# Patient Record
Sex: FEMALE | Race: WHITE | Employment: OTHER | URBAN - METROPOLITAN AREA
[De-identification: names, ages, dates, MRNs, and addresses within clinical notes are randomized per-mention and may not be internally consistent; named-entity substitution may affect disease eponyms.]

---

## 2017-01-18 ENCOUNTER — OFFICE VISIT (OUTPATIENT)
Dept: PRIMARY CARE CLINIC | Age: 67
End: 2017-01-18

## 2017-01-18 VITALS
RESPIRATION RATE: 14 BRPM | SYSTOLIC BLOOD PRESSURE: 118 MMHG | HEART RATE: 74 BPM | TEMPERATURE: 97.5 F | WEIGHT: 98.6 LBS | BODY MASS INDEX: 17.47 KG/M2 | DIASTOLIC BLOOD PRESSURE: 78 MMHG | HEIGHT: 63 IN

## 2017-01-18 DIAGNOSIS — Z11.59 NEED FOR HEPATITIS C SCREENING TEST: ICD-10-CM

## 2017-01-18 DIAGNOSIS — E03.8 OTHER SPECIFIED HYPOTHYROIDISM: ICD-10-CM

## 2017-01-18 DIAGNOSIS — R73.9 HYPERGLYCEMIA: ICD-10-CM

## 2017-01-18 DIAGNOSIS — M19.049 ARTHRITIS OF HAND: ICD-10-CM

## 2017-01-18 DIAGNOSIS — Z11.4 ENCOUNTER FOR SCREENING FOR HIV: ICD-10-CM

## 2017-01-18 DIAGNOSIS — I73.00 RAYNAUD'S DISEASE WITHOUT GANGRENE: Primary | ICD-10-CM

## 2017-01-18 DIAGNOSIS — M54.2 ACUTE NECK PAIN: ICD-10-CM

## 2017-01-18 DIAGNOSIS — Z12.31 SCREENING MAMMOGRAM, ENCOUNTER FOR: ICD-10-CM

## 2017-01-18 DIAGNOSIS — Z12.11 SCREEN FOR COLON CANCER: ICD-10-CM

## 2017-01-18 DIAGNOSIS — I73.00 RAYNAUD'S DISEASE WITHOUT GANGRENE: ICD-10-CM

## 2017-01-18 LAB
ALBUMIN SERPL-MCNC: 4.6 G/DL (ref 3.9–4.9)
ALP BLD-CCNC: 56 U/L (ref 40–130)
ALT SERPL-CCNC: 17 U/L (ref 0–33)
ANION GAP SERPL CALCULATED.3IONS-SCNC: 13 MEQ/L (ref 7–13)
AST SERPL-CCNC: 30 U/L (ref 0–35)
BASOPHILS ABSOLUTE: 0 K/UL (ref 0–0.2)
BASOPHILS RELATIVE PERCENT: 0.5 %
BILIRUB SERPL-MCNC: 0.3 MG/DL (ref 0–1.2)
BUN BLDV-MCNC: 15 MG/DL (ref 8–23)
CALCIUM SERPL-MCNC: 9.2 MG/DL (ref 8.6–10.2)
CHLORIDE BLD-SCNC: 99 MEQ/L (ref 98–107)
CO2: 27 MEQ/L (ref 22–29)
CREAT SERPL-MCNC: 0.59 MG/DL (ref 0.5–0.9)
EOSINOPHILS ABSOLUTE: 0.1 K/UL (ref 0–0.7)
EOSINOPHILS RELATIVE PERCENT: 1.3 %
GFR AFRICAN AMERICAN: >60
GFR NON-AFRICAN AMERICAN: >60
GLOBULIN: 1.6 G/DL (ref 2.3–3.5)
GLUCOSE BLD-MCNC: 67 MG/DL (ref 74–109)
HBA1C MFR BLD: 5.2 % (ref 4.8–5.9)
HCT VFR BLD CALC: 45.8 % (ref 37–47)
HEMOGLOBIN: 14.7 G/DL (ref 12–16)
HEPATITIS C ANTIBODY INTERPRETATION: NORMAL
LYMPHOCYTES ABSOLUTE: 1.5 K/UL (ref 1–4.8)
LYMPHOCYTES RELATIVE PERCENT: 30.4 %
MCH RBC QN AUTO: 31.6 PG (ref 27–31.3)
MCHC RBC AUTO-ENTMCNC: 32 % (ref 33–37)
MCV RBC AUTO: 98.8 FL (ref 82–100)
MONOCYTES ABSOLUTE: 0.8 K/UL (ref 0.2–0.8)
MONOCYTES RELATIVE PERCENT: 15.8 %
NEUTROPHILS ABSOLUTE: 2.5 K/UL (ref 1.4–6.5)
NEUTROPHILS RELATIVE PERCENT: 52 %
PDW BLD-RTO: 12.2 % (ref 11.5–14.5)
PLATELET # BLD: 219 K/UL (ref 130–400)
POTASSIUM SERPL-SCNC: 3.5 MEQ/L (ref 3.5–5.1)
RBC # BLD: 4.64 M/UL (ref 4.2–5.4)
RHEUMATOID FACTOR: <10 IU/ML (ref 0–14)
SLIDE REVIEW: ABNORMAL
SODIUM BLD-SCNC: 139 MEQ/L (ref 132–144)
TOTAL PROTEIN: 6.2 G/DL (ref 6.4–8.1)
TSH SERPL DL<=0.05 MIU/L-ACNC: 1.02 UIU/ML (ref 0.27–4.2)
WBC # BLD: 4.8 K/UL (ref 4.8–10.8)

## 2017-01-18 PROCEDURE — 3014F SCREEN MAMMO DOC REV: CPT | Performed by: INTERNAL MEDICINE

## 2017-01-18 PROCEDURE — 3017F COLORECTAL CA SCREEN DOC REV: CPT | Performed by: INTERNAL MEDICINE

## 2017-01-18 PROCEDURE — G8427 DOCREV CUR MEDS BY ELIG CLIN: HCPCS | Performed by: INTERNAL MEDICINE

## 2017-01-18 PROCEDURE — 99214 OFFICE O/P EST MOD 30 MIN: CPT | Performed by: INTERNAL MEDICINE

## 2017-01-18 PROCEDURE — G8482 FLU IMMUNIZE ORDER/ADMIN: HCPCS | Performed by: INTERNAL MEDICINE

## 2017-01-18 PROCEDURE — G8419 CALC BMI OUT NRM PARAM NOF/U: HCPCS | Performed by: INTERNAL MEDICINE

## 2017-01-18 PROCEDURE — 1036F TOBACCO NON-USER: CPT | Performed by: INTERNAL MEDICINE

## 2017-01-18 RX ORDER — HYDROCODONE BITARTRATE AND ACETAMINOPHEN 5; 325 MG/1; MG/1
1 TABLET ORAL DAILY
Qty: 60 TABLET | Refills: 0 | Status: SHIPPED | OUTPATIENT
Start: 2017-01-18 | End: 2017-08-08

## 2017-01-18 RX ORDER — NIFEDIPINE 30 MG/1
30 TABLET, EXTENDED RELEASE ORAL DAILY
Qty: 30 TABLET | Refills: 5 | Status: SHIPPED | OUTPATIENT
Start: 2017-01-18 | End: 2017-08-08

## 2017-01-19 LAB
ANA INTERPRETATION: ABNORMAL
ANA PATTERN: ABNORMAL
ANA TITER: ABNORMAL
ANTI-NUCLEAR ANTIBODY (ANA): POSITIVE

## 2017-01-20 LAB — HIV-1 AND HIV-2 ANTIBODIES: NEGATIVE

## 2017-01-23 DIAGNOSIS — M19.049 ARTHRITIS OF HAND: ICD-10-CM

## 2017-01-23 RX ORDER — PREDNISONE 1 MG/1
TABLET ORAL
Qty: 90 TABLET | Refills: 1 | Status: SHIPPED | OUTPATIENT
Start: 2017-01-23 | End: 2017-08-08 | Stop reason: SDUPTHER

## 2017-01-29 ASSESSMENT — ENCOUNTER SYMPTOMS
FACIAL SWELLING: 0
APNEA: 0
ABDOMINAL DISTENTION: 0
CHOKING: 0
BLOOD IN STOOL: 0
PHOTOPHOBIA: 0

## 2017-01-30 DIAGNOSIS — M25.50 MULTIPLE JOINT PAIN: Primary | ICD-10-CM

## 2017-01-30 DIAGNOSIS — R76.8 ANA POSITIVE: ICD-10-CM

## 2017-02-06 RX ORDER — METOPROLOL SUCCINATE 25 MG/1
TABLET, EXTENDED RELEASE ORAL
Qty: 90 TABLET | Refills: 1 | Status: SHIPPED | OUTPATIENT
Start: 2017-02-06 | End: 2018-01-31 | Stop reason: SDUPTHER

## 2017-02-09 ENCOUNTER — HOSPITAL ENCOUNTER (OUTPATIENT)
Dept: WOMENS IMAGING | Age: 67
Discharge: HOME | End: 2017-02-09
Payer: COMMERCIAL

## 2017-02-09 DIAGNOSIS — Z12.31 SCREENING MAMMOGRAM, ENCOUNTER FOR: ICD-10-CM

## 2017-02-09 PROCEDURE — G0202 SCR MAMMO BI INCL CAD: HCPCS

## 2017-03-03 RX ORDER — LEVOTHYROXINE SODIUM 0.05 MG/1
TABLET ORAL
Qty: 90 TABLET | Refills: 1 | Status: SHIPPED | OUTPATIENT
Start: 2017-03-03 | End: 2017-09-01 | Stop reason: SDUPTHER

## 2017-03-30 RX ORDER — AMOXICILLIN 500 MG/1
2000 CAPSULE ORAL ONCE
Qty: 6 CAPSULE | Refills: 0 | Status: SHIPPED | OUTPATIENT
Start: 2017-03-30 | End: 2017-03-30

## 2017-07-14 DIAGNOSIS — M19.049 ARTHRITIS OF HAND: ICD-10-CM

## 2017-07-14 RX ORDER — HYDROXYCHLOROQUINE SULFATE 200 MG/1
TABLET, FILM COATED ORAL
Qty: 30 TABLET | Refills: 0 | Status: SHIPPED | OUTPATIENT
Start: 2017-07-14 | End: 2017-10-13 | Stop reason: SDUPTHER

## 2017-08-03 DIAGNOSIS — M19.049 ARTHRITIS OF HAND: ICD-10-CM

## 2017-08-03 RX ORDER — PREDNISONE 1 MG/1
TABLET ORAL
Qty: 90 TABLET | Refills: 1 | OUTPATIENT
Start: 2017-08-03

## 2017-08-08 ENCOUNTER — OFFICE VISIT (OUTPATIENT)
Dept: PRIMARY CARE CLINIC | Age: 67
End: 2017-08-08

## 2017-08-08 VITALS
WEIGHT: 99 LBS | SYSTOLIC BLOOD PRESSURE: 104 MMHG | RESPIRATION RATE: 14 BRPM | BODY MASS INDEX: 17.54 KG/M2 | DIASTOLIC BLOOD PRESSURE: 60 MMHG | HEIGHT: 63 IN | TEMPERATURE: 97.4 F | HEART RATE: 62 BPM

## 2017-08-08 DIAGNOSIS — M54.9 CHRONIC BILATERAL BACK PAIN, UNSPECIFIED BACK LOCATION: ICD-10-CM

## 2017-08-08 DIAGNOSIS — M81.8 OTHER OSTEOPOROSIS: ICD-10-CM

## 2017-08-08 DIAGNOSIS — M54.2 ACUTE NECK PAIN: ICD-10-CM

## 2017-08-08 DIAGNOSIS — M19.049 ARTHRITIS OF HAND: Primary | ICD-10-CM

## 2017-08-08 DIAGNOSIS — Z12.11 SCREEN FOR COLON CANCER: ICD-10-CM

## 2017-08-08 DIAGNOSIS — G89.29 CHRONIC BILATERAL BACK PAIN, UNSPECIFIED BACK LOCATION: ICD-10-CM

## 2017-08-08 PROCEDURE — G8419 CALC BMI OUT NRM PARAM NOF/U: HCPCS | Performed by: INTERNAL MEDICINE

## 2017-08-08 PROCEDURE — 3014F SCREEN MAMMO DOC REV: CPT | Performed by: INTERNAL MEDICINE

## 2017-08-08 PROCEDURE — 99214 OFFICE O/P EST MOD 30 MIN: CPT | Performed by: INTERNAL MEDICINE

## 2017-08-08 PROCEDURE — 4005F PHARM THX FOR OP RXD: CPT | Performed by: INTERNAL MEDICINE

## 2017-08-08 PROCEDURE — 3017F COLORECTAL CA SCREEN DOC REV: CPT | Performed by: INTERNAL MEDICINE

## 2017-08-08 PROCEDURE — G8427 DOCREV CUR MEDS BY ELIG CLIN: HCPCS | Performed by: INTERNAL MEDICINE

## 2017-08-08 PROCEDURE — 1036F TOBACCO NON-USER: CPT | Performed by: INTERNAL MEDICINE

## 2017-08-08 RX ORDER — COLCHICINE 0.6 MG/1
TABLET ORAL
Refills: 5 | COMMUNITY
Start: 2017-07-30 | End: 2021-12-08

## 2017-08-08 RX ORDER — HYDROCODONE BITARTRATE AND ACETAMINOPHEN 5; 325 MG/1; MG/1
1 TABLET ORAL 2 TIMES DAILY PRN
Qty: 60 TABLET | Refills: 0 | Status: SHIPPED | OUTPATIENT
Start: 2017-08-08 | End: 2018-04-04

## 2017-08-08 RX ORDER — ORPHENADRINE CITRATE 100 MG/1
100 TABLET, EXTENDED RELEASE ORAL 2 TIMES DAILY PRN
Qty: 60 TABLET | Refills: 1 | Status: SHIPPED | OUTPATIENT
Start: 2017-08-08 | End: 2018-04-04

## 2017-08-08 RX ORDER — PREDNISONE 1 MG/1
TABLET ORAL
Qty: 90 TABLET | Refills: 1 | Status: SHIPPED | OUTPATIENT
Start: 2017-08-08 | End: 2017-11-07 | Stop reason: SDUPTHER

## 2017-08-08 ASSESSMENT — PATIENT HEALTH QUESTIONNAIRE - PHQ9
1. LITTLE INTEREST OR PLEASURE IN DOING THINGS: 0
SUM OF ALL RESPONSES TO PHQ9 QUESTIONS 1 & 2: 0
SUM OF ALL RESPONSES TO PHQ QUESTIONS 1-9: 0
2. FEELING DOWN, DEPRESSED OR HOPELESS: 0

## 2017-08-11 ASSESSMENT — ENCOUNTER SYMPTOMS
ABDOMINAL PAIN: 0
APNEA: 0
FACIAL SWELLING: 0
ABDOMINAL DISTENTION: 0
BLOOD IN STOOL: 0
CHOKING: 0
BACK PAIN: 1
PHOTOPHOBIA: 0

## 2017-09-01 RX ORDER — LEVOTHYROXINE SODIUM 0.05 MG/1
TABLET ORAL
Qty: 90 TABLET | Refills: 1 | Status: SHIPPED | OUTPATIENT
Start: 2017-09-01 | End: 2017-11-07 | Stop reason: SDUPTHER

## 2017-10-13 DIAGNOSIS — M19.049 ARTHRITIS OF HAND: ICD-10-CM

## 2017-10-13 RX ORDER — HYDROXYCHLOROQUINE SULFATE 200 MG/1
TABLET, FILM COATED ORAL
Qty: 30 TABLET | Refills: 5 | Status: SHIPPED | OUTPATIENT
Start: 2017-10-13 | End: 2017-11-07 | Stop reason: SDUPTHER

## 2017-11-07 DIAGNOSIS — M19.049 ARTHRITIS OF HAND: ICD-10-CM

## 2017-11-07 RX ORDER — PREDNISONE 1 MG/1
TABLET ORAL
Qty: 90 TABLET | Refills: 1 | Status: SHIPPED | OUTPATIENT
Start: 2017-11-07 | End: 2018-04-04 | Stop reason: SDUPTHER

## 2017-11-07 RX ORDER — HYDROXYCHLOROQUINE SULFATE 200 MG/1
TABLET, FILM COATED ORAL
Qty: 30 TABLET | Refills: 5 | Status: SHIPPED | OUTPATIENT
Start: 2017-11-07 | End: 2018-04-04

## 2017-11-07 RX ORDER — LEVOTHYROXINE SODIUM 0.05 MG/1
TABLET ORAL
Qty: 90 TABLET | Refills: 1 | Status: SHIPPED | OUTPATIENT
Start: 2017-11-07 | End: 2018-04-04 | Stop reason: SDUPTHER

## 2017-11-08 ENCOUNTER — TELEPHONE (OUTPATIENT)
Dept: PRIMARY CARE CLINIC | Age: 67
End: 2017-11-08

## 2017-11-08 NOTE — TELEPHONE ENCOUNTER
Zoloft 50 mg 1 qd #7 NR phoned into [facility]. Pt is waiting for mail away meds to arrive.

## 2018-01-15 DIAGNOSIS — M19.049 ARTHRITIS OF HAND: ICD-10-CM

## 2018-01-15 RX ORDER — HYDROXYCHLOROQUINE SULFATE 200 MG/1
TABLET, FILM COATED ORAL
Qty: 60 TABLET | Refills: 3 | Status: SHIPPED | OUTPATIENT
Start: 2018-01-15 | End: 2018-05-14 | Stop reason: SDUPTHER

## 2018-01-31 RX ORDER — METOPROLOL SUCCINATE 25 MG/1
TABLET, EXTENDED RELEASE ORAL
Qty: 90 TABLET | Refills: 0 | Status: SHIPPED | OUTPATIENT
Start: 2018-01-31 | End: 2018-04-04 | Stop reason: SDUPTHER

## 2018-02-14 ENCOUNTER — HOSPITAL ENCOUNTER (OUTPATIENT)
Dept: WOMENS IMAGING | Age: 68
Discharge: HOME | End: 2018-02-16
Payer: MEDICARE

## 2018-02-14 DIAGNOSIS — M81.8 OSTEOPOROSIS, IDIOPATHIC: ICD-10-CM

## 2018-02-14 PROCEDURE — 77080 DXA BONE DENSITY AXIAL: CPT

## 2018-02-26 ENCOUNTER — TELEPHONE (OUTPATIENT)
Dept: PRIMARY CARE CLINIC | Age: 68
End: 2018-02-26

## 2018-04-04 ENCOUNTER — OFFICE VISIT (OUTPATIENT)
Dept: PRIMARY CARE CLINIC | Age: 68
End: 2018-04-04
Payer: MEDICARE

## 2018-04-04 VITALS
WEIGHT: 99.5 LBS | HEIGHT: 63 IN | TEMPERATURE: 97.2 F | DIASTOLIC BLOOD PRESSURE: 80 MMHG | BODY MASS INDEX: 17.63 KG/M2 | OXYGEN SATURATION: 98 % | RESPIRATION RATE: 16 BRPM | SYSTOLIC BLOOD PRESSURE: 110 MMHG | HEART RATE: 71 BPM

## 2018-04-04 DIAGNOSIS — E53.8 VITAMIN B 12 DEFICIENCY: ICD-10-CM

## 2018-04-04 DIAGNOSIS — E53.8 FOLATE DEFICIENCY: ICD-10-CM

## 2018-04-04 DIAGNOSIS — E55.9 VITAMIN D DEFICIENCY: ICD-10-CM

## 2018-04-04 DIAGNOSIS — M19.049 ARTHRITIS OF HAND: ICD-10-CM

## 2018-04-04 DIAGNOSIS — R00.2 PALPITATIONS: ICD-10-CM

## 2018-04-04 DIAGNOSIS — E03.9 HYPOTHYROIDISM, UNSPECIFIED TYPE: ICD-10-CM

## 2018-04-04 DIAGNOSIS — R00.2 PALPITATIONS: Primary | ICD-10-CM

## 2018-04-04 LAB
ALBUMIN SERPL-MCNC: 4.6 G/DL (ref 3.9–4.9)
ALP BLD-CCNC: 66 U/L (ref 40–130)
ALT SERPL-CCNC: 15 U/L (ref 0–33)
ANION GAP SERPL CALCULATED.3IONS-SCNC: 13 MEQ/L (ref 7–13)
AST SERPL-CCNC: 26 U/L (ref 0–35)
BASOPHILS ABSOLUTE: 0.1 K/UL (ref 0–0.2)
BASOPHILS RELATIVE PERCENT: 1.4 %
BILIRUB SERPL-MCNC: 0.4 MG/DL (ref 0–1.2)
BUN BLDV-MCNC: 12 MG/DL (ref 8–23)
CALCIUM SERPL-MCNC: 9.1 MG/DL (ref 8.6–10.2)
CHLORIDE BLD-SCNC: 102 MEQ/L (ref 98–107)
CO2: 27 MEQ/L (ref 22–29)
CREAT SERPL-MCNC: 0.57 MG/DL (ref 0.5–0.9)
EOSINOPHILS ABSOLUTE: 0.1 K/UL (ref 0–0.7)
EOSINOPHILS RELATIVE PERCENT: 1.5 %
FOLATE: >20 NG/ML (ref 7.3–26.1)
GFR AFRICAN AMERICAN: >60
GFR NON-AFRICAN AMERICAN: >60
GLOBULIN: 1.4 G/DL (ref 2.3–3.5)
GLUCOSE BLD-MCNC: 81 MG/DL (ref 74–109)
HCT VFR BLD CALC: 44 % (ref 37–47)
HEMOGLOBIN: 14.6 G/DL (ref 12–16)
LYMPHOCYTES ABSOLUTE: 1.1 K/UL (ref 1–4.8)
LYMPHOCYTES RELATIVE PERCENT: 26.2 %
MCH RBC QN AUTO: 32.7 PG (ref 27–31.3)
MCHC RBC AUTO-ENTMCNC: 33.2 % (ref 33–37)
MCV RBC AUTO: 98.5 FL (ref 82–100)
MONOCYTES ABSOLUTE: 0.7 K/UL (ref 0.2–0.8)
MONOCYTES RELATIVE PERCENT: 16.7 %
NEUTROPHILS ABSOLUTE: 2.3 K/UL (ref 1.4–6.5)
NEUTROPHILS RELATIVE PERCENT: 54.2 %
PDW BLD-RTO: 13.4 % (ref 11.5–14.5)
PLATELET # BLD: 210 K/UL (ref 130–400)
POTASSIUM SERPL-SCNC: 3.3 MEQ/L (ref 3.5–5.1)
RBC # BLD: 4.47 M/UL (ref 4.2–5.4)
SODIUM BLD-SCNC: 142 MEQ/L (ref 132–144)
T4 FREE: 1.2 NG/DL (ref 0.93–1.7)
TOTAL PROTEIN: 6 G/DL (ref 6.4–8.1)
TSH SERPL DL<=0.05 MIU/L-ACNC: 0.91 UIU/ML (ref 0.27–4.2)
VITAMIN B-12: 198 PG/ML (ref 232–1245)
VITAMIN D 25-HYDROXY: 13.8 NG/ML (ref 30–100)
WBC # BLD: 4.2 K/UL (ref 4.8–10.8)

## 2018-04-04 PROCEDURE — 93000 ELECTROCARDIOGRAM COMPLETE: CPT | Performed by: INTERNAL MEDICINE

## 2018-04-04 PROCEDURE — 99213 OFFICE O/P EST LOW 20 MIN: CPT | Performed by: INTERNAL MEDICINE

## 2018-04-04 RX ORDER — METOPROLOL SUCCINATE 25 MG/1
25 TABLET, EXTENDED RELEASE ORAL DAILY
Qty: 90 TABLET | Refills: 2 | Status: SHIPPED | OUTPATIENT
Start: 2018-04-04 | End: 2019-02-18 | Stop reason: SDUPTHER

## 2018-04-04 RX ORDER — LEVOTHYROXINE SODIUM 0.05 MG/1
TABLET ORAL
Qty: 90 TABLET | Refills: 2 | Status: SHIPPED | OUTPATIENT
Start: 2018-04-04 | End: 2019-02-15 | Stop reason: SDUPTHER

## 2018-04-04 RX ORDER — FOLIC ACID 1 MG/1
TABLET ORAL
Refills: 2 | COMMUNITY
Start: 2018-02-28 | End: 2021-12-08

## 2018-04-04 RX ORDER — PREDNISONE 1 MG/1
TABLET ORAL
Qty: 90 TABLET | Refills: 1 | Status: SHIPPED | OUTPATIENT
Start: 2018-04-04 | End: 2018-10-12 | Stop reason: SDUPTHER

## 2018-04-04 RX ORDER — COLCHICINE 0.6 MG/1
CAPSULE ORAL
COMMUNITY
Start: 2018-02-28 | End: 2018-10-12

## 2018-04-05 DIAGNOSIS — E55.9 VITAMIN D DEFICIENCY: Primary | ICD-10-CM

## 2018-04-05 RX ORDER — CHOLECALCIFEROL (VITAMIN D3) 1250 MCG
1 CAPSULE ORAL WEEKLY
Qty: 12 CAPSULE | Refills: 1 | Status: SHIPPED | OUTPATIENT
Start: 2018-04-05 | End: 2018-09-19 | Stop reason: SDUPTHER

## 2018-04-08 ASSESSMENT — ENCOUNTER SYMPTOMS
FACIAL SWELLING: 0
PHOTOPHOBIA: 0
ABDOMINAL DISTENTION: 0
CHOKING: 0
BLOOD IN STOOL: 0
COUGH: 0
APNEA: 0

## 2018-04-12 ENCOUNTER — OFFICE VISIT (OUTPATIENT)
Dept: CARDIOLOGY CLINIC | Age: 68
End: 2018-04-12
Payer: MEDICARE

## 2018-04-12 VITALS
HEIGHT: 63 IN | RESPIRATION RATE: 12 BRPM | DIASTOLIC BLOOD PRESSURE: 70 MMHG | SYSTOLIC BLOOD PRESSURE: 110 MMHG | HEART RATE: 68 BPM | WEIGHT: 99 LBS | BODY MASS INDEX: 17.54 KG/M2

## 2018-04-12 DIAGNOSIS — R94.31 ABNORMAL FINDING ON EKG: ICD-10-CM

## 2018-04-12 DIAGNOSIS — R07.89 OTHER CHEST PAIN: ICD-10-CM

## 2018-04-12 DIAGNOSIS — R00.0 TACHYCARDIA: Primary | ICD-10-CM

## 2018-04-12 PROCEDURE — 99204 OFFICE O/P NEW MOD 45 MIN: CPT | Performed by: INTERNAL MEDICINE

## 2018-04-12 ASSESSMENT — ENCOUNTER SYMPTOMS
COUGH: 0
NAUSEA: 0
APNEA: 0
CHEST TIGHTNESS: 0
VOMITING: 0
COLOR CHANGE: 0
SHORTNESS OF BREATH: 0

## 2018-04-23 ENCOUNTER — NURSE ONLY (OUTPATIENT)
Dept: PRIMARY CARE CLINIC | Age: 68
End: 2018-04-23
Payer: MEDICARE

## 2018-04-23 DIAGNOSIS — E53.8 VITAMIN B 12 DEFICIENCY: Primary | ICD-10-CM

## 2018-04-23 PROCEDURE — 96372 THER/PROPH/DIAG INJ SC/IM: CPT | Performed by: INTERNAL MEDICINE

## 2018-04-23 RX ORDER — CYANOCOBALAMIN 1000 UG/ML
1000 INJECTION INTRAMUSCULAR; SUBCUTANEOUS ONCE
Status: COMPLETED | OUTPATIENT
Start: 2018-04-23 | End: 2018-04-23

## 2018-04-23 RX ADMIN — CYANOCOBALAMIN 1000 MCG: 1000 INJECTION INTRAMUSCULAR; SUBCUTANEOUS at 11:53

## 2018-04-27 ENCOUNTER — HOSPITAL ENCOUNTER (OUTPATIENT)
Dept: NUCLEAR MEDICINE | Age: 68
Discharge: HOME | End: 2018-04-29
Payer: MEDICARE

## 2018-04-27 ENCOUNTER — HOSPITAL ENCOUNTER (OUTPATIENT)
Dept: NON INVASIVE DIAGNOSTICS | Age: 68
Discharge: HOME | End: 2018-04-27
Payer: MEDICARE

## 2018-04-27 VITALS — SYSTOLIC BLOOD PRESSURE: 120 MMHG | DIASTOLIC BLOOD PRESSURE: 82 MMHG

## 2018-04-27 DIAGNOSIS — R00.0 TACHYCARDIA: ICD-10-CM

## 2018-04-27 DIAGNOSIS — R07.89 OTHER CHEST PAIN: ICD-10-CM

## 2018-04-27 DIAGNOSIS — R94.31 ABNORMAL FINDING ON EKG: ICD-10-CM

## 2018-04-27 LAB
LV EF: 65 %
LVEF MODALITY: NORMAL

## 2018-04-27 PROCEDURE — 93306 TTE W/DOPPLER COMPLETE: CPT

## 2018-04-27 PROCEDURE — 78452 HT MUSCLE IMAGE SPECT MULT: CPT

## 2018-04-27 PROCEDURE — 2580000003 HC RX 258: Performed by: INTERNAL MEDICINE

## 2018-04-27 PROCEDURE — A9502 TC99M TETROFOSMIN: HCPCS | Performed by: INTERNAL MEDICINE

## 2018-04-27 PROCEDURE — 3430000000 HC RX DIAGNOSTIC RADIOPHARMACEUTICAL: Performed by: INTERNAL MEDICINE

## 2018-04-27 PROCEDURE — 6360000004 HC RX CONTRAST MEDICATION: Performed by: INTERNAL MEDICINE

## 2018-04-27 PROCEDURE — 93017 CV STRESS TEST TRACING ONLY: CPT

## 2018-04-27 RX ORDER — SODIUM CHLORIDE 0.9 % (FLUSH) 0.9 %
10 SYRINGE (ML) INJECTION 2 TIMES DAILY
Status: DISCONTINUED | OUTPATIENT
Start: 2018-04-27 | End: 2018-04-30 | Stop reason: HOSPADM

## 2018-04-27 RX ORDER — SODIUM CHLORIDE 0.9 % (FLUSH) 0.9 %
10 SYRINGE (ML) INJECTION ONCE
Status: COMPLETED | OUTPATIENT
Start: 2018-04-27 | End: 2018-04-27

## 2018-04-27 RX ADMIN — TETROFOSMIN 11.8 MILLICURIE: 0.23 INJECTION, POWDER, LYOPHILIZED, FOR SOLUTION INTRAVENOUS at 07:13

## 2018-04-27 RX ADMIN — Medication 10 ML: at 07:14

## 2018-04-27 RX ADMIN — Medication 10 ML: at 08:53

## 2018-04-27 RX ADMIN — REGADENOSON 0.4 MG: 0.08 INJECTION, SOLUTION INTRAVENOUS at 08:50

## 2018-04-27 RX ADMIN — TETROFOSMIN 35.3 MILLICURIE: 0.23 INJECTION, POWDER, LYOPHILIZED, FOR SOLUTION INTRAVENOUS at 08:52

## 2018-04-27 RX ADMIN — Medication 10 ML: at 08:51

## 2018-05-04 ENCOUNTER — APPOINTMENT (OUTPATIENT)
Dept: NON INVASIVE DIAGNOSTICS | Age: 68
End: 2018-05-04
Payer: MEDICARE

## 2018-05-04 ENCOUNTER — APPOINTMENT (OUTPATIENT)
Dept: NUCLEAR MEDICINE | Age: 68
End: 2018-05-04
Payer: MEDICARE

## 2018-05-04 ENCOUNTER — HOSPITAL ENCOUNTER (OUTPATIENT)
Dept: NON INVASIVE DIAGNOSTICS | Age: 68
Discharge: HOME | End: 2018-05-04
Payer: MEDICARE

## 2018-05-04 DIAGNOSIS — R07.89 OTHER CHEST PAIN: ICD-10-CM

## 2018-05-04 DIAGNOSIS — R94.31 ABNORMAL FINDING ON EKG: ICD-10-CM

## 2018-05-04 DIAGNOSIS — R00.0 TACHYCARDIA: ICD-10-CM

## 2018-05-04 PROCEDURE — 93270 REMOTE 30 DAY ECG REV/REPORT: CPT

## 2018-05-04 PROCEDURE — 93227 XTRNL ECG REC<48 HR R&I: CPT | Performed by: INTERNAL MEDICINE

## 2018-05-04 PROCEDURE — 93271 ECG/MONITORING AND ANALYSIS: CPT

## 2018-05-14 DIAGNOSIS — M19.049 ARTHRITIS OF HAND: ICD-10-CM

## 2018-05-14 RX ORDER — HYDROXYCHLOROQUINE SULFATE 200 MG/1
TABLET, FILM COATED ORAL
Qty: 60 TABLET | Refills: 2 | Status: SHIPPED | OUTPATIENT
Start: 2018-05-14 | End: 2018-08-12 | Stop reason: SDUPTHER

## 2018-05-21 ENCOUNTER — NURSE ONLY (OUTPATIENT)
Dept: PRIMARY CARE CLINIC | Age: 68
End: 2018-05-21
Payer: MEDICARE

## 2018-05-21 DIAGNOSIS — E53.8 VITAMIN B 12 DEFICIENCY: Primary | ICD-10-CM

## 2018-05-21 PROCEDURE — 96372 THER/PROPH/DIAG INJ SC/IM: CPT | Performed by: INTERNAL MEDICINE

## 2018-05-21 RX ORDER — ABALOPARATIDE 2000 UG/ML
INJECTION, SOLUTION SUBCUTANEOUS
COMMUNITY
Start: 2018-04-16 | End: 2018-10-12

## 2018-05-21 RX ORDER — CYANOCOBALAMIN 1000 UG/ML
1000 INJECTION INTRAMUSCULAR; SUBCUTANEOUS ONCE
Status: COMPLETED | OUTPATIENT
Start: 2018-05-21 | End: 2018-05-21

## 2018-05-21 RX ADMIN — CYANOCOBALAMIN 1000 MCG: 1000 INJECTION INTRAMUSCULAR; SUBCUTANEOUS at 11:44

## 2018-05-24 ENCOUNTER — OFFICE VISIT (OUTPATIENT)
Dept: CARDIOLOGY CLINIC | Age: 68
End: 2018-05-24
Payer: MEDICARE

## 2018-05-24 VITALS
HEIGHT: 63 IN | BODY MASS INDEX: 17.72 KG/M2 | DIASTOLIC BLOOD PRESSURE: 64 MMHG | HEART RATE: 60 BPM | SYSTOLIC BLOOD PRESSURE: 110 MMHG | WEIGHT: 100 LBS | RESPIRATION RATE: 12 BRPM

## 2018-05-24 DIAGNOSIS — R07.89 OTHER CHEST PAIN: ICD-10-CM

## 2018-05-24 DIAGNOSIS — R00.0 TACHYCARDIA: Primary | ICD-10-CM

## 2018-05-24 PROCEDURE — 99213 OFFICE O/P EST LOW 20 MIN: CPT | Performed by: INTERNAL MEDICINE

## 2018-05-24 ASSESSMENT — ENCOUNTER SYMPTOMS
COUGH: 0
VOMITING: 0
DIARRHEA: 0
CHEST TIGHTNESS: 0
ABDOMINAL DISTENTION: 0
ABDOMINAL PAIN: 0
COLOR CHANGE: 0
APNEA: 0
NAUSEA: 0
SHORTNESS OF BREATH: 0
ANAL BLEEDING: 0
BLOOD IN STOOL: 0

## 2018-06-29 ENCOUNTER — NURSE ONLY (OUTPATIENT)
Dept: PRIMARY CARE CLINIC | Age: 68
End: 2018-06-29
Payer: MEDICARE

## 2018-06-29 DIAGNOSIS — E53.8 VITAMIN B12 DEFICIENCY: Primary | ICD-10-CM

## 2018-06-29 PROCEDURE — 96372 THER/PROPH/DIAG INJ SC/IM: CPT | Performed by: INTERNAL MEDICINE

## 2018-06-29 RX ORDER — CYANOCOBALAMIN 1000 UG/ML
1000 INJECTION INTRAMUSCULAR; SUBCUTANEOUS ONCE
Status: COMPLETED | OUTPATIENT
Start: 2018-06-29 | End: 2018-06-29

## 2018-06-29 RX ADMIN — CYANOCOBALAMIN 1000 MCG: 1000 INJECTION INTRAMUSCULAR; SUBCUTANEOUS at 11:52

## 2018-08-12 DIAGNOSIS — M19.049 ARTHRITIS OF HAND: ICD-10-CM

## 2018-08-13 RX ORDER — HYDROXYCHLOROQUINE SULFATE 200 MG/1
TABLET, FILM COATED ORAL
Qty: 60 TABLET | Refills: 2 | Status: SHIPPED | OUTPATIENT
Start: 2018-08-13 | End: 2018-11-12 | Stop reason: SDUPTHER

## 2018-08-14 ENCOUNTER — NURSE ONLY (OUTPATIENT)
Dept: PRIMARY CARE CLINIC | Age: 68
End: 2018-08-14
Payer: MEDICARE

## 2018-08-14 DIAGNOSIS — E53.8 VITAMIN B 12 DEFICIENCY: Primary | ICD-10-CM

## 2018-08-14 PROCEDURE — 96372 THER/PROPH/DIAG INJ SC/IM: CPT | Performed by: INTERNAL MEDICINE

## 2018-08-14 RX ORDER — CYANOCOBALAMIN 1000 UG/ML
1000 INJECTION INTRAMUSCULAR; SUBCUTANEOUS ONCE
Status: COMPLETED | OUTPATIENT
Start: 2018-08-14 | End: 2018-08-14

## 2018-08-14 RX ADMIN — CYANOCOBALAMIN 1000 MCG: 1000 INJECTION INTRAMUSCULAR; SUBCUTANEOUS at 15:03

## 2018-09-18 ENCOUNTER — NURSE ONLY (OUTPATIENT)
Dept: PRIMARY CARE CLINIC | Age: 68
End: 2018-09-18
Payer: MEDICARE

## 2018-09-18 DIAGNOSIS — E53.8 VITAMIN B 12 DEFICIENCY: Primary | ICD-10-CM

## 2018-09-18 PROCEDURE — 96372 THER/PROPH/DIAG INJ SC/IM: CPT | Performed by: INTERNAL MEDICINE

## 2018-09-18 RX ORDER — CYANOCOBALAMIN 1000 UG/ML
1000 INJECTION INTRAMUSCULAR; SUBCUTANEOUS ONCE
Status: COMPLETED | OUTPATIENT
Start: 2018-09-18 | End: 2018-09-18

## 2018-09-18 RX ADMIN — CYANOCOBALAMIN 1000 MCG: 1000 INJECTION INTRAMUSCULAR; SUBCUTANEOUS at 15:07

## 2018-09-19 DIAGNOSIS — E55.9 VITAMIN D DEFICIENCY: ICD-10-CM

## 2018-09-19 RX ORDER — CHOLECALCIFEROL (VITAMIN D3) 1250 MCG
1 CAPSULE ORAL WEEKLY
Qty: 12 CAPSULE | Refills: 0 | Status: SHIPPED | OUTPATIENT
Start: 2018-09-19 | End: 2018-12-07 | Stop reason: SDUPTHER

## 2018-09-19 NOTE — TELEPHONE ENCOUNTER
Vitamin D3    Patient was last seen on 04/04/2018  Last Prescribed 04/05/2018    Medication is pending  Please approve or deny this request

## 2018-10-10 DIAGNOSIS — M19.049 ARTHRITIS OF HAND: ICD-10-CM

## 2018-10-10 RX ORDER — PREDNISONE 1 MG/1
TABLET ORAL
Qty: 90 TABLET | Refills: 1 | OUTPATIENT
Start: 2018-10-10

## 2018-10-12 ENCOUNTER — OFFICE VISIT (OUTPATIENT)
Dept: PRIMARY CARE CLINIC | Age: 68
End: 2018-10-12
Payer: MEDICARE

## 2018-10-12 VITALS
HEIGHT: 63 IN | TEMPERATURE: 97 F | SYSTOLIC BLOOD PRESSURE: 102 MMHG | BODY MASS INDEX: 17.54 KG/M2 | OXYGEN SATURATION: 100 % | DIASTOLIC BLOOD PRESSURE: 60 MMHG | WEIGHT: 99 LBS | HEART RATE: 63 BPM | RESPIRATION RATE: 14 BRPM

## 2018-10-12 DIAGNOSIS — M19.049 ARTHRITIS OF HAND: ICD-10-CM

## 2018-10-12 DIAGNOSIS — M54.9 CHRONIC BILATERAL BACK PAIN, UNSPECIFIED BACK LOCATION: ICD-10-CM

## 2018-10-12 DIAGNOSIS — G89.29 CHRONIC BILATERAL BACK PAIN, UNSPECIFIED BACK LOCATION: ICD-10-CM

## 2018-10-12 PROCEDURE — 99213 OFFICE O/P EST LOW 20 MIN: CPT | Performed by: INTERNAL MEDICINE

## 2018-10-12 RX ORDER — HYDROCODONE BITARTRATE AND ACETAMINOPHEN 5; 325 MG/1; MG/1
1 TABLET ORAL EVERY 6 HOURS PRN
Qty: 28 TABLET | Refills: 0 | Status: SHIPPED | OUTPATIENT
Start: 2018-10-12 | End: 2018-10-19

## 2018-10-12 RX ORDER — PREDNISONE 1 MG/1
5 TABLET ORAL DAILY
Qty: 14 TABLET | Refills: 0 | Status: SHIPPED | OUTPATIENT
Start: 2018-10-12 | End: 2018-10-26

## 2018-10-12 RX ORDER — PREDNISONE 1 MG/1
TABLET ORAL
Qty: 90 TABLET | Refills: 2 | Status: SHIPPED | OUTPATIENT
Start: 2018-10-12 | End: 2019-04-29 | Stop reason: SDUPTHER

## 2018-10-12 RX ORDER — CYCLOBENZAPRINE HCL 10 MG
10 TABLET ORAL 2 TIMES DAILY PRN
Qty: 60 TABLET | Refills: 2 | Status: SHIPPED | OUTPATIENT
Start: 2018-10-12 | End: 2021-10-25 | Stop reason: SDUPTHER

## 2018-10-12 ASSESSMENT — PATIENT HEALTH QUESTIONNAIRE - PHQ9
SUM OF ALL RESPONSES TO PHQ QUESTIONS 1-9: 1
2. FEELING DOWN, DEPRESSED OR HOPELESS: 1
1. LITTLE INTEREST OR PLEASURE IN DOING THINGS: 0
SUM OF ALL RESPONSES TO PHQ9 QUESTIONS 1 & 2: 1
SUM OF ALL RESPONSES TO PHQ QUESTIONS 1-9: 1

## 2018-10-12 NOTE — PROGRESS NOTES
Ely Marin 79 y.o. female presents today with   Chief Complaint   Patient presents with    Arthritis     Follow up. Medication refill for Prednisone due to arthritis of the hands. Patient would like 90 day to go to mail away and 2 week supply to local pharmacy-  in [place].  Health Maintenance     CRC denied       Hand Pain    Incident onset: years. The incident occurred at home. There was no injury mechanism. The pain is present in the left hand, left fingers, right fingers and right hand. The quality of the pain is described as aching. The pain is at a severity of 4/10. The pain is moderate. The pain has been worsening since the incident. Pertinent negatives include no chest pain. The symptoms are aggravated by movement. got multiple shots.     Past Medical History:   Diagnosis Date    Arthritis     seen dr Queta Enrique Chronic back pain     seen dr Ana M Ortiz in past    Depression     Hypothyroidism     Osteoarthritis     Osteoporosis     Restless legs syndrome     Tachycardia      Patient Active Problem List    Diagnosis Date Noted    Spondylosis of lumbar region without myelopathy or radiculopathy 07/29/2016    Hypothyroid 07/29/2013    Depressive disorder, not elsewhere classified 07/29/2013    Palpitations 07/29/2013    Tachycardia 07/29/2013     Past Surgical History:   Procedure Laterality Date    CHOLECYSTECTOMY      HYSTERECTOMY      KNEE ARTHROSCOPY Right     TONSILLECTOMY       Family History   Problem Relation Age of Onset    Heart Disease Mother     High Blood Pressure Mother     Kidney Disease Mother     Cancer Father         bone, prostate, skin    Heart Disease Brother     Cancer Sister         kidney, lung     Social History     Social History    Marital status:      Spouse name: N/A    Number of children: N/A    Years of education: N/A     Social History Main Topics    Smoking status: Never Smoker    Smokeless tobacco: Never Used    Alcohol use 0.0 and all orders for this visit:    Arthritis of hand  -     predniSONE (DELTASONE) 5 MG tablet; TAKE 1 TABLET DAILY    Chronic bilateral back pain, unspecified back location  -     HYDROcodone-acetaminophen (NORCO) 5-325 MG per tablet; Take 1 tablet by mouth every 6 hours as needed for Pain for up to 7 days. .  -     cyclobenzaprine (FLEXERIL) 10 MG tablet; Take 1 tablet by mouth 2 times daily as needed for Muscle spasms  -     Referral To Pain Management ([facility]) - [facility] [address].    Other orders  -     predniSONE (DELTASONE) 5 MG tablet; Take 1 tablet by mouth daily for 14 days        No Follow-up on file.     Tenzin Garcia MD

## 2018-10-17 ASSESSMENT — ENCOUNTER SYMPTOMS
BLOOD IN STOOL: 0
BACK PAIN: 1
FACIAL SWELLING: 0
APNEA: 0
PHOTOPHOBIA: 0
ABDOMINAL DISTENTION: 0
CHOKING: 0

## 2018-11-02 ENCOUNTER — NURSE ONLY (OUTPATIENT)
Dept: PRIMARY CARE CLINIC | Age: 68
End: 2018-11-02
Payer: MEDICARE

## 2018-11-02 DIAGNOSIS — D51.0 PA (PERNICIOUS ANEMIA): Primary | ICD-10-CM

## 2018-11-02 PROCEDURE — 96372 THER/PROPH/DIAG INJ SC/IM: CPT | Performed by: INTERNAL MEDICINE

## 2018-11-02 RX ORDER — CYANOCOBALAMIN 1000 UG/ML
1000 INJECTION INTRAMUSCULAR; SUBCUTANEOUS ONCE
Status: COMPLETED | OUTPATIENT
Start: 2018-11-02 | End: 2018-11-02

## 2018-11-02 RX ADMIN — CYANOCOBALAMIN 1000 MCG: 1000 INJECTION INTRAMUSCULAR; SUBCUTANEOUS at 15:07

## 2018-11-12 DIAGNOSIS — M19.049 ARTHRITIS OF HAND: ICD-10-CM

## 2018-11-12 RX ORDER — HYDROXYCHLOROQUINE SULFATE 200 MG/1
TABLET, FILM COATED ORAL
Qty: 60 TABLET | Refills: 1 | Status: SHIPPED | OUTPATIENT
Start: 2018-11-12 | End: 2019-01-11 | Stop reason: SDUPTHER

## 2018-11-29 ENCOUNTER — OFFICE VISIT (OUTPATIENT)
Dept: CARDIOLOGY CLINIC | Age: 68
End: 2018-11-29
Payer: MEDICARE

## 2018-11-29 VITALS
BODY MASS INDEX: 17.54 KG/M2 | OXYGEN SATURATION: 97 % | SYSTOLIC BLOOD PRESSURE: 116 MMHG | HEIGHT: 63 IN | DIASTOLIC BLOOD PRESSURE: 72 MMHG | HEART RATE: 61 BPM | RESPIRATION RATE: 18 BRPM

## 2018-11-29 DIAGNOSIS — R00.0 TACHYCARDIA: Primary | ICD-10-CM

## 2018-11-29 PROCEDURE — 99213 OFFICE O/P EST LOW 20 MIN: CPT | Performed by: INTERNAL MEDICINE

## 2018-11-29 ASSESSMENT — ENCOUNTER SYMPTOMS
APNEA: 0
COLOR CHANGE: 0
SHORTNESS OF BREATH: 0
DIARRHEA: 0
CHEST TIGHTNESS: 0
NAUSEA: 0
BLOOD IN STOOL: 0
VOMITING: 0

## 2018-11-29 NOTE — PROGRESS NOTES
[facility] CARDIOLOGY OFFICE FOLLOW-UP      Patient: Nathalia Amanda  YOB: 1950  MRN: [id number]    Chief Complaint:  Chief Complaint   Patient presents with    6 Month Follow-Up    Palpitations    Tachycardia         Subjective/HPI:  11/28/18: Patient presents today for A reddish of palpitation. Much better after Toprol was started. Sees rheumatologist for connective tissue disorder. No angina. No congestive heart failure. See me in 6 months.     5/24/18: Patient presents today for Evaluation of palpitation. Much better after we started her on Toprol 25 mg a day. Stress test is unremarkable. Echocardiogram is okay too. Has a history of connective tissue disorder being followed by Juan. No syncope no dizziness. See me in 6 months     4/12/18: Patient presents today for Evaluation of palpitation. Consulted by Dr. Teri Bingham. Has a history of lupus and being treated by Juan. Has a history of palpitation the remote past. Was seen by Torsten Severino. Had a stress test followed by cardiac catheterization. She was put on Toprol 25 mg (1/2). And has continued since then. Accompanied by her . No syncope. Palpitations are unpredictable. It would last hours at times. No relation to any activity she does. Could happen when she is lying in bed. Thyroid profile is normal. No convulsions or seizures. We will increase her Toprol to 25 mg a day. We will repeat Lexiscan, echo and a 14 day event monitor.            Past Medical History:   Diagnosis Date    Arthritis     seen dr Eleazar Leonardo Chronic back pain     seen dr Veronica Leon in past    Depression     Hypothyroidism     Osteoarthritis     Osteoporosis     Restless legs syndrome     Tachycardia        Past Surgical History:   Procedure Laterality Date    CHOLECYSTECTOMY      HYSTERECTOMY      KNEE ARTHROSCOPY Right     TONSILLECTOMY         Family History   Problem Relation Age of Onset    Heart Disease Mother     High Blood Pressure Mother     Kidney

## 2018-12-05 ENCOUNTER — NURSE ONLY (OUTPATIENT)
Dept: PRIMARY CARE CLINIC | Age: 68
End: 2018-12-05
Payer: MEDICARE

## 2018-12-05 DIAGNOSIS — E53.8 B12 DEFICIENCY: Primary | ICD-10-CM

## 2018-12-05 PROCEDURE — 96372 THER/PROPH/DIAG INJ SC/IM: CPT | Performed by: INTERNAL MEDICINE

## 2018-12-05 RX ORDER — CYANOCOBALAMIN 1000 UG/ML
1000 INJECTION INTRAMUSCULAR; SUBCUTANEOUS ONCE
Status: COMPLETED | OUTPATIENT
Start: 2018-12-05 | End: 2018-12-05

## 2018-12-05 RX ADMIN — CYANOCOBALAMIN 1000 MCG: 1000 INJECTION INTRAMUSCULAR; SUBCUTANEOUS at 11:34

## 2018-12-07 DIAGNOSIS — E55.9 VITAMIN D DEFICIENCY: ICD-10-CM

## 2018-12-07 RX ORDER — CHOLECALCIFEROL (VITAMIN D3) 1250 MCG
1 CAPSULE ORAL WEEKLY
Qty: 12 CAPSULE | Refills: 3 | Status: SHIPPED | OUTPATIENT
Start: 2018-12-07 | End: 2019-11-15 | Stop reason: SDUPTHER

## 2019-01-11 DIAGNOSIS — M19.049 ARTHRITIS OF HAND: ICD-10-CM

## 2019-01-11 RX ORDER — HYDROXYCHLOROQUINE SULFATE 200 MG/1
TABLET, FILM COATED ORAL
Qty: 60 TABLET | Refills: 2 | Status: SHIPPED | OUTPATIENT
Start: 2019-01-11 | End: 2019-04-11 | Stop reason: SDUPTHER

## 2019-01-18 ENCOUNTER — NURSE ONLY (OUTPATIENT)
Dept: PRIMARY CARE CLINIC | Age: 69
End: 2019-01-18
Payer: MEDICARE

## 2019-01-18 DIAGNOSIS — E53.8 VITAMIN B 12 DEFICIENCY: Primary | ICD-10-CM

## 2019-01-18 PROCEDURE — 96372 THER/PROPH/DIAG INJ SC/IM: CPT | Performed by: INTERNAL MEDICINE

## 2019-01-18 RX ORDER — CYANOCOBALAMIN 1000 UG/ML
1000 INJECTION INTRAMUSCULAR; SUBCUTANEOUS ONCE
Status: COMPLETED | OUTPATIENT
Start: 2019-01-18 | End: 2019-01-18

## 2019-01-18 RX ADMIN — CYANOCOBALAMIN 1000 MCG: 1000 INJECTION INTRAMUSCULAR; SUBCUTANEOUS at 15:04

## 2019-02-15 RX ORDER — LEVOTHYROXINE SODIUM 0.05 MG/1
TABLET ORAL
Qty: 90 TABLET | Refills: 2 | Status: SHIPPED | OUTPATIENT
Start: 2019-02-15 | End: 2019-08-19 | Stop reason: SDUPTHER

## 2019-02-18 RX ORDER — METOPROLOL SUCCINATE 25 MG/1
25 TABLET, EXTENDED RELEASE ORAL DAILY
Qty: 90 TABLET | Refills: 2 | Status: SHIPPED | OUTPATIENT
Start: 2019-02-18 | End: 2019-08-19 | Stop reason: SDUPTHER

## 2019-02-22 ENCOUNTER — NURSE ONLY (OUTPATIENT)
Dept: PRIMARY CARE CLINIC | Age: 69
End: 2019-02-22
Payer: MEDICARE

## 2019-02-22 DIAGNOSIS — E53.8 VITAMIN B 12 DEFICIENCY: Primary | ICD-10-CM

## 2019-02-22 PROCEDURE — 96372 THER/PROPH/DIAG INJ SC/IM: CPT | Performed by: INTERNAL MEDICINE

## 2019-02-22 RX ORDER — CYANOCOBALAMIN 1000 UG/ML
1000 INJECTION INTRAMUSCULAR; SUBCUTANEOUS ONCE
Status: COMPLETED | OUTPATIENT
Start: 2019-02-22 | End: 2019-02-22

## 2019-02-22 RX ADMIN — CYANOCOBALAMIN 1000 MCG: 1000 INJECTION INTRAMUSCULAR; SUBCUTANEOUS at 15:09

## 2019-03-26 ENCOUNTER — NURSE ONLY (OUTPATIENT)
Dept: PRIMARY CARE CLINIC | Age: 69
End: 2019-03-26
Payer: MEDICARE

## 2019-03-26 DIAGNOSIS — E53.8 B12 DEFICIENCY: Primary | ICD-10-CM

## 2019-03-26 PROCEDURE — 96372 THER/PROPH/DIAG INJ SC/IM: CPT | Performed by: INTERNAL MEDICINE

## 2019-03-26 RX ORDER — CYANOCOBALAMIN 1000 UG/ML
1000 INJECTION INTRAMUSCULAR; SUBCUTANEOUS ONCE
Status: COMPLETED | OUTPATIENT
Start: 2019-03-26 | End: 2019-03-26

## 2019-03-26 RX ADMIN — CYANOCOBALAMIN 1000 MCG: 1000 INJECTION INTRAMUSCULAR; SUBCUTANEOUS at 15:05

## 2019-04-11 DIAGNOSIS — M19.049 ARTHRITIS OF HAND: ICD-10-CM

## 2019-04-11 RX ORDER — HYDROXYCHLOROQUINE SULFATE 200 MG/1
TABLET, FILM COATED ORAL
Qty: 60 TABLET | Refills: 2 | Status: SHIPPED | OUTPATIENT
Start: 2019-04-11 | End: 2019-07-09 | Stop reason: SDUPTHER

## 2019-04-11 NOTE — TELEPHONE ENCOUNTER
Patient was last seen on 10-12-18  Last Prescribed 1-11-19    Medication is pending  Please approve or deny this request

## 2019-04-26 ENCOUNTER — NURSE ONLY (OUTPATIENT)
Dept: FAMILY MEDICINE CLINIC | Age: 69
End: 2019-04-26
Payer: MEDICARE

## 2019-04-26 DIAGNOSIS — F32.A FATIGUE DUE TO DEPRESSION: Primary | ICD-10-CM

## 2019-04-26 DIAGNOSIS — R53.83 FATIGUE DUE TO DEPRESSION: Primary | ICD-10-CM

## 2019-04-26 PROCEDURE — 96372 THER/PROPH/DIAG INJ SC/IM: CPT | Performed by: INTERNAL MEDICINE

## 2019-04-26 RX ORDER — CYANOCOBALAMIN 1000 UG/ML
1000 INJECTION INTRAMUSCULAR; SUBCUTANEOUS ONCE
Status: COMPLETED | OUTPATIENT
Start: 2019-04-26 | End: 2019-04-26

## 2019-04-26 RX ADMIN — CYANOCOBALAMIN 1000 MCG: 1000 INJECTION INTRAMUSCULAR; SUBCUTANEOUS at 15:04

## 2019-04-29 DIAGNOSIS — M19.049 ARTHRITIS OF HAND: ICD-10-CM

## 2019-04-29 RX ORDER — PREDNISONE 1 MG/1
TABLET ORAL
Qty: 7 TABLET | Refills: 0 | Status: SHIPPED | OUTPATIENT
Start: 2019-04-29 | End: 2019-07-31 | Stop reason: SDUPTHER

## 2019-04-29 NOTE — TELEPHONE ENCOUNTER
Pt requesting short supply to hold over until she receives her order from [address]. Please advise.

## 2019-05-13 ENCOUNTER — TELEPHONE (OUTPATIENT)
Dept: FAMILY MEDICINE CLINIC | Age: 69
End: 2019-05-13

## 2019-05-13 DIAGNOSIS — M54.50 LOW BACK PAIN RADIATING TO BOTH LEGS: ICD-10-CM

## 2019-05-13 DIAGNOSIS — M79.605 LOW BACK PAIN RADIATING TO BOTH LEGS: ICD-10-CM

## 2019-05-13 DIAGNOSIS — M47.816 SPONDYLOSIS OF LUMBAR REGION WITHOUT MYELOPATHY OR RADICULOPATHY: Primary | ICD-10-CM

## 2019-05-13 DIAGNOSIS — M79.604 LOW BACK PAIN RADIATING TO BOTH LEGS: ICD-10-CM

## 2019-05-23 ENCOUNTER — HOSPITAL ENCOUNTER (OUTPATIENT)
Dept: GENERAL RADIOLOGY | Age: 69
Discharge: HOME | End: 2019-05-25
Payer: MEDICARE

## 2019-05-23 ENCOUNTER — HOSPITAL ENCOUNTER (OUTPATIENT)
Dept: GENERAL RADIOLOGY | Age: 69
End: 2019-05-23
Payer: MEDICARE

## 2019-05-23 DIAGNOSIS — M41.9 SCOLIOSIS, UNSPECIFIED SCOLIOSIS TYPE, UNSPECIFIED SPINAL REGION: ICD-10-CM

## 2019-05-23 DIAGNOSIS — M51.36 DEGENERATION OF INTERVERTEBRAL DISC OF LUMBAR REGION: ICD-10-CM

## 2019-05-23 DIAGNOSIS — M47.816 ARTHRITIS OF LUMBAR SPINE: ICD-10-CM

## 2019-05-23 PROCEDURE — 72110 X-RAY EXAM L-2 SPINE 4/>VWS: CPT

## 2019-05-23 PROCEDURE — 72072 X-RAY EXAM THORAC SPINE 3VWS: CPT

## 2019-06-04 ENCOUNTER — NURSE ONLY (OUTPATIENT)
Dept: FAMILY MEDICINE CLINIC | Age: 69
End: 2019-06-04
Payer: MEDICARE

## 2019-06-04 DIAGNOSIS — E53.8 VITAMIN B12 DEFICIENCY: Primary | ICD-10-CM

## 2019-06-04 PROCEDURE — 96372 THER/PROPH/DIAG INJ SC/IM: CPT | Performed by: INTERNAL MEDICINE

## 2019-06-04 RX ORDER — CYANOCOBALAMIN 1000 UG/ML
1000 INJECTION INTRAMUSCULAR; SUBCUTANEOUS ONCE
Status: COMPLETED | OUTPATIENT
Start: 2019-06-04 | End: 2019-06-04

## 2019-06-04 RX ADMIN — CYANOCOBALAMIN 1000 MCG: 1000 INJECTION INTRAMUSCULAR; SUBCUTANEOUS at 15:15

## 2019-07-09 DIAGNOSIS — M19.049 ARTHRITIS OF HAND: ICD-10-CM

## 2019-07-09 RX ORDER — HYDROXYCHLOROQUINE SULFATE 200 MG/1
TABLET, FILM COATED ORAL
Qty: 60 TABLET | Refills: 2 | Status: SHIPPED | OUTPATIENT
Start: 2019-07-09 | End: 2019-11-07 | Stop reason: SDUPTHER

## 2019-07-09 NOTE — TELEPHONE ENCOUNTER
Patient was last seen on 11/29/2018  Last Prescribed 4/11/2019    Medication is pending  Please approve or deny this request

## 2019-07-15 ENCOUNTER — NURSE ONLY (OUTPATIENT)
Dept: FAMILY MEDICINE CLINIC | Age: 69
End: 2019-07-15
Payer: MEDICARE

## 2019-07-15 DIAGNOSIS — E53.8 VITAMIN B12 DEFICIENCY: Primary | ICD-10-CM

## 2019-07-15 PROCEDURE — 96372 THER/PROPH/DIAG INJ SC/IM: CPT | Performed by: INTERNAL MEDICINE

## 2019-07-15 RX ORDER — CYANOCOBALAMIN 1000 UG/ML
1000 INJECTION INTRAMUSCULAR; SUBCUTANEOUS ONCE
Status: COMPLETED | OUTPATIENT
Start: 2019-07-15 | End: 2019-07-15

## 2019-07-15 RX ADMIN — CYANOCOBALAMIN 1000 MCG: 1000 INJECTION INTRAMUSCULAR; SUBCUTANEOUS at 15:48

## 2019-07-22 ENCOUNTER — OFFICE VISIT (OUTPATIENT)
Dept: FAMILY MEDICINE CLINIC | Age: 69
End: 2019-07-22
Payer: MEDICARE

## 2019-07-22 VITALS
SYSTOLIC BLOOD PRESSURE: 118 MMHG | BODY MASS INDEX: 17.75 KG/M2 | HEIGHT: 61 IN | WEIGHT: 94 LBS | DIASTOLIC BLOOD PRESSURE: 62 MMHG | OXYGEN SATURATION: 97 % | RESPIRATION RATE: 14 BRPM | HEART RATE: 66 BPM | TEMPERATURE: 98.1 F

## 2019-07-22 DIAGNOSIS — M70.52 INFRAPATELLAR BURSITIS OF LEFT KNEE: ICD-10-CM

## 2019-07-22 DIAGNOSIS — M70.52 INFRAPATELLAR BURSITIS OF LEFT KNEE: Primary | ICD-10-CM

## 2019-07-22 PROCEDURE — 99214 OFFICE O/P EST MOD 30 MIN: CPT | Performed by: INTERNAL MEDICINE

## 2019-07-22 PROCEDURE — 20610 DRAIN/INJ JOINT/BURSA W/O US: CPT | Performed by: INTERNAL MEDICINE

## 2019-07-22 NOTE — PROGRESS NOTES
swelling noted. Area was cleaned and bandaid applied. Patient tolerated procedure well. Neurological: She is alert and oriented to person, place, and time. Assessment:       Diagnosis Orders   1. Infrapatellar bursitis of left knee  65720 - CA DRAIN/INJECT LARGE JOINT/BURSA    Body Fluid Culture    Body Fluid Cell Count With Differential     Plan:   OTC ibuprofen 2-3 tabs q6prn. Pt will complete antibiotic course. Return in about 1 week (around 7/29/2019) for assessment of response to treatment, review of test results.

## 2019-07-23 ASSESSMENT — ENCOUNTER SYMPTOMS
SINUS PAIN: 0
VOMITING: 0
DIARRHEA: 0
COUGH: 0
WHEEZING: 0
COLOR CHANGE: 1
NAUSEA: 0
ABDOMINAL PAIN: 0
SHORTNESS OF BREATH: 0
SINUS PRESSURE: 0
SORE THROAT: 0
RHINORRHEA: 0

## 2019-07-24 ENCOUNTER — TELEPHONE (OUTPATIENT)
Dept: FAMILY MEDICINE CLINIC | Age: 69
End: 2019-07-24

## 2019-07-24 LAB
APPEARANCE FLUID: NORMAL
BASO FLUID: 2 %
CELL COUNT FLUID TYPE: NORMAL
CLOT EVALUATION: NORMAL
COLOR FLUID: NORMAL
EOSINOPHIL FLUID: 1 %
LYMPHOCYTES, BODY FLUID: 18 %
MONOCYTE, FLUID: 7 %
NEUTROPHIL, FLUID: 72 %
NUCLEATED CELLS FLUID: 3786 /CUMM
NUMBER OF CELLS COUNTED FLUID: 100
RBC FLUID: NORMAL /CUMM

## 2019-07-25 DIAGNOSIS — A49.8 BACTERIAL INFECTION DUE TO PSEUDOMONAS: ICD-10-CM

## 2019-07-25 DIAGNOSIS — M70.52 INFRAPATELLAR BURSITIS OF LEFT KNEE: Primary | ICD-10-CM

## 2019-07-25 LAB
BODY FLUID CULTURE, STERILE: ABNORMAL
BODY FLUID CULTURE, STERILE: ABNORMAL
GRAM STAIN RESULT: ABNORMAL
ORGANISM: ABNORMAL

## 2019-07-25 RX ORDER — CIPROFLOXACIN 500 MG/1
500 TABLET, FILM COATED ORAL 2 TIMES DAILY
Qty: 20 TABLET | Refills: 0 | Status: SHIPPED | OUTPATIENT
Start: 2019-07-25 | End: 2019-07-29

## 2019-07-29 ENCOUNTER — OFFICE VISIT (OUTPATIENT)
Dept: FAMILY MEDICINE CLINIC | Age: 69
End: 2019-07-29
Payer: MEDICARE

## 2019-07-29 VITALS
SYSTOLIC BLOOD PRESSURE: 128 MMHG | HEIGHT: 61 IN | HEART RATE: 69 BPM | RESPIRATION RATE: 14 BRPM | WEIGHT: 93 LBS | OXYGEN SATURATION: 93 % | TEMPERATURE: 97.9 F | DIASTOLIC BLOOD PRESSURE: 71 MMHG | BODY MASS INDEX: 17.56 KG/M2

## 2019-07-29 DIAGNOSIS — A49.8 BACTERIAL INFECTION DUE TO PSEUDOMONAS: Primary | ICD-10-CM

## 2019-07-29 DIAGNOSIS — L03.116 CELLULITIS OF KNEE, LEFT: ICD-10-CM

## 2019-07-29 DIAGNOSIS — Z00.00 HEALTH CARE MAINTENANCE: ICD-10-CM

## 2019-07-29 PROCEDURE — 99213 OFFICE O/P EST LOW 20 MIN: CPT | Performed by: INTERNAL MEDICINE

## 2019-07-29 RX ORDER — LEVOFLOXACIN 500 MG/1
500 TABLET, FILM COATED ORAL DAILY
Qty: 10 TABLET | Refills: 0 | Status: SHIPPED | OUTPATIENT
Start: 2019-07-29 | End: 2019-10-28 | Stop reason: SDUPTHER

## 2019-07-29 NOTE — PROGRESS NOTES
Jacki Marin 79 y.o. female presents today with   Chief Complaint   Patient presents with    Bursitis     Pt here for 1 wk f/u from [address] with Dr. Shiva Cassidy for left knee infection, Dr. Shiva Cassidy drained knee.  Health Maintenance     Pt would like cologuard, Pt would like referral for mammo. Rash   This is a new problem. The current episode started 1 to 4 weeks ago. The problem has been gradually improving since onset. The affected locations include the left lower leg (left knee). The rash is characterized by redness and swelling. Pertinent negatives include no fever.        Past Medical History:   Diagnosis Date    Arthritis     seen dr Bina Roque Chronic back pain     seen dr Farzad Lawrence in past    Depression     Hypothyroidism     Osteoarthritis     Osteoporosis     Restless legs syndrome     Tachycardia      Patient Active Problem List    Diagnosis Date Noted    Spondylosis of lumbar region without myelopathy or radiculopathy 07/29/2016    Hypothyroid 07/29/2013    Depressive disorder, not elsewhere classified 07/29/2013    Palpitations 07/29/2013    Tachycardia 07/29/2013     Past Surgical History:   Procedure Laterality Date    CHOLECYSTECTOMY      HYSTERECTOMY      KNEE ARTHROSCOPY Right     TONSILLECTOMY       Family History   Problem Relation Age of Onset    Heart Disease Mother     High Blood Pressure Mother     Kidney Disease Mother     Cancer Father         bone, prostate, skin    Heart Disease Brother     Cancer Sister         kidney, lung     Social History     Socioeconomic History    Marital status:      Spouse name: None    Number of children: None    Years of education: None    Highest education level: None   Occupational History    None   Social Needs    Financial resource strain: None    Food insecurity:     Worry: None     Inability: None    Transportation needs:     Medical: None     Non-medical: None   Tobacco Use    Smoking status: Never Smoker    Smokeless tobacco: Never Used   Substance and Sexual Activity    Alcohol use: Yes     Alcohol/week: 0.0 standard drinks     Comment: social    Drug use: No    Sexual activity: None   Lifestyle    Physical activity:     Days per week: None     Minutes per session: None    Stress: None   Relationships    Social connections:     Talks on phone: None     Gets together: None     Attends Restorationist service: None     Active member of club or organization: None     Attends meetings of clubs or organizations: None     Relationship status: None    Intimate partner violence:     Fear of current or ex partner: None     Emotionally abused: None     Physically abused: None     Forced sexual activity: None   Other Topics Concern    None   Social History Narrative    None     No Known Allergies    Review of Systems   Constitutional: Negative for chills and fever. HENT: Negative for facial swelling and nosebleeds. Eyes: Negative for photophobia and visual disturbance. Respiratory: Negative for apnea and choking. Cardiovascular: Negative for chest pain and palpitations. Gastrointestinal: Negative for abdominal distention and blood in stool. Genitourinary: Negative for enuresis, hematuria and vaginal bleeding. Musculoskeletal: Negative for gait problem and joint swelling. Skin: Positive for rash. Neurological: Negative for syncope and speech difficulty. Hematological: Does not bruise/bleed easily. Psychiatric/Behavioral: Negative for hallucinations and suicidal ideas. Vitals:    07/29/19 1131   BP: 128/71   Pulse: 69   Resp: 14   Temp: 97.9 °F (36.6 °C)   SpO2: 93%   Weight: 93 lb (42.2 kg)   Height: 5' 1\" (1.549 m)       Physical Exam   Constitutional: She is oriented to person, place, and time. She appears well-developed and well-nourished. HENT:   Head: Normocephalic and atraumatic. Eyes: Pupils are equal, round, and reactive to light. EOM are normal.   Neck: Normal range of motion. Neck supple.

## 2019-07-31 DIAGNOSIS — M19.049 ARTHRITIS OF HAND: ICD-10-CM

## 2019-07-31 RX ORDER — PREDNISONE 1 MG/1
TABLET ORAL
Qty: 90 TABLET | Refills: 1 | Status: SHIPPED | OUTPATIENT
Start: 2019-07-31 | End: 2022-08-29 | Stop reason: SDUPTHER

## 2019-07-31 RX ORDER — PREDNISONE 1 MG/1
5 TABLET ORAL DAILY
Qty: 10 TABLET | Refills: 0 | Status: SHIPPED | OUTPATIENT
Start: 2019-07-31 | End: 2019-08-10

## 2019-07-31 ASSESSMENT — ENCOUNTER SYMPTOMS
ABDOMINAL DISTENTION: 0
APNEA: 0
FACIAL SWELLING: 0
PHOTOPHOBIA: 0
BLOOD IN STOOL: 0
CHOKING: 0

## 2019-08-05 ENCOUNTER — OFFICE VISIT (OUTPATIENT)
Dept: FAMILY MEDICINE CLINIC | Age: 69
End: 2019-08-05
Payer: MEDICARE

## 2019-08-05 VITALS
DIASTOLIC BLOOD PRESSURE: 80 MMHG | HEIGHT: 61 IN | HEART RATE: 80 BPM | RESPIRATION RATE: 14 BRPM | OXYGEN SATURATION: 97 % | TEMPERATURE: 97.7 F | SYSTOLIC BLOOD PRESSURE: 121 MMHG | WEIGHT: 92 LBS | BODY MASS INDEX: 17.37 KG/M2

## 2019-08-05 DIAGNOSIS — E03.9 HYPOTHYROIDISM, UNSPECIFIED TYPE: ICD-10-CM

## 2019-08-05 DIAGNOSIS — L03.116 CELLULITIS OF KNEE, LEFT: Primary | ICD-10-CM

## 2019-08-05 LAB
T4 TOTAL: 4.5 UG/DL (ref 4.5–11.7)
TSH SERPL DL<=0.05 MIU/L-ACNC: 1.35 UIU/ML (ref 0.44–3.86)

## 2019-08-05 PROCEDURE — 99212 OFFICE O/P EST SF 10 MIN: CPT | Performed by: INTERNAL MEDICINE

## 2019-08-10 ASSESSMENT — ENCOUNTER SYMPTOMS
EYE DISCHARGE: 0
ABDOMINAL DISTENTION: 0

## 2019-08-19 RX ORDER — LEVOTHYROXINE SODIUM 0.05 MG/1
TABLET ORAL
Qty: 10 TABLET | Refills: 0 | Status: SHIPPED | OUTPATIENT
Start: 2019-08-19 | End: 2019-11-29 | Stop reason: SDUPTHER

## 2019-08-19 RX ORDER — METOPROLOL SUCCINATE 25 MG/1
25 TABLET, EXTENDED RELEASE ORAL DAILY
Qty: 10 TABLET | Refills: 0 | Status: SHIPPED | OUTPATIENT
Start: 2019-08-19 | End: 2019-11-29 | Stop reason: SDUPTHER

## 2019-08-26 ENCOUNTER — TELEPHONE (OUTPATIENT)
Dept: FAMILY MEDICINE CLINIC | Age: 69
End: 2019-08-26

## 2019-08-29 DIAGNOSIS — E53.8 VITAMIN B 12 DEFICIENCY: ICD-10-CM

## 2019-08-29 LAB — VITAMIN B-12: 322 PG/ML (ref 232–1245)

## 2019-09-05 ENCOUNTER — TELEPHONE (OUTPATIENT)
Dept: FAMILY MEDICINE CLINIC | Age: 69
End: 2019-09-05

## 2019-09-17 ENCOUNTER — NURSE ONLY (OUTPATIENT)
Dept: FAMILY MEDICINE CLINIC | Age: 69
End: 2019-09-17
Payer: MEDICARE

## 2019-09-17 DIAGNOSIS — E53.8 B12 DEFICIENCY: Primary | ICD-10-CM

## 2019-09-17 PROCEDURE — 96372 THER/PROPH/DIAG INJ SC/IM: CPT | Performed by: INTERNAL MEDICINE

## 2019-09-17 RX ORDER — CYANOCOBALAMIN 1000 UG/ML
1000 INJECTION INTRAMUSCULAR; SUBCUTANEOUS ONCE
Status: COMPLETED | OUTPATIENT
Start: 2019-09-17 | End: 2019-09-17

## 2019-09-17 RX ADMIN — CYANOCOBALAMIN 1000 MCG: 1000 INJECTION INTRAMUSCULAR; SUBCUTANEOUS at 15:11

## 2019-10-23 DIAGNOSIS — Z12.31 ENCOUNTER FOR SCREENING MAMMOGRAM FOR MALIGNANT NEOPLASM OF BREAST: Primary | ICD-10-CM

## 2019-10-28 ENCOUNTER — OFFICE VISIT (OUTPATIENT)
Dept: FAMILY MEDICINE CLINIC | Age: 69
End: 2019-10-28
Payer: MEDICARE

## 2019-10-28 VITALS
OXYGEN SATURATION: 96 % | DIASTOLIC BLOOD PRESSURE: 82 MMHG | WEIGHT: 95 LBS | SYSTOLIC BLOOD PRESSURE: 134 MMHG | BODY MASS INDEX: 17.94 KG/M2 | TEMPERATURE: 99.1 F | HEIGHT: 61 IN | RESPIRATION RATE: 14 BRPM | HEART RATE: 79 BPM

## 2019-10-28 DIAGNOSIS — G89.29 CHRONIC PAIN OF LEFT KNEE: ICD-10-CM

## 2019-10-28 DIAGNOSIS — R82.998 HYPERURICURIA: ICD-10-CM

## 2019-10-28 DIAGNOSIS — E53.8 B12 DEFICIENCY: ICD-10-CM

## 2019-10-28 DIAGNOSIS — M25.562 CHRONIC PAIN OF LEFT KNEE: ICD-10-CM

## 2019-10-28 DIAGNOSIS — L03.116 CELLULITIS OF KNEE, LEFT: Primary | ICD-10-CM

## 2019-10-28 PROCEDURE — 99213 OFFICE O/P EST LOW 20 MIN: CPT | Performed by: INTERNAL MEDICINE

## 2019-10-28 PROCEDURE — 96372 THER/PROPH/DIAG INJ SC/IM: CPT | Performed by: INTERNAL MEDICINE

## 2019-10-28 RX ORDER — CYANOCOBALAMIN 1000 UG/ML
1000 INJECTION INTRAMUSCULAR; SUBCUTANEOUS ONCE
Status: COMPLETED | OUTPATIENT
Start: 2019-10-28 | End: 2019-10-28

## 2019-10-28 RX ORDER — LEVOFLOXACIN 500 MG/1
500 TABLET, FILM COATED ORAL DAILY
Qty: 10 TABLET | Refills: 0 | Status: SHIPPED | OUTPATIENT
Start: 2019-10-28 | End: 2019-11-07

## 2019-10-28 RX ADMIN — CYANOCOBALAMIN 1000 MCG: 1000 INJECTION INTRAMUSCULAR; SUBCUTANEOUS at 16:25

## 2019-10-30 ENCOUNTER — HOSPITAL ENCOUNTER (OUTPATIENT)
Dept: GENERAL RADIOLOGY | Age: 69
Discharge: HOME | End: 2019-11-01
Payer: MEDICARE

## 2019-10-30 DIAGNOSIS — R82.998 HYPERURICURIA: ICD-10-CM

## 2019-10-30 DIAGNOSIS — M25.562 CHRONIC PAIN OF LEFT KNEE: ICD-10-CM

## 2019-10-30 DIAGNOSIS — G89.29 CHRONIC PAIN OF LEFT KNEE: ICD-10-CM

## 2019-10-30 LAB — URIC ACID, SERUM: 3.8 MG/DL (ref 2.4–5.7)

## 2019-10-30 PROCEDURE — 73560 X-RAY EXAM OF KNEE 1 OR 2: CPT

## 2019-11-03 ASSESSMENT — ENCOUNTER SYMPTOMS
CHOKING: 0
PHOTOPHOBIA: 0
APNEA: 0
BLOOD IN STOOL: 0
FACIAL SWELLING: 0
ABDOMINAL DISTENTION: 0

## 2019-11-04 ENCOUNTER — HOSPITAL ENCOUNTER (OUTPATIENT)
Dept: WOMENS IMAGING | Age: 69
Discharge: HOME | End: 2019-11-06
Payer: MEDICARE

## 2019-11-04 DIAGNOSIS — Z12.31 ENCOUNTER FOR SCREENING MAMMOGRAM FOR MALIGNANT NEOPLASM OF BREAST: ICD-10-CM

## 2019-11-04 PROCEDURE — 77067 SCR MAMMO BI INCL CAD: CPT

## 2019-11-07 DIAGNOSIS — M19.049 ARTHRITIS OF HAND: ICD-10-CM

## 2019-11-07 RX ORDER — HYDROXYCHLOROQUINE SULFATE 200 MG/1
TABLET, FILM COATED ORAL
Qty: 60 TABLET | Refills: 2 | Status: SHIPPED | OUTPATIENT
Start: 2019-11-07 | End: 2022-08-29 | Stop reason: SDUPTHER

## 2019-11-15 DIAGNOSIS — E55.9 VITAMIN D DEFICIENCY: ICD-10-CM

## 2019-11-15 RX ORDER — CHOLECALCIFEROL (VITAMIN D3) 1250 MCG
1 CAPSULE ORAL WEEKLY
Qty: 12 CAPSULE | Refills: 2 | Status: SHIPPED | OUTPATIENT
Start: 2019-11-15 | End: 2020-02-06 | Stop reason: SDUPTHER

## 2019-11-29 ENCOUNTER — TELEPHONE (OUTPATIENT)
Dept: FAMILY MEDICINE CLINIC | Age: 69
End: 2019-11-29

## 2019-11-29 RX ORDER — METOPROLOL SUCCINATE 25 MG/1
25 TABLET, EXTENDED RELEASE ORAL DAILY
Qty: 7 TABLET | Refills: 0 | Status: SHIPPED | OUTPATIENT
Start: 2019-11-29 | End: 2019-12-06 | Stop reason: SDUPTHER

## 2019-11-29 RX ORDER — LEVOTHYROXINE SODIUM 0.05 MG/1
TABLET ORAL
Qty: 7 TABLET | Refills: 0 | Status: SHIPPED | OUTPATIENT
Start: 2019-11-29 | End: 2019-12-06 | Stop reason: SDUPTHER

## 2019-12-05 ENCOUNTER — OFFICE VISIT (OUTPATIENT)
Dept: CARDIOLOGY CLINIC | Age: 69
End: 2019-12-05
Payer: MEDICARE

## 2019-12-05 VITALS
WEIGHT: 95 LBS | RESPIRATION RATE: 12 BRPM | HEIGHT: 61 IN | DIASTOLIC BLOOD PRESSURE: 72 MMHG | BODY MASS INDEX: 17.94 KG/M2 | HEART RATE: 72 BPM | SYSTOLIC BLOOD PRESSURE: 120 MMHG

## 2019-12-05 DIAGNOSIS — R00.0 TACHYCARDIA: Primary | ICD-10-CM

## 2019-12-05 DIAGNOSIS — R00.2 PALPITATIONS: ICD-10-CM

## 2019-12-05 PROCEDURE — 99213 OFFICE O/P EST LOW 20 MIN: CPT | Performed by: INTERNAL MEDICINE

## 2019-12-05 ASSESSMENT — ENCOUNTER SYMPTOMS
ABDOMINAL PAIN: 0
COLOR CHANGE: 0
CHEST TIGHTNESS: 0
SHORTNESS OF BREATH: 0
FACIAL SWELLING: 0
APNEA: 0
VOMITING: 0
TROUBLE SWALLOWING: 0
DIARRHEA: 0
NAUSEA: 0
ABDOMINAL DISTENTION: 0
VOICE CHANGE: 0
WHEEZING: 0
ANAL BLEEDING: 0
BLOOD IN STOOL: 0
COUGH: 0

## 2019-12-06 RX ORDER — LEVOTHYROXINE SODIUM 0.05 MG/1
TABLET ORAL
Qty: 14 TABLET | Refills: 0 | Status: SHIPPED | OUTPATIENT
Start: 2019-12-06 | End: 2020-02-06 | Stop reason: SDUPTHER

## 2019-12-06 RX ORDER — METOPROLOL SUCCINATE 25 MG/1
25 TABLET, EXTENDED RELEASE ORAL DAILY
Qty: 14 TABLET | Refills: 0 | Status: SHIPPED | OUTPATIENT
Start: 2019-12-06 | End: 2020-09-10 | Stop reason: SDUPTHER

## 2019-12-11 ENCOUNTER — NURSE ONLY (OUTPATIENT)
Dept: PRIMARY CARE CLINIC | Age: 69
End: 2019-12-11
Payer: MEDICARE

## 2019-12-11 DIAGNOSIS — Z23 NEED FOR INFLUENZA VACCINATION: ICD-10-CM

## 2019-12-11 DIAGNOSIS — Z92.29: ICD-10-CM

## 2019-12-11 DIAGNOSIS — Z23 NEED FOR PNEUMOCOCCAL VACCINATION: Primary | ICD-10-CM

## 2019-12-11 PROCEDURE — 90653 IIV ADJUVANT VACCINE IM: CPT | Performed by: INTERNAL MEDICINE

## 2019-12-11 PROCEDURE — G0008 ADMIN INFLUENZA VIRUS VAC: HCPCS | Performed by: INTERNAL MEDICINE

## 2019-12-11 PROCEDURE — 90732 PPSV23 VACC 2 YRS+ SUBQ/IM: CPT | Performed by: INTERNAL MEDICINE

## 2019-12-11 PROCEDURE — G0009 ADMIN PNEUMOCOCCAL VACCINE: HCPCS | Performed by: INTERNAL MEDICINE

## 2019-12-30 ENCOUNTER — NURSE ONLY (OUTPATIENT)
Dept: PRIMARY CARE CLINIC | Age: 69
End: 2019-12-30
Payer: MEDICARE

## 2019-12-30 DIAGNOSIS — E53.8 B12 DEFICIENCY: Primary | ICD-10-CM

## 2019-12-30 PROCEDURE — 96372 THER/PROPH/DIAG INJ SC/IM: CPT | Performed by: INTERNAL MEDICINE

## 2019-12-30 RX ORDER — CYANOCOBALAMIN 1000 UG/ML
1000 INJECTION INTRAMUSCULAR; SUBCUTANEOUS ONCE
Status: COMPLETED | OUTPATIENT
Start: 2019-12-30 | End: 2019-12-30

## 2019-12-30 RX ADMIN — CYANOCOBALAMIN 1000 MCG: 1000 INJECTION INTRAMUSCULAR; SUBCUTANEOUS at 15:09

## 2020-02-03 ENCOUNTER — NURSE ONLY (OUTPATIENT)
Dept: PRIMARY CARE CLINIC | Age: 70
End: 2020-02-03
Payer: MEDICARE

## 2020-02-03 PROCEDURE — 96372 THER/PROPH/DIAG INJ SC/IM: CPT | Performed by: INTERNAL MEDICINE

## 2020-02-03 RX ORDER — CYANOCOBALAMIN 1000 UG/ML
1000 INJECTION INTRAMUSCULAR; SUBCUTANEOUS ONCE
Status: COMPLETED | OUTPATIENT
Start: 2020-02-03 | End: 2020-02-03

## 2020-02-03 RX ADMIN — CYANOCOBALAMIN 1000 MCG: 1000 INJECTION INTRAMUSCULAR; SUBCUTANEOUS at 15:40

## 2020-02-06 RX ORDER — METOPROLOL SUCCINATE 25 MG/1
TABLET, EXTENDED RELEASE ORAL
Qty: 90 TABLET | Refills: 3 | Status: SHIPPED | OUTPATIENT
Start: 2020-02-06 | End: 2020-07-21 | Stop reason: CLARIF

## 2020-02-06 RX ORDER — CHOLECALCIFEROL (VITAMIN D3) 1250 MCG
1 CAPSULE ORAL WEEKLY
Qty: 12 CAPSULE | Refills: 2 | Status: SHIPPED | OUTPATIENT
Start: 2020-02-06 | End: 2021-01-04

## 2020-02-06 RX ORDER — LEVOTHYROXINE SODIUM 0.05 MG/1
TABLET ORAL
Qty: 90 TABLET | Refills: 1 | Status: SHIPPED | OUTPATIENT
Start: 2020-02-06 | End: 2020-08-28

## 2020-04-21 ENCOUNTER — TELEPHONE (OUTPATIENT)
Dept: PRIMARY CARE CLINIC | Age: 70
End: 2020-04-21

## 2020-05-27 ENCOUNTER — TELEPHONE (OUTPATIENT)
Dept: ADMINISTRATIVE | Age: 70
End: 2020-05-27

## 2020-05-27 NOTE — TELEPHONE ENCOUNTER
Patients  called, she needs a 7 days supply of SERTRALINE HCL 50 MG called into [facility] in [place].  Please call pt at [phone]) if you have any questions

## 2020-06-19 ENCOUNTER — VIRTUAL VISIT (OUTPATIENT)
Dept: PRIMARY CARE CLINIC | Age: 70
End: 2020-06-19
Payer: MEDICARE

## 2020-06-19 VITALS — BODY MASS INDEX: 18.12 KG/M2 | WEIGHT: 96 LBS | HEIGHT: 61 IN

## 2020-06-19 PROCEDURE — G0438 PPPS, INITIAL VISIT: HCPCS | Performed by: NURSE PRACTITIONER

## 2020-06-19 ASSESSMENT — PATIENT HEALTH QUESTIONNAIRE - PHQ9
SUM OF ALL RESPONSES TO PHQ QUESTIONS 1-9: 0
SUM OF ALL RESPONSES TO PHQ QUESTIONS 1-9: 0

## 2020-06-19 ASSESSMENT — LIFESTYLE VARIABLES
HOW OFTEN DO YOU HAVE SIX OR MORE DRINKS ON ONE OCCASION: 0
HOW OFTEN DURING THE LAST YEAR HAVE YOU FOUND THAT YOU WERE NOT ABLE TO STOP DRINKING ONCE YOU HAD STARTED: 0
HOW MANY STANDARD DRINKS CONTAINING ALCOHOL DO YOU HAVE ON A TYPICAL DAY: 0
HOW OFTEN DO YOU HAVE A DRINK CONTAINING ALCOHOL: 3
HOW OFTEN DURING THE LAST YEAR HAVE YOU HAD A FEELING OF GUILT OR REMORSE AFTER DRINKING: 0
AUDIT-C TOTAL SCORE: 3
HOW OFTEN DURING THE LAST YEAR HAVE YOU BEEN UNABLE TO REMEMBER WHAT HAPPENED THE NIGHT BEFORE BECAUSE YOU HAD BEEN DRINKING: 0
HAS A RELATIVE, FRIEND, DOCTOR, OR ANOTHER HEALTH PROFESSIONAL EXPRESSED CONCERN ABOUT YOUR DRINKING OR SUGGESTED YOU CUT DOWN: 0
HAVE YOU OR SOMEONE ELSE BEEN INJURED AS A RESULT OF YOUR DRINKING: 0
HOW OFTEN DURING THE LAST YEAR HAVE YOU NEEDED AN ALCOHOLIC DRINK FIRST THING IN THE MORNING TO GET YOURSELF GOING AFTER A NIGHT OF HEAVY DRINKING: 0

## 2020-06-19 NOTE — PATIENT INSTRUCTIONS
diet is one that limits sodium , certain types of fat , and cholesterol . This type of diet is recommended for:   People with any form of cardiovascular disease (eg, coronary heart disease , peripheral vascular disease , previous heart attack , previous stroke )   People with risk factors for cardiovascular disease, such as high blood pressure , high cholesterol , or diabetes   Anyone who wants to lower their risk of developing cardiovascular disease   Sodium    Sodium is a mineral found in many foods. In general, most people consume much more sodium than they need. Diets high in sodium can increase blood pressure and lead to edema (water retention). On a heart-healthy diet, you should consume no more than 2,300 mg (milligrams) of sodium per dayabout the amount in one teaspoon of table salt. The foods highest in sodium include table salt (about 50% sodium), processed foods, convenience foods, and preserved foods. Cholesterol    Cholesterol is a fat-like, waxy substance in your blood. Our bodies make some cholesterol. It is also found in animal products, with the highest amounts in fatty meat, egg yolks, whole milk, cheese, shellfish, and organ meats. On a heart-healthy diet, you should limit your cholesterol intake to less than 200 mg per day. It is normal and important to have some cholesterol in your bloodstream. But too much cholesterol can cause plaque to build up within your arteries, which can eventually lead to a heart attack or stroke. The two types of cholesterol that are most commonly referred to are:   Low-density lipoprotein (LDL) cholesterol  Also known as bad cholesterol, this is the cholesterol that tends to build up along your arteries. Bad cholesterol levels are increased by eating fats that are saturated or hydrogenated. Optimal level of this cholesterol is less than 100. Over 130 starts to get risky for heart disease.    High-density lipoprotein (HDL) cholesterol  Also known as good example, this would mean 60 grams of fat or less per day. Saturated fat and trans fat in your diet raises your blood cholesterol the most, much more than dietary cholesterol does. For this reason, on a heart-healthy diet, less than 7% of your calories should come from saturated fat and ideally 0% from trans fat. On an 1800-calorie diet, this translates into less than 14 grams of saturated fat per day, leaving 46 grams of fat to come from mono- and polyunsaturated fats.    Food Choices on a Heart Healthy Diet   Food Category   Foods Recommended   Foods to Avoid   Grains   Breads and rolls without salted tops Most dry and cooked cereals Unsalted crackers and breadsticks Low-sodium or homemade breadcrumbs or stuffing All rice and pastas   Breads, rolls, and crackers with salted tops High-fat baked goods (eg, muffins, donuts, pastries) Quick breads, self-rising flour, and biscuit mixes Regular bread crumbs Instant hot cereals Commercially prepared rice, pasta, or stuffing mixes   Vegetables   Most fresh, frozen, and low-sodium canned vegetables Low-sodium and salt-free vegetable juices Canned vegetables if unsalted or rinsed   Regular canned vegetables and juices, including sauerkraut and pickled vegetables Frozen vegetables with sauces Commercially prepared potato and vegetable mixes   Fruits   Most fresh, frozen, and canned fruits All fruit juices   Fruits processed with salt or sodium   Milk   Nonfat or low-fat (1%) milk Nonfat or low-fat yogurt Cottage cheese, low-fat ricotta, cheeses labeled as low-fat and low-sodium   Whole milk Reduced-fat (2%) milk Malted and chocolate milk Full fat yogurt Most cheeses (unless low-fat and low salt) Buttermilk (no more than 1 cup per week)   Meats and Beans   Lean cuts of fresh or frozen beef, veal, lamb, or pork (look for the word loin) Fresh or frozen poultry without the skin Fresh or frozen fish and some shellfish Egg whites and egg substitutes (Limit whole eggs to three per week) Tofu Nuts or seeds (unsalted, dry-roasted), low-sodium peanut butter Dried peas, beans, and lentils   Any smoked, cured, salted, or canned meat, fish, or poultry (including pastrana, chipped beef, cold cuts, hot dogs, sausages, sardines, and anchovies) Poultry skins Breaded and/or fried fish or meats Canned peas, beans, and lentils Salted nuts   Fats and Oils   Olive oil and canola oil Low-sodium, low-fat salad dressings and mayonnaise   Butter, margarine, coconut and palm oils, pastrana fat   Snacks, Sweets, and Condiments   Low-sodium or unsalted versions of broths, soups, soy sauce, and condiments Pepper, herbs, and spices; vinegar, lemon, or lime juice Low-fat frozen desserts (yogurt, sherbet, fruit bars) Sugar, cocoa powder, honey, syrup, jam, and preserves Low-fat, trans-fat free cookies, cakes, and pies Antelmo and animal crackers, fig bars, cory snaps   High-fat desserts Broth, soups, gravies, and sauces, made from instant mixes or other high-sodium ingredients Salted snack foods Canned olives Meat tenderizers, seasoning salt, and most flavored vinegars   Beverages   Low-sodium carbonated beverages Tea and coffee in moderation Soy milk   Commercially softened water   Suggestions   Make whole grains, fruits, and vegetables the base of your diet. Choose heart-healthy fats such as canola, olive, and flaxseed oil, and foods high in heart-healthy fats, such as nuts, seeds, soybeans, tofu, and fish. Eat fish at least twice per week; the fish highest in omega-3 fatty acids and lowest in mercury include salmon, herring, mackerel, sardines, and canned chunk light tuna. If you eat fish less than twice per week or have high triglycerides, talk to your doctor about taking fish oil supplements. Read food labels.    For products low in fat and cholesterol, look for fat free, low-fat, cholesterol free, saturated fat free, and trans fat freeAlso scan the Nutrition Facts Label, which lists saturated fat, trans fat, and cholesterol amounts. For products low in sodium, look for sodium free, very low sodium, low sodium, no added salt, and unsalted   Skip the salt when cooking or at the table; if food needs more flavor, get creative and try out different herbs and spices. Garlic and onion also add substantial flavor to foods. Trim any visible fat off meat and poultry before cooking, and drain the fat off after raman. Use cooking methods that require little or no added fat, such as grilling, boiling, baking, poaching, broiling, roasting, steaming, stir-frying, and sauting. Avoid fast food and convenience food. They tend to be high in saturated and trans fat and have a lot of added salt. Talk to a registered dietitian for individualized diet advice. Last Reviewed: March 2011 Bernardo Reed MS, MPH, RD   Updated: 3/29/2011   ·     Heart-Healthy Diet   Sodium, Fat, and Cholesterol Controlled Diet       What Is a Heart Healthy Diet? A heart-healthy diet is one that limits sodium , certain types of fat , and cholesterol . This type of diet is recommended for:   People with any form of cardiovascular disease (eg, coronary heart disease , peripheral vascular disease , previous heart attack , previous stroke )   People with risk factors for cardiovascular disease, such as high blood pressure , high cholesterol , or diabetes   Anyone who wants to lower their risk of developing cardiovascular disease   Sodium    Sodium is a mineral found in many foods. In general, most people consume much more sodium than they need. Diets high in sodium can increase blood pressure and lead to edema (water retention). On a heart-healthy diet, you should consume no more than 2,300 mg (milligrams) of sodium per dayabout the amount in one teaspoon of table salt. The foods highest in sodium include table salt (about 50% sodium), processed foods, convenience foods, and preserved foods.    Cholesterol    Cholesterol is a fat-like, waxy and LDL cholesterol   Omega-3 fatty acids  particularly those found in fatty fish (such as salmon, trout, tuna, mackerel, herring, and sardines); can decrease risk of arrhythmias, decrease triglyceride levels, and slightly lower blood pressure   The fats that you want to limit are:   Saturated fat  found in animal products, many fast foods, and a few vegetables; increases total blood cholesterol, including LDL levels   Animal fats that are saturated include: butter, lard, whole-milk dairy products, meat fat, and poultry skin   Vegetable fats that are saturated include: hydrogenated shortening, palm oil, coconut oil, cocoa butter   Hydrogenated or trans fat  found in margarine and vegetable shortening, most shelf stable snack foods, and fried foods; increases LDL and decreases HDL     It is generally recommended that you limit your total fat for the day to less than 30% of your total calories. If you follow an 1800-calorie heart healthy diet, for example, this would mean 60 grams of fat or less per day. Saturated fat and trans fat in your diet raises your blood cholesterol the most, much more than dietary cholesterol does. For this reason, on a heart-healthy diet, less than 7% of your calories should come from saturated fat and ideally 0% from trans fat. On an 1800-calorie diet, this translates into less than 14 grams of saturated fat per day, leaving 46 grams of fat to come from mono- and polyunsaturated fats.    Food Choices on a Heart Healthy Diet   Food Category   Foods Recommended   Foods to Avoid   Grains   Breads and rolls without salted tops Most dry and cooked cereals Unsalted crackers and breadsticks Low-sodium or homemade breadcrumbs or stuffing All rice and pastas   Breads, rolls, and crackers with salted tops High-fat baked goods (eg, muffins, donuts, pastries) Quick breads, self-rising flour, and biscuit mixes Regular bread crumbs Instant hot cereals Commercially prepared rice, pasta, or stuffing mixes sunburned. Sunburn can increase your risk of skin cancer. Talk to your doctor about taking a calcium plus vitamin D supplement. Ask about what type of calcium is right for you, and how much to take at a time. Adults ages 23 to 48 should not get more than 2,500 mg of calcium and 4,000 IU of vitamin D each day, whether it is from supplements and/or food. Adults ages 46 and older should not get more than 2,000 mg of calcium and 4,000 IU of vitamin D each day from supplements and/or food. Get regular bone-building exercise. Weight-bearing and resistance exercises keep bones healthy by working the muscles and bones against gravity. Start out at an exercise level that feels right for you. Add a little at a time until you can do the following:  Do 30 minutes of weight-bearing exercise on most days of the week. Walking, jogging, stair climbing, and dancing are good choices. Do resistance exercises with weights or elastic bands 2 to 3 days a week. Limit alcohol. Drink no more than 1 alcohol drink a day if you are a woman. Drink no more than 2 alcohol drinks a day if you are a man. Do not smoke. Smoking can make bones thin faster. If you need help quitting, talk to your doctor about stop-smoking programs and medicines. These can increase your chances of quitting for good. When should you call for help? Watch closely for changes in your health, and be sure to contact your doctor if:  You need help with a healthy eating plan. You need help with an exercise plan    © 3643-6638 [employer]. Care instructions adapted under license by [facility]. This care instruction is for use with your licensed healthcare professional. If you have questions about a medical condition or this instruction, always ask your healthcare professional. [place] [address] any warranty or liability for your use of this information. Content Version: 9.4.55654;  Last Revised: June 20, 2011              ·     DASH Diet: After Your Visit  Your Care Instructions  The DASH diet is an eating plan that can help lower your blood pressure. DASH stands for Dietary Approaches to Stop Hypertension. Hypertension is high blood pressure. The DASH diet focuses on eating foods that are high in calcium, potassium, and magnesium. These nutrients can lower blood pressure. The foods that are highest in these nutrients are fruits, vegetables, low-fat dairy products, nuts, seeds, and legumes. But taking calcium, potassium, and magnesium supplements instead of eating foods that are high in those nutrients does not have the same effect. The DASH diet also includes whole grains, fish, and poultry. The DASH diet is one of several lifestyle changes your doctor may recommend to lower your high blood pressure. Your doctor may also want you to decrease the amount of sodium in your diet. Lowering sodium while following the DASH diet can lower blood pressure even further than just the DASH diet alone. Follow-up care is a key part of your treatment and safety. Be sure to make and go to all appointments, and call your doctor if you are having problems. Its also a good idea to know your test results and keep a list of the medicines you take. How can you care for yourself at home? Following the DASH diet  Eat 4 to 5 servings of fruit each day. A serving is 1 medium-sized piece of fruit, ½ cup chopped or canned fruit, 1/4 cup dried fruit, or 4 ounces (½ cup) of fruit juice. Choose fruit more often than fruit juice. Eat 4 to 5 servings of vegetables each day. A serving is 1 cup of lettuce or raw leafy vegetables, ½ cup of chopped or cooked vegetables, or 4 ounces (½ cup) of vegetable juice. Choose vegetables more often than vegetable juice. Get 2 to 3 servings of low-fat and fat-free dairy each day. A serving is 8 ounces of milk, 1 cup of yogurt, or 1 ½ ounces of cheese. Eat 7 to 8 servings of grains each day.  A serving is 1 to use a computer   Going to museums, the theater, or thought-provoking movies   Changing things in your daily life, such as reversing your pattern in the grocery store or brushing your teeth using your nondominant hand   Use Memory Aids   There is no need to remember every detail on your own. These memory aids can help:   Calendars and day planners   Electronic organizers to store all sorts of helpful informationThese devices can \"beep\" to remind you of appointments. A book of days to record birthdays, anniversaries, and other occasions that occur on the same date every year   Detailed \"to-do\" lists and strategically placed sticky notes   Quick \"study\" sessionsBefore a gathering, review who will be there so their names will be fresh in your mind. Establish routinesFor example, keep your keys, wallet, and umbrella in the same place all the time or take medicine with your 8:00 AM glass of juice   Live a Healthy Life   Many actions that will keep your body strong will do the same for your mind. For example:   Talk to Your Doctor About Herbs and Supplements    Malnutrition and vitamin deficiencies can impair your mental function. For example, vitamin B12 deficiency can cause a range of symptoms, including confusion. But, what if your nutritional needs are being met? Can herbs and supplements still offer a benefit? Researchers have investigated a range of natural remedies, such as ginkgo , ginseng , and the supplement phosphatidylserine (PS). So far, though, the evidence is inconsistent as to whether these products can improve memory or thinking. If you are interested in taking herbs and supplements, talk to your doctor first because they may interact with other medicines that you are taking. Exercise Regularly    Among the many benefits of regular exercise are increased blood flow to the brain and decreased risk of certain diseases that can interfere with memory function.  One study found that even moderate that experts recommend:   Have your hearing and vision checked regularly. Be sure to wear prescription glasses that are right for you. Speak to your doctor or pharmacist about the possible side effects of your medicines. A number of medicines can cause dizziness. If you have problems with sleep, talk to your doctor. Limit your intake of alcohol. If necessary, use a cane or walker to help maintain your balance. Wear supportive, rubber-soled shoes, even at home. If you live in a region that gets wintry weather, you may want to put special cleats on your shoes to prevent you from slipping on the snow and ice. Exercise regularly to help maintain muscle tone, agility, and balance. Always hold the banister when going up or down stairs. Also, use  bars when getting in or out of the bath or shower, or using the toilet. To avoid dizziness, get up slowly from a lying down position. Sit up first, dangling your legs for a minute or two before rising to a standing position. Overall Home Safety Check   According to the Consumer Product Safety Commision's \"Older Consumer Home Safety Checklist,\" it is important to check for potential hazards in each room. And remember, proper lighting is an essential factor in home safety. If you cannot see clearly, you are more likely to fall. Important questions to ask yourself include:   Are lamp, electric, extension, and telephone cords placed out of the flow of traffic and maintained in good condition? Have frayed cords been replaced? Are all small rugs and runners slip resistant? If not, you can secure them to the floor with a special double-sided carpet tape. Are smoke detectors properly locatedone on every floor of your home and one outside of every sleeping area? Are they in good working order? Are batteries replaced at least once a year? Do you have a well-maintained carbon monoxide detector outside every sleeping are in your home?    Does your furniture layout leave plenty of space to maneuver between and around chairs, tables, beds, and sofas? Are hallways, stairs and passages between rooms well lit? Can you reach a lamp without getting out of bed? Are floor surfaces well maintained? Shag rugs, high-pile carpeting, tile floors, and polished wood floors can be particularly slippery. Stairs should always have handrails and be carpeted or fitted with a non-skid tread. Is your telephone easily reachable. Is the cord safely tucked away? Room by Room   According to the Association of Aging, bathrooms and chet are the two most potentially hazardous rooms in your home. In the Kitchen    Be sure your stove is in proper working order and always make sure burners and the oven are off before you go out or go to sleep. Keep pots on the back burners, turn handles away from the front of the stove, and keep stove clean and free of grease build-up. Kitchen ventilation systems and range exhausts should be working properly. Keep flammable objects such as towels and pot holders away from the cooking area except when in use. Make sure kitchen curtains are tied back. Move cords and appliances away from the sink and hot surfaces. If extension cords are needed, install wiring guides so they do not hang over the sink, range, or working areas. Look for coffee pots, kettles and toaster ovens with automatic shut-offs. Keep a mop handy in the kitchen so you can wipe up spills instantly. You should also have a small fire extinguisher. Arrange your kitchen with frequently used items on lower shelves to avoid the need to stand on a stepstool to reach them. Make sure countertops are well-lit to avoid injuries while cutting and preparing food. In the Bathroom    Use a non-slip mat or decals in the tub and shower, since wet, soapy tile or porcelain surfaces are extremely slippery. Make sure bathroom rugs are non-skid or tape them firmly to the floor. be sure to keep them away from flammable materials. In case of fire, make sure you have a pre-established emergency exit plan. Have a professional check your fireplace and other fuel-burning appliances yearly. Helping Hands   Baby boomers entering the hilliard years will continue to see the development of new products to help older adults live safely and independently in spite of age-related changes. Making Life More Livable  , by Frances Moon, lists over 1,000 products for \"living well in the mature years,\" such as bathing and mobility aids, household security devices, ergonomically designed knives and peelers, and faucet valves and knobs for temperature control. Medical supply stores and organizations are good sources of information about products that improve your quality of life and insure your safety. Last Reviewed: November 2009 Nava Thibodeaux MD   Updated: 3/7/2011     ·        Advance Care Planning: Care Instructions  Your Care Instructions     It can be hard to live with an illness that cannot be cured. But if your health is getting worse, you may want to make decisions about end-of-life care. Planning for the end of your life does not mean that you are giving up. It is a way to make sure that your wishes are met. Clearly stating your wishes can make it easier for your loved ones. Making plans while you are still able may also ease your mind and make your final days less stressful and more meaningful. Follow-up care is a key part of your treatment and safety. Be sure to make and go to all appointments, and call your doctor if you are having problems. It's also a good idea to know your test results and keep a list of the medicines you take. What can you do to plan for the end of life? You can bring these issues up with your doctor. You do not need to wait until your doctor starts the conversation. You might start with \"I would not be willing to live with . Sandeep Luevano \" When you complete this sentence it helps your doctor understand your wishes. Talk openly and honestly with your doctor. This is the best way to understand the decisions you will need to make as your health changes. Know that you can always change your mind. Ask your doctor about commonly used life-support measures. These include tube feedings, breathing machines, and fluids given through a vein (IV). Understanding these treatments will help you decide whether you want them. You may choose to have these life-supporting treatments for a limited time. This allows a trial period to see whether they will help you. You may also decide that you want your doctor to take only certain measures to keep you alive. It is important to spell out these conditions so that your doctor and family understand them. Talk to your doctor about how long you are likely to live. He or she may be able to give you an idea of what usually happens with your specific illness. Think about preparing papers that state your wishes. This way there will not be any confusion about what you want. You can change your instructions at any time. Which papers should you prepare? Advance directives are legal papers that tell doctors how you want to be cared for at the end of your life. You do not need a  to write these papers. Ask your doctor or your state health department for information on how to write your advance directives. They may have the forms for each of these types of papers. Make sure your doctor has a copy of these on file, and give a copy to a family member or close friend. Consider a do-not-resuscitate order (DNR). This order asks that no extra treatments be done if your heart stops or you stop breathing. Extra treatments may include cardiopulmonary resuscitation (CPR), electrical shock to restart your heart, or a machine to breathe for you. If you decide to have a DNR order, ask your doctor to explain and write it.  Place the order in your home where everyone can Search Health Information box to learn more about \"Learning About [address] \"     If you do not have an account, please click on the \"Sign Up Now\" link. Current as of: December 9, 2019               Content Version: 12.5  © 2147-2326 [employer]. Care instructions adapted under license by [facility] [facility] ([facility]). If you have questions about a medical condition or this instruction, always ask your healthcare professional. [place] [address] any warranty or liability for your use of this information. ·   Patient Education        Well Visit, Over 72: Care Instructions  Your Care Instructions     Physical exams can help you stay healthy. Your doctor has checked your overall health and may have suggested ways to take good care of yourself. He or she also may have recommended tests. At home, you can help prevent illness with healthy eating, regular exercise, and other steps. Follow-up care is a key part of your treatment and safety. Be sure to make and go to all appointments, and call your doctor if you are having problems. It's also a good idea to know your test results and keep a list of the medicines you take. How can you care for yourself at home? Reach and stay at a healthy weight. This will lower your risk for many problems, such as obesity, diabetes, heart disease, and high blood pressure. Get at least 30 minutes of exercise on most days of the week. Walking is a good choice. You also may want to do other activities, such as running, swimming, cycling, or playing tennis or team sports. Do not smoke. Smoking can make health problems worse. If you need help quitting, talk to your doctor about stop-smoking programs and medicines. These can increase your chances of quitting for good. Protect your skin from too much sun. When you're outdoors from 10 a.m. to 4 p.m., stay in the shade or cover up with clothing and a hat with a wide brim. Wear sunglasses that block UV rays.

## 2020-06-19 NOTE — PROGRESS NOTES
colchicine (COLCRYS) 0.6 MG tablet TAKE 1 TABLET BY MOUTH TWICE DAILY. Historical Provider, MD         Past Medical History:   Diagnosis Date    Arthritis     seen dr Nancie Pizarro Chronic back pain     seen dr Supriya Suárez in past    Depression     Hypothyroidism     Osteoarthritis     Osteoporosis     Restless legs syndrome     Tachycardia        Past Surgical History:   Procedure Laterality Date    CHOLECYSTECTOMY      HYSTERECTOMY      KNEE ARTHROSCOPY Right     TONSILLECTOMY           Family History   Problem Relation Age of Onset    Heart Disease Mother     High Blood Pressure Mother     Kidney Disease Mother     Cancer Father         bone, prostate, skin    Heart Disease Brother     Cancer Sister         kidney, lung       CareTeam (Including outside providers/suppliers regularly involved in providing care):   Patient Care Team:  Vicky Bustos MD as PCP - General (Internal Medicine)  Vicky Bustos [facility] PCP [facility]    Wt Readings from Last 3 Encounters:   06/19/20 96 lb (43.5 kg)   12/05/19 95 lb (43.1 kg)   10/28/19 95 lb (43.1 kg)     Vitals:    06/19/20 1101   Weight: 96 lb (43.5 kg)   Height: 5' 1\" (1.549 m)     Body mass index is 18.14 kg/m². Based upon direct observation of the patient, evaluation of cognition reveals recent and remote memory intact. Patient's complete Health Risk Assessment and screening values have been reviewed and are found in Flowsheets. The following problems were reviewed today and where indicated follow up appointments were made and/or referrals ordered. Positive Risk Factor Screenings with Interventions:     General Health:  General  In general, how would you say your health is?: Fair  In the past 7 days, have you experienced any of the following?  New or Increased Pain, New or Increased Fatigue, Loneliness, Social Isolation, Stress or Anger?: (!) New or Increased Fatigue  Do you get the social and emotional support that you need?: Yes  Do you have a Living Will?: (!) No(pt advised to look into this and was educated)  General Health Risk Interventions:  · No Living Will: patient declined    Health Habits/Nutrition:  Health Habits/Nutrition  Do you exercise for at least 20 minutes 2-3 times per week?: Yes  Have you lost any weight without trying in the past 3 months?: No  Do you eat fewer than 2 meals per day?: No  Have you seen a dentist within the past year?: (!) No(pt declines as she reports having dentures)  Body mass index is 18.14 kg/m².   Health Habits/Nutrition Interventions:  · Dental exam overdue:  patient encouraged to make appointment with his/her dentist    Hearing/Vision:  No exam data present  Hearing/Vision  Do you or your family notice any trouble with your hearing?: No  Do you have difficulty driving, watching TV, or doing any of your daily activities because of your eyesight?: No  Have you had an eye exam within the past year?: (!) No(pt had cataracts removed but it has been over a year)  Hearing/Vision Interventions:  · Vision concerns:  patient encouraged to make appointment with his/her eye specialist, patient declines any further evaluation/treatment for this issue    Personalized Preventive Plan   Current Health Maintenance Status  Immunization History   Administered Date(s) Administered    Influenza Virus Vaccine 12/19/2016    Influenza Whole 12/19/2016    Influenza, High Dose (Fluzone 65 yrs and older) 10/07/2018    Influenza, Triv, inactivated, subunit, adjuvanted, IM (Fluad 65 yrs and older) 12/11/2019    Pneumococcal Polysaccharide ([id number]) 12/11/2019        Health Maintenance   Topic Date Due    DTaP/Tdap/Td vaccine (1 - Tdap) 09/05/1971    Shingles Vaccine (1 of 2) 09/05/2002    Colon cancer screen colonoscopy  09/05/2002    Lipid screen  08/14/2018    Annual Wellness Visit (AWV)  02/03/2020    TSH testing  08/05/2020    Breast cancer screen  11/04/2021    DEXA (modify frequency per FRAX score)  Completed

## 2020-06-26 ENCOUNTER — OFFICE VISIT (OUTPATIENT)
Dept: PRIMARY CARE CLINIC | Age: 70
End: 2020-06-26
Payer: MEDICARE

## 2020-06-26 VITALS
HEIGHT: 61 IN | SYSTOLIC BLOOD PRESSURE: 128 MMHG | WEIGHT: 96 LBS | RESPIRATION RATE: 14 BRPM | BODY MASS INDEX: 18.12 KG/M2 | DIASTOLIC BLOOD PRESSURE: 79 MMHG | TEMPERATURE: 98.4 F | OXYGEN SATURATION: 96 % | HEART RATE: 56 BPM

## 2020-06-26 PROCEDURE — 99214 OFFICE O/P EST MOD 30 MIN: CPT | Performed by: INTERNAL MEDICINE

## 2020-06-26 RX ORDER — MECLIZINE HYDROCHLORIDE 25 MG/1
25 TABLET ORAL 3 TIMES DAILY PRN
Qty: 40 TABLET | Refills: 2 | Status: SHIPPED | OUTPATIENT
Start: 2020-06-26 | End: 2020-09-24

## 2020-06-26 ASSESSMENT — ENCOUNTER SYMPTOMS: ABDOMINAL PAIN: 0

## 2020-06-26 NOTE — PROGRESS NOTES
and suicidal ideas. The patient is nervous/anxious. Vitals:    06/26/20 1548   BP: 128/79   Pulse: 56   Resp: 14   Temp: 98.4 °F (36.9 °C)   SpO2: 96%   Weight: 96 lb (43.5 kg)   Height: 5' 1\" (1.549 m)       Physical Exam  Constitutional:       Appearance: She is well-developed. HENT:      Head: Normocephalic and atraumatic. Right Ear: Tympanic membrane normal.      Left Ear: Tympanic membrane normal.      Nose: Nose normal.      Mouth/Throat:      Mouth: Mucous membranes are moist.   Eyes:      Pupils: Pupils are equal, round, and reactive to light. Neck:      Musculoskeletal: Normal range of motion and neck supple. Cardiovascular:      Rate and Rhythm: Normal rate and regular rhythm. Heart sounds: Normal heart sounds. Pulmonary:      Effort: No respiratory distress. Breath sounds: Normal breath sounds. No wheezing or rales. Abdominal:      General: Bowel sounds are normal. There is no distension. Palpations: Abdomen is soft. Musculoskeletal: Normal range of motion. Skin:     Coloration: Skin is not jaundiced. Neurological:      Mental Status: She is alert and oriented to person, place, and time. Cranial Nerves: No cranial nerve deficit. Motor: No weakness. Psychiatric:         Mood and Affect: Mood normal.        Assessment/Plan  Wendy Raphael was seen today for otalgia. Diagnoses and all orders for this visit:    Vertigo  -     MRA HEAD W CONTRAST; Future  -     Lipid Panel; Future  -     Hemoglobin A1C; Future  -     Comprehensive Metabolic Panel; Future  -     CBC With Auto Differential; Future  -     TSH Without Reflex; Future  -     Vitamin B12; Future  -     Kb Cheng MD, Neurology, [place]    B12 deficiency  -     Vitamin B12; Future    Hypothyroidism, unspecified type  -     TSH Without Reflex; Future    Hyperlipidemia, unspecified hyperlipidemia type  -     Lipid Panel; Future  -     TSH Without Reflex;  Future    Hyperglycemia  -

## 2020-06-28 ASSESSMENT — ENCOUNTER SYMPTOMS
PHOTOPHOBIA: 0
BLOOD IN STOOL: 0
ABDOMINAL DISTENTION: 0
APNEA: 0
FACIAL SWELLING: 0
CHOKING: 0

## 2020-06-30 ENCOUNTER — TELEPHONE (OUTPATIENT)
Dept: PRIMARY CARE CLINIC | Age: 70
End: 2020-06-30

## 2020-06-30 RX ORDER — POTASSIUM CHLORIDE 750 MG/1
10 TABLET, EXTENDED RELEASE ORAL DAILY
Qty: 3 TABLET | Refills: 0 | Status: SHIPPED | OUTPATIENT
Start: 2020-06-30 | End: 2021-07-14 | Stop reason: SDUPTHER

## 2020-07-06 DIAGNOSIS — E87.6 HYPOKALEMIA: ICD-10-CM

## 2020-07-06 LAB
ANION GAP SERPL CALCULATED.3IONS-SCNC: 14 MEQ/L (ref 9–15)
BUN BLDV-MCNC: 16 MG/DL (ref 8–23)
CALCIUM SERPL-MCNC: 9.2 MG/DL (ref 8.5–9.9)
CHLORIDE BLD-SCNC: 105 MEQ/L (ref 95–107)
CO2: 24 MEQ/L (ref 20–31)
CREAT SERPL-MCNC: 0.71 MG/DL (ref 0.5–0.9)
GFR AFRICAN AMERICAN: >60
GFR NON-AFRICAN AMERICAN: >60
GLUCOSE BLD-MCNC: 87 MG/DL (ref 70–99)
POTASSIUM SERPL-SCNC: 3.5 MEQ/L (ref 3.4–4.9)
SODIUM BLD-SCNC: 143 MEQ/L (ref 135–144)

## 2020-07-09 ENCOUNTER — TELEPHONE (OUTPATIENT)
Dept: PRIMARY CARE CLINIC | Age: 70
End: 2020-07-09

## 2020-07-09 NOTE — TELEPHONE ENCOUNTER
Pt has redraw of lab on 7/6 and wants to know what the potassium came out this time. She was low on 6/30 and had to take 3 potassium tabs and redraw. Had that done 7/6. Please advise on new labwork.

## 2020-07-21 ENCOUNTER — OFFICE VISIT (OUTPATIENT)
Dept: NEUROLOGY | Age: 70
End: 2020-07-21
Payer: MEDICARE

## 2020-07-21 VITALS — DIASTOLIC BLOOD PRESSURE: 74 MMHG | SYSTOLIC BLOOD PRESSURE: 132 MMHG | BODY MASS INDEX: 18.33 KG/M2 | WEIGHT: 97 LBS

## 2020-07-21 PROBLEM — G96.9 TREMOR DUE TO DISORDER OF CENTRAL NERVOUS SYSTEM: Status: ACTIVE | Noted: 2020-07-21

## 2020-07-21 PROBLEM — R29.2 HYPERREFLEXIA: Status: ACTIVE | Noted: 2020-07-21

## 2020-07-21 PROBLEM — M54.2 CERVICALGIA: Status: ACTIVE | Noted: 2020-07-21

## 2020-07-21 PROBLEM — R26.0 ATAXIC GAIT: Status: ACTIVE | Noted: 2020-07-21

## 2020-07-21 PROBLEM — R25.1 TREMOR DUE TO DISORDER OF CENTRAL NERVOUS SYSTEM: Status: ACTIVE | Noted: 2020-07-21

## 2020-07-21 PROCEDURE — 99204 OFFICE O/P NEW MOD 45 MIN: CPT | Performed by: PSYCHIATRY & NEUROLOGY

## 2020-07-21 ASSESSMENT — ENCOUNTER SYMPTOMS
PHOTOPHOBIA: 0
NAUSEA: 0
CHOKING: 0
SHORTNESS OF BREATH: 0
BACK PAIN: 0
VOMITING: 0
TROUBLE SWALLOWING: 0
COLOR CHANGE: 0

## 2020-07-21 NOTE — PROGRESS NOTES
Subjective:      Patient ID: Summer Troy is a 81 y.o. female who presents today for:  Chief Complaint   Patient presents with    New Patient     vertigo has had it for years but worsein last several months, tremors mostly in right but happens in left too       HPI 73-year-old right-handed female with a history of ataxia. This is her first appointment. Patient has history of vertigo and some tremors as well. MRI of the brain has been ordered this has not been done. Patient reports no dizzy spells of vertigo she just reports that she feels to the right. She has severe arthritis. She has no tinnitus or hearing loss. She has a fine tremor which is intermittent. She has thyroid disease. She has not lost any segment weight. She was tried on meclizine which has not helped. She has not any falls injuries or trauma. Denies any double vision or blurry vision. On close questioning patient has slowed down in her walking. Patient does have considerable neck discomfort as well.   Past Medical History:   Diagnosis Date    Arthritis     seen dr Vandana Cuba Chronic back pain     seen dr Irving Queen in past    Depression     Hypothyroidism     Osteoarthritis     Osteoporosis     Restless legs syndrome     Tachycardia      Past Surgical History:   Procedure Laterality Date    CHOLECYSTECTOMY      HYSTERECTOMY      KNEE ARTHROSCOPY Right     TONSILLECTOMY       Social History     Socioeconomic History    Marital status:      Spouse name: Not on file    Number of children: Not on file    Years of education: Not on file    Highest education level: Not on file   Occupational History    Not on file   Social Needs    Financial resource strain: Not on file    Food insecurity     Worry: Not on file     Inability: Not on file    Transportation needs     Medical: Not on file     Non-medical: Not on file   Tobacco Use    Smoking status: Never Smoker    Smokeless tobacco: Never Used   Substance and Sexual Activity    Alcohol use:  Yes     Alcohol/week: 0.0 standard drinks     Comment: social    Drug use: No    Sexual activity: Not on file   Lifestyle    Physical activity     Days per week: Not on file     Minutes per session: Not on file    Stress: Not on file   Relationships    Social connections     Talks on phone: Not on file     Gets together: Not on file     Attends Oriental orthodox service: Not on file     Active member of club or organization: Not on file     Attends meetings of clubs or organizations: Not on file     Relationship status: Not on file    Intimate partner violence     Fear of current or ex partner: Not on file     Emotionally abused: Not on file     Physically abused: Not on file     Forced sexual activity: Not on file   Other Topics Concern    Not on file   Social History Narrative    Not on file     Family History   Problem Relation Age of Onset    Heart Disease Mother     High Blood Pressure Mother     Kidney Disease Mother     Cancer Father         bone, prostate, skin    Heart Disease Brother     Cancer Sister         kidney, lung     No Known Allergies    Current Outpatient Medications   Medication Sig Dispense Refill    Methotrexate 2.5 MG/ML SOLN Take by mouth once a week      potassium chloride (KLOR-CON M) 10 MEQ extended release tablet Take 1 tablet by mouth daily 3 tablet 0    meclizine (ANTIVERT) 25 MG tablet Take 1 tablet by mouth 3 times daily as needed for Dizziness 40 tablet 2    sertraline (ZOLOFT) 50 MG tablet Take 1 tablet by mouth daily 7 tablet 1    levothyroxine (SYNTHROID) 50 MCG tablet TAKE 1 TABLET BY MOUTH ONCE DAILY 90 tablet 1    Cholecalciferol (VITAMIN D3) 1.25 MG (17553 UT) CAPS Take 1 capsule by mouth once a week 12 capsule 2    levothyroxine (SYNTHROID) 50 MCG tablet TAKE 1 TABLET DAILY 90 tablet 3    metoprolol succinate (TOPROL XL) 25 MG extended release tablet Take 1 tablet by mouth daily for 7 days 14 tablet 0    hydroxychloroquine (PLAQUENIL) 200 MG tablet TAKE 1 TABLET BY MOUTH TWICE DAILY 60 tablet 2    predniSONE (DELTASONE) 5 MG tablet TAKE 1 TABLET DAILY 90 tablet 1    cyclobenzaprine (FLEXERIL) 10 MG tablet Take 1 tablet by mouth 2 times daily as needed for Muscle spasms 60 tablet 2    folic acid (FOLVITE) 1 MG tablet TAKE 1 TABLET BY MOUTH ONCE DAILY. 2    colchicine (COLCRYS) 0.6 MG tablet TAKE 1 TABLET BY MOUTH TWICE DAILY. 5    methotrexate (RHEUMATREX) 2.5 MG chemo tablet TAKE 3 TABLETS BY MOUTH once EVERY week. 2     No current facility-administered medications for this visit. Review of Systems   Constitutional: Negative for fever. HENT: Negative for ear pain, tinnitus and trouble swallowing. Eyes: Negative for photophobia and visual disturbance. Respiratory: Negative for choking and shortness of breath. Cardiovascular: Negative for chest pain and palpitations. Gastrointestinal: Negative for nausea and vomiting. Musculoskeletal: Negative for back pain, gait problem, joint swelling, myalgias, neck pain and neck stiffness. Skin: Negative for color change. Allergic/Immunologic: Negative for food allergies. Neurological: Negative for dizziness, tremors, seizures, syncope, facial asymmetry, speech difficulty, weakness, light-headedness, numbness and headaches. Psychiatric/Behavioral: Negative for behavioral problems, confusion, hallucinations and sleep disturbance. Remarkable for loss of balance only. Objective:   /74 (Site: Left Upper Arm, Position: Sitting, Cuff Size: Small Adult)   Wt 97 lb (44 kg)   BMI 18.33 kg/m²     Physical Exam  Vitals signs reviewed. Eyes:      Pupils: Pupils are equal, round, and reactive to light. Neck:      Musculoskeletal: Normal range of motion. Cardiovascular:      Rate and Rhythm: Normal rate and regular rhythm. Heart sounds: No murmur. Pulmonary:      Effort: Pulmonary effort is normal.      Breath sounds: Normal breath sounds. Abdominal:      General: Bowel sounds are normal.   Musculoskeletal: Normal range of motion. Skin:     General: Skin is warm. Neurological:      Mental Status: She is alert and oriented to person, place, and time. Cranial Nerves: No cranial nerve deficit. Sensory: No sensory deficit. Motor: No abnormal muscle tone. Coordination: Coordination normal.      Deep Tendon Reflexes: Reflexes are normal and symmetric. Babinski sign absent on the right side. Babinski sign absent on the left side. Psychiatric:         Mood and Affect: Mood normal.       Considerable arthritic changes seen in small joints with swelling. These are nontender. She is mildly hyperreflexic throughout with some minor loss of balance. She has a mild tremor which had seen this could be a rest tremor though this is very subtle and I was not able to exacerbate it with mental task of walking. There is no cogwheel rigidity but she does have decreased arm swings  Jakob Digital Screen W Cad Bilateral    Result Date: 11/4/2019  EXAMINATION: JAKOB DIGITAL SCREEN W OR WO CAD BILATERAL   CLINICAL HISTORY:Z12.31 Encounter for screening mammogram for malignant neoplasm of breast ICD10 COMPARISON: February 9, 2017 FINDINGS:Bilateral digital mammography was performed. The breast parenchyma is heterogeneously dense bilaterally which may obscure small masses. There are no suspicious masses, areas of architectural distortion or suspicious areas of microcalcifications. CAD analysis was performed and used in the interpretation. BI-RADS 2: BENIGN FINDINGS. Board Certified Radiologists. Accredited by the ACR and FDA. MAMMOGRAPHY IS VERY IMPORTANT TO YOUR HEALTH. THE AMERICAN CANC[employer] SOCIETY GUIDELINES RECOMMEND THAT WOMEN 36YEARS OF AGE AND OLDER SHOULD HAVE A MAMMOGRAM EVERY YEAR.  A REMINDER LETTER WILL BE SENT AT THE APPROPRIATE TIME.  THIS FACILITY UTILIZES A REMINDER SYSTEM TO ENSURE ALL PATIENTS RECEIVE REMINDER NOTIFICATIONS AT THE APPROPRIATE TIME BASED ON THE RECOMMENDATIONS OF THIS EXAM. THIS INCLUDES REMINDERS FOR ROUTINE  SCREENING MAMMOGRAMS, DIAGNOSTIC MAMMOGRAMS IN WHICH THE PATIENT IS ASKED TO RETURN FOR ADDITIONAL VIEWS, OR OTHER BREAST IMAGING INTERVENTIONS WHEN APPROPRIATE. THE PATIENT WILL BE PLACED IN THE APPROPRIATE REMINDER SYSTEM INCLUDING A REMINDER AT THE APPROPRIATE TIME FOR ANY PENDING ADDITIONAL VIEWS. Lab Results   Component Value Date    WBC 4.2 06/30/2020    RBC 4.58 06/30/2020    HGB 15.1 06/30/2020    HCT 46.2 06/30/2020    .9 06/30/2020    MCH 33.0 06/30/2020    MCHC 32.7 06/30/2020    RDW 14.1 06/30/2020     06/30/2020    MPV 8.4 08/14/2013     Lab Results   Component Value Date     07/06/2020    K 3.5 07/06/2020     07/06/2020    CO2 24 07/06/2020    BUN 16 07/06/2020    CREATININE 0.71 07/06/2020    GFRAA >60.0 07/06/2020    LABGLOM >60.0 07/06/2020    GLUCOSE 87 07/06/2020    PROT 6.3 06/30/2020    LABALBU 4.6 06/30/2020    CALCIUM 9.2 07/06/2020    BILITOT 0.5 06/30/2020    ALKPHOS 58 06/30/2020    AST 28 06/30/2020    ALT 15 06/30/2020     No results found for: PROTIME, INR  Lab Results   Component Value Date    TSH 0.632 06/30/2020    IEDASQLO10 298 06/30/2020    FOLATE >20.0 04/04/2018     Lab Results   Component Value Date    TRIG 88 06/30/2020     06/30/2020    LDLCALC 56 06/30/2020     No results found for: LABAMPH, BARBSCNU, LABBENZ, CANNAB, COCAINESCRN, LABMETH, OPIATESCREENURINE, PHENCYCLIDINESCREENURINE, PPXUR, ETOH  No results found for: LITHIUM, DILFRTOT, VALPROATE    Assessment:       Diagnosis Orders   1. Ataxic gait  MRI CERVICAL SPINE WO CONTRAST   2. 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     3. Cervicalgia     4. Hyperreflexia     Gait ataxia which may relate to patient's arthritic changes appear this could be in her cervical spine as well. She is hyperreflexic throughout and this may be contributing to some of her gait issues.     The other

## 2020-08-11 ENCOUNTER — HOSPITAL ENCOUNTER (OUTPATIENT)
Dept: MRI IMAGING | Age: 70
Discharge: HOME | End: 2020-08-13
Payer: MEDICARE

## 2020-08-11 PROCEDURE — 70544 MR ANGIOGRAPHY HEAD W/O DYE: CPT

## 2020-08-11 PROCEDURE — 72141 MRI NECK SPINE W/O DYE: CPT

## 2020-08-24 DIAGNOSIS — E87.6 HYPOKALEMIA: ICD-10-CM

## 2020-08-24 LAB
ANION GAP SERPL CALCULATED.3IONS-SCNC: 11 MEQ/L (ref 9–15)
BUN BLDV-MCNC: 18 MG/DL (ref 8–23)
CALCIUM SERPL-MCNC: 8.7 MG/DL (ref 8.5–9.9)
CHLORIDE BLD-SCNC: 106 MEQ/L (ref 95–107)
CO2: 27 MEQ/L (ref 20–31)
CREAT SERPL-MCNC: 0.74 MG/DL (ref 0.5–0.9)
GFR AFRICAN AMERICAN: >60
GFR NON-AFRICAN AMERICAN: >60
GLUCOSE BLD-MCNC: 106 MG/DL (ref 70–99)
POTASSIUM SERPL-SCNC: 3.4 MEQ/L (ref 3.4–4.9)
SODIUM BLD-SCNC: 144 MEQ/L (ref 135–144)

## 2020-08-28 RX ORDER — LEVOTHYROXINE SODIUM 0.05 MG/1
TABLET ORAL
Qty: 90 TABLET | Refills: 1 | Status: SHIPPED | OUTPATIENT
Start: 2020-08-28 | End: 2021-03-02 | Stop reason: SDUPTHER

## 2020-08-28 NOTE — TELEPHONE ENCOUNTER
requesting medication refill.  Please approve or deny this request.    Rx requested:  Requested Prescriptions     Pending Prescriptions Disp Refills    levothyroxine (SYNTHROID) 50 MCG tablet [Pharmacy Med Name: LEVOTHYROXIN TAB 0.05MG] 90 tablet 1     Sig: TAKE 1 TABLET ONCE DAILY         Last Office Visit:   6/26/2020      Next Visit Date:  Future Appointments   Date Time Provider Roque Garcia   9/22/2020  1:45 PM Serafin Christian MD [facility]   12/7/2020  3:30 PM Jaiden Thomas MD [facility]

## 2020-08-30 ENCOUNTER — TELEPHONE (OUTPATIENT)
Dept: FAMILY MEDICINE CLINIC | Age: 70
End: 2020-08-30

## 2020-09-10 RX ORDER — METOPROLOL SUCCINATE 25 MG/1
25 TABLET, EXTENDED RELEASE ORAL DAILY
Qty: 14 TABLET | Refills: 0 | Status: SHIPPED | OUTPATIENT
Start: 2020-09-10 | End: 2020-09-15 | Stop reason: SDUPTHER

## 2020-09-15 RX ORDER — METOPROLOL SUCCINATE 25 MG/1
25 TABLET, EXTENDED RELEASE ORAL DAILY
Qty: 90 TABLET | Refills: 3 | Status: SHIPPED | OUTPATIENT
Start: 2020-09-15 | End: 2021-09-14

## 2020-09-22 ENCOUNTER — OFFICE VISIT (OUTPATIENT)
Dept: NEUROLOGY | Age: 70
End: 2020-09-22
Payer: MEDICARE

## 2020-09-22 VITALS — DIASTOLIC BLOOD PRESSURE: 78 MMHG | SYSTOLIC BLOOD PRESSURE: 128 MMHG | HEART RATE: 90 BPM

## 2020-09-22 PROCEDURE — 99214 OFFICE O/P EST MOD 30 MIN: CPT | Performed by: PSYCHIATRY & NEUROLOGY

## 2020-09-22 RX ORDER — NEEDLES, SAFETY 22GX1 1/2"
NEEDLE, DISPOSABLE MISCELLANEOUS
COMMUNITY
Start: 2020-08-01 | End: 2021-12-13

## 2020-09-22 ASSESSMENT — ENCOUNTER SYMPTOMS
COLOR CHANGE: 0
PHOTOPHOBIA: 0
NAUSEA: 0
CHOKING: 0
BACK PAIN: 0
VOMITING: 0
SHORTNESS OF BREATH: 0
TROUBLE SWALLOWING: 0

## 2020-09-22 NOTE — PROGRESS NOTES
Subjective:      Patient ID: Aung Allen is a 78 y.o. female who presents today for:  Chief Complaint   Patient presents with    Follow-up     Pt states that she is still a little off when walking.  Results     Pateint had MRI's done through [facility] and would like to go over results. HPI 57-year-old right-handed female with a history of ataxia. Patient had considerable arthritic changes and she is hyperreflexic throughout and therefore we are concerned about cervical spine pathology. The other etiology would be a very early suggestion of parkinsonism. MRI of the cervical spine does not reveal anything significant for some arthritic changes. Patient continues to slow down in her walking. She is not any falls injuries trauma no bleeding bruising choking drooling. Past Medical History:   Diagnosis Date    Arthritis     seen dr Bunny Foster Chronic back pain     seen dr Navjot Reyes in past    Depression     Hypothyroidism     Osteoarthritis     Osteoporosis     Restless legs syndrome     Tachycardia      Past Surgical History:   Procedure Laterality Date    CHOLECYSTECTOMY      HYSTERECTOMY      KNEE ARTHROSCOPY Right     TONSILLECTOMY       Social History     Socioeconomic History    Marital status:      Spouse name: Not on file    Number of children: Not on file    Years of education: Not on file    Highest education level: Not on file   Occupational History    Not on file   Social Needs    Financial resource strain: Not on file    Food insecurity     Worry: Not on file     Inability: Not on file    Transportation needs     Medical: Not on file     Non-medical: Not on file   Tobacco Use    Smoking status: Never Smoker    Smokeless tobacco: Never Used   Substance and Sexual Activity    Alcohol use:  Yes     Alcohol/week: 0.0 standard drinks     Comment: social    Drug use: No    Sexual activity: Not on file   Lifestyle    Physical activity     Days per week: Not on file Minutes per session: Not on file    Stress: Not on file   Relationships    Social connections     Talks on phone: Not on file     Gets together: Not on file     Attends Buddhist service: Not on file     Active member of club or organization: Not on file     Attends meetings of clubs or organizations: Not on file     Relationship status: Not on file    Intimate partner violence     Fear of current or ex partner: Not on file     Emotionally abused: Not on file     Physically abused: Not on file     Forced sexual activity: Not on file   Other Topics Concern    Not on file   Social History Narrative    Not on file     Family History   Problem Relation Age of Onset    Heart Disease Mother     High Blood Pressure Mother     Kidney Disease Mother     Cancer Father         bone, prostate, skin    Heart Disease Brother     Cancer Sister         kidney, lung     No Known Allergies    Current Outpatient Medications   Medication Sig Dispense Refill    B-D INSULIN SYRINGE 29G X 1/2\" 1 ML MISC use one syringe to administer mEtHOTRExATE once each week      metoprolol succinate (TOPROL XL) 25 MG extended release tablet Take 1 tablet by mouth daily 90 tablet 3    levothyroxine (SYNTHROID) 50 MCG tablet TAKE 1 TABLET ONCE DAILY 90 tablet 1    Methotrexate 2.5 MG/ML SOLN Take by mouth once a week      potassium chloride (KLOR-CON M) 10 MEQ extended release tablet Take 1 tablet by mouth daily 3 tablet 0    meclizine (ANTIVERT) 25 MG tablet Take 1 tablet by mouth 3 times daily as needed for Dizziness 40 tablet 2    sertraline (ZOLOFT) 50 MG tablet Take 1 tablet by mouth daily 7 tablet 1    Cholecalciferol (VITAMIN D3) 1.25 MG (00792 UT) CAPS Take 1 capsule by mouth once a week 12 capsule 2    levothyroxine (SYNTHROID) 50 MCG tablet TAKE 1 TABLET DAILY 90 tablet 3    hydroxychloroquine (PLAQUENIL) 200 MG tablet TAKE 1 TABLET BY MOUTH TWICE DAILY 60 tablet 2    predniSONE (DELTASONE) 5 MG tablet TAKE 1 TABLET DAILY 90 tablet 1    cyclobenzaprine (FLEXERIL) 10 MG tablet Take 1 tablet by mouth 2 times daily as needed for Muscle spasms 60 tablet 2    folic acid (FOLVITE) 1 MG tablet TAKE 1 TABLET BY MOUTH ONCE DAILY. 2    methotrexate (RHEUMATREX) 2.5 MG chemo tablet TAKE 3 TABLETS BY MOUTH once EVERY week. 2    colchicine (COLCRYS) 0.6 MG tablet TAKE 1 TABLET BY MOUTH TWICE DAILY. 5     No current facility-administered medications for this visit. Review of Systems   Constitutional: Negative for fever. HENT: Negative for ear pain, tinnitus and trouble swallowing. Eyes: Negative for photophobia and visual disturbance. Respiratory: Negative for choking and shortness of breath. Cardiovascular: Negative for chest pain and palpitations. Gastrointestinal: Negative for nausea and vomiting. Musculoskeletal: Positive for arthralgias. Negative for back pain, gait problem, joint swelling, myalgias, neck pain and neck stiffness. Skin: Negative for color change. Allergic/Immunologic: Negative for food allergies. Neurological: Negative for dizziness, tremors, seizures, syncope, facial asymmetry, speech difficulty, weakness, light-headedness, numbness and headaches. Psychiatric/Behavioral: Negative for behavioral problems, confusion, hallucinations and sleep disturbance. Objective:   /78 (Site: Left Upper Arm, Position: Sitting, Cuff Size: Medium Adult)   Pulse 90     Physical Exam  Vitals signs reviewed. Eyes:      Pupils: Pupils are equal, round, and reactive to light. Neck:      Musculoskeletal: Normal range of motion. Cardiovascular:      Rate and Rhythm: Normal rate and regular rhythm. Heart sounds: No murmur. Pulmonary:      Effort: Pulmonary effort is normal.      Breath sounds: Normal breath sounds. Abdominal:      General: Bowel sounds are normal.   Musculoskeletal: Normal range of motion. Skin:     General: Skin is warm.    Neurological:      Mental Status: She is alert and oriented to person, place, and time. Cranial Nerves: No cranial nerve deficit. Sensory: No sensory deficit. Motor: No abnormal muscle tone. Coordination: Coordination normal.      Deep Tendon Reflexes: Reflexes are normal and symmetric. Babinski sign absent on the right side. Babinski sign absent on the left side. Psychiatric:         Mood and Affect: Mood normal.     Patient is to posture with decreased arm swings. A subtle tremor was noted when she walked but nothing else is seen. Mra Head Wo Contrast    Result Date: 8/11/2020  MRA BRAIN: COMPARISONS:  NONE CLINICAL HISTORY:  Abnormal balance. Vertigo, dizziness. Symptoms for 6 months. TECHNIQUE: 3-D time-of-flight MRA images of the [place] were obtained. Also T2-weighted axial sequences of the brain were acquired. FINDINGS: Mild motion degradation. ?? Flow is visualized in both distal vertebral arteries, basilar artery and posterior cerebral arteries. Flow is visualized in the intracranial internal carotid arteries and its branches including anterior, middle and cerebral arteries. No demonstrable aneurysm or AVM. There is some narrowing at the origin of the A1 segment of the right TISHA . NO DEMONSTRABLE ANEURYSM. SUSPECTED NARROWING AT THE ORIGIN OF THE A1 SEGMENT THE RIGHT TISHA. NO OTHER SIGNIFICANT STENOSIS. Mri Cervical Spine Wo Contrast    Result Date: 8/11/2020  MRI CERVICAL SPINE WITHOUT CONTRAST: COMPARISONS:  NONE CLINICAL HISTORY: Vertigo, dizziness, abnormal balance. Lightheaded feeling for 6 months. Neck pain. TECHNIQUE: Multiplanar, multi-sequence MRI was performed on a  1.2 Do open magnet. FINDINGS: There is a mild anterolisthesis at the C3-4 level for 3-4 mm. There is a mild retrolisthesis at the C5-6 level for 4 mm. There is narrowing of the C2-3 through C6-7 disc levels. No acute fracture nor osseous destruction. There are degenerative changes, see below.   The cervical cord has normal morphology and signal. No evidence of cord compression. The cerebellar tonsils are not ectopic. Within the posterior aspect of the dens there is a focal area of decreased signal on T1 and increased signal on T2 and increased signal on STIR images. 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 C2/C3: Narrowed disc. No central canal or neural foraminal stenosis. C3/C4: Mild anterolisthesis for 3 to 4 mm. There is posterior bony ridging. There is moderate narrowing left neural foramen. No central canal  or right neural foraminal stenosis. C4/C5: Narrowed disc. Bilateral posterior lateral bony spurring. Mild narrowing the right neural foramen. No central canal or left neural foraminal stenosis. C5/C6: Narrowed disc. Mild retrolisthesis of C5 and C6 for approximately 4 mm. Posterior bony spurring with bony ridge. Posterior protrusion of disc. This causes anterior extradural defect and mild narrowing of central canal. Moderate narrowing left neural foramen. Right neural foramen is patent. C6/C7: Narrowed disc. Bilateral posterior lateral bony spurring. Moderate narrowing of left neural foramen. Mild narrowing central canal. Right neural foramen is patent. C7/T1: Narrowed disc. No central canal or neural foraminal stenosis. NO EVIDENCE OF FRACTURE. MILD ANTEROLISTHESIS C3-4 AND RETROLISTHESIS C5-6. DEGENERATIVE CHANGES AT MULTIPLE CERVICAL DISC LEVELS, SEE ABOVE. MILD NARROWING CENTRAL CANAL AT THE C5-6 AND C6-7 LEVELS. NARROWING OF NEUROFORAMINA, SEE INDIVIDUAL LEVELS ABOVE. SUSPECTED BONE CYST WITHIN THE POSTERIOR ASPECT OF THE DENS. NO ABNORMAL SIGNAL WITHIN CERVICAL CORD.       Lab Results   Component Value Date    WBC 4.2 06/30/2020    RBC 4.58 06/30/2020    HGB 15.1 06/30/2020    HCT 46.2 06/30/2020    .9 06/30/2020    MCH 33.0 06/30/2020    MCHC 32.7 06/30/2020    RDW 14.1 06/30/2020     06/30/2020    MPV 8.4 08/14/2013     Lab Results   Component Value Date     08/24/2020    K 3.4 08/24/2020     08/24/2020    CO2 27 08/24/2020    BUN 18 08/24/2020    CREATININE 0.74 08/24/2020    GFRAA >60.0 08/24/2020    LABGLOM >60.0 08/24/2020    GLUCOSE 106 08/24/2020    PROT 6.3 06/30/2020    LABALBU 4.6 06/30/2020    CALCIUM 8.7 08/24/2020    BILITOT 0.5 06/30/2020    ALKPHOS 58 06/30/2020    AST 28 06/30/2020    ALT 15 06/30/2020     No results found for: PROTIME, INR  Lab Results   Component Value Date    TSH 0.632 06/30/2020    [id number] 298 06/30/2020    FOLATE >20.0 04/04/2018     Lab Results   Component Value Date    TRIG 88 06/30/2020     06/30/2020    LDLCALC 56 06/30/2020     No results found for: LABAMPH, BARBSCNU, LABBENZ, CANNAB, COCAINESCRN, LABMETH, OPIATESCREENURINE, PHENCYCLIDINESCREENURINE, PPXUR, ETOH  No results found for: LITHIUM, DILFRTOT, VALPROATE    Assessment:       Diagnosis Orders   1. Ataxic gait     2. Cervicalgia     3. 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     Gait ataxia which may likely represent very early parkinsonism. She has some features of stooped posture decreased arm swings as well as a subtle tremor that I note only when she walks in the left hand. Cervical spine MRI was obtained as patient is hyperreflexic and she does not have cervical myelopathy. I recommend that we try her on a low-dose of carbidopa levodopa as a dopamine trial and see her in 2 months to see if this is helping. Side effects of been discussed she will call me if this occurs then will discontinue the medication. Details of this are reviewed      Plan:      No orders of the defined types were placed in this encounter. No orders of the defined types were placed in this encounter. No follow-ups on file.       Miguelina Starks MD

## 2020-09-24 ENCOUNTER — TELEPHONE (OUTPATIENT)
Dept: NEUROLOGY | Age: 70
End: 2020-09-24

## 2020-09-24 NOTE — TELEPHONE ENCOUNTER
Patient started Sinemet 25-100mg today and after she took it she started having cold sweats, stomach cramping and was vomiting.      Please advise    Thanks Media [place]

## 2020-11-24 ENCOUNTER — OFFICE VISIT (OUTPATIENT)
Dept: NEUROLOGY | Age: 70
End: 2020-11-24
Payer: MEDICARE

## 2020-11-24 VITALS
DIASTOLIC BLOOD PRESSURE: 68 MMHG | SYSTOLIC BLOOD PRESSURE: 124 MMHG | BODY MASS INDEX: 18.48 KG/M2 | WEIGHT: 97.8 LBS | HEART RATE: 74 BPM

## 2020-11-24 PROBLEM — M41.9 KYPHOSCOLIOSIS: Status: ACTIVE | Noted: 2020-11-24

## 2020-11-24 PROCEDURE — 99214 OFFICE O/P EST MOD 30 MIN: CPT | Performed by: PSYCHIATRY & NEUROLOGY

## 2020-11-24 ASSESSMENT — ENCOUNTER SYMPTOMS
SHORTNESS OF BREATH: 0
COLOR CHANGE: 0
TROUBLE SWALLOWING: 0
NAUSEA: 0
PHOTOPHOBIA: 0
BACK PAIN: 0
CHOKING: 0
VOMITING: 0

## 2020-11-24 NOTE — PROGRESS NOTES
Subjective:      Patient ID: Da Harding is a 78 y.o. female who presents today for:  Chief Complaint   Patient presents with    Follow-up     Pt states that she is doing a little better. Pt states that she is taking 1/2 in the morning and 1/2 in the evening of the carb-levo and she has been doing okay on it with no problems. HPI 80-year-old right-handed female with history of ataxia. Patient has history of vertigo and tremors./Seen MRI of the cervical spine was reviewed does not rating significant MR is normal.  Minor narrowing of the right TISHA is notable of no clinical significance. Patient has some symptoms of parkinsonism and therefore await further started on carbidopa levodopa. She had some stomach issues and she was not able to tolerate that she is now on half a tablet twice a day and tolerating this and doing somewhat better. She is not any falls denies any dyskinesias or hallucinations or hypotensive episodes  Patient been somewhat better her balance issues are also related to severe's kyphoscoliosis that she has. Patient also has insomnia and does not sleep very well.   She is not tried any medication except for occasional use of Tylenol PM.  She is not having any vivid dreaming    Past Medical History:   Diagnosis Date    Arthritis     seen dr Jose Luis Corcoran Chronic back pain     seen dr Aye Nation in past    Depression     Hypothyroidism     Osteoarthritis     Osteoporosis     Restless legs syndrome     Tachycardia      Past Surgical History:   Procedure Laterality Date    CHOLECYSTECTOMY      HYSTERECTOMY      KNEE ARTHROSCOPY Right     TONSILLECTOMY       Social History     Socioeconomic History    Marital status:      Spouse name: Not on file    Number of children: Not on file    Years of education: Not on file    Highest education level: Not on file   Occupational History    Not on file   Social Needs    Financial resource strain: Not on file    Food insecurity Worry: Not on file     Inability: Not on file    Transportation needs     Medical: Not on file     Non-medical: Not on file   Tobacco Use    Smoking status: Never Smoker    Smokeless tobacco: Never Used   Substance and Sexual Activity    Alcohol use:  Yes     Alcohol/week: 0.0 standard drinks     Comment: social    Drug use: No    Sexual activity: Not on file   Lifestyle    Physical activity     Days per week: Not on file     Minutes per session: Not on file    Stress: Not on file   Relationships    Social connections     Talks on phone: Not on file     Gets together: Not on file     Attends Congregation service: Not on file     Active member of club or organization: Not on file     Attends meetings of clubs or organizations: Not on file     Relationship status: Not on file    Intimate partner violence     Fear of current or ex partner: Not on file     Emotionally abused: Not on file     Physically abused: Not on file     Forced sexual activity: Not on file   Other Topics Concern    Not on file   Social History Narrative    Not on file     Family History   Problem Relation Age of Onset    Heart Disease Mother     High Blood Pressure Mother     Kidney Disease Mother     Cancer Father         bone, prostate, skin    Heart Disease Brother     Cancer Sister         kidney, lung     No Known Allergies    Current Outpatient Medications   Medication Sig Dispense Refill    carbidopa-levodopa (SINEMET)  MG per tablet One qam for 1 week, then one bid (am and 2 pm) 90 tablet 3    metoprolol succinate (TOPROL XL) 25 MG extended release tablet Take 1 tablet by mouth daily 90 tablet 3    levothyroxine (SYNTHROID) 50 MCG tablet TAKE 1 TABLET ONCE DAILY 90 tablet 1    potassium chloride (KLOR-CON M) 10 MEQ extended release tablet Take 1 tablet by mouth daily 3 tablet 0    sertraline (ZOLOFT) 50 MG tablet Take 1 tablet by mouth daily 7 tablet 1    Cholecalciferol (VITAMIN D3) 1.25 MG (26607 UT) CAPS Take 1 capsule by mouth once a week 12 capsule 2    hydroxychloroquine (PLAQUENIL) 200 MG tablet TAKE 1 TABLET BY MOUTH TWICE DAILY 60 tablet 2    predniSONE (DELTASONE) 5 MG tablet TAKE 1 TABLET DAILY 90 tablet 1    colchicine (COLCRYS) 0.6 MG tablet TAKE 1 TABLET BY MOUTH TWICE DAILY. 5    B-D INSULIN SYRINGE 29G X 1/2\" 1 ML MISC use one syringe to administer mEtHOTRExATE once each week      Methotrexate 2.5 MG/ML SOLN Take by mouth once a week      levothyroxine (SYNTHROID) 50 MCG tablet TAKE 1 TABLET DAILY (Patient not taking: Reported on 11/24/2020) 90 tablet 3    cyclobenzaprine (FLEXERIL) 10 MG tablet Take 1 tablet by mouth 2 times daily as needed for Muscle spasms (Patient not taking: Reported on 11/24/2020) 60 tablet 2    folic acid (FOLVITE) 1 MG tablet TAKE 1 TABLET BY MOUTH ONCE DAILY. 2    methotrexate (RHEUMATREX) 2.5 MG chemo tablet TAKE 3 TABLETS BY MOUTH once EVERY week. 2     No current facility-administered medications for this visit. Review of Systems   Constitutional: Negative for fever. HENT: Negative for ear pain, tinnitus and trouble swallowing. Eyes: Negative for photophobia and visual disturbance. Respiratory: Negative for choking and shortness of breath. Cardiovascular: Negative for chest pain and palpitations. Gastrointestinal: Negative for nausea and vomiting. Musculoskeletal: Positive for gait problem. Negative for back pain, joint swelling, myalgias, neck pain and neck stiffness. Skin: Negative for color change. Allergic/Immunologic: Negative for food allergies. Neurological: Negative for dizziness, tremors, seizures, syncope, facial asymmetry, speech difficulty, weakness, light-headedness, numbness and headaches. Psychiatric/Behavioral: Negative for behavioral problems, confusion, hallucinations and sleep disturbance.        Objective:   /68 (Site: Left Upper Arm, Position: Sitting, Cuff Size: Medium Adult)   Pulse 74   Wt 97 lb 12.8 oz (44.4 kg)   BMI 18.48 kg/m²     Physical Exam  Vitals signs reviewed. Eyes:      Pupils: Pupils are equal, round, and reactive to light. Neck:      Musculoskeletal: Normal range of motion. Cardiovascular:      Rate and Rhythm: Normal rate and regular rhythm. Heart sounds: No murmur. Pulmonary:      Effort: Pulmonary effort is normal.      Breath sounds: Normal breath sounds. Abdominal:      General: Bowel sounds are normal.   Musculoskeletal: Normal range of motion. Skin:     General: Skin is warm. Neurological:      Mental Status: She is alert and oriented to person, place, and time. Cranial Nerves: No cranial nerve deficit. Sensory: No sensory deficit. Motor: No abnormal muscle tone. Coordination: Coordination normal.      Deep Tendon Reflexes: Reflexes are normal and symmetric. Babinski sign absent on the right side. Babinski sign absent on the left side. Comments: In addition to this patient has a significant tilted gait with kyphoscoliosis. She today has some arm swings somewhat better. Psychiatric:         Mood and Affect: Mood normal.     She is somewhat hyperreflexic throughout    Mra Head Wo Contrast    Result Date: 8/11/2020  MRA BRAIN: COMPARISONS:  NONE CLINICAL HISTORY:  Abnormal balance. Vertigo, dizziness. Symptoms for 6 months. TECHNIQUE: 3-D time-of-flight MRA images of the [place] were obtained. Also T2-weighted axial sequences of the brain were acquired. FINDINGS: Mild motion degradation. ?? Flow is visualized in both distal vertebral arteries, basilar artery and posterior cerebral arteries. Flow is visualized in the intracranial internal carotid arteries and its branches including anterior, middle and cerebral arteries. No demonstrable aneurysm or AVM. There is some narrowing at the origin of the A1 segment of the right TISHA . NO DEMONSTRABLE ANEURYSM. SUSPECTED NARROWING AT THE ORIGIN OF THE A1 SEGMENT THE RIGHT TISHA.  NO OTHER SIGNIFICANT MULTIPLE CERVICAL DISC LEVELS, SEE ABOVE. MILD NARROWING CENTRAL CANAL AT THE C5-6 AND C6-7 LEVELS. NARROWING OF NEUROFORAMINA, SEE INDIVIDUAL LEVELS ABOVE. SUSPECTED BONE CYST WITHIN THE POSTERIOR ASPECT OF THE DENS. NO ABNORMAL SIGNAL WITHIN CERVICAL CORD. Lab Results   Component Value Date    WBC 4.2 06/30/2020    RBC 4.58 06/30/2020    HGB 15.1 06/30/2020    HCT 46.2 06/30/2020    .9 06/30/2020    MCH 33.0 06/30/2020    MCHC 32.7 06/30/2020    RDW 14.1 06/30/2020     06/30/2020    MPV 8.4 08/14/2013     Lab Results   Component Value Date     08/24/2020    K 3.4 08/24/2020     08/24/2020    CO2 27 08/24/2020    BUN 18 08/24/2020    CREATININE 0.74 08/24/2020    GFRAA >60.0 08/24/2020    LABGLOM >60.0 08/24/2020    GLUCOSE 106 08/24/2020    PROT 6.3 06/30/2020    LABALBU 4.6 06/30/2020    CALCIUM 8.7 08/24/2020    BILITOT 0.5 06/30/2020    ALKPHOS 58 06/30/2020    AST 28 06/30/2020    ALT 15 06/30/2020     No results found for: PROTIME, INR  Lab Results   Component Value Date    TSH 0.632 06/30/2020    [id number] 298 06/30/2020    FOLATE >20.0 04/04/2018     Lab Results   Component Value Date    TRIG 88 06/30/2020     06/30/2020    LDLCALC 56 06/30/2020     No results found for: LABAMPH, BARBSCNU, LABBENZ, CANNAB, COCAINESCRN, LABMETH, OPIATESCREENURINE, PHENCYCLIDINESCREENURINE, PPXUR, ETOH  No results found for: LITHIUM, DILFRTOT, VALPROATE    Assessment:       Diagnosis Orders   1. Ataxic gait     2. Cervicalgia     3. Hyperreflexia     Gait  ataxia which may represent very early parkinsonism without any true features of Parkinson's disease. Patient has a stooped posture with decreased arm swings with a subtle tremor that I noted while she was walking. Today she is doing much better. We tried on carbidopa levodopa twice a day with side effects. We cut this back to half a tablet twice a day and she is doing better.   I did not appreciate a tremor today and she has some arm swings. We will keep an eye on this appearance of her cervical spine MRI did not show rating significant and she is hyperreflexic and this may be secondary to severe kyphoscoliosis that she has. We will see how she does in the next few months she is not developed any other side effects of carbidopa levodopa      Plan:      No orders of the defined types were placed in this encounter. No orders of the defined types were placed in this encounter. No follow-ups on file.       Domonique Singh MD

## 2020-12-04 NOTE — TELEPHONE ENCOUNTER
Pharmacy requesting medication refill.  Please approve or deny this request.    Rx requested:  Requested Prescriptions     Pending Prescriptions Disp Refills    sertraline (ZOLOFT) 50 MG tablet [Pharmacy Med Name: SERTRALINE TAB 50MG] 90 tablet 2     Sig: TAKE 1 TABLET DAILY         Last Office Visit:   6/26/2020      Next Visit Date:  Future Appointments   Date Time Provider Roque Radha   12/9/2020 11:30 AM Jd Davis MD [facility]   3/30/2021  2:00 PM Kb Johnson MD [address] [place] [address]

## 2020-12-09 ENCOUNTER — OFFICE VISIT (OUTPATIENT)
Dept: CARDIOLOGY CLINIC | Age: 70
End: 2020-12-09
Payer: MEDICARE

## 2020-12-09 VITALS
DIASTOLIC BLOOD PRESSURE: 82 MMHG | OXYGEN SATURATION: 98 % | WEIGHT: 97.4 LBS | BODY MASS INDEX: 18.39 KG/M2 | RESPIRATION RATE: 20 BRPM | HEART RATE: 70 BPM | SYSTOLIC BLOOD PRESSURE: 122 MMHG | HEIGHT: 61 IN

## 2020-12-09 PROCEDURE — 99214 OFFICE O/P EST MOD 30 MIN: CPT | Performed by: INTERNAL MEDICINE

## 2020-12-09 ASSESSMENT — ENCOUNTER SYMPTOMS
VOMITING: 0
BLOOD IN STOOL: 0
DIARRHEA: 0
SHORTNESS OF BREATH: 0
NAUSEA: 0
APNEA: 0
CHEST TIGHTNESS: 0

## 2020-12-09 NOTE — PROGRESS NOTES
[facility] CARDIOLOGY OFFICE FOLLOW-UP      Patient: Janice Martinez  YOB: 1950  MRN: [id number]    Chief Complaint:  Chief Complaint   Patient presents with    1 Year Follow Up    Palpitations    Tachycardia         Subjective/HPI:  12/9/2020: Patient presents today for follow-up of palpitation. Stop taking the methotrexate which was given to her. by Erik Deleon. Hypothyroidism. Also on Sinemet. This was started by Dr. Murtaza An. Sinemet was causing issues with Plaquenil and therefore she quit taking Plaquenil. Occasional chest pain. This is noncardiac. She weighs only 100 pounds appetite is okay. No ankle edema. No syncope dizziness. I will see her in 1 year. She denies smoking. Emphasized to her the need to take all kinds of precaution during the Covid season because of her immunocompromised status. 12/5/19: Patient presents today for follow-up of palpitations. Much better controlled. After beta-blocker was started. Also reportedly has connective tissue disorders for which she sees . I have advised her to get the flu shot. She does not smoke. No syncope dizziness     11/28/18: Patient presents today for followup of palpitation. Much better after Toprol was started. Sees rheumatologist for connective tissue disorder. No angina. No congestive heart failure. See me in 6 months.     5/24/18: Patient presents today for Evaluation of palpitation. Much better after we started her on Toprol 25 mg a day. Stress test is unremarkable. Echocardiogram is okay too. Has a history of connective tissue disorder being followed by Juan. No syncope no dizziness. See me in 6 months     4/12/18: Patient presents today for Evaluation of palpitation. Consulted by Dr. Demetri Cat. Has a history of lupus and being treated by Juan. Has a history of palpitation the remote past. Was seen by Gita Chicas. Had a stress test followed by cardiac catheterization. She was put on Toprol 25 mg (1/2).  And has continued since then. Accompanied by her . No syncope. Palpitations are unpredictable. It would last hours at times. No relation to any activity she does. Could happen when she is lying in bed. Thyroid profile is normal. No convulsions or seizures. We will increase her Toprol to 25 mg a day. We will repeat Lexiscan, echo and a 14 day event monitor.                Past Medical History:   Diagnosis Date    Arthritis     seen dr Naman Javier Chronic back pain     seen dr Zeynep Nava in past    Depression     Hypothyroidism     Osteoarthritis     Osteoporosis     Restless legs syndrome     Tachycardia        Past Surgical History:   Procedure Laterality Date    CHOLECYSTECTOMY      HYSTERECTOMY      KNEE ARTHROSCOPY Right     TONSILLECTOMY         Family History   Problem Relation Age of Onset    Heart Disease Mother     High Blood Pressure Mother     Kidney Disease Mother     Cancer Father         bone, prostate, skin    Heart Disease Brother     Cancer Sister         kidney, lung       Social History     Socioeconomic History    Marital status:      Spouse name: None    Number of children: None    Years of education: None    Highest education level: None   Occupational History    None   Social Needs    Financial resource strain: None    Food insecurity     Worry: None     Inability: None    Transportation needs     Medical: None     Non-medical: None   Tobacco Use    Smoking status: Never Smoker    Smokeless tobacco: Never Used   Substance and Sexual Activity    Alcohol use:  Yes     Alcohol/week: 0.0 standard drinks     Comment: social    Drug use: No    Sexual activity: None   Lifestyle    Physical activity     Days per week: None     Minutes per session: None    Stress: None   Relationships    Social connections     Talks on phone: None     Gets together: None     Attends Jainism service: None     Active member of club or organization: None     Attends meetings of clubs or organizations: None     Relationship status: None    Intimate partner violence     Fear of current or ex partner: None     Emotionally abused: None     Physically abused: None     Forced sexual activity: None   Other Topics Concern    None   Social History Narrative    None       No Known Allergies    Current Outpatient Medications   Medication Sig Dispense Refill    sertraline (ZOLOFT) 50 MG tablet TAKE 1 TABLET DAILY 90 tablet 2    B-D INSULIN SYRINGE 29G X 1/2\" 1 ML MISC use one syringe to administer mEtHOTRExATE once each week      carbidopa-levodopa (SINEMET)  MG per tablet One qam for 1 week, then one bid (am and 2 pm) 90 tablet 3    metoprolol succinate (TOPROL XL) 25 MG extended release tablet Take 1 tablet by mouth daily 90 tablet 3    levothyroxine (SYNTHROID) 50 MCG tablet TAKE 1 TABLET ONCE DAILY 90 tablet 1    potassium chloride (KLOR-CON M) 10 MEQ extended release tablet Take 1 tablet by mouth daily 3 tablet 0    Cholecalciferol (VITAMIN D3) 1.25 MG (93104 UT) CAPS Take 1 capsule by mouth once a week 12 capsule 2    hydroxychloroquine (PLAQUENIL) 200 MG tablet TAKE 1 TABLET BY MOUTH TWICE DAILY 60 tablet 2    predniSONE (DELTASONE) 5 MG tablet TAKE 1 TABLET DAILY 90 tablet 1    cyclobenzaprine (FLEXERIL) 10 MG tablet Take 1 tablet by mouth 2 times daily as needed for Muscle spasms 60 tablet 2    folic acid (FOLVITE) 1 MG tablet TAKE 1 TABLET BY MOUTH ONCE DAILY. 2    colchicine (COLCRYS) 0.6 MG tablet TAKE 1 TABLET BY MOUTH TWICE DAILY. 5     No current facility-administered medications for this visit. Review of Systems:   Review of Systems   Constitutional: Negative for diaphoresis and fatigue. HENT: Negative for nosebleeds. Respiratory: Negative for apnea, chest tightness and shortness of breath. Cardiovascular: Negative for chest pain, palpitations and leg swelling. Gastrointestinal: Negative for blood in stool, diarrhea, nausea and vomiting. Musculoskeletal: Negative for myalgias, neck pain and neck stiffness. Neurological: Negative for dizziness, seizures, syncope, weakness, light-headedness, numbness and headaches. Hematological: Does not bruise/bleed easily. Psychiatric/Behavioral: Negative for confusion. The patient is not nervous/anxious. All other systems reviewed and are negative. Review of System is negative except for as mentioned above. Physical Examination:    /82 (Site: Left Upper Arm, Position: Sitting, Cuff Size: Medium Adult)   Pulse 70   Resp 20   Ht 5' 1\" (1.549 m)   Wt 97 lb 6.4 oz (44.2 kg)   SpO2 98%   BMI 18.40 kg/m²    Physical Exam        Patient Active Problem List   Diagnosis    Hypothyroid    Depressive disorder, not elsewhere classified    Palpitations    Tachycardia    Spondylosis of lumbar region without myelopathy or radiculopathy    Ataxic gait    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    Cervicalgia    Hyperreflexia    Kyphoscoliosis           No orders of the defined types were placed in this encounter. No orders of the defined types were placed in this encounter. Assessment/Orders:       ICD-10-CM    1. Tachycardia  R00.0    2. Palpitations  R00.2        No orders of the defined types were placed in this encounter. Medications Discontinued During This Encounter   Medication Reason    methotrexate (RHEUMATREX) 2.5 MG chemo tablet Therapy completed    levothyroxine (SYNTHROID) 50 MCG tablet DUPLICATE    Methotrexate 2.5 MG/ML SOLN Therapy completed    sertraline (ZOLOFT) 50 MG tablet DUPLICATE       No orders of the defined types were placed in this encounter. Plan:    Stay on same medications. See me in 1 year.         Electronically signed by: Rogelio Ramsey MD  12/13/2020 7:02 PM

## 2021-01-03 DIAGNOSIS — E55.9 VITAMIN D DEFICIENCY: ICD-10-CM

## 2021-01-04 RX ORDER — CHOLECALCIFEROL (VITAMIN D3) 1250 MCG
1 CAPSULE ORAL WEEKLY
Qty: 12 CAPSULE | Refills: 1 | Status: SHIPPED | OUTPATIENT
Start: 2021-01-04 | End: 2021-06-15

## 2021-01-04 NOTE — TELEPHONE ENCOUNTER
Pharmacy requesting medication refill.  Please approve or deny this request.    Rx requested:  Requested Prescriptions     Pending Prescriptions Disp Refills    Cholecalciferol (VITAMIN D3) 1.25 MG (84931 UT) CAPS [Pharmacy Med Name: cholecalciferol (vitamin D3) 1,250 mcg (50,000 unit) capsule] 12 capsule 1     Sig: TAKE 1 CAPSULE BY MOUTH ONCE A WEEK         Last Office Visit:   6/26/2020      Next Visit Date:  Future Appointments   Date Time Provider Roque Quachisti   3/30/2021  2:00 PM Jv Barton MD [facility]   12/8/2021 11:30 AM Claudette Holcomb MD [facility] [address]

## 2021-03-02 RX ORDER — LEVOTHYROXINE SODIUM 0.05 MG/1
TABLET ORAL
Qty: 90 TABLET | Refills: 1 | Status: SHIPPED | OUTPATIENT
Start: 2021-03-02 | End: 2021-09-10 | Stop reason: SDUPTHER

## 2021-03-30 ENCOUNTER — OFFICE VISIT (OUTPATIENT)
Dept: NEUROLOGY | Age: 71
End: 2021-03-30
Payer: MEDICARE

## 2021-03-30 VITALS
HEART RATE: 88 BPM | SYSTOLIC BLOOD PRESSURE: 138 MMHG | DIASTOLIC BLOOD PRESSURE: 78 MMHG | WEIGHT: 95 LBS | BODY MASS INDEX: 17.95 KG/M2

## 2021-03-30 DIAGNOSIS — M41.9 KYPHOSCOLIOSIS: ICD-10-CM

## 2021-03-30 DIAGNOSIS — R29.2 HYPERREFLEXIA: ICD-10-CM

## 2021-03-30 DIAGNOSIS — G20 PD (PARKINSON'S DISEASE) (HCC): ICD-10-CM

## 2021-03-30 DIAGNOSIS — R26.0 ATAXIC GAIT: Primary | ICD-10-CM

## 2021-03-30 PROCEDURE — 99214 OFFICE O/P EST MOD 30 MIN: CPT | Performed by: PSYCHIATRY & NEUROLOGY

## 2021-03-30 ASSESSMENT — ENCOUNTER SYMPTOMS
CHOKING: 0
BACK PAIN: 0
VOMITING: 0
COLOR CHANGE: 0
SHORTNESS OF BREATH: 0
NAUSEA: 0
PHOTOPHOBIA: 0
TROUBLE SWALLOWING: 0

## 2021-03-30 NOTE — PROGRESS NOTES
Subjective:      Patient ID: Landon Hogan is a 78 y.o. female who presents today for:  Chief Complaint   Patient presents with    Follow-up     Pt states that she is doing okay. She has notices a little bit more of a tremor occuring in the hands. States that she has the tremor more in the right hand. No recent falls and her balance is better then it was but still a little shaky. HPI 43-year-old right-handed female with Parkinson's disease. Patient actually had balance issues more than the tremors. Patient was initially started on a full tablet of carbidopa levodopa which she did not tolerate due to significant nausea. We then cut it down to half a tablet she is taking half a tablet 3 times a day. She is slowing down somewhat and more stooped with a minor tremor. She is able to tolerate the medication she takes it with food. She does have some vivid dreaming as a side effect but no other side effects of dizziness hallucinations dyskinesias are reported. She has not any memory issues not any difficulty swallowing or bulbar symptoms.     Past Medical History:   Diagnosis Date    Arthritis     seen dr Ayah Hamlin Chronic back pain     seen dr Weston Riggs in past    Depression     Hypothyroidism     Osteoarthritis     Osteoporosis     Restless legs syndrome     Tachycardia      Past Surgical History:   Procedure Laterality Date    CHOLECYSTECTOMY      HYSTERECTOMY      KNEE ARTHROSCOPY Right     TONSILLECTOMY       Social History     Socioeconomic History    Marital status:      Spouse name: Not on file    Number of children: Not on file    Years of education: Not on file    Highest education level: Not on file   Occupational History    Not on file   Social Needs    Financial resource strain: Not on file    Food insecurity     Worry: Not on file     Inability: Not on file    Transportation needs     Medical: Not on file     Non-medical: Not on file   Tobacco Use    Smoking status: tablet TAKE 1 TABLET DAILY 90 tablet 1    cyclobenzaprine (FLEXERIL) 10 MG tablet Take 1 tablet by mouth 2 times daily as needed for Muscle spasms 60 tablet 2    colchicine (COLCRYS) 0.6 MG tablet TAKE 1 TABLET BY MOUTH TWICE DAILY. 5    potassium chloride (KLOR-CON M) 10 MEQ extended release tablet Take 1 tablet by mouth daily (Patient not taking: Reported on 3/30/2021) 3 tablet 0    folic acid (FOLVITE) 1 MG tablet TAKE 1 TABLET BY MOUTH ONCE DAILY. 2     No current facility-administered medications for this visit. Review of Systems   Constitutional: Negative for fever. HENT: Negative for ear pain, tinnitus and trouble swallowing. Eyes: Negative for photophobia and visual disturbance. Respiratory: Negative for choking and shortness of breath. Cardiovascular: Negative for chest pain and palpitations. Gastrointestinal: Negative for nausea and vomiting. Musculoskeletal: Negative for back pain, gait problem, joint swelling, myalgias, neck pain and neck stiffness. Skin: Negative for color change. Allergic/Immunologic: Negative for food allergies. Neurological: Negative for dizziness, tremors, seizures, syncope, facial asymmetry, speech difficulty, weakness, light-headedness, numbness and headaches. Psychiatric/Behavioral: Negative for behavioral problems, confusion, hallucinations and sleep disturbance. Objective:   /78 (Site: Left Upper Arm, Position: Sitting, Cuff Size: Medium Adult)   Pulse 88   Wt 95 lb (43.1 kg)   BMI 17.95 kg/m²     Physical Exam  Vitals signs reviewed. Eyes:      Pupils: Pupils are equal, round, and reactive to light. Neck:      Musculoskeletal: Normal range of motion. Cardiovascular:      Rate and Rhythm: Normal rate and regular rhythm. Heart sounds: No murmur. Pulmonary:      Effort: Pulmonary effort is normal.      Breath sounds: Normal breath sounds.    Abdominal:      General: Bowel sounds are normal.   Musculoskeletal: Normal range of motion. Skin:     General: Skin is warm. Neurological:      Mental Status: She is alert and oriented to person, place, and time. Cranial Nerves: No cranial nerve deficit. Sensory: No sensory deficit. Motor: No abnormal muscle tone. Coordination: Coordination normal.      Deep Tendon Reflexes: Reflexes are normal and symmetric. Babinski sign absent on the right side. Babinski sign absent on the left side. Comments: In addition to this she has a very subtle tremor. She has decreased arm swings a stooped posture though this is more notable with kyphoscoliosis more than his parkinsonism of bradykinesia. Psychiatric:         Mood and Affect: Mood normal.         Mra Head Wo Contrast    Result Date: 8/11/2020  MRA BRAIN: COMPARISONS:  NONE CLINICAL HISTORY:  Abnormal balance. Vertigo, dizziness. Symptoms for 6 months. TECHNIQUE: 3-D time-of-flight MRA images of the [place] were obtained. Also T2-weighted axial sequences of the brain were acquired. FINDINGS: Mild motion degradation. ?? Flow is visualized in both distal vertebral arteries, basilar artery and posterior cerebral arteries. Flow is visualized in the intracranial internal carotid arteries and its branches including anterior, middle and cerebral arteries. No demonstrable aneurysm or AVM. There is some narrowing at the origin of the A1 segment of the right TISHA . NO DEMONSTRABLE ANEURYSM. SUSPECTED NARROWING AT THE ORIGIN OF THE A1 SEGMENT THE RIGHT TISHA. NO OTHER SIGNIFICANT STENOSIS. Mri Cervical Spine Wo Contrast    Result Date: 8/11/2020  MRI CERVICAL SPINE WITHOUT CONTRAST: COMPARISONS:  NONE CLINICAL HISTORY: Vertigo, dizziness, abnormal balance. Lightheaded feeling for 6 months. Neck pain. TECHNIQUE: Multiplanar, multi-sequence MRI was performed on a  1.2 Do open magnet. FINDINGS: There is a mild anterolisthesis at the C3-4 level for 3-4 mm.  There is a mild retrolisthesis at the C5-6 level for 4 mm.   There is narrowing of the C2-3 through C6-7 disc levels. No acute fracture nor osseous destruction. There are degenerative changes, see below. The cervical cord has normal morphology and signal. No evidence of cord compression. The cerebellar tonsils are not ectopic. Within the posterior aspect of the dens there is a focal area of decreased signal on T1 and increased signal on T2 and increased signal on STIR images. 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 C2/C3: Narrowed disc. No central canal or neural foraminal stenosis. C3/C4: Mild anterolisthesis for 3 to 4 mm. There is posterior bony ridging. There is moderate narrowing left neural foramen. No central canal  or right neural foraminal stenosis. C4/C5: Narrowed disc. Bilateral posterior lateral bony spurring. Mild narrowing the right neural foramen. No central canal or left neural foraminal stenosis. C5/C6: Narrowed disc. Mild retrolisthesis of C5 and C6 for approximately 4 mm. Posterior bony spurring with bony ridge. Posterior protrusion of disc. This causes anterior extradural defect and mild narrowing of central canal. Moderate narrowing left neural foramen. Right neural foramen is patent. C6/C7: Narrowed disc. Bilateral posterior lateral bony spurring. Moderate narrowing of left neural foramen. Mild narrowing central canal. Right neural foramen is patent. C7/T1: Narrowed disc. No central canal or neural foraminal stenosis. NO EVIDENCE OF FRACTURE. MILD ANTEROLISTHESIS C3-4 AND RETROLISTHESIS C5-6. DEGENERATIVE CHANGES AT MULTIPLE CERVICAL DISC LEVELS, SEE ABOVE. MILD NARROWING CENTRAL CANAL AT THE C5-6 AND C6-7 LEVELS. NARROWING OF NEUROFORAMINA, SEE INDIVIDUAL LEVELS ABOVE. SUSPECTED BONE CYST WITHIN THE POSTERIOR ASPECT OF THE DENS. NO ABNORMAL SIGNAL WITHIN CERVICAL CORD.       Lab Results   Component Value Date    WBC 4.2 06/30/2020    RBC 4.58 06/30/2020    HGB 15.1 06/30/2020    HCT 46.2 06/30/2020    .9 06/30/2020    MCH 33.0 06/30/2020 MCHC 32.7 06/30/2020    RDW 14.1 06/30/2020     06/30/2020    MPV 8.4 08/14/2013     Lab Results   Component Value Date     08/24/2020    K 3.4 08/24/2020     08/24/2020    CO2 27 08/24/2020    BUN 18 08/24/2020    CREATININE 0.74 08/24/2020    GFRAA >60.0 08/24/2020    LABGLOM >60.0 08/24/2020    GLUCOSE 106 08/24/2020    PROT 6.3 06/30/2020    LABALBU 4.6 06/30/2020    CALCIUM 8.7 08/24/2020    BILITOT 0.5 06/30/2020    ALKPHOS 58 06/30/2020    AST 28 06/30/2020    ALT 15 06/30/2020     No results found for: PROTIME, INR  Lab Results   Component Value Date    TSH 0.632 06/30/2020    NTPAAEYU70 298 06/30/2020    FOLATE >20.0 04/04/2018     Lab Results   Component Value Date    TRIG 88 06/30/2020     06/30/2020    LDLCALC 56 06/30/2020     No results found for: LABAMPH, BARBSCNU, LABBENZ, CANNAB, COCAINESCRN, LABMETH, OPIATESCREENURINE, PHENCYCLIDINESCREENURINE, PPXUR, ETOH  No results found for: LITHIUM, DILFRTOT, VALPROATE    Assessment:       Diagnosis Orders   1. Ataxic gait     2. Kyphoscoliosis     3. PD (Parkinson's disease) ([facility] [address]     4. Hyperreflexia     With ataxia consistent with very early parkinsonism. Patient actually responded very well to carbidopa levodopa dose on arrival to tolerate higher doses. She is now showing some minor worsening therefore recommended that we change her carbidopa levodopa to half a tablet 4 times a day she can space it out and not take 1 late at night. She does have some vivid dreaming she has no other side effects. Patient has severe kyphoscoliosis with a stooped posture and has hyperreflexia but no other myelopathic signs are notable. Of note she has a right TISHA narrowing which is of no clinical significance  She let me know how things go and I recommended that she take the medication with just a few crackers instead of with meals as she is likely to lose 20% of her carbidopa levodopa with protein binding.   She is doing a stationary bike which is helping. We will keep an eye on this and continue to follow. Plan:      No orders of the defined types were placed in this encounter. No orders of the defined types were placed in this encounter. No follow-ups on file.       Ben Medel MD

## 2021-06-13 DIAGNOSIS — E55.9 VITAMIN D DEFICIENCY: ICD-10-CM

## 2021-06-15 DIAGNOSIS — E53.8 VITAMIN B 12 DEFICIENCY: ICD-10-CM

## 2021-06-15 DIAGNOSIS — Z00.00 PREVENTATIVE HEALTH CARE: Primary | ICD-10-CM

## 2021-06-15 DIAGNOSIS — E78.5 HYPERLIPIDEMIA, UNSPECIFIED HYPERLIPIDEMIA TYPE: ICD-10-CM

## 2021-06-15 DIAGNOSIS — E03.9 HYPOTHYROIDISM, UNSPECIFIED TYPE: ICD-10-CM

## 2021-06-15 DIAGNOSIS — R73.9 HYPERGLYCEMIA: ICD-10-CM

## 2021-06-15 RX ORDER — CHOLECALCIFEROL (VITAMIN D3) 1250 MCG
1 CAPSULE ORAL WEEKLY
Qty: 12 CAPSULE | Refills: 1 | Status: SHIPPED | OUTPATIENT
Start: 2021-06-15 | End: 2021-12-05

## 2021-06-15 NOTE — TELEPHONE ENCOUNTER
Pharmacy requesting medication refill.  Please approve or deny this request.    Rx requested:  Requested Prescriptions     Pending Prescriptions Disp Refills    Cholecalciferol (VITAMIN D3) 1.25 MG (86228 UT) CAPS [Pharmacy Med Name: cholecalciferol (vitamin D3) 1,250 mcg (50,000 unit) capsule] 12 capsule 1     Sig: TAKE 1 CAPSULE BY MOUTH ONCE A WEEK         Last Office Visit:   6/26/2020      Next Visit Date:  Future Appointments   Date Time Provider Roque Umañai   8/3/2021  2:15 PM Haider Borja MD [facility]   12/8/2021 11:30 AM Irven Claude, MD [facility] [address]

## 2021-06-22 ENCOUNTER — HOSPITAL ENCOUNTER (OUTPATIENT)
Dept: LAB | Age: 71
Discharge: HOME | End: 2021-06-22
Payer: MEDICARE

## 2021-06-22 LAB
ALBUMIN SERPL-MCNC: 4.4 G/DL (ref 3.5–4.6)
ALP BLD-CCNC: 74 U/L (ref 40–130)
ALT SERPL-CCNC: <5 U/L (ref 0–33)
ANION GAP SERPL CALCULATED.3IONS-SCNC: 14 MEQ/L (ref 9–15)
AST SERPL-CCNC: 25 U/L (ref 0–35)
BASOPHILS ABSOLUTE: 0.1 K/UL (ref 0–0.2)
BASOPHILS RELATIVE PERCENT: 2.1 %
BILIRUB SERPL-MCNC: 0.4 MG/DL (ref 0.2–0.7)
BUN BLDV-MCNC: 13 MG/DL (ref 8–23)
CALCIUM SERPL-MCNC: 9.7 MG/DL (ref 8.5–9.9)
CHLORIDE BLD-SCNC: 104 MEQ/L (ref 95–107)
CHOLESTEROL, TOTAL: 200 MG/DL (ref 0–199)
CO2: 27 MEQ/L (ref 20–31)
CREAT SERPL-MCNC: 0.74 MG/DL (ref 0.5–0.9)
EOSINOPHILS ABSOLUTE: 0.1 K/UL (ref 0–0.7)
EOSINOPHILS RELATIVE PERCENT: 2.5 %
GFR AFRICAN AMERICAN: >60
GFR NON-AFRICAN AMERICAN: >60
GLOBULIN: 2 G/DL (ref 2.3–3.5)
GLUCOSE BLD-MCNC: 52 MG/DL (ref 70–99)
HBA1C MFR BLD: 5.3 % (ref 4.8–5.9)
HCT VFR BLD CALC: 45.8 % (ref 37–47)
HDLC SERPL-MCNC: 129 MG/DL (ref 40–59)
HEMOGLOBIN: 15.3 G/DL (ref 12–16)
LDL CHOLESTEROL CALCULATED: 55 MG/DL (ref 0–129)
LYMPHOCYTES ABSOLUTE: 1.1 K/UL (ref 1–4.8)
LYMPHOCYTES RELATIVE PERCENT: 26.2 %
MCH RBC QN AUTO: 32.5 PG (ref 27–31.3)
MCHC RBC AUTO-ENTMCNC: 33.4 % (ref 33–37)
MCV RBC AUTO: 97.5 FL (ref 82–100)
MONOCYTES ABSOLUTE: 0.6 K/UL (ref 0.2–0.8)
MONOCYTES RELATIVE PERCENT: 14.8 %
NEUTROPHILS ABSOLUTE: 2.2 K/UL (ref 1.4–6.5)
NEUTROPHILS RELATIVE PERCENT: 54.4 %
PDW BLD-RTO: 13.3 % (ref 11.5–14.5)
PLATELET # BLD: 225 K/UL (ref 130–400)
PLATELET SLIDE REVIEW: NORMAL
POTASSIUM SERPL-SCNC: 3.2 MEQ/L (ref 3.4–4.9)
RBC # BLD: 4.7 M/UL (ref 4.2–5.4)
RBC # BLD: NORMAL 10*6/UL
SODIUM BLD-SCNC: 145 MEQ/L (ref 135–144)
TOTAL PROTEIN: 6.4 G/DL (ref 6.3–8)
TRIGL SERPL-MCNC: 79 MG/DL (ref 0–150)
TSH REFLEX: 0.75 UIU/ML (ref 0.44–3.86)
VITAMIN B-12: 294 PG/ML (ref 232–1245)
WBC # BLD: 4 K/UL (ref 4.8–10.8)

## 2021-06-22 PROCEDURE — 82607 VITAMIN B-12: CPT

## 2021-06-22 PROCEDURE — 80053 COMPREHEN METABOLIC PANEL: CPT

## 2021-06-22 PROCEDURE — 84443 ASSAY THYROID STIM HORMONE: CPT

## 2021-06-22 PROCEDURE — 80061 LIPID PANEL: CPT

## 2021-06-22 PROCEDURE — 85025 COMPLETE CBC W/AUTO DIFF WBC: CPT

## 2021-06-22 PROCEDURE — 83036 HEMOGLOBIN GLYCOSYLATED A1C: CPT

## 2021-07-14 ENCOUNTER — TELEPHONE (OUTPATIENT)
Dept: PRIMARY CARE CLINIC | Age: 71
End: 2021-07-14

## 2021-07-14 DIAGNOSIS — E87.6 HYPOKALEMIA: ICD-10-CM

## 2021-07-14 RX ORDER — POTASSIUM CHLORIDE 750 MG/1
10 TABLET, EXTENDED RELEASE ORAL DAILY
Qty: 8 TABLET | Refills: 2 | Status: SHIPPED | OUTPATIENT
Start: 2021-07-14 | End: 2021-12-08

## 2021-07-26 ENCOUNTER — OFFICE VISIT (OUTPATIENT)
Dept: PRIMARY CARE CLINIC | Age: 71
End: 2021-07-26
Payer: MEDICARE

## 2021-07-26 VITALS
SYSTOLIC BLOOD PRESSURE: 120 MMHG | OXYGEN SATURATION: 97 % | BODY MASS INDEX: 17.56 KG/M2 | WEIGHT: 93 LBS | HEIGHT: 61 IN | TEMPERATURE: 97.9 F | DIASTOLIC BLOOD PRESSURE: 88 MMHG | HEART RATE: 80 BPM

## 2021-07-26 DIAGNOSIS — M54.50 LOW BACK PAIN RADIATING TO BOTH LEGS: Primary | ICD-10-CM

## 2021-07-26 DIAGNOSIS — M79.604 LOW BACK PAIN RADIATING TO BOTH LEGS: Primary | ICD-10-CM

## 2021-07-26 DIAGNOSIS — M79.605 LOW BACK PAIN RADIATING TO BOTH LEGS: Primary | ICD-10-CM

## 2021-07-26 PROCEDURE — 99213 OFFICE O/P EST LOW 20 MIN: CPT | Performed by: INTERNAL MEDICINE

## 2021-07-26 RX ORDER — TIZANIDINE 2 MG/1
2 TABLET ORAL 2 TIMES DAILY PRN
Qty: 60 TABLET | Refills: 2 | Status: SHIPPED | OUTPATIENT
Start: 2021-07-26 | End: 2021-10-24

## 2021-07-26 RX ORDER — TRAMADOL HYDROCHLORIDE 50 MG/1
50 TABLET ORAL 2 TIMES DAILY PRN
Qty: 60 TABLET | Refills: 2 | Status: SHIPPED | OUTPATIENT
Start: 2021-07-26 | End: 2021-10-25 | Stop reason: SDUPTHER

## 2021-07-26 SDOH — ECONOMIC STABILITY: FOOD INSECURITY: WITHIN THE PAST 12 MONTHS, THE FOOD YOU BOUGHT JUST DIDN'T LAST AND YOU DIDN'T HAVE MONEY TO GET MORE.: NEVER TRUE

## 2021-07-26 SDOH — ECONOMIC STABILITY: FOOD INSECURITY: WITHIN THE PAST 12 MONTHS, YOU WORRIED THAT YOUR FOOD WOULD RUN OUT BEFORE YOU GOT MONEY TO BUY MORE.: NEVER TRUE

## 2021-07-26 SDOH — ECONOMIC STABILITY: TRANSPORTATION INSECURITY
IN THE PAST 12 MONTHS, HAS LACK OF TRANSPORTATION KEPT YOU FROM MEETINGS, WORK, OR FROM GETTING THINGS NEEDED FOR DAILY LIVING?: NO

## 2021-07-26 SDOH — ECONOMIC STABILITY: TRANSPORTATION INSECURITY
IN THE PAST 12 MONTHS, HAS THE LACK OF TRANSPORTATION KEPT YOU FROM MEDICAL APPOINTMENTS OR FROM GETTING MEDICATIONS?: NO

## 2021-07-26 ASSESSMENT — ENCOUNTER SYMPTOMS
PHOTOPHOBIA: 0
BLOOD IN STOOL: 0
COUGH: 0
APNEA: 0
ABDOMINAL DISTENTION: 0

## 2021-07-26 ASSESSMENT — SOCIAL DETERMINANTS OF HEALTH (SDOH): HOW HARD IS IT FOR YOU TO PAY FOR THE VERY BASICS LIKE FOOD, HOUSING, MEDICAL CARE, AND HEATING?: NOT HARD AT ALL

## 2021-07-26 NOTE — PROGRESS NOTES
Krystle Marin 78 y.o. female presents today with   Chief Complaint   Patient presents with    Lower Back Pain     \" its bad\"        Back Pain  This is a recurrent problem. The current episode started in the past 7 days. The problem occurs daily. The problem has been waxing and waning since onset. The pain is present in the lumbar spine. The quality of the pain is described as aching. The pain radiates to the left thigh and right foot. The pain is at a severity of 7/10. The pain is severe. The symptoms are aggravated by twisting, standing, bending and sitting. Pertinent negatives include no chest pain or fever.        Past Medical History:   Diagnosis Date    Arthritis     seen dr Mallika Mancera    Chronic back pain     seen dr Aye Nation in past    Depression     Hypothyroidism     Osteoarthritis     Osteoporosis     Restless legs syndrome     Tachycardia      Patient Active Problem List    Diagnosis Date Noted    Kyphoscoliosis 11/24/2020    Ataxic gait 07/21/2020    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 07/21/2020    Cervicalgia 07/21/2020    Hyperreflexia 07/21/2020    Spondylosis of lumbar region without myelopathy or radiculopathy 07/29/2016    Hypothyroid 07/29/2013    Depressive disorder, not elsewhere classified 07/29/2013    Palpitations 07/29/2013    Tachycardia 07/29/2013     Past Surgical History:   Procedure Laterality Date    CHOLECYSTECTOMY      HYSTERECTOMY      KNEE ARTHROSCOPY Right     TONSILLECTOMY       Family History   Problem Relation Age of Onset    Heart Disease Mother     High Blood Pressure Mother     Kidney Disease Mother     Cancer Father         bone, prostate, skin    Heart Disease Brother     Cancer Sister         kidney, lung     Social History     Socioeconomic History    Marital status:      Spouse name: None    Number of children: None    Years of education: None    Highest education level: None   Occupational History    None   Tobacco Use  Smoking status: Never Smoker    Smokeless tobacco: Never Used   Substance and Sexual Activity    Alcohol use: Yes     Alcohol/week: 0.0 standard drinks     Comment: social    Drug use: No    Sexual activity: None   Other Topics Concern    None   Social History Narrative    None     Social Determinants of Health     Financial Resource Strain: Low Risk     Difficulty of Paying Living Expenses: Not hard at all   Food Insecurity: No Food Insecurity    Worried About Running Out of Food in the Last Year: Never true    Cris of Food in the Last Year: Never true   Transportation Needs: No Transportation Needs    Lack of Transportation (Medical): No    Lack of Transportation (Non-Medical): No   Physical Activity:     Days of Exercise per Week:     Minutes of Exercise per Session:    Stress:     Feeling of Stress :    Social Connections:     Frequency of Communication with Friends and Family:     Frequency of Social Gatherings with Friends and Family:     Attends Yazdanism Services:     Active Member of Clubs or Organizations:     Attends Club or Organization Meetings:     Marital Status:    Intimate Partner Violence:     Fear of Current or Ex-Partner:     Emotionally Abused:     Physically Abused:     Sexually Abused:      No Known Allergies    Review of Systems   Constitutional: Negative for fever. Eyes: Negative for photophobia and visual disturbance. Respiratory: Negative for apnea and cough. Cardiovascular: Negative for chest pain and palpitations. Gastrointestinal: Negative for abdominal distention and blood in stool. Genitourinary: Negative for enuresis and hematuria. Musculoskeletal: Positive for back pain. Negative for gait problem and joint swelling. Skin: Negative for rash. Neurological: Negative for syncope and speech difficulty. Hematological: Does not bruise/bleed easily. Psychiatric/Behavioral: Negative for hallucinations and suicidal ideas.            Vitals: 07/26/21 1717   BP: 120/88   Site: Right Upper Arm   Cuff Size: Medium Adult   Pulse: 80   Temp: 97.9 °F (36.6 °C)   SpO2: 97%   Weight: 93 lb (42.2 kg)   Height: 5' 1\" (1.549 m)       Physical Exam  Constitutional:       Appearance: She is well-developed. HENT:      Head: Normocephalic and atraumatic. Eyes:      Pupils: Pupils are equal, round, and reactive to light. Cardiovascular:      Rate and Rhythm: Normal rate and regular rhythm. Heart sounds: Normal heart sounds. Pulmonary:      Effort: No respiratory distress. Breath sounds: Normal breath sounds. Abdominal:      General: There is no distension. Musculoskeletal:      Cervical back: Normal range of motion and neck supple. Thoracic back: Deformity and spasms present. Decreased range of motion. Lumbar back: Deformity and spasms present. Decreased range of motion. Skin:     Coloration: Skin is not jaundiced. Neurological:      Mental Status: She is alert and oriented to person, place, and time. Cranial Nerves: No cranial nerve deficit. Psychiatric:         Mood and Affect: Mood normal.       Assessment/Plan  Ofelia Newton was seen today for lower back pain. Diagnoses and all orders for this visit:    Low back pain radiating to both legs  -     traMADol (ULTRAM) 50 MG tablet; Take 1 tablet by mouth 2 times daily as needed for Pain for up to 90 days. Intended supply: 7 days. Take lowest dose possible to manage pain  -     tiZANidine (ZANAFLEX) 2 MG tablet; Take 1 tablet by mouth 2 times daily as needed (prn)      Controlled Substances Monitoring: Periodic Controlled Substance Monitoring: Possible medication side effects, risk of tolerance/dependence & alternative treatments discussed., No signs of potential drug abuse or diversion identified. , Assessed functional status. Danya Garcia MD)      No follow-ups on file.     Danya Garcia MD

## 2021-07-31 ASSESSMENT — ENCOUNTER SYMPTOMS: BACK PAIN: 1

## 2021-08-02 PROBLEM — G20.A1 PARKINSON'S DISEASE: Status: ACTIVE | Noted: 2021-08-02

## 2021-08-02 PROBLEM — G20 PARKINSON'S DISEASE (HCC): Status: ACTIVE | Noted: 2021-08-02

## 2021-08-03 ENCOUNTER — OFFICE VISIT (OUTPATIENT)
Dept: NEUROLOGY | Age: 71
End: 2021-08-03
Payer: MEDICARE

## 2021-08-03 VITALS
DIASTOLIC BLOOD PRESSURE: 78 MMHG | OXYGEN SATURATION: 97 % | SYSTOLIC BLOOD PRESSURE: 130 MMHG | WEIGHT: 95 LBS | HEART RATE: 81 BPM | BODY MASS INDEX: 17.95 KG/M2

## 2021-08-03 DIAGNOSIS — M41.9 KYPHOSCOLIOSIS: ICD-10-CM

## 2021-08-03 DIAGNOSIS — R26.0 ATAXIC GAIT: ICD-10-CM

## 2021-08-03 DIAGNOSIS — G20 PARKINSON'S DISEASE (HCC): Primary | ICD-10-CM

## 2021-08-03 DIAGNOSIS — R25.1 TREMOR DUE TO DISORDER OF CENTRAL NERVOUS SYSTEM: ICD-10-CM

## 2021-08-03 DIAGNOSIS — G96.9 TREMOR DUE TO DISORDER OF CENTRAL NERVOUS SYSTEM: ICD-10-CM

## 2021-08-03 PROCEDURE — 99214 OFFICE O/P EST MOD 30 MIN: CPT | Performed by: PSYCHIATRY & NEUROLOGY

## 2021-08-03 RX ORDER — CYPROHEPTADINE HYDROCHLORIDE 4 MG/1
4 TABLET ORAL 2 TIMES DAILY
Qty: 60 TABLET | Refills: 3 | Status: SHIPPED | OUTPATIENT
Start: 2021-08-03 | End: 2021-12-08

## 2021-08-03 RX ORDER — CLONAZEPAM 0.5 MG/1
0.25 TABLET ORAL 2 TIMES DAILY
Qty: 30 TABLET | Refills: 3 | Status: SHIPPED | OUTPATIENT
Start: 2021-08-03 | End: 2021-12-08

## 2021-08-03 ASSESSMENT — ENCOUNTER SYMPTOMS
TROUBLE SWALLOWING: 0
PHOTOPHOBIA: 0
NAUSEA: 0
SHORTNESS OF BREATH: 0
CHOKING: 0
BACK PAIN: 0
VOMITING: 0
COLOR CHANGE: 0

## 2021-08-03 NOTE — PROGRESS NOTES
tobacco: Never Used   Substance and Sexual Activity    Alcohol use: Yes     Alcohol/week: 0.0 standard drinks     Comment: social    Drug use: No    Sexual activity: Not on file   Other Topics Concern    Not on file   Social History Narrative    Not on file     Social Determinants of Health     Financial Resource Strain: Low Risk     Difficulty of Paying Living Expenses: Not hard at all   Food Insecurity: No Food Insecurity    Worried About Running Out of Food in the Last Year: Never true    Cris of Food in the Last Year: Never true   Transportation Needs: No Transportation Needs    Lack of Transportation (Medical): No    Lack of Transportation (Non-Medical):  No   Physical Activity:     Days of Exercise per Week:     Minutes of Exercise per Session:    Stress:     Feeling of Stress :    Social Connections:     Frequency of Communication with Friends and Family:     Frequency of Social Gatherings with Friends and Family:     Attends Synagogue Services:     Active Member of Clubs or Organizations:     Attends Club or Organization Meetings:     Marital Status:    Intimate Partner Violence:     Fear of Current or Ex-Partner:     Emotionally Abused:     Physically Abused:     Sexually Abused:      Family History   Problem Relation Age of Onset    Heart Disease Mother     High Blood Pressure Mother     Kidney Disease Mother     Cancer Father         bone, prostate, skin    Heart Disease Brother     Cancer Sister         kidney, lung     No Known Allergies    Current Outpatient Medications   Medication Sig Dispense Refill    carbidopa-levodopa (SINEMET)  MG per tablet TAKE 1 TABLET BY MOUTH IN THE MORNING FOR 1 WEEK, then TAKE 1 TABLET TWICE DAILY (IN THE MORNING and at 2pm) 90 tablet 3    cyproheptadine (PERIACTIN) 4 MG tablet Take 1 tablet by mouth 2 times daily Half hour before meals 60 tablet 3    clonazePAM (KLONOPIN) 0.5 MG tablet Take 0.5 tablets by mouth 2 times daily for 30 days. 30 tablet 3    traMADol (ULTRAM) 50 MG tablet Take 1 tablet by mouth 2 times daily as needed for Pain for up to 90 days. Intended supply: 7 days. Take lowest dose possible to manage pain 60 tablet 2    tiZANidine (ZANAFLEX) 2 MG tablet Take 1 tablet by mouth 2 times daily as needed (prn) 60 tablet 2    potassium chloride (KLOR-CON M) 10 MEQ extended release tablet Take 1 tablet by mouth daily One pill twice a week po 8 tablet 2    Cholecalciferol (VITAMIN D3) 1.25 MG (18640 UT) CAPS TAKE 1 CAPSULE BY MOUTH ONCE A WEEK 12 capsule 1    levothyroxine (SYNTHROID) 50 MCG tablet TAKE 1 TABLET ONCE DAILY 90 tablet 1    sertraline (ZOLOFT) 50 MG tablet TAKE 1 TABLET DAILY 90 tablet 2    B-D INSULIN SYRINGE 29G X 1/2\" 1 ML MISC use one syringe to administer mEtHOTRExATE once each week      metoprolol succinate (TOPROL XL) 25 MG extended release tablet Take 1 tablet by mouth daily 90 tablet 3    hydroxychloroquine (PLAQUENIL) 200 MG tablet TAKE 1 TABLET BY MOUTH TWICE DAILY 60 tablet 2    predniSONE (DELTASONE) 5 MG tablet TAKE 1 TABLET DAILY 90 tablet 1    colchicine (COLCRYS) 0.6 MG tablet TAKE 1 TABLET BY MOUTH TWICE DAILY. 5    cyclobenzaprine (FLEXERIL) 10 MG tablet Take 1 tablet by mouth 2 times daily as needed for Muscle spasms (Patient not taking: Reported on 7/26/2021) 60 tablet 2    folic acid (FOLVITE) 1 MG tablet TAKE 1 TABLET BY MOUTH ONCE DAILY. (Patient not taking: Reported on 7/26/2021)  2     No current facility-administered medications for this visit. Review of Systems   Constitutional: Negative for fever. HENT: Negative for ear pain, tinnitus and trouble swallowing. Eyes: Negative for photophobia and visual disturbance. Respiratory: Negative for choking and shortness of breath. Cardiovascular: Negative for chest pain and palpitations. Gastrointestinal: Negative for nausea and vomiting.    Musculoskeletal: Negative for back pain, gait problem, joint swelling, myalgias, neck pain and neck stiffness. Skin: Negative for color change. Allergic/Immunologic: Negative for food allergies. Neurological: Negative for dizziness, tremors, seizures, syncope, facial asymmetry, speech difficulty, weakness, light-headedness, numbness and headaches. Psychiatric/Behavioral: Negative for behavioral problems, confusion, hallucinations and sleep disturbance. Objective:   /78 (Site: Left Upper Arm, Position: Sitting, Cuff Size: Medium Adult)   Pulse 81   Wt 95 lb (43.1 kg)   SpO2 97%   BMI 17.95 kg/m²     Physical Exam  Vitals reviewed. Eyes:      Pupils: Pupils are equal, round, and reactive to light. Cardiovascular:      Rate and Rhythm: Normal rate and regular rhythm. Heart sounds: No murmur heard. Pulmonary:      Effort: Pulmonary effort is normal.      Breath sounds: Normal breath sounds. Abdominal:      General: Bowel sounds are normal.   Musculoskeletal:         General: Normal range of motion. Cervical back: Normal range of motion. Skin:     General: Skin is warm. Neurological:      Mental Status: She is alert and oriented to person, place, and time. Cranial Nerves: No cranial nerve deficit. Sensory: No sensory deficit. Motor: No abnormal muscle tone. Coordination: Coordination normal.      Deep Tendon Reflexes: Reflexes are normal and symmetric. Babinski sign absent on the right side. Babinski sign absent on the left side. Psychiatric:         Mood and Affect: Mood normal.     ~Parkinsonian features are notable she is able to sleep well    No results found.     Lab Results   Component Value Date    WBC 4.0 06/22/2021    RBC 4.70 06/22/2021    HGB 15.3 06/22/2021    HCT 45.8 06/22/2021    MCV 97.5 06/22/2021    MCH 32.5 06/22/2021    MCHC 33.4 06/22/2021    RDW 13.3 06/22/2021     06/22/2021    MPV 8.4 08/14/2013     Lab Results   Component Value Date     06/22/2021    K 3.2 06/22/2021     06/22/2021    CO2 27 06/22/2021    BUN 13 06/22/2021    CREATININE 0.74 06/22/2021    GFRAA >60.0 06/22/2021    LABGLOM >60.0 06/22/2021    GLUCOSE 52 06/22/2021    PROT 6.4 06/22/2021    LABALBU 4.4 06/22/2021    CALCIUM 9.7 06/22/2021    BILITOT 0.4 06/22/2021    ALKPHOS 74 06/22/2021    AST 25 06/22/2021    ALT <5 06/22/2021     No results found for: PROTIME, INR  Lab Results   Component Value Date    TSH 0.632 06/30/2020    ABUEGLNX95 294 06/22/2021    FOLATE >20.0 04/04/2018     Lab Results   Component Value Date    TRIG 79 06/22/2021     06/22/2021    LDLCALC 55 06/22/2021     No results found for: Raghu Ku, LABBENZ, CANNAB, COCAINESCRN, LABMETH, OPIATESCREENURINE, PHENCYCLIDINESCREENURINE, PPXUR, ETOH  No results found for: LITHIUM, DILFRTOT, VALPROATE    Assessment:       Diagnosis Orders   1. Parkinson's disease ([address]     2. 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  clonazePAM (KLONOPIN) 0.5 MG tablet   3. Ataxic gait     4. Kyphoscoliosis     Parkinson's disease which is better controlled with carbidopa levodopa half tablet 3 times a day she is not able tolerate higher doses. She had considerable weight loss. She is having significant REM issues and we recommended Klonopin 0.25 mg at night as she has vivid dreaming which is part of Parkinson's disease and medications itself. Patient still having some nausea recommended for cyproheptadine to be taken half an hour before meals as she may actually gain weight with this and may have response to nausea. We will see how she does on the Klonopin. She will continue on carbidopa levodopa half a tablet 3 times a day for now her parkinsonian symptoms are much well controlled. Plan:      No orders of the defined types were placed in this encounter.     Orders Placed This Encounter   Medications    carbidopa-levodopa (SINEMET)  MG per tablet     Sig: TAKE 1 TABLET BY MOUTH IN THE MORNING FOR 1 WEEK, then TAKE 1 TABLET TWICE DAILY (IN THE MORNING and at 2pm)     Dispense:  90 tablet     Refill:  3    cyproheptadine (PERIACTIN) 4 MG tablet     Sig: Take 1 tablet by mouth 2 times daily Half hour before meals     Dispense:  60 tablet     Refill:  3    clonazePAM (KLONOPIN) 0.5 MG tablet     Sig: Take 0.5 tablets by mouth 2 times daily for 30 days. Dispense:  30 tablet     Refill:  3       Return in about 4 months (around 12/3/2021).       Ravi James MD

## 2021-08-05 ENCOUNTER — TELEPHONE (OUTPATIENT)
Dept: NEUROLOGY | Age: 71
End: 2021-08-05

## 2021-08-05 RX ORDER — HYDROXYZINE HYDROCHLORIDE 25 MG/1
25 TABLET, FILM COATED ORAL
Qty: 60 TABLET | Refills: 0 | Status: SHIPPED | OUTPATIENT
Start: 2021-08-05 | End: 2021-09-04

## 2021-08-05 NOTE — TELEPHONE ENCOUNTER
Pts insurance is denying cyproheptadine. They did not give any other medications that they do cover. Would you like to try something different for the patient.

## 2021-09-10 RX ORDER — LEVOTHYROXINE SODIUM 0.05 MG/1
TABLET ORAL
Qty: 90 TABLET | Refills: 1 | OUTPATIENT
Start: 2021-09-10

## 2021-09-10 RX ORDER — LEVOTHYROXINE SODIUM 0.05 MG/1
TABLET ORAL
Qty: 90 TABLET | Refills: 1 | Status: SHIPPED | OUTPATIENT
Start: 2021-09-10 | End: 2021-09-13 | Stop reason: SDUPTHER

## 2021-09-10 NOTE — TELEPHONE ENCOUNTER
Patient also asking if you can send a 14 day supply of the levothyroxine to the local [facility] [address] [place] [address] until they receive the mailaway 90 day supply since she is completely out.   Thank you

## 2021-09-13 RX ORDER — LEVOTHYROXINE SODIUM 0.05 MG/1
TABLET ORAL
Qty: 14 TABLET | Refills: 0 | Status: SHIPPED | OUTPATIENT
Start: 2021-09-13 | End: 2022-02-22 | Stop reason: SDUPTHER

## 2021-09-13 NOTE — TELEPHONE ENCOUNTER
Please send this 14 day script to local pharmacy, you sent the 90 day, but will be almost 2 weeks for the refill to come to them.

## 2021-10-08 ENCOUNTER — IMMUNIZATION (OUTPATIENT)
Dept: PRIMARY CARE CLINIC | Age: 71
End: 2021-10-08
Payer: MEDICARE

## 2021-10-08 DIAGNOSIS — Z23 NEED FOR INFLUENZA VACCINATION: Primary | ICD-10-CM

## 2021-10-08 PROCEDURE — 90694 VACC AIIV4 NO PRSRV 0.5ML IM: CPT | Performed by: INTERNAL MEDICINE

## 2021-10-08 PROCEDURE — G0008 ADMIN INFLUENZA VIRUS VAC: HCPCS | Performed by: INTERNAL MEDICINE

## 2021-10-25 ENCOUNTER — OFFICE VISIT (OUTPATIENT)
Dept: PRIMARY CARE CLINIC | Age: 71
End: 2021-10-25
Payer: MEDICARE

## 2021-10-25 VITALS
HEIGHT: 61 IN | DIASTOLIC BLOOD PRESSURE: 64 MMHG | SYSTOLIC BLOOD PRESSURE: 130 MMHG | WEIGHT: 94 LBS | TEMPERATURE: 97.6 F | OXYGEN SATURATION: 96 % | HEART RATE: 96 BPM | BODY MASS INDEX: 17.75 KG/M2

## 2021-10-25 DIAGNOSIS — G89.29 CHRONIC BILATERAL BACK PAIN, UNSPECIFIED BACK LOCATION: Primary | ICD-10-CM

## 2021-10-25 DIAGNOSIS — Z51.81 THERAPEUTIC DRUG MONITORING: ICD-10-CM

## 2021-10-25 DIAGNOSIS — M54.50 LOW BACK PAIN RADIATING TO RIGHT LEG: ICD-10-CM

## 2021-10-25 DIAGNOSIS — M54.9 CHRONIC BILATERAL BACK PAIN, UNSPECIFIED BACK LOCATION: Primary | ICD-10-CM

## 2021-10-25 DIAGNOSIS — K59.00 CONSTIPATION, UNSPECIFIED CONSTIPATION TYPE: ICD-10-CM

## 2021-10-25 DIAGNOSIS — M79.604 LEG PAIN, RIGHT: ICD-10-CM

## 2021-10-25 DIAGNOSIS — M79.604 LOW BACK PAIN RADIATING TO BOTH LEGS: ICD-10-CM

## 2021-10-25 DIAGNOSIS — Z00.00 ROUTINE GENERAL MEDICAL EXAMINATION AT A HEALTH CARE FACILITY: Primary | ICD-10-CM

## 2021-10-25 DIAGNOSIS — M54.50 LOW BACK PAIN RADIATING TO BOTH LEGS: ICD-10-CM

## 2021-10-25 DIAGNOSIS — M79.604 LOW BACK PAIN RADIATING TO RIGHT LEG: ICD-10-CM

## 2021-10-25 DIAGNOSIS — M79.605 LOW BACK PAIN RADIATING TO BOTH LEGS: ICD-10-CM

## 2021-10-25 PROCEDURE — G0439 PPPS, SUBSEQ VISIT: HCPCS | Performed by: INTERNAL MEDICINE

## 2021-10-25 PROCEDURE — 99214 OFFICE O/P EST MOD 30 MIN: CPT | Performed by: INTERNAL MEDICINE

## 2021-10-25 RX ORDER — CYCLOBENZAPRINE HCL 10 MG
10 TABLET ORAL 2 TIMES DAILY PRN
Qty: 60 TABLET | Refills: 2 | Status: SHIPPED | OUTPATIENT
Start: 2021-10-25 | End: 2022-01-31 | Stop reason: SDUPTHER

## 2021-10-25 RX ORDER — TRAMADOL HYDROCHLORIDE 50 MG/1
50 TABLET ORAL EVERY 8 HOURS PRN
Qty: 90 TABLET | Refills: 2 | Status: SHIPPED | OUTPATIENT
Start: 2021-10-25 | End: 2022-01-28

## 2021-10-25 RX ORDER — COLCHICINE 0.6 MG/1
0.6 CAPSULE ORAL DAILY
COMMUNITY
Start: 2021-10-05

## 2021-10-25 RX ORDER — POLYETHYLENE GLYCOL 3350 17 G/17G
17 POWDER, FOR SOLUTION ORAL DAILY
Qty: 507 G | Refills: 5 | Status: SHIPPED | OUTPATIENT
Start: 2021-10-25 | End: 2021-12-08

## 2021-10-25 RX ORDER — TRAMADOL HYDROCHLORIDE 50 MG/1
50 TABLET ORAL EVERY 6 HOURS PRN
COMMUNITY
End: 2021-12-08

## 2021-10-25 ASSESSMENT — PATIENT HEALTH QUESTIONNAIRE - PHQ9
SUM OF ALL RESPONSES TO PHQ QUESTIONS 1-9: 0
SUM OF ALL RESPONSES TO PHQ QUESTIONS 1-9: 0
1. LITTLE INTEREST OR PLEASURE IN DOING THINGS: 0
SUM OF ALL RESPONSES TO PHQ QUESTIONS 1-9: 0
2. FEELING DOWN, DEPRESSED OR HOPELESS: 0
SUM OF ALL RESPONSES TO PHQ9 QUESTIONS 1 & 2: 0

## 2021-10-25 ASSESSMENT — ENCOUNTER SYMPTOMS
CONSTIPATION: 1
CHOKING: 0
BACK PAIN: 1
ABDOMINAL PAIN: 0
APNEA: 0
PHOTOPHOBIA: 0
ABDOMINAL DISTENTION: 0
BLOOD IN STOOL: 0
FACIAL SWELLING: 0

## 2021-10-25 ASSESSMENT — LIFESTYLE VARIABLES
HOW OFTEN DO YOU HAVE A DRINK CONTAINING ALCOHOL: NEVER
AUDIT TOTAL SCORE: INCOMPLETE
AUDIT-C TOTAL SCORE: INCOMPLETE
HOW OFTEN DO YOU HAVE A DRINK CONTAINING ALCOHOL: 0

## 2021-10-25 NOTE — PROGRESS NOTES
Ethel Marin 73 y.o. female presents today with   Chief Complaint   Patient presents with    Leg Pain    Medication Refill       Back Pain  This is a chronic problem. The current episode started more than 1 year ago. The problem occurs daily. The problem has been waxing and waning since onset. The pain is present in the lumbar spine and gluteal. The quality of the pain is described as aching. The pain radiates to the right foot. The pain is at a severity of 7/10. The pain is severe. The symptoms are aggravated by twisting, standing and bending. Associated symptoms include leg pain. Pertinent negatives include no abdominal pain, chest pain or fever. Leg Pain   The incident occurred more than 1 week ago. The incident occurred at home. The pain is present in the right leg. The pain is at a severity of 5/10. The pain is moderate. The pain has been worsening since onset.        Past Medical History:   Diagnosis Date    Arthritis     seen dr Rose Marie Saxena    Chronic back pain     seen dr Maddie Hernandez in past    Depression     Hypothyroidism     Osteoarthritis     Osteoporosis     Restless legs syndrome     Tachycardia      Patient Active Problem List    Diagnosis Date Noted    Parkinson's disease ([facility] [address] 08/02/2021    Kyphoscoliosis 11/24/2020    Ataxic gait 07/21/2020    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 07/21/2020    Cervicalgia 07/21/2020    Hyperreflexia 07/21/2020    Spondylosis of lumbar region without myelopathy or radiculopathy 07/29/2016    Hypothyroid 07/29/2013    Depressive disorder, not elsewhere classified 07/29/2013    Palpitations 07/29/2013    Tachycardia 07/29/2013     Past Surgical History:   Procedure Laterality Date    CHOLECYSTECTOMY      HYSTERECTOMY      KNEE ARTHROSCOPY Right     TONSILLECTOMY       Family History   Problem Relation Age of Onset    Heart Disease Mother     High Blood Pressure Mother     Kidney Disease Mother     Cancer Father         bone, prostate, skin    Heart Disease Brother     Cancer Sister         kidney, lung     Social History     Socioeconomic History    Marital status:      Spouse name: None    Number of children: None    Years of education: None    Highest education level: None   Occupational History    None   Tobacco Use    Smoking status: Never Smoker    Smokeless tobacco: Never Used   Substance and Sexual Activity    Alcohol use: Yes     Alcohol/week: 0.0 standard drinks     Comment: social    Drug use: No    Sexual activity: None   Other Topics Concern    None   Social History Narrative    None     Social Determinants of Health     Financial Resource Strain: Low Risk     Difficulty of Paying Living Expenses: Not hard at all   Food Insecurity: No Food Insecurity    Worried About Running Out of Food in the Last Year: Never true    Cris of Food in the Last Year: Never true   Transportation Needs: No Transportation Needs    Lack of Transportation (Medical): No    Lack of Transportation (Non-Medical): No   Physical Activity:     Days of Exercise per Week:     Minutes of Exercise per Session:    Stress:     Feeling of Stress :    Social Connections:     Frequency of Communication with Friends and Family:     Frequency of Social Gatherings with Friends and Family:     Attends Sabianism Services:     Active Member of Clubs or Organizations:     Attends Club or Organization Meetings:     Marital Status:    Intimate Partner Violence:     Fear of Current or Ex-Partner:     Emotionally Abused:     Physically Abused:     Sexually Abused:      No Known Allergies    Review of Systems   Constitutional: Negative for chills and fever. HENT: Negative for facial swelling and nosebleeds. Eyes: Negative for photophobia and visual disturbance. Respiratory: Negative for apnea and choking. Cardiovascular: Negative for chest pain and palpitations. Gastrointestinal: Positive for constipation.  Negative for abdominal (ULTRAM) 50 MG tablet; Take 1 tablet by mouth every 8 hours as needed for Pain for up to 90 days. Intended supply: 7 days. Take lowest dose possible to manage pain    Low back pain radiating to both legs    Constipation, unspecified constipation type  -     polyethylene glycol (MIRALAX) 17 GM/SCOOP powder; Take 17 g by mouth daily        No follow-ups on file.     Clotilde Joya MD

## 2021-10-28 DIAGNOSIS — Z51.81 THERAPEUTIC DRUG MONITORING: ICD-10-CM

## 2021-10-28 LAB
AMPHETAMINE SCREEN, URINE: NORMAL
BARBITURATE SCREEN URINE: NORMAL
BENZODIAZEPINE SCREEN, URINE: NORMAL
CANNABINOID SCREEN URINE: NORMAL
COCAINE METABOLITE SCREEN URINE: NORMAL
Lab: NORMAL
METHADONE SCREEN, URINE: NORMAL
OPIATE SCREEN URINE: NORMAL
OXYCODONE URINE: NORMAL
PHENCYCLIDINE SCREEN URINE: NORMAL
PROPOXYPHENE SCREEN: NORMAL

## 2021-10-29 NOTE — PROGRESS NOTES
Scotty Enriquez 73 y.o. female presents today with   Chief Complaint   Patient presents with    Medicare AWV       HPI  Annual wellness visit  Past Medical History:   Diagnosis Date    Arthritis     seen dr Halie Girard    Chronic back pain     seen dr Aman Marin in past    Depression     Hypothyroidism     Osteoarthritis     Osteoporosis     Restless legs syndrome     Tachycardia      Patient Active Problem List    Diagnosis Date Noted    Parkinson's disease ([facility] [address] 08/02/2021    Kyphoscoliosis 11/24/2020    Ataxic gait 07/21/2020    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 07/21/2020    Cervicalgia 07/21/2020    Hyperreflexia 07/21/2020    Spondylosis of lumbar region without myelopathy or radiculopathy 07/29/2016    Hypothyroid 07/29/2013    Depressive disorder, not elsewhere classified 07/29/2013    Palpitations 07/29/2013    Tachycardia 07/29/2013     Past Surgical History:   Procedure Laterality Date    CHOLECYSTECTOMY      HYSTERECTOMY      KNEE ARTHROSCOPY Right     TONSILLECTOMY       Family History   Problem Relation Age of Onset    Heart Disease Mother     High Blood Pressure Mother     Kidney Disease Mother     Cancer Father         bone, prostate, skin    Heart Disease Brother     Cancer Sister         kidney, lung     Social History     Socioeconomic History    Marital status:      Spouse name: Not on file    Number of children: Not on file    Years of education: Not on file    Highest education level: Not on file   Occupational History    Not on file   Tobacco Use    Smoking status: Never Smoker    Smokeless tobacco: Never Used   Substance and Sexual Activity    Alcohol use:  Yes     Alcohol/week: 0.0 standard drinks     Comment: social    Drug use: No    Sexual activity: Not on file   Other Topics Concern    Not on file   Social History Narrative    Not on file     Social Determinants of Health     Financial Resource Strain: Low Risk     Difficulty of Paying Living Expenses: Not hard at all   Food Insecurity: No Food Insecurity    Worried About [address] in the Last Year: Never true    Ran Out of Food in the Last Year: Never true   Transportation Needs: No Transportation Needs    Lack of Transportation (Medical): No    Lack of Transportation (Non-Medical): No   Physical Activity:     Days of Exercise per Week:     Minutes of Exercise per Session:    Stress:     Feeling of Stress :    Social Connections:     Frequency of Communication with Friends and Family:     Frequency of Social Gatherings with Friends and Family:     Attends Worship Services:     Active Member of Clubs or Organizations:     Attends Club or Organization Meetings:     Marital Status:    Intimate Partner Violence:     Fear of Current or Ex-Partner:     Emotionally Abused:     Physically Abused:     Sexually Abused:      No Known Allergies    Review of Systems        There were no vitals filed for this visit. Physical Exam\Assessment/Plan  Tomas Paulino was seen today for medicare awv. Diagnoses and all orders for this visit:    Routine general medical examination at a health care facility        Return in 1 year (on 10/25/2022) for Medicare Annual Wellness Visit in 1 year. Janes Ritchie MD    Medicare Annual Wellness Visit  Name: Devaughn Hennessy Date: 10/29/2021   MRN: [id number] Sex: Female   Age: 73 y.o. Ethnicity: Non- / Non    : 1950 Race: White (non-)      Tanvi Quinonez is here for Medicare AWV    Screenings for behavioral, psychosocial and functional/safety risks, and cognitive dysfunction are all negative except as indicated below. These results, as well as other patient data from the [address] [facility], are documented in Flowsheets linked to this Encounter. No Known Allergies      Prior to Visit Medications    Medication Sig Taking?  Authorizing Provider   colchicine (MITIGARE) 0.6 MG capsule  Yes Historical Provider, MD   traMADol (ULTRAM) 50 MG tablet Take 50 mg by mouth every 6 hours as needed for Pain. Yes Historical Provider, MD   sertraline (ZOLOFT) 50 MG tablet TAKE 1 TABLET DAILY Yes Vahe Neal MD   metoprolol succinate (TOPROL XL) 25 MG extended release tablet TAKE 1 TABLET DAILY Yes Vahe Neal MD   levothyroxine (SYNTHROID) 50 MCG tablet TAKE 1 TABLET ONCE DAILY Yes Vahe Neal MD   carbidopa-levodopa (SINEMET)  MG per tablet TAKE 1 TABLET BY MOUTH IN THE MORNING FOR 1 WEEK, then TAKE 1 TABLET TWICE DAILY (IN THE MORNING and at 2pm) Yes Liz Leyva MD   cyproheptadine (PERIACTIN) 4 MG tablet Take 1 tablet by mouth 2 times daily Half hour before meals Yes Liz Leyva MD   potassium chloride (KLOR-CON M) 10 MEQ extended release tablet Take 1 tablet by mouth daily One pill twice a week po Yes Vahe Neal MD   Cholecalciferol (VITAMIN D3) 1.25 MG (56618 UT) CAPS TAKE 1 CAPSULE BY MOUTH ONCE A WEEK Yes Vahe Neal MD   B-D INSULIN SYRINGE 29G X 1/2\" 1 ML MISC use one syringe to administer mEtHOTRExATE once each week Yes Historical Provider, MD   hydroxychloroquine (PLAQUENIL) 200 MG tablet TAKE 1 TABLET BY MOUTH TWICE DAILY Yes Vahe Neal MD   predniSONE (DELTASONE) 5 MG tablet TAKE 1 TABLET DAILY Yes Vahe Neal MD   folic acid (FOLVITE) 1 MG tablet TAKE 1 TABLET BY MOUTH ONCE DAILY. Yes Historical Provider, MD   colchicine (COLCRYS) 0.6 MG tablet TAKE 1 TABLET BY MOUTH TWICE DAILY. Yes Historical Provider, MD   cyclobenzaprine (FLEXERIL) 10 MG tablet Take 1 tablet by mouth 2 times daily as needed for Muscle spasms  Vahe Neal MD   traMADol (ULTRAM) 50 MG tablet Take 1 tablet by mouth every 8 hours as needed for Pain for up to 90 days. Intended supply: 7 days.  Take lowest dose possible to manage pain  Vahe Neal MD   polyethylene glycol (MIRALAX) 17 GM/SCOOP powder Take 17 g by mouth daily  Vahe Neal MD   clonazePAM (KLONOPIN) 0.5 MG tablet Take 0.5 tablets by mouth 2 times daily for 30 days. Patient not taking: Reported on 10/25/2021  Scot Nieto MD         Past Medical History:   Diagnosis Date    Arthritis     seen dr Carin Coats Chronic back pain     seen dr Valentin Dhaliwal in past    Depression     Hypothyroidism     Osteoarthritis     Osteoporosis     Restless legs syndrome     Tachycardia        Past Surgical History:   Procedure Laterality Date    CHOLECYSTECTOMY      HYSTERECTOMY      KNEE ARTHROSCOPY Right     TONSILLECTOMY           Family History   Problem Relation Age of Onset    Heart Disease Mother     High Blood Pressure Mother     Kidney Disease Mother     Cancer Father         bone, prostate, skin    Heart Disease Brother     Cancer Sister         kidney, lung       CareTeam (Including outside providers/suppliers regularly involved in providing care):   Patient Care Team:  Lorrin Kayser, MD as PCP - General (Internal Medicine)  Lorrin Kayser, MD as PCP - [address] [place] [address]  [facility] Provider  Deion Coronado MD as Physician (Cardiology)  Scot Nieto MD as Consulting Physician (Neurology)    Wt Readings from Last 3 Encounters:   10/25/21 94 lb (42.6 kg)   08/03/21 95 lb (43.1 kg)   07/26/21 93 lb (42.2 kg)     There were no vitals filed for this visit. There is no height or weight on file to calculate BMI. Based upon direct observation of the patient, evaluation of cognition reveals recent and remote memory intact. Patient's complete Health Risk Assessment and screening values have been reviewed and are found in Flowsheets. The following problems were reviewed today and where indicated follow up appointments were made and/or referrals ordered. Positive Risk Factor Screenings with Interventions:            General Health and ACP:  General  In general, how would you say your health is?: Fair  In the past 7 days, have you experienced any of the following?  New or Increased Pain, New or Increased Fatigue, Loneliness, Social Isolation, Stress or Anger?: (!) New or Hib vaccine  Aged Out    Meningococcal (ACWY) vaccine  Aged Out     Recommendations for [employer] Due: see orders and patient instructions/AVS.  . Recommended screening schedule for the next 5-10 years is provided to the patient in written form: see Patient Instructions/AVS.    Narinderwilly Ferguson was seen today for medicare aw.     Diagnoses and all orders for this visit:    Routine general medical examination at a health care facility

## 2021-11-12 DIAGNOSIS — M79.604 LOW BACK PAIN RADIATING TO RIGHT LEG: ICD-10-CM

## 2021-11-12 DIAGNOSIS — M54.50 LOW BACK PAIN RADIATING TO RIGHT LEG: ICD-10-CM

## 2021-11-22 ENCOUNTER — TELEPHONE (OUTPATIENT)
Dept: PRIMARY CARE CLINIC | Age: 71
End: 2021-11-22

## 2021-11-22 DIAGNOSIS — F41.9 ANXIETY: Primary | ICD-10-CM

## 2021-11-22 RX ORDER — DIAZEPAM 5 MG/1
TABLET ORAL
Qty: 2 TABLET | Refills: 0 | Status: SHIPPED | OUTPATIENT
Start: 2021-11-22 | End: 2021-12-08

## 2021-11-22 NOTE — TELEPHONE ENCOUNTER
----- Message from Preston Needle sent at 11/22/2021  9:30 AM EST -----  Subject: Message to Provider    QUESTIONS  Information for Provider? Patient has an MRI scheduled on the 26th. He is   asking if she can get something to help calm her nerves such as a valium,   etc. Can someone please follow up asap.   ---------------------------------------------------------------------------  --------------  CALL BACK INFO  What is the best way for the office to contact you? OK to leave message on   voicemail  Preferred Call Back Phone Number? [phone]  ---------------------------------------------------------------------------  --------------  SCRIPT ANSWERS  Relationship to Patient? Third Party  Representative Name? Mary Marin-.  not on updated HIPAA but   Patient gave permission

## 2021-11-22 NOTE — TELEPHONE ENCOUNTER
----- Message from Preston Needle sent at 11/22/2021  9:30 AM EST -----  Subject: Message to Provider    QUESTIONS  Information for Provider? Patient has an MRI scheduled on the 26th. He is   asking if she can get something to help calm her nerves such as a valium,   etc. Can someone please follow up asap.   ---------------------------------------------------------------------------  --------------  CALL BACK INFO  What is the best way for the office to contact you? OK to leave message on   voicemail  Preferred Call Back Phone Number? [phone]  ---------------------------------------------------------------------------  --------------  SCRIPT ANSWERS  Relationship to Patient? Third Party  Representative Name? Mary Marin-.  not on updated HIPAA but   Patient gave permission

## 2021-11-26 ENCOUNTER — HOSPITAL ENCOUNTER (OUTPATIENT)
Dept: MRI IMAGING | Age: 71
Discharge: HOME | End: 2021-11-28
Payer: MEDICARE

## 2021-11-26 PROCEDURE — 72148 MRI LUMBAR SPINE W/O DYE: CPT

## 2021-12-05 DIAGNOSIS — E55.9 VITAMIN D DEFICIENCY: ICD-10-CM

## 2021-12-05 RX ORDER — CHOLECALCIFEROL (VITAMIN D3) 1250 MCG
1 CAPSULE ORAL WEEKLY
Qty: 12 CAPSULE | Refills: 1 | Status: SHIPPED | OUTPATIENT
Start: 2021-12-05 | End: 2022-06-27

## 2021-12-07 ENCOUNTER — OFFICE VISIT (OUTPATIENT)
Dept: NEUROLOGY | Age: 71
End: 2021-12-07
Payer: MEDICARE

## 2021-12-07 ENCOUNTER — TELEPHONE (OUTPATIENT)
Dept: PRIMARY CARE CLINIC | Age: 71
End: 2021-12-07

## 2021-12-07 VITALS — SYSTOLIC BLOOD PRESSURE: 138 MMHG | HEART RATE: 88 BPM | DIASTOLIC BLOOD PRESSURE: 88 MMHG

## 2021-12-07 DIAGNOSIS — G20 PARKINSON'S DISEASE (HCC): Primary | ICD-10-CM

## 2021-12-07 DIAGNOSIS — M79.604 LOW BACK PAIN RADIATING TO RIGHT LEG: Primary | ICD-10-CM

## 2021-12-07 DIAGNOSIS — M41.9 KYPHOSCOLIOSIS: ICD-10-CM

## 2021-12-07 DIAGNOSIS — G96.9 TREMOR DUE TO DISORDER OF CENTRAL NERVOUS SYSTEM: ICD-10-CM

## 2021-12-07 DIAGNOSIS — R29.2 HYPERREFLEXIA: ICD-10-CM

## 2021-12-07 DIAGNOSIS — M54.50 LOW BACK PAIN RADIATING TO RIGHT LEG: Primary | ICD-10-CM

## 2021-12-07 DIAGNOSIS — R25.1 TREMOR DUE TO DISORDER OF CENTRAL NERVOUS SYSTEM: ICD-10-CM

## 2021-12-07 DIAGNOSIS — M54.2 CERVICALGIA: ICD-10-CM

## 2021-12-07 DIAGNOSIS — R26.0 ATAXIC GAIT: ICD-10-CM

## 2021-12-07 PROCEDURE — 99214 OFFICE O/P EST MOD 30 MIN: CPT | Performed by: PSYCHIATRY & NEUROLOGY

## 2021-12-07 ASSESSMENT — ENCOUNTER SYMPTOMS
CHOKING: 0
SHORTNESS OF BREATH: 0
TROUBLE SWALLOWING: 0
VOMITING: 0
NAUSEA: 0
BACK PAIN: 1
PHOTOPHOBIA: 0
COLOR CHANGE: 0

## 2021-12-07 NOTE — TELEPHONE ENCOUNTER
Central scheduling called requesting to get an extended order on EMG since they are booking out further. Central scheduling stated that order will  21. Central scheduling will just keep checking pt's chart for updated order of EMG. Please advise.

## 2021-12-07 NOTE — PROGRESS NOTES
Subjective:      Patient ID: Scotty Enriquez is a 73 y.o. female who presents today for:  Chief Complaint   Patient presents with    Follow-up     Pt states that she is having a lot of back and leg pain. She states that she recently had MRI of the spine. HPI 73-year-old right-handed female with a known history of Parkinson's disease. Patient has considerable kyphoscoliosis as well and he is on a very low-dose of carbidopa levodopa she is not able to increase the medication due to intolerance. She is slowing down his stooped posture. She cuts her tablet into 3:30 times a day is the best she can do. Patient also has a right TISHA narrowing of no clinical significance there is no aneurysm. Last seen we had further obtained a MRI of the cervical spine and then the lumbar spine. The lumbar spine shows significant kyphoscoliosis. This appears to be at multiple levels in fact we were not able to see the sagittal view and had to reconstruct the whole MRI though I am not seeing a significant root compression. I did review the findings with her in much detail and that I recommended that we not pursue surgery in this situation as it is not likely to help her. An EMG would be beneficial though is unlikely to help anyway to add into her diagnosis and since she had not recommended any symptomatic treatments or surgical treatments this would be best avoided as well. She does not have bladder dysfunction yet.     Past Medical History:   Diagnosis Date    Arthritis     seen dr Eduard Mann Chronic back pain     seen dr Aman Marin in past    Depression     Hypothyroidism     Osteoarthritis     Osteoporosis     Restless legs syndrome     Tachycardia      Past Surgical History:   Procedure Laterality Date    CHOLECYSTECTOMY      HYSTERECTOMY      KNEE ARTHROSCOPY Right     TONSILLECTOMY       Social History     Socioeconomic History    Marital status:      Spouse name: Not on file    Number of children: Not on Brother     Cancer Sister         kidney, lung     No Known Allergies    Current Outpatient Medications   Medication Sig Dispense Refill    Cholecalciferol (VITAMIN D3) 1.25 MG (86698 UT) CAPS TAKE 1 CAPSULE BY MOUTH ONCE A WEEK 12 capsule 1    traMADol (ULTRAM) 50 MG tablet Take 50 mg by mouth every 6 hours as needed for Pain.  cyclobenzaprine (FLEXERIL) 10 MG tablet Take 1 tablet by mouth 2 times daily as needed for Muscle spasms 60 tablet 2    traMADol (ULTRAM) 50 MG tablet Take 1 tablet by mouth every 8 hours as needed for Pain for up to 90 days. Intended supply: 7 days.  Take lowest dose possible to manage pain 90 tablet 2    sertraline (ZOLOFT) 50 MG tablet TAKE 1 TABLET DAILY 90 tablet 1    metoprolol succinate (TOPROL XL) 25 MG extended release tablet TAKE 1 TABLET DAILY 90 tablet 2    levothyroxine (SYNTHROID) 50 MCG tablet TAKE 1 TABLET ONCE DAILY 14 tablet 0    carbidopa-levodopa (SINEMET)  MG per tablet TAKE 1 TABLET BY MOUTH IN THE MORNING FOR 1 WEEK, then TAKE 1 TABLET TWICE DAILY (IN THE MORNING and at 2pm) 90 tablet 3    B-D INSULIN SYRINGE 29G X 1/2\" 1 ML MISC use one syringe to administer mEtHOTRExATE once each week      hydroxychloroquine (PLAQUENIL) 200 MG tablet TAKE 1 TABLET BY MOUTH TWICE DAILY (Patient taking differently: Take 200 mg by mouth daily ) 60 tablet 2    predniSONE (DELTASONE) 5 MG tablet TAKE 1 TABLET DAILY 90 tablet 1    diazePAM (VALIUM) 5 MG tablet 1-2 pills po 30 minutes before the test (Patient not taking: Reported on 12/7/2021) 2 tablet 0    colchicine (MITIGARE) 0.6 MG capsule  (Patient not taking: Reported on 12/7/2021)      polyethylene glycol (MIRALAX) 17 GM/SCOOP powder Take 17 g by mouth daily (Patient not taking: Reported on 12/7/2021) 507 g 5    cyproheptadine (PERIACTIN) 4 MG tablet Take 1 tablet by mouth 2 times daily Half hour before meals (Patient not taking: Reported on 12/7/2021) 60 tablet 3    clonazePAM (KLONOPIN) 0.5 MG tablet Take 0.5 tablets by mouth 2 times daily for 30 days. (Patient not taking: Reported on 10/25/2021) 30 tablet 3    potassium chloride (KLOR-CON M) 10 MEQ extended release tablet Take 1 tablet by mouth daily One pill twice a week po (Patient not taking: Reported on 12/7/2021) 8 tablet 2    folic acid (FOLVITE) 1 MG tablet TAKE 1 TABLET BY MOUTH ONCE DAILY. (Patient not taking: Reported on 12/7/2021)  2    colchicine (COLCRYS) 0.6 MG tablet TAKE 1 TABLET BY MOUTH TWICE DAILY. (Patient not taking: Reported on 12/7/2021)  5     No current facility-administered medications for this visit. Review of Systems   Constitutional: Negative for fever. HENT: Negative for ear pain, tinnitus and trouble swallowing. Eyes: Negative for photophobia and visual disturbance. Respiratory: Negative for choking and shortness of breath. Cardiovascular: Negative for chest pain and palpitations. Gastrointestinal: Negative for nausea and vomiting. Musculoskeletal: Positive for back pain, gait problem and neck pain. Negative for joint swelling, myalgias and neck stiffness. Skin: Negative for color change. Allergic/Immunologic: Negative for food allergies. Neurological: Positive for tremors and weakness. Negative for dizziness, seizures, syncope, facial asymmetry, speech difficulty, light-headedness, numbness and headaches. Psychiatric/Behavioral: Negative for behavioral problems, confusion, hallucinations and sleep disturbance. Objective:   /88 (Site: Left Upper Arm, Position: Sitting, Cuff Size: Small Adult)   Pulse 88     Physical Exam  Vitals reviewed. Eyes:      Pupils: Pupils are equal, round, and reactive to light. Cardiovascular:      Rate and Rhythm: Normal rate and regular rhythm. Heart sounds: No murmur heard. Pulmonary:      Effort: Pulmonary effort is normal.      Breath sounds: Normal breath sounds.    Abdominal:      General: Bowel sounds are normal.   Musculoskeletal: General: Normal range of motion. Cervical back: Normal range of motion. Skin:     General: Skin is warm. Neurological:      Mental Status: She is alert and oriented to person, place, and time. Cranial Nerves: No cranial nerve deficit. Sensory: No sensory deficit. Motor: No abnormal muscle tone. Coordination: Coordination normal.      Deep Tendon Reflexes: Reflexes are normal and symmetric. Babinski sign absent on the right side. Babinski sign absent on the left side. Comments: Patient has severe kyphoscoliosis of her back and gait issues but she is not myelopathic. Psychiatric:         Mood and Affect: Mood normal.         MRI LUMBAR SPINE WO CONTRAST    Result Date: 11/26/2021  EXAMINATION: MRI LUMBAR SPINE WO CONTRAST CLINICAL HISTORY: M54.50 Low back pain radiating to right leg ICD10 COMPARISONS: Lumbar spine x-rays from 8/23/2019 TECHNIQUE: Multiplanar multisequence images of the lumbar spine were obtained without contrast. FINDINGS:  There is severe levoscoliosis of the lumbar spine with a Singletary angle measurement of 48 degrees between L1 and L4. There is mild left-to-right subluxation of L2 on L3 and grade 2 left subluxation of L3 and L4 best seen on the coronal views of the proximal  a 6 mm of displacement at each level. There is a millimeters of right-to-left subluxation of L4 on L5. There is preservation of the lordotic curvature. There is severe intervertebral disc space narrowing about the right side at L2-3 and L3-4 with disc desiccation moderate to space narrowing at the remaining levels. There is no acute fracture. There is preservation of the vertebral body heights. The bone marrow signal is inhomogeneous which may be secondary to osteoporosis, osteopenia versus other etiologies. The distal cord and conus medullaris are within normal limits. The cauda equina is unremarkable. There is no prevertebral soft tissue swelling.   The visualized retroperitoneal structures are unremarkable. T12-L1: There is a 6 mm broad-based disc bulge asymmetric towards the left indenting the anterior portion of the thecal sac. There is no narrowing of the central canal. There is severe bilateral neural foraminal narrowing which may be impinging on the exiting T12 nerve roots. L1-2: There is no disc herniation. There is mild bilateral facet arthrosis and mild ligamentum flavum hypertrophy. There is no narrowing of central canal. There is moderate to severe narrowing of the neural foramina which may be impinging on the exiting L1 nerve roots. L2-3: There is a 6 mm symmetric disc bulge as well as mild bilateral facet arthrosis and mild ligamentum flavum hypertrophy producing mild narrowing of central canal. There is severe right and moderate left neural foraminal narrowing which may be impinging on the exiting right L2 nerve root. L3-4: There is a 2 mm symmetric disc bulge as well as moderate bilateral facet arthrosis and mild ligamentum flavum hypertrophy producing mild narrowing of the central canal. There is severe right and moderate left neural foraminal narrowing which may be  impinging on the exiting right L3 nerve root. L4-5: There is a 2 mm symmetric disc bulge as well as moderate bilateral facet arthrosis and mild ligamentum flavum hypertrophy producing mild narrowing of central canal and mild left lateral recess narrowing. There is moderate right and left neural foraminal narrowing. L5-S1: There is a 2 mm symmetric disc bulge as well as mild bilateral facet arthrosis and mild ligamentum flavum hypertrophy producing mild narrowing of the central canal. There is mild bilateral neural foraminal narrowing. There is severe multilevel spondylosis including severe levoscoliosis centered at L3 with a Singletary angle measurement of 48 degrees between L1 and L4. There is no loss vertebral body height. There is no acute fracture or subluxation.  There is a to moderate narrowing of central canal with multiple areas of severe neural foraminal narrowing which may be impinging on the first on the exiting nerve roots as described in greater detail at each level above. Lab Results   Component Value Date    WBC 4.0 06/22/2021    RBC 4.70 06/22/2021    HGB 15.3 06/22/2021    HCT 45.8 06/22/2021    MCV 97.5 06/22/2021    MCH 32.5 06/22/2021    MCHC 33.4 06/22/2021    RDW 13.3 06/22/2021     06/22/2021    MPV 8.4 08/14/2013     Lab Results   Component Value Date     06/22/2021    K 3.2 06/22/2021     06/22/2021    CO2 27 06/22/2021    BUN 13 06/22/2021    CREATININE 0.74 06/22/2021    GFRAA >60.0 06/22/2021    LABGLOM >60.0 06/22/2021    GLUCOSE 52 06/22/2021    PROT 6.4 06/22/2021    LABALBU 4.4 06/22/2021    CALCIUM 9.7 06/22/2021    BILITOT 0.4 06/22/2021    ALKPHOS 74 06/22/2021    AST 25 06/22/2021    ALT <5 06/22/2021     No results found for: PROTIME, INR  Lab Results   Component Value Date    TSH 0.632 06/30/2020    WQOYOMNL97 294 06/22/2021    FOLATE >20.0 04/04/2018     Lab Results   Component Value Date    TRIG 79 06/22/2021     06/22/2021    LDLCALC 55 06/22/2021     Lab Results   Component Value Date    LABAMPH Neg 10/28/2021    BARBSCNU Neg 10/28/2021    LABBENZ Neg 10/28/2021    LABMETH Neg 10/28/2021    OPIATESCREENURINE Neg 10/28/2021    PHENCYCLIDINESCREENURINE Neg 10/28/2021     No results found for: LITHIUM, DILFRTOT, VALPROATE    Assessment:       Diagnosis Orders   1. Parkinson's disease ([address]     2. 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     3. Kyphoscoliosis     4. Hyperreflexia     5. Cervicalgia     6. Ataxic gait     Parkinson's disease which is stable. Patient is only able to tolerate half a tablet of carbidopa levodopa 3 times a day and she has been stable on the same. Her main issues appears to be her gait and since last session we further obtain an MRI of the cervical spine and lumbar spine.   The lumbar spine MRI is reviewed and this shows significant kyphoscoliosis in fact we are not able to see a sagittal view given that patient has considerable kyphoscoliosis and we had to actually reconstruct her spine on the MRI. I am not seeing significant stenosis though. I have not recommended surgery given these findings as there is no surgical interventions likely to help this. I recommended a back brace and a walker for now and we will watch that she does not have bowel or bladder dysfunction or cauda equina syndrome. We will continue keep observation of her findings. Cervical spine MRI does not show any significant compression. Plan:      No orders of the defined types were placed in this encounter. No orders of the defined types were placed in this encounter. No follow-ups on file.       Jorge Polo MD

## 2021-12-08 ENCOUNTER — OFFICE VISIT (OUTPATIENT)
Dept: CARDIOLOGY CLINIC | Age: 71
End: 2021-12-08
Payer: MEDICARE

## 2021-12-08 VITALS
HEART RATE: 86 BPM | WEIGHT: 90 LBS | HEIGHT: 61 IN | BODY MASS INDEX: 16.99 KG/M2 | SYSTOLIC BLOOD PRESSURE: 138 MMHG | OXYGEN SATURATION: 100 % | DIASTOLIC BLOOD PRESSURE: 80 MMHG

## 2021-12-08 DIAGNOSIS — R00.2 PALPITATIONS: Primary | ICD-10-CM

## 2021-12-08 DIAGNOSIS — R00.0 TACHYCARDIA: ICD-10-CM

## 2021-12-08 PROCEDURE — 93000 ELECTROCARDIOGRAM COMPLETE: CPT | Performed by: INTERNAL MEDICINE

## 2021-12-08 PROCEDURE — 99212 OFFICE O/P EST SF 10 MIN: CPT | Performed by: INTERNAL MEDICINE

## 2021-12-08 ASSESSMENT — ENCOUNTER SYMPTOMS
SHORTNESS OF BREATH: 0
APNEA: 0
CHEST TIGHTNESS: 0
COLOR CHANGE: 0

## 2021-12-08 NOTE — PROGRESS NOTES
[facility] CARDIOLOGY OFFICE FOLLOW-UP      Patient: Mónica Henning  YOB: 1950  MRN: [id number]    Chief Complaint:  Chief Complaint   Patient presents with    1 Year Follow Up    Tachycardia         Subjective/HPI:  12/8/21: Patient presents today for follow-up of palpitation. She is extremely frail. Has a history of depressive disorder, Hypothyroidism, and Parkinson's disease. Does not smoke. Also has hyperuricemia for which she is on Colcrys 0.6 mg twice a day. Also on Plaquenil which is given to her by Rosa M May. No congestive heart failure symptoms no syncope no dizziness. She is vaccinated. She will see me in 1 year       12/9/2020: Patient presents today for follow-up of palpitation. Stop taking the methotrexate which was given to her. by Darby Luna. Hypothyroidism. Also on Sinemet. This was started by Dr. Sid Cody. Sinemet was causing issues with Plaquenil and therefore she quit taking Plaquenil. Occasional chest pain. This is noncardiac. She weighs only 100 pounds appetite is okay. No ankle edema. No syncope dizziness. I will see her in 1 year. She denies smoking. Emphasized to her the need to take all kinds of precaution during the Covid season because of her immunocompromised status.        12/5/19: Patient presents today for follow-up of palpitations.  Much better controlled.  After beta-blocker was started. Michelle Figueroa reportedly has connective tissue disorders for which she sees .  I have advised her to get the flu shot.  She does not smoke.  No syncope dizziness       11/28/18: Patient presents today for followup of palpitation. Much better after Toprol was started. Sees rheumatologist for connective tissue disorder. No angina. No congestive heart failure. See me in 6 months.       5/24/18: Patient presents today for Evaluation of palpitation. Much better after we started her on Toprol 25 mg a day. Stress test is unremarkable.  Echocardiogram is okay too. Has a history of connective tissue disorder being followed by [facility]. No syncope no dizziness. See me in 6 months       4/12/18: Patient presents today for Evaluation of palpitation. Consulted by Dr. Baldwin. Has a history of lupus and being treated by Juan. Has a history of palpitation the remote past. Was seen by [facility]. Had a stress test followed by cardiac catheterization. She was put on Toprol 25 mg (1/2). And has continued since then. Accompanied by her . No syncope. Palpitations are unpredictable. It would last hours at times. No relation to any activity she does. Could happen when she is lying in bed. Thyroid profile is normal. No convulsions or seizures. We will increase her Toprol to 25 mg a day. We will repeat Lexiscan, echo and a 14 day event monitor.                 Past Medical History:   Diagnosis Date    Arthritis     seen dr Daphne Mitchell Chronic back pain     seen dr Yeimy Crump in past    Depression     Hypothyroidism     Osteoarthritis     Osteoporosis     Restless legs syndrome     Tachycardia        Past Surgical History:   Procedure Laterality Date    CHOLECYSTECTOMY      HYSTERECTOMY      KNEE ARTHROSCOPY Right     TONSILLECTOMY         Family History   Problem Relation Age of Onset    Heart Disease Mother     High Blood Pressure Mother     Kidney Disease Mother     Cancer Father         bone, prostate, skin    Heart Disease Brother     Cancer Sister         kidney, lung       Social History     Socioeconomic History    Marital status:      Spouse name: None    Number of children: None    Years of education: None    Highest education level: None   Occupational History    None   Tobacco Use    Smoking status: Never Smoker    Smokeless tobacco: Never Used   Substance and Sexual Activity    Alcohol use:  Yes     Alcohol/week: 0.0 standard drinks     Comment: social    Drug use: No    Sexual activity: None   Other Topics Concern    None   Social History Narrative  None     Social Determinants of Health     Financial Resource Strain: Low Risk     Difficulty of Paying Living Expenses: Not hard at all   Food Insecurity: No Food Insecurity    Worried About Running Out of Food in the Last Year: Never true    Cris of Food in the Last Year: Never true   Transportation Needs: No Transportation Needs    Lack of Transportation (Medical): No    Lack of Transportation (Non-Medical): No   Physical Activity:     Days of Exercise per Week: Not on file    Minutes of Exercise per Session: Not on file   Stress:     Feeling of Stress : Not on file   Social Connections:     Frequency of Communication with Friends and Family: Not on file    Frequency of Social Gatherings with Friends and Family: Not on file    Attends Mosque Services: Not on file    Active Member of [address] [place] [address] or Organizations: Not on file    Attends Club or Organization Meetings: Not on file    Marital Status: Not on file   Intimate Partner Violence:     Fear of Current or Ex-Partner: Not on file    Emotionally Abused: Not on file    Physically Abused: Not on file    Sexually Abused: Not on file   Housing Stability:     Unable to Pay for Housing in the Last Year: Not on file    Number of [place] in the Last Year: Not on file    Unstable Housing in the Last Year: Not on file       No Known Allergies    Current Outpatient Medications   Medication Sig Dispense Refill    Cholecalciferol (VITAMIN D3) 1.25 MG (80638 UT) CAPS TAKE 1 CAPSULE BY MOUTH ONCE A WEEK 12 capsule 1    colchicine (MITIGARE) 0.6 MG capsule       traMADol (ULTRAM) 50 MG tablet Take 1 tablet by mouth every 8 hours as needed for Pain for up to 90 days. Intended supply: 7 days.  Take lowest dose possible to manage pain 90 tablet 2    sertraline (ZOLOFT) 50 MG tablet TAKE 1 TABLET DAILY 90 tablet 1    metoprolol succinate (TOPROL XL) 25 MG extended release tablet TAKE 1 TABLET DAILY 90 tablet 2    levothyroxine (SYNTHROID) 50 MCG tablet TAKE 1 TABLET ONCE DAILY 14 tablet 0    carbidopa-levodopa (SINEMET)  MG per tablet TAKE 1 TABLET BY MOUTH IN THE MORNING FOR 1 WEEK, then TAKE 1 TABLET TWICE DAILY (IN THE MORNING and at 2pm) 90 tablet 3    hydroxychloroquine (PLAQUENIL) 200 MG tablet TAKE 1 TABLET BY MOUTH TWICE DAILY (Patient taking differently: Take 200 mg by mouth daily ) 60 tablet 2    predniSONE (DELTASONE) 5 MG tablet TAKE 1 TABLET DAILY 90 tablet 1    cyclobenzaprine (FLEXERIL) 10 MG tablet Take 1 tablet by mouth 2 times daily as needed for Muscle spasms 60 tablet 2    B-D INSULIN SYRINGE 29G X 1/2\" 1 ML MISC use one syringe to administer mEtHOTRExATE once each week       No current facility-administered medications for this visit. Review of Systems:   Review of Systems   Constitutional: Negative for appetite change, diaphoresis and fatigue. Respiratory: Negative for apnea, chest tightness and shortness of breath. Cardiovascular: Positive for palpitations. Negative for chest pain and leg swelling. Musculoskeletal: Negative for myalgias. Skin: Negative for color change, pallor, rash and wound. Neurological: Positive for light-headedness. Negative for dizziness, syncope, weakness, numbness and headaches. Hematological: Does not bruise/bleed easily. Psychiatric/Behavioral: Negative for agitation, behavioral problems and confusion. The patient is not nervous/anxious and is not hyperactive. Review of System is negative except for as mentioned above. Physical Examination:    /80 (Site: Left Upper Arm, Position: Sitting, Cuff Size: Medium Adult)   Pulse 86   Ht 5' 1\" (1.549 m)   Wt 90 lb (40.8 kg)   SpO2 100%   BMI 17.01 kg/m²    Physical Exam   Constitutional: She appears healthy. HENT:   Nose: Nose normal.   Mouth/Throat: Dentition is normal. Oropharynx is clear. Eyes: Pupils are equal, round, and reactive to light.    Cardiovascular: Normal rate, regular rhythm, S1 normal, S2 normal, normal heart sounds, intact distal pulses and normal pulses. No extrasystoles are present. Exam reveals no gallop. No murmur heard. Pulmonary/Chest: Effort normal and breath sounds normal. She has no wheezes. She has no rales. She exhibits no tenderness. Abdominal: Soft. Bowel sounds are normal. She exhibits no distension and no mass. There is no splenomegaly or hepatomegaly. There is no abdominal tenderness. Musculoskeletal:         General: No tenderness, deformity or edema. Normal range of motion. Cervical back: Normal range of motion. Neurological: She is alert and oriented to person, place, and time. She has normal motor skills and normal reflexes. Gait normal.   Skin: Skin is warm and dry. Patient Active Problem List   Diagnosis    Hypothyroid    Depressive disorder, not elsewhere classified    Palpitations    Tachycardia    Spondylosis of lumbar region without myelopathy or radiculopathy    Ataxic gait    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    Cervicalgia    Hyperreflexia    Kyphoscoliosis    Parkinson's disease ([facility] [address]           Orders Placed This Encounter   Procedures    EKG 12 lead     Order Specific Question:   Reason for Exam?     Answer:   Irregular heart rate                   Assessment/Orders:       ICD-10-CM    1. Palpitations  R00.2 EKG 12 lead   2. Tachycardia  R00.0 EKG 12 lead       No orders of the defined types were placed in this encounter.       Medications Discontinued During This Encounter   Medication Reason    clonazePAM (KLONOPIN) 0.5 MG tablet LIST CLEANUP    colchicine (COLCRYS) 0.6 MG tablet LIST CLEANUP    diazePAM (VALIUM) 5 MG tablet LIST CLEANUP    folic acid (FOLVITE) 1 MG tablet LIST CLEANUP    cyproheptadine (PERIACTIN) 4 MG tablet LIST CLEANUP    polyethylene glycol (MIRALAX) 17 GM/SCOOP powder LIST CLEANUP    potassium chloride (KLOR-CON M) 10 MEQ extended release tablet LIST CLEANUP    traMADol (ULTRAM) 50 MG tablet LIST CLEANUP       Orders Placed This Encounter   Procedures    EKG 12 lead     Order Specific Question:   Reason for Exam?     Answer:   Irregular heart rate               Plan:  EKG was reviewed. Sinus rhythm at 84. Blood pressure is controlled.   Continue same medications and see me in 1 year            Electronically signed by: Kiko Duran MD  12/8/2021 11:58 AM

## 2021-12-10 DIAGNOSIS — M79.604 LOW BACK PAIN RADIATING TO RIGHT LEG: Primary | ICD-10-CM

## 2021-12-10 DIAGNOSIS — M54.50 LOW BACK PAIN RADIATING TO RIGHT LEG: Primary | ICD-10-CM

## 2021-12-13 ENCOUNTER — OFFICE VISIT (OUTPATIENT)
Dept: PRIMARY CARE CLINIC | Age: 71
End: 2021-12-13
Payer: MEDICARE

## 2021-12-13 ENCOUNTER — HOSPITAL ENCOUNTER (OUTPATIENT)
Dept: GENERAL RADIOLOGY | Age: 71
Discharge: HOME | End: 2021-12-15
Payer: MEDICARE

## 2021-12-13 VITALS
SYSTOLIC BLOOD PRESSURE: 134 MMHG | DIASTOLIC BLOOD PRESSURE: 70 MMHG | OXYGEN SATURATION: 97 % | RESPIRATION RATE: 16 BRPM | HEART RATE: 85 BPM | HEIGHT: 61 IN | WEIGHT: 93.6 LBS | BODY MASS INDEX: 17.67 KG/M2

## 2021-12-13 DIAGNOSIS — Z12.31 ENCOUNTER FOR SCREENING MAMMOGRAM FOR BREAST CANCER: ICD-10-CM

## 2021-12-13 DIAGNOSIS — S99.922A FOOT INJURY, LEFT, INITIAL ENCOUNTER: ICD-10-CM

## 2021-12-13 DIAGNOSIS — Z12.11 SPECIAL SCREENING FOR MALIGNANT NEOPLASMS, COLON: ICD-10-CM

## 2021-12-13 DIAGNOSIS — S99.922A FOOT INJURY, LEFT, INITIAL ENCOUNTER: Primary | ICD-10-CM

## 2021-12-13 PROCEDURE — 73630 X-RAY EXAM OF FOOT: CPT

## 2021-12-13 PROCEDURE — 73610 X-RAY EXAM OF ANKLE: CPT

## 2021-12-13 PROCEDURE — 99214 OFFICE O/P EST MOD 30 MIN: CPT | Performed by: INTERNAL MEDICINE

## 2021-12-13 PROCEDURE — L4361 PNEUMA/VAC WALK BOOT PRE OTS: HCPCS | Performed by: INTERNAL MEDICINE

## 2021-12-13 ASSESSMENT — ENCOUNTER SYMPTOMS
SHORTNESS OF BREATH: 0
COUGH: 0
WHEEZING: 0

## 2021-12-13 NOTE — PROGRESS NOTES
Subjective:      Patient ID: Denisha Bearden is a 73 y.o. female    Left foot and ankle pain x 3 days   HPI  Pt presents with 3 days of stabbing left foot and ankle pain. This started after she dropped a 2lb weight on the foot and rammed it into a wall. Assoc swelling of the foot. Past Medical History:   Diagnosis Date    Arthritis     seen dr Aura Ruiz Chronic back pain     seen dr Nestor Bryant in past    Depression     Hypothyroidism     Osteoarthritis     Osteoporosis     Restless legs syndrome     Tachycardia      Past Surgical History:   Procedure Laterality Date    CHOLECYSTECTOMY      HYSTERECTOMY      KNEE ARTHROSCOPY Right     TONSILLECTOMY       Social History     Socioeconomic History    Marital status:      Spouse name: Not on file    Number of children: Not on file    Years of education: Not on file    Highest education level: Not on file   Occupational History    Not on file   Tobacco Use    Smoking status: Never Smoker    Smokeless tobacco: Never Used   Substance and Sexual Activity    Alcohol use: Yes     Alcohol/week: 0.0 standard drinks     Comment: social    Drug use: No    Sexual activity: Not on file   Other Topics Concern    Not on file   Social History Narrative    Not on file     Social Determinants of Health     Financial Resource Strain: Low Risk     Difficulty of Paying Living Expenses: Not hard at all   Food Insecurity: No Food Insecurity    Worried About Running Out of Food in the Last Year: Never true    Cris of Food in the Last Year: Never true   Transportation Needs: No Transportation Needs    Lack of Transportation (Medical): No    Lack of Transportation (Non-Medical):  No   Physical Activity:     Days of Exercise per Week: Not on file    Minutes of Exercise per Session: Not on file   Stress:     Feeling of Stress : Not on file   Social Connections:     Frequency of Communication with Friends and Family: Not on file    Frequency of Social Gatherings with Friends and Family: Not on file    Attends Judaism Services: Not on file    Active Member of Clubs or Organizations: Not on file    Attends Club or Organization Meetings: Not on file    Marital Status: Not on file   Intimate Partner Violence:     Fear of Current or Ex-Partner: Not on file    Emotionally Abused: Not on file    Physically Abused: Not on file    Sexually Abused: Not on file   Housing Stability:     Unable to Pay for Housing in the Last Year: Not on file    Number of [place] in the Last Year: Not on file    Unstable Housing in the Last Year: Not on file     Family History   Problem Relation Age of Onset    Heart Disease Mother     High Blood Pressure Mother     Kidney Disease Mother     Cancer Father         bone, prostate, skin    Heart Disease Brother     Cancer Sister         kidney, lung     Allergies:  Patient has no known allergies. Patient Active Problem List   Diagnosis    Hypothyroid    Depressive disorder, not elsewhere classified    Palpitations    Tachycardia    Spondylosis of lumbar region without myelopathy or radiculopathy    Ataxic gait    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    Cervicalgia    Hyperreflexia    Kyphoscoliosis    Parkinson's disease ([facility] [address]     Current Outpatient Medications on File Prior to Visit   Medication Sig Dispense Refill    Cholecalciferol (VITAMIN D3) 1.25 MG (15343 UT) CAPS TAKE 1 CAPSULE BY MOUTH ONCE A WEEK 12 capsule 1    colchicine (MITIGARE) 0.6 MG capsule       cyclobenzaprine (FLEXERIL) 10 MG tablet Take 1 tablet by mouth 2 times daily as needed for Muscle spasms 60 tablet 2    traMADol (ULTRAM) 50 MG tablet Take 1 tablet by mouth every 8 hours as needed for Pain for up to 90 days. Intended supply: 7 days.  Take lowest dose possible to manage pain 90 tablet 2    sertraline (ZOLOFT) 50 MG tablet TAKE 1 TABLET DAILY 90 tablet 1    metoprolol succinate (TOPROL XL) 25 MG extended release tablet TAKE 1 TABLET DAILY 90 tablet 2    levothyroxine (SYNTHROID) 50 MCG tablet TAKE 1 TABLET ONCE DAILY 14 tablet 0    carbidopa-levodopa (SINEMET)  MG per tablet TAKE 1 TABLET BY MOUTH IN THE MORNING FOR 1 WEEK, then TAKE 1 TABLET TWICE DAILY (IN THE MORNING and at 2pm) 90 tablet 3    hydroxychloroquine (PLAQUENIL) 200 MG tablet TAKE 1 TABLET BY MOUTH TWICE DAILY (Patient taking differently: Take 200 mg by mouth daily ) 60 tablet 2    predniSONE (DELTASONE) 5 MG tablet TAKE 1 TABLET DAILY 90 tablet 1     No current facility-administered medications on file prior to visit. Review of Systems   Respiratory: Negative for cough, shortness of breath and wheezing. Cardiovascular: Negative for chest pain. Endocrine: Negative for cold intolerance and heat intolerance. Genitourinary: Negative for dysuria and frequency. Musculoskeletal: Positive for arthralgias and joint swelling. Neurological: Negative for dizziness and light-headedness. Objective:   /70   Pulse 85   Resp 16   Ht 5' 1\" (1.549 m)   Wt 93 lb 9.6 oz (42.5 kg)   SpO2 97%   BMI 17.69 kg/m²     Physical Exam  Constitutional:       General: She is not in acute distress. Appearance: She is not diaphoretic. Cardiovascular:      Rate and Rhythm: Normal rate and regular rhythm. Pulses: Normal pulses. Heart sounds: Normal heart sounds, S1 normal and S2 normal.   Pulmonary:      Effort: Pulmonary effort is normal. No respiratory distress. Breath sounds: Normal breath sounds. No wheezing or rales. Chest:      Chest wall: No tenderness. Abdominal:      General: Bowel sounds are normal.      Tenderness: There is no abdominal tenderness.    Musculoskeletal:      Comments:   Left foot and ankle erythema, swelling and marked TTP in the lateral malleolus, metatarsasus and fourth toeor TTP b/l  DP and PT pulses palpable b/l   Left ankle ROM limited in flexion, extension, inversion and eversion    Gait is antalgic   Neurological:      Mental Status: She is alert. Assessment:       Diagnosis Orders   1. Foot injury, left, initial encounter  Procare Nexstep Shortie Air Walker (Boot)    XR FOOT LEFT (MIN 3 VIEWS)    XR ANKLE LEFT (MIN 3 VIEWS)    Gabby Lowe DPM, Podiatry, [place]   2. Encounter for screening mammogram for breast cancer  [facility] DIGITAL SCREEN W OR WO CAD BILATERAL   3. Special screening for malignant neoplasms, colon  Cologuard     Plan:      Orders Placed This Encounter   Procedures    Cologuard     This test is performed by an external laboratory and is used for result attachment only. Please fill out the appropriate paperwork required by the processing laboratory. See [email or web address] for further information. Standing Status:   Future     Standing Expiration Date:   12/13/2022    MAYANK DIGITAL SCREEN W OR WO CAD BILATERAL     Standing Status:   Future     Standing Expiration Date:   2/13/2023    XR FOOT LEFT (MIN 3 VIEWS)     Standing Status:   Future     Number of Occurrences:   1     Standing Expiration Date:   12/13/2022    XR ANKLE LEFT (MIN 3 VIEWS)     Standing Status:   Future     Number of Occurrences:   1     Standing Expiration Date:   12/13/2022   Joanne Lowe DPM, Podiatry, [place]     Referral Priority:   Routine     Referral Type:   Eval and Treat     Referral Reason:   Specialty Services Required     Referred to Provider:   Chase Elizabeth DPM     Requested Specialty:   Podiatry     Number of Visits Requested:   1    [facility]     Patient was prescribed a Procare Nexstep Shortie Air Walker (Boot). The left foot will require stabilization / immobilization from this semi-rigid / rigid orthosis to improve their function. The orthosis will assist in protecting the affected area, provide functional support and facilitate healing.     The patient was educated and fit by a healthcare professional with expert knowledge and specialization in brace application while under the direct supervision of the physician. Verbal and written instructions for the use of and application of this item were provided. They were instructed to contact the office immediately should the brace result in increased pain, decreased sensation, increased swelling or worsening of the condition. No orders of the defined types were placed in this encounter. No follow-ups on file.

## 2022-01-04 ENCOUNTER — INITIAL CONSULT (OUTPATIENT)
Dept: PODIATRY | Age: 72
End: 2022-01-04
Payer: MEDICARE

## 2022-01-04 VITALS — WEIGHT: 93 LBS | HEIGHT: 61 IN | TEMPERATURE: 97.7 F | BODY MASS INDEX: 17.56 KG/M2

## 2022-01-04 DIAGNOSIS — S92.532A CLOSED DISPLACED FRACTURE OF DISTAL PHALANX OF LESSER TOE OF LEFT FOOT, INITIAL ENCOUNTER: ICD-10-CM

## 2022-01-04 DIAGNOSIS — M79.675 PAIN IN TOE OF LEFT FOOT: Primary | ICD-10-CM

## 2022-01-04 PROCEDURE — 99203 OFFICE O/P NEW LOW 30 MIN: CPT | Performed by: PODIATRIST

## 2022-01-04 ASSESSMENT — ENCOUNTER SYMPTOMS
BACK PAIN: 1
SHORTNESS OF BREATH: 0
NAUSEA: 0
VOMITING: 0

## 2022-01-04 NOTE — PROGRESS NOTES
[address] [facility]  [address] [place] [address]  Dept: [phone]  Loc: [phone]       Clark Prado  (1950)    1/4/22    Subjective     Clark Prado is 73 y.o. female who complains today of:    Chief Complaint   Patient presents with    Foot Pain     left       Clark Prado is seen in consultation at the request of Lili Busby MD for evaluation of a toe injury. HPI: Patient presents with a complaint of a painful left fourth toe. Patient states the pain began 2 weeks ago when she dropped a 2 pound exercise weight onto the toe. Patient describes having sharp and achy pain. She rates the pain a 5/10. Patient states the pain has improved since the initial incident. Patient states that walking exacerbates the pain. Patient states that sitting with the foot elevated decreases her pain. Patient has been performing magui splinting with silk tape. Patient says she purchased a postoperative shoe at the [facility], she finds is more comfortable than a normal shoe. Review of Systems   Constitutional: Negative for chills and fever. HENT: Negative for hearing loss. Respiratory: Negative for shortness of breath. Cardiovascular: Negative for chest pain. Gastrointestinal: Negative for nausea and vomiting. Genitourinary: Negative for difficulty urinating. Musculoskeletal: Positive for back pain and gait problem. Skin: Negative for wound. Neurological: Negative for numbness. Hematological: Does not bruise/bleed easily. Psychiatric/Behavioral: Negative for sleep disturbance. The patient is not a diabetic. Allergies:  Patient has no known allergies.     Current Outpatient Medications on File Prior to Visit   Medication Sig Dispense Refill    Cholecalciferol (VITAMIN D3) 1.25 MG (04237 UT) CAPS TAKE 1 CAPSULE BY MOUTH ONCE A WEEK 12 capsule 1    colchicine (MITIGARE) 0.6 MG capsule       cyclobenzaprine (FLEXERIL) 10 MG tablet Take 1 tablet by mouth 2 times daily as needed for Muscle spasms 60 tablet 2    traMADol (ULTRAM) 50 MG tablet Take 1 tablet by mouth every 8 hours as needed for Pain for up to 90 days. Intended supply: 7 days. Take lowest dose possible to manage pain 90 tablet 2    sertraline (ZOLOFT) 50 MG tablet TAKE 1 TABLET DAILY 90 tablet 1    metoprolol succinate (TOPROL XL) 25 MG extended release tablet TAKE 1 TABLET DAILY 90 tablet 2    levothyroxine (SYNTHROID) 50 MCG tablet TAKE 1 TABLET ONCE DAILY 14 tablet 0    carbidopa-levodopa (SINEMET)  MG per tablet TAKE 1 TABLET BY MOUTH IN THE MORNING FOR 1 WEEK, then TAKE 1 TABLET TWICE DAILY (IN THE MORNING and at 2pm) 90 tablet 3    hydroxychloroquine (PLAQUENIL) 200 MG tablet TAKE 1 TABLET BY MOUTH TWICE DAILY (Patient taking differently: Take 200 mg by mouth daily ) 60 tablet 2    predniSONE (DELTASONE) 5 MG tablet TAKE 1 TABLET DAILY 90 tablet 1     No current facility-administered medications on file prior to visit. Past Medical History:   Diagnosis Date    Arthritis     seen dr Zuñiga Allan Chronic back pain     seen dr Jed Douglass in past    Depression     Hypothyroidism     Osteoarthritis     Osteoporosis     Restless legs syndrome     Tachycardia      Past Surgical History:   Procedure Laterality Date    CHOLECYSTECTOMY      HYSTERECTOMY      KNEE ARTHROSCOPY Right     TONSILLECTOMY       Social History     Socioeconomic History    Marital status:      Spouse name: Not on file    Number of children: Not on file    Years of education: Not on file    Highest education level: Not on file   Occupational History    Not on file   Tobacco Use    Smoking status: Never Smoker    Smokeless tobacco: Never Used   Substance and Sexual Activity    Alcohol use:  Yes     Alcohol/week: 0.0 standard drinks     Comment: social    Drug use: No    Sexual activity: Not on file Other Topics Concern    Not on file   Social History Narrative    Not on file     Social Determinants of Health     Financial Resource Strain: Low Risk     Difficulty of Paying Living Expenses: Not hard at all   Food Insecurity: No Food Insecurity    Worried About Running Out of Food in the Last Year: Never true    [address] in the Last Year: Never true   Transportation Needs: No Transportation Needs    Lack of Transportation (Medical): No    Lack of Transportation (Non-Medical): No   Physical Activity:     Days of Exercise per Week: Not on file    Minutes of Exercise per Session: Not on file   Stress:     Feeling of Stress : Not on file   Social Connections:     Frequency of Communication with Friends and Family: Not on file    Frequency of Social Gatherings with Friends and Family: Not on file    Attends Roman Catholic Services: Not on file    Active Member of [address] [place] [address] [employer] or Organizations: Not on file    Attends Club or Organization Meetings: Not on file    Marital Status: Not on file   Intimate Partner Violence:     Fear of Current or Ex-Partner: Not on file    Emotionally Abused: Not on file    Physically Abused: Not on file    Sexually Abused: Not on file   Housing Stability:     Unable to Pay for Housing in the Last Year: Not on file    Number of [place] in the Last Year: Not on file    Unstable Housing in the Last Year: Not on file     Family History   Problem Relation Age of Onset    Heart Disease Mother     High Blood Pressure Mother     Kidney Disease Mother     Cancer Father         bone, prostate, skin    Heart Disease Brother     Cancer Sister         kidney, lung           Objective:   Vitals:  Temp 97.7 °F (36.5 °C)   Ht 5' 1\" (1.549 m)   Wt 93 lb (42.2 kg)   BMI 17.57 kg/m² Pain Score:   5      Physical Exam  Vitals reviewed. Constitutional:       Appearance: Normal appearance. She is normal weight. HENT:      Head: Normocephalic and atraumatic.    Cardiovascular: Pulses:           Dorsalis pedis pulses are 2+ on the right side and 2+ on the left side. Posterior tibial pulses are 2+ on the right side and 2+ on the left side. Comments: Focal edema noted to the distal left forefoot and fourth toe. Varicosities and telangiectasia noted bilaterally. No hair growth noted to the digits bilaterally. Pulmonary:      Effort: Pulmonary effort is normal.   Musculoskeletal:      Right lower leg: No edema. Left lower leg: Edema present. Right foot: Deformity and bunion present. Left foot: Deformity and bunion present. Feet:      Right foot:      Skin integrity: Callus present. Toenail Condition: Right toenails are normal.      Left foot:      Skin integrity: Erythema and callus present. Toenail Condition: Left toenails are normal.      Comments: Rectus foot type noted bilaterally. Manual muscle testing is graded 5/5 for all extrinsic foot muscle groups bilaterally. Laterally deviated hallux with prominent medial eminence of the head of the first metatarsal noted bilaterally. Adductovarus rotation noted to toes 3-5 bilaterally, prominent lateral eminence noted at the head of the fifth metatarsal.  Semirigid flexion deformity noted to the right second PIPJ. There is pain on palpation of the distal one third of the left fourth toe, there is pain with range of motion of the DIPJ. Lymphadenopathy:      Comments: Popliteal lymph nodes are soft nontender left lower extremity. Skin:     Capillary Refill: Capillary refill takes less than 2 seconds. Comments: Mild erythema noted to the dorsum of the left forefoot. There is resolving ecchymosis noted to the fourth toe, there is a subungual hematoma noted to the proximal portion of the left fourth nail plate, no loosening of the nail plate noted. Neurological:      Mental Status: She is alert and oriented to person, place, and time. Motor: Motor function is intact.       Deep Tendon Reflexes: Babinski sign absent on the right side. Babinski sign absent on the left side. Reflex Scores:       Patellar reflexes are 2+ on the right side and 2+ on the left side. Achilles reflexes are 2+ on the right side and 2+ on the left side. Comments: Altered light touch sensation noted to the distal portion of the left fourth toe. Psychiatric:         Mood and Affect: Mood normal.         Behavior: Behavior normal.         Assessment:      Diagnosis Orders   1. Pain in toe of left foot     2. Closed displaced fracture of distal phalanx of lesser toe of left foot, initial encounter         Plan:     Closed fracture left fourth toe: Patient instructed to continue use of the surgical shoe to the left foot, weightbearing as tolerated. Patient may attempt transition to a normal shoe in 2 weeks. I have instructed patient to use a foam toe spacer and a reasonable toe strap instead of tape to magui splint the toes. I have also dispensed instructions for home exercise plan, patient may begin performing exercises once they are not painful. All questions were answered to the patient's satisfaction. Thank you for allowing me to participate in the care of your patient. Follow up:  Return in about 4 weeks (around 2/1/2022). Obed Cesar DPM      Level of medical decision making: low. Please note that this report has been partially produced using speech recognition software which may cause errors including grammar, punctuation, and spelling or words and phrases that may seem inappropriate. If there are questions or concerns please feel free to contact me for clarification.

## 2022-01-04 NOTE — PATIENT INSTRUCTIONS
Patient Education        Broken Toe: Care Instructions  Your Care Instructions  You have broken (fractured) a bone in your toe. This kind of fracture does not need a special cast or brace. \"Magui-taping\" your broken toe to a healthy toe next to it is almost always enough to treat the problem and ease symptoms. The toe may take 4 weeks or more to heal.  You heal best when you take good care of yourself. Eat a variety of healthy foods, and don't smoke. Follow-up care is a key part of your treatment and safety. Be sure to make and go to all appointments, and call your doctor if you are having problems. It's also a good idea to know your test results and keep a list of the medicines you take. How can you care for yourself at home? · Be safe with medicines. Take pain medicines exactly as directed. ? If the doctor gave you a prescription medicine for pain, take it as prescribed. ? If you are not taking a prescription pain medicine, ask your doctor if you can take an over-the-counter medicine. · If your toe is taped to the toe next to it, your doctor has shown you how to change the tape. Protect the skin by putting something soft, such as felt or foam, between your toes before you tape them together. Never tape the toes together skin-to-skin. Your broken toe may need to be magui-taped for 2 to 4 weeks to heal.  · Rest and protect your toe. Do not walk on it until you can do so without too much pain. If the doctor has told you to use crutches, use them as instructed. · Put ice or a cold pack on your toe for 10 to 20 minutes at a time. Try to do this every 1 to 2 hours for the next 3 days (when you are awake) or until the swelling goes down. Put a thin cloth between the ice and your skin. · Prop up your foot on a pillow when you ice it or anytime you sit or lie down. Try to keep it above the level of your heart. This will help reduce swelling. · Make sure you go to your follow-up appointments.  Your doctor will need to check that your toe is healing right. When should you call for help? Call your doctor now or seek immediate medical care if:    · You have severe pain.     · Your toe is cool or pale or changes color.     · You have tingling, weakness, or numbness in your toe. Watch closely for changes in your health, and be sure to contact your doctor if:    · Pain and swelling get worse.     · You are not getting better as expected. Where can you learn more? Go to https://Drobo.CoreTrace. org and sign in to your mPura account. Enter N875 in the Truckily box to learn more about \"Broken Toe: Care Instructions. \"     If you do not have an account, please click on the \"Sign Up Now\" link. Current as of: July 1, 2021               Content Version: 13.1  © 3742-2585 [employer]. Care instructions adapted under license by [facility] ([facility]). If you have questions about a medical condition or this instruction, always ask your healthcare professional. [place] [address] any warranty or liability for your use of this information. Patient Education        Toe Fracture: Rehab Exercises  Introduction  Here are some examples of exercises for you to try. The exercises may be suggested for a condition or for rehabilitation. Start each exercise slowly. Ease off the exercises if you start to have pain. You will be told when to start these exercises and which ones will work best for you. How to do the exercises  Passive toe exercise    1. Sit on the floor, with the heel of your affected foot on the floor. Use one hand to hold your foot steady. 2. Using the thumb and index (pointing) finger of your other hand, slowly bend your toe forward and then backward. Hold each position for about 15 seconds. 3. Repeat 2 to 4 times. Toe curl    1. Sit on the floor, with the heel of your affected foot on the floor. 2. Gently curl your toes forward and then backward.  Hold each position for about 6 seconds. 3. Repeat 8 to 12 times. Towel scrunches    1. Sit in a chair, and place your affected foot on a towel on the floor. 2. Scrunch the towel toward you with your toes. Then use your toes to push the towel back into place. 3. Repeat 8 to 12 times. [place] pick-ups    1. Put some marbles on the floor next to a cup.  2. Sit in a chair, and use the toes of your affected foot to lift up one marble from the floor at a time. Then try to put the marble in the cup.  3. Repeat 8 to 12 times. Towel stretch    1. Sit with your legs extended and knees straight. 2. Place a towel or belt around your foot just under your toes. 3. Hold both ends of the towel or belt, with your hands above your knees. 4. Pull back with the towel or belt so that your foot stretches toward you. 5. Hold the position for at least 15 to 30 seconds. 6. Repeat 2 to 4 times. Follow-up care is a key part of your treatment and safety. Be sure to make and go to all appointments, and call your doctor if you are having problems. It's also a good idea to know your test results and keep a list of the medicines you take. Where can you learn more? Go to https://Solace TherapeuticspeEMUZE.DECA. org and sign in to your STEARCLEAR account. Enter T670 in the KylesHealthagen box to learn more about \"Toe Fracture: Rehab Exercises. \"     If you do not have an account, please click on the \"Sign Up Now\" link. Current as of: July 1, 2021               Content Version: 13.1  © 3206-7064 Healthwise[employer]. Care instructions adapted under license by [facility] ([facility]). If you have questions about a medical condition or this instruction, always ask your healthcare professional. [address] any warranty or liability for your use of this information.

## 2022-01-31 ENCOUNTER — OFFICE VISIT (OUTPATIENT)
Dept: PRIMARY CARE CLINIC | Age: 72
End: 2022-01-31
Payer: MEDICARE

## 2022-01-31 VITALS
OXYGEN SATURATION: 100 % | HEART RATE: 88 BPM | TEMPERATURE: 97.1 F | DIASTOLIC BLOOD PRESSURE: 70 MMHG | BODY MASS INDEX: 17.56 KG/M2 | HEIGHT: 61 IN | WEIGHT: 93 LBS | SYSTOLIC BLOOD PRESSURE: 112 MMHG

## 2022-01-31 DIAGNOSIS — G20 PARKINSON'S DISEASE (HCC): ICD-10-CM

## 2022-01-31 DIAGNOSIS — M79.604 LOW BACK PAIN RADIATING TO RIGHT LEG: Primary | ICD-10-CM

## 2022-01-31 DIAGNOSIS — Z12.31 SCREENING MAMMOGRAM FOR BREAST CANCER: ICD-10-CM

## 2022-01-31 DIAGNOSIS — M54.50 LOW BACK PAIN RADIATING TO RIGHT LEG: Primary | ICD-10-CM

## 2022-01-31 DIAGNOSIS — M72.0 DUPUYTREN'S CONTRACTURE OF LEFT HAND: ICD-10-CM

## 2022-01-31 DIAGNOSIS — R11.0 NAUSEA: ICD-10-CM

## 2022-01-31 PROCEDURE — 99214 OFFICE O/P EST MOD 30 MIN: CPT | Performed by: INTERNAL MEDICINE

## 2022-01-31 RX ORDER — TRAMADOL HYDROCHLORIDE 50 MG/1
50 TABLET ORAL EVERY 8 HOURS PRN
Qty: 90 TABLET | Refills: 2 | Status: SHIPPED | OUTPATIENT
Start: 2022-01-31 | End: 2022-05-01

## 2022-01-31 RX ORDER — ONDANSETRON 4 MG/1
4 TABLET, FILM COATED ORAL 3 TIMES DAILY PRN
Qty: 90 TABLET | Refills: 2 | Status: SHIPPED | OUTPATIENT
Start: 2022-01-31 | End: 2022-10-25

## 2022-01-31 RX ORDER — CYCLOBENZAPRINE HCL 10 MG
10 TABLET ORAL 2 TIMES DAILY PRN
Qty: 60 TABLET | Refills: 2 | Status: SHIPPED | OUTPATIENT
Start: 2022-01-31 | End: 2022-05-02 | Stop reason: SDUPTHER

## 2022-01-31 ASSESSMENT — ENCOUNTER SYMPTOMS
ABDOMINAL PAIN: 0
CHOKING: 0
ABDOMINAL DISTENTION: 0
PHOTOPHOBIA: 0
BLOOD IN STOOL: 0
APNEA: 0
BACK PAIN: 1
FACIAL SWELLING: 0

## 2022-02-01 ENCOUNTER — HOSPITAL ENCOUNTER (OUTPATIENT)
Dept: WOMENS IMAGING | Age: 72
Discharge: HOME | End: 2022-02-03
Payer: MEDICARE

## 2022-02-01 VITALS — BODY MASS INDEX: 17.57 KG/M2 | HEIGHT: 61 IN

## 2022-02-01 DIAGNOSIS — Z12.31 SCREENING MAMMOGRAM FOR BREAST CANCER: ICD-10-CM

## 2022-02-01 PROCEDURE — 77063 BREAST TOMOSYNTHESIS BI: CPT

## 2022-02-21 ENCOUNTER — TELEPHONE (OUTPATIENT)
Dept: PRIMARY CARE CLINIC | Age: 72
End: 2022-02-21

## 2022-02-22 DIAGNOSIS — E03.9 HYPOTHYROIDISM, UNSPECIFIED TYPE: ICD-10-CM

## 2022-02-22 DIAGNOSIS — E03.9 HYPOTHYROIDISM, UNSPECIFIED TYPE: Primary | ICD-10-CM

## 2022-02-22 RX ORDER — LEVOTHYROXINE SODIUM 0.05 MG/1
TABLET ORAL
Qty: 90 TABLET | Refills: 2 | Status: SHIPPED | OUTPATIENT
Start: 2022-02-22 | End: 2022-02-22 | Stop reason: SDUPTHER

## 2022-02-22 RX ORDER — LEVOTHYROXINE SODIUM 0.05 MG/1
TABLET ORAL
Qty: 90 TABLET | Refills: 2 | Status: SHIPPED | OUTPATIENT
Start: 2022-02-22

## 2022-03-02 ENCOUNTER — OFFICE VISIT (OUTPATIENT)
Dept: ORTHOPEDIC SURGERY | Age: 72
End: 2022-03-02
Payer: MEDICARE

## 2022-03-02 VITALS
TEMPERATURE: 97 F | WEIGHT: 93 LBS | HEART RATE: 103 BPM | HEIGHT: 61 IN | BODY MASS INDEX: 17.56 KG/M2 | OXYGEN SATURATION: 98 %

## 2022-03-02 DIAGNOSIS — M72.0 DUPUYTREN'S CONTRACTURE: Primary | ICD-10-CM

## 2022-03-02 DIAGNOSIS — M19.049 LOCALIZED, PRIMARY OSTEOARTHRITIS OF HAND, UNSPECIFIED LATERALITY: ICD-10-CM

## 2022-03-02 PROCEDURE — 99203 OFFICE O/P NEW LOW 30 MIN: CPT | Performed by: ORTHOPAEDIC SURGERY

## 2022-03-02 NOTE — PROGRESS NOTES
Subjective:      Patient ID: Sarah Moss is a 73 y.o. female who presents today for:  Chief Complaint   Patient presents with    Hand Pain     The patient c/o left hand pain and a noticable bump. She states that the bump has gotten larger. Her pain is rated as a 4/10 today. HPI    The patient presents for 2 things. She comes in with little stiffness of her left hand she also noticed a bump in her left hand in the palm and wanted to have that checked out. Her father had significant cords causing surgery per her report. She is here with her  today. No x-rays are taken today. Past Medical History:   Diagnosis Date    Arthritis     seen dr Janay Cisse Chronic back pain     seen dr Dung De La Rosa in past    Depression     Hypothyroidism     Osteoarthritis     Osteoporosis     Restless legs syndrome     Tachycardia      Past Surgical History:   Procedure Laterality Date    CHOLECYSTECTOMY      HYSTERECTOMY      KNEE ARTHROSCOPY Right     OVARY REMOVAL      TONSILLECTOMY       Social History     Socioeconomic History    Marital status:      Spouse name: Not on file    Number of children: Not on file    Years of education: Not on file    Highest education level: Not on file   Occupational History    Not on file   Tobacco Use    Smoking status: Never Smoker    Smokeless tobacco: Never Used   Substance and Sexual Activity    Alcohol use:  Yes     Alcohol/week: 0.0 standard drinks     Comment: social    Drug use: No    Sexual activity: Not on file   Other Topics Concern    Not on file   Social History Narrative    Not on file     Social Determinants of Health     Financial Resource Strain: Low Risk     Difficulty of Paying Living Expenses: Not hard at all   Food Insecurity: No Food Insecurity    Worried About Running Out of Food in the Last Year: Never true    Cris of Food in the Last Year: Never true   Transportation Needs: No Transportation Needs    Lack of Transportation (Medical): No    Lack of Transportation (Non-Medical): No   Physical Activity:     Days of Exercise per Week: Not on file    Minutes of Exercise per Session: Not on file   Stress:     Feeling of Stress : Not on file   Social Connections:     Frequency of Communication with Friends and Family: Not on file    Frequency of Social Gatherings with Friends and Family: Not on file    Attends [employer] Services: Not on file    Active Member of [address] [place] [address] [employer] or Organizations: Not on file    Attends Club or Organization Meetings: Not on file    Marital Status: Not on file   Intimate Partner Violence:     Fear of Current or Ex-Partner: Not on file    Emotionally Abused: Not on file    Physically Abused: Not on file    Sexually Abused: Not on file   Housing Stability:     Unable to Pay for Housing in the Last Year: Not on file    Number of [place] in the Last Year: Not on file    Unstable Housing in the Last Year: Not on file     Family History   Problem Relation Age of Onset    Heart Disease Mother     High Blood Pressure Mother     Kidney Disease Mother     Cancer Father         bone, prostate, skin    Heart Disease Brother     Cancer Sister         kidney, lung     No Known Allergies  Current Outpatient Medications on File Prior to Visit   Medication Sig Dispense Refill    levothyroxine (SYNTHROID) 50 MCG tablet TAKE 1 TABLET ONCE DAILY 90 tablet 2    traMADol (ULTRAM) 50 MG tablet Take 1 tablet by mouth every 8 hours as needed for Pain for up to 90 days.  90 tablet 2    cyclobenzaprine (FLEXERIL) 10 MG tablet Take 1 tablet by mouth 2 times daily as needed for Muscle spasms 60 tablet 2    ondansetron (ZOFRAN) 4 MG tablet Take 1 tablet by mouth 3 times daily as needed for Nausea or Vomiting 90 tablet 2    Cholecalciferol (VITAMIN D3) 1.25 MG (76265 UT) CAPS TAKE 1 CAPSULE BY MOUTH ONCE A WEEK 12 capsule 1    colchicine (MITIGARE) 0.6 MG capsule       sertraline (ZOLOFT) 50 MG tablet TAKE 1 TABLET DAILY 90 tablet 1    metoprolol succinate (TOPROL XL) 25 MG extended release tablet TAKE 1 TABLET DAILY 90 tablet 2    carbidopa-levodopa (SINEMET)  MG per tablet TAKE 1 TABLET BY MOUTH IN THE MORNING FOR 1 WEEK, then TAKE 1 TABLET TWICE DAILY (IN THE MORNING and at 2pm) 90 tablet 3    hydroxychloroquine (PLAQUENIL) 200 MG tablet TAKE 1 TABLET BY MOUTH TWICE DAILY (Patient taking differently: Take 200 mg by mouth daily ) 60 tablet 2    predniSONE (DELTASONE) 5 MG tablet TAKE 1 TABLET DAILY 90 tablet 1     No current facility-administered medications on file prior to visit. Review of Systems  Patient has no fever chills night sweats    Objective:   Pulse 103   Temp 97 °F (36.1 °C) (Temporal)   Ht 5' 1\" (1.549 m)   Wt 93 lb (42.2 kg)   SpO2 98%   BMI 17.57 kg/m²     ORTHOEXAM    Patient has a small Dupuytren's nodule in the palm. It does cause a contracture of the middle finger. Or the ring finger. Unfortunately the patient has significant osteoarthritis in the hand and involves all the joints. She has no triggering that I can appreciate but clearly her hand arthritis is due to such and is caused her restriction of motion. Her right hand not to that extent. The patient has a very small nodule on the right hand as well. Assessment:       Diagnosis Orders   1. Dupuytren's contracture     2. Localized, primary osteoarthritis of hand, unspecified laterality           Plan:   I does had a discussion with her and her  on what Dupuytren's is. The good news is I think her small nodules will never come to a point where she will have to address them surgically or in any other manner. And therefore understanding what it is and what to look for were comfortable with that plan. In regards to arthritis outside of anti-inflammatories I would only recommend warming up the hand in the morning with a damp cloth or a warm water before starting her day.   No intervention at this time for any isolated joint is required. She is comfortable with the plan. Follow-up will be as needed  No orders of the defined types were placed in this encounter. No orders of the defined types were placed in this encounter. No follow-ups on file.       Rachana Armenta MD

## 2022-03-30 DIAGNOSIS — F32.A DEPRESSION, UNSPECIFIED DEPRESSION TYPE: ICD-10-CM

## 2022-04-01 ENCOUNTER — TELEPHONE (OUTPATIENT)
Dept: PRIMARY CARE CLINIC | Age: 72
End: 2022-04-01

## 2022-04-01 DIAGNOSIS — F32.A DEPRESSION, UNSPECIFIED DEPRESSION TYPE: ICD-10-CM

## 2022-04-01 NOTE — TELEPHONE ENCOUNTER
----- Message from Jenn Russ [address] sent at 4/1/2022  9:54 AM EDT -----  Subject: Refill Request    QUESTIONS  Name of Medication? sertraline (ZOLOFT) 50 MG tablet  Patient-reported dosage and instructions? 1 tab daily  How many days do you have left? 1  Preferred Pharmacy? [id number]  Pharmacy phone number (if available)? [phone]  Additional Information for Provider? ####7 Day supply Only. Pt called in   refill for mail order however she will be out of the med before it   arrives. they are requesting a 7 day supply be called into the local   pharm. Call with question  ---------------------------------------------------------------------------  --------------  CALL BACK INFO  What is the best way for the office to contact you? OK to leave message on   voicemail  Preferred Call Back Phone Number? [phone]  ---------------------------------------------------------------------------  --------------  SCRIPT ANSWERS  Relationship to Patient? Other  Representative Name?   Is the Representative on the appropriate HIPAA document in Epic?  Yes

## 2022-04-05 ENCOUNTER — OFFICE VISIT (OUTPATIENT)
Dept: NEUROLOGY | Age: 72
End: 2022-04-05
Payer: MEDICARE

## 2022-04-05 VITALS
WEIGHT: 92 LBS | HEIGHT: 61 IN | SYSTOLIC BLOOD PRESSURE: 120 MMHG | DIASTOLIC BLOOD PRESSURE: 80 MMHG | BODY MASS INDEX: 17.37 KG/M2 | HEART RATE: 83 BPM

## 2022-04-05 DIAGNOSIS — R26.0 ATAXIC GAIT: ICD-10-CM

## 2022-04-05 DIAGNOSIS — G20 PARKINSON'S DISEASE (HCC): Primary | ICD-10-CM

## 2022-04-05 DIAGNOSIS — M41.9 KYPHOSCOLIOSIS: ICD-10-CM

## 2022-04-05 DIAGNOSIS — M54.2 CERVICALGIA: ICD-10-CM

## 2022-04-05 DIAGNOSIS — R29.2 HYPERREFLEXIA: ICD-10-CM

## 2022-04-05 PROCEDURE — 99214 OFFICE O/P EST MOD 30 MIN: CPT | Performed by: PSYCHIATRY & NEUROLOGY

## 2022-04-05 ASSESSMENT — ENCOUNTER SYMPTOMS
COLOR CHANGE: 0
CHOKING: 0
PHOTOPHOBIA: 0
BACK PAIN: 1
TROUBLE SWALLOWING: 0
NAUSEA: 0
SHORTNESS OF BREATH: 0
VOMITING: 0

## 2022-04-05 NOTE — PROGRESS NOTES
Subjective:      Patient ID: Bhavya Diaz is a 73 y.o. female who presents today for:  Chief Complaint   Patient presents with    Follow-up     4 month f/u pt states shes more shaky now, having trouble with constipation also        HPI 81-year-old right-handed female with a history of Parkinson's disease. Patient has gait ataxia and tremor with some degree of kyphoscoliosis. Patient last seen in December. We had initiated her on a very low-dose of carbidopa levodopa and she was not able to increase this. She cuts her tablets half a tablet 3 times a day. She has not had any further worsening. We will further obtain a cervical spine lumbar spine MRI showing significant kyphoscoliosis. This did not appear to suggest cervical stenosis or cauda equina  Patient actually is now taking up to 2 tablets of carbidopa levodopa half tablet 4 times a day. She has not developed any side effects. Her walking continues to show the same findings and she is very tilted once in a while she uses a cane. She has lost some weight    Past Medical History:   Diagnosis Date    Arthritis     seen dr Luis Alberto Schneider Chronic back pain     seen dr Akila Fitzgerald in past    Depression     Hypothyroidism     Osteoarthritis     Osteoporosis     Restless legs syndrome     Tachycardia      Past Surgical History:   Procedure Laterality Date    CHOLECYSTECTOMY      HYSTERECTOMY      KNEE ARTHROSCOPY Right     OVARY REMOVAL      TONSILLECTOMY       Social History     Socioeconomic History    Marital status:      Spouse name: Not on file    Number of children: Not on file    Years of education: Not on file    Highest education level: Not on file   Occupational History    Not on file   Tobacco Use    Smoking status: Never Smoker    Smokeless tobacco: Never Used   Substance and Sexual Activity    Alcohol use:  Yes     Alcohol/week: 0.0 standard drinks     Comment: social    Drug use: No    Sexual activity: Not on file   Other Topics Concern    Not on file   Social History Narrative    Not on file     Social Determinants of Health     Financial Resource Strain: Low Risk     Difficulty of Paying Living Expenses: Not hard at all   Food Insecurity: No Food Insecurity    Worried About Running Out of Food in the Last Year: Never true    [address] in the Last Year: Never true   Transportation Needs: No Transportation Needs    Lack of Transportation (Medical): No    Lack of Transportation (Non-Medical):  No   Physical Activity:     Days of Exercise per Week: Not on file    Minutes of Exercise per Session: Not on file   Stress:     Feeling of Stress : Not on file   Social Connections:     Frequency of Communication with Friends and Family: Not on file    Frequency of Social Gatherings with Friends and Family: Not on file    Attends Mandaen Services: Not on file    Active Member of [address] [place] [address] or Organizations: Not on file    Attends Club or Organization Meetings: Not on file    Marital Status: Not on file   Intimate Partner Violence:     Fear of Current or Ex-Partner: Not on file    Emotionally Abused: Not on file    Physically Abused: Not on file    Sexually Abused: Not on file   Housing Stability:     Unable to Pay for Housing in the Last Year: Not on file    Number of Places Lived in the Last Year: Not on file    Unstable Housing in the Last Year: Not on file     Family History   Problem Relation Age of Onset    Heart Disease Mother     High Blood Pressure Mother     Kidney Disease Mother     Cancer Father         bone, prostate, skin    Heart Disease Brother     Cancer Sister         kidney, lung     No Known Allergies    Current Outpatient Medications   Medication Sig Dispense Refill    sertraline (ZOLOFT) 50 MG tablet TAKE 1 TABLET DAILY po 90 tablet 1    levothyroxine (SYNTHROID) 50 MCG tablet TAKE 1 TABLET ONCE DAILY 90 tablet 2    traMADol (ULTRAM) 50 MG tablet Take 1 tablet by mouth every 8 hours as needed for Pain for up to 90 days. 90 tablet 2    cyclobenzaprine (FLEXERIL) 10 MG tablet Take 1 tablet by mouth 2 times daily as needed for Muscle spasms 60 tablet 2    ondansetron (ZOFRAN) 4 MG tablet Take 1 tablet by mouth 3 times daily as needed for Nausea or Vomiting 90 tablet 2    Cholecalciferol (VITAMIN D3) 1.25 MG (92783 UT) CAPS TAKE 1 CAPSULE BY MOUTH ONCE A WEEK 12 capsule 1    colchicine (MITIGARE) 0.6 MG capsule       metoprolol succinate (TOPROL XL) 25 MG extended release tablet TAKE 1 TABLET DAILY 90 tablet 2    carbidopa-levodopa (SINEMET)  MG per tablet TAKE 1 TABLET BY MOUTH IN THE MORNING FOR 1 WEEK, then TAKE 1 TABLET TWICE DAILY (IN THE MORNING and at 2pm) 90 tablet 3    hydroxychloroquine (PLAQUENIL) 200 MG tablet TAKE 1 TABLET BY MOUTH TWICE DAILY (Patient taking differently: Take 200 mg by mouth daily ) 60 tablet 2    predniSONE (DELTASONE) 5 MG tablet TAKE 1 TABLET DAILY 90 tablet 1     No current facility-administered medications for this visit. Review of Systems   Constitutional: Negative for fever. HENT: Negative for ear pain, tinnitus and trouble swallowing. Eyes: Negative for photophobia and visual disturbance. Respiratory: Negative for choking and shortness of breath. Cardiovascular: Negative for chest pain and palpitations. Gastrointestinal: Negative for nausea and vomiting. Musculoskeletal: Positive for back pain and gait problem. Negative for joint swelling, myalgias, neck pain and neck stiffness. Skin: Negative for color change. Allergic/Immunologic: Negative for food allergies. Neurological: Positive for tremors and weakness. Negative for dizziness, seizures, syncope, facial asymmetry, speech difficulty, light-headedness, numbness and headaches. Psychiatric/Behavioral: Negative for behavioral problems, confusion, hallucinations and sleep disturbance.        Objective:   /80 (Site: Left Upper Arm, Position: Sitting, Cuff Size: Small Adult)   Pulse 83   Ht 5' 1\" (1.549 m)   Wt 92 lb (41.7 kg)   BMI 17.38 kg/m²     Physical Exam  Vitals reviewed. Eyes:      Pupils: Pupils are equal, round, and reactive to light. Cardiovascular:      Rate and Rhythm: Normal rate and regular rhythm. Heart sounds: No murmur heard. Pulmonary:      Effort: Pulmonary effort is normal.      Breath sounds: Normal breath sounds. Abdominal:      General: Bowel sounds are normal.   Musculoskeletal:         General: Normal range of motion. Cervical back: Normal range of motion. Skin:     General: Skin is warm. Neurological:      Mental Status: She is alert and oriented to person, place, and time. Cranial Nerves: No cranial nerve deficit. Sensory: No sensory deficit. Motor: No abnormal muscle tone. Coordination: Coordination normal.      Deep Tendon Reflexes: Reflexes are normal and symmetric. Babinski sign absent on the right side. Babinski sign absent on the left side. Comments: No tremors notable today though she is quite tilted with kyphoscoliosis. She has mild degree of bradykinesia. Psychiatric:         Mood and Affect: Mood normal.         [employer] FABRICIO DIGITAL SCREEN BILATERAL    Result Date: 2/1/2022  EXAMINATION: [employer] FABRIC[employer] DIGITAL SCREEN BILATERAL CLINICAL HISTORY:Z12.31 Screening mammogram for breast cancer ICD10 COMPARISON: Priors dating back to 2011 RESULT: 3-D tomosynthesis imaging of the bilateral breasts was performed. The breast parenchyma is heterogeneously dense which may obscure small masses. There are no suspicious masses or asymmetries, areas of architectural distortion or suspicious areas of microcalcifications. CAD analysis was performed and used in the interpretation. BI-RADS 1: NEGATIVE MAMMOGRAM. ROUTINE FOLLOW-UP MAMMOGRAPHY IS SUGGESTED IN ONE YEAR. DENSITY: Heterogeneous Board Certified Radiologists. Accredited by the ACR and FDA. MAMMOGRAPHY IS VERY IMPORTANT TO YOUR HEALTH.   THE AMERICAN CANCER SOCIETY GUIDELINES RECOMMEND THAT WOMEN 36YEARS OF AGE AND OLDER SHOULD HAVE A MAMMOGRAM EVERY YEAR. A REMINDER LETTER WILL BE SENT AT THE APPROPRIATE TIME.  THIS FACILITY UTILIZES A REMINDER SYSTEM TO ENSURE ALL PATIENTS RECEIVE REMINDER NOTIFICATIONS AT THE APPROPRIATE TIME BASED ON THE RECOMMENDATIONS OF THIS EXAM. THIS INCLUDES REMINDERS FOR ROUTINE  SCREENING MAMMOGRAMS, DIAGNOSTIC MAMMOGRAMS IN WHICH THE PATIENT IS ASKED TO RETURN FOR ADDITIONAL VIEWS, OR OTHER BREAST IMAGING INTERVENTIONS WHEN APPROPRIATE. THE PATIENT WILL BE PLACED IN THE APPROPRIATE REMINDER SYSTEM INCLUDING A REMINDER AT THE APPROPRIATE TIME FOR ANY PENDING ADDITIONAL VIEWS. Lab Results   Component Value Date    WBC 4.0 06/22/2021    RBC 4.70 06/22/2021    HGB 15.3 06/22/2021    HCT 45.8 06/22/2021    MCV 97.5 06/22/2021    MCH 32.5 06/22/2021    MCHC 33.4 06/22/2021    RDW 13.3 06/22/2021     06/22/2021    MPV 8.4 08/14/2013     Lab Results   Component Value Date     06/22/2021    K 3.2 06/22/2021     06/22/2021    CO2 27 06/22/2021    BUN 13 06/22/2021    CREATININE 0.74 06/22/2021    GFRAA >60.0 06/22/2021    LABGLOM >60.0 06/22/2021    GLUCOSE 52 06/22/2021    PROT 6.4 06/22/2021    LABALBU 4.4 06/22/2021    CALCIUM 9.7 06/22/2021    BILITOT 0.4 06/22/2021    ALKPHOS 74 06/22/2021    AST 25 06/22/2021    ALT <5 06/22/2021     No results found for: PROTIME, INR  Lab Results   Component Value Date    TSH 0.632 06/30/2020    OWDYDNAL80 294 06/22/2021    FOLATE >20.0 04/04/2018     Lab Results   Component Value Date    TRIG 79 06/22/2021     06/22/2021    LDLCALC 55 06/22/2021     Lab Results   Component Value Date    LABAMPH Neg 10/28/2021    BARBSCNU Neg 10/28/2021    LABBENZ Neg 10/28/2021    LABMETH Neg 10/28/2021    OPIATESCREENURINE Neg 10/28/2021    PHENCYCLIDINESCREENURINE Neg 10/28/2021     No results found for: LITHIUM, DILFRTOT, VALPROATE    Assessment:       Diagnosis Orders   1. Parkinson's disease ([facility] [address]     2. Ataxic gait     3. Cervicalgia     4. Hyperreflexia     5. Kyphoscoliosis       Parkinson's disease responsive to carbidopa levodopa. She is not able to tolerate higher dosing. She is on half a tablet 4 times a day making it to total tablets. She has not any major side effects of hypertension hallucinations or vivid dreaming or REM sleep disorders. She has lost some weight I recommend that she take protein shakes but keep it about 1 hour away from the medications given that this is protein binding. She not developed any cognitive issues. Her main issues appears to be kyphoscoliosis causing most of her gait issues. For now we will not recommended any further titration and if he can maintain her with this amount of medication we should be good. Occasionally she uses a cane   Plan:      No orders of the defined types were placed in this encounter. No orders of the defined types were placed in this encounter. No follow-ups on file.       Hanane Chase MD

## 2022-04-11 DIAGNOSIS — F32.A DEPRESSION, UNSPECIFIED DEPRESSION TYPE: ICD-10-CM

## 2022-04-11 NOTE — TELEPHONE ENCOUNTER
pharmacy requesting medication refill. Please approve or deny this request. It had went to the wrong pharmacy.     Rx requested:  Requested Prescriptions     Pending Prescriptions Disp Refills    sertraline (ZOLOFT) 50 MG tablet 90 tablet 1     Sig: TAKE 1 TABLET DAILY po         Last Office Visit:   1/31/2022      Next Visit Date:  Future Appointments   Date Time Provider Roque Garcia   5/2/2022  2:30 PM Feliberto Runner, MD [place]   8/2/2022  3:00 PM Hanane Chase MD [address] [place] [address]

## 2022-05-02 ENCOUNTER — OFFICE VISIT (OUTPATIENT)
Dept: PRIMARY CARE CLINIC | Age: 72
End: 2022-05-02
Payer: MEDICARE

## 2022-05-02 VITALS
HEART RATE: 52 BPM | TEMPERATURE: 97.6 F | BODY MASS INDEX: 17.56 KG/M2 | DIASTOLIC BLOOD PRESSURE: 68 MMHG | OXYGEN SATURATION: 98 % | WEIGHT: 93 LBS | SYSTOLIC BLOOD PRESSURE: 124 MMHG | HEIGHT: 61 IN

## 2022-05-02 DIAGNOSIS — Z00.00 PREVENTATIVE HEALTH CARE: ICD-10-CM

## 2022-05-02 DIAGNOSIS — M54.50 LOW BACK PAIN RADIATING TO RIGHT LEG: ICD-10-CM

## 2022-05-02 DIAGNOSIS — M79.604 LOW BACK PAIN RADIATING TO RIGHT LEG: ICD-10-CM

## 2022-05-02 DIAGNOSIS — I83.812 VARICOSE VEINS OF LEFT LOWER EXTREMITY WITH PAIN: Primary | ICD-10-CM

## 2022-05-02 DIAGNOSIS — Z23 NEED FOR PNEUMOCOCCAL VACCINATION: ICD-10-CM

## 2022-05-02 DIAGNOSIS — M79.605 LEG PAIN, LEFT: ICD-10-CM

## 2022-05-02 DIAGNOSIS — R60.0 EDEMA OF LEFT LOWER LEG: ICD-10-CM

## 2022-05-02 DIAGNOSIS — I77.1 ILIAC ARTERY STENOSIS, LEFT (HCC): ICD-10-CM

## 2022-05-02 PROCEDURE — 90732 PPSV23 VACC 2 YRS+ SUBQ/IM: CPT | Performed by: INTERNAL MEDICINE

## 2022-05-02 PROCEDURE — G0009 ADMIN PNEUMOCOCCAL VACCINE: HCPCS | Performed by: INTERNAL MEDICINE

## 2022-05-02 PROCEDURE — 99213 OFFICE O/P EST LOW 20 MIN: CPT | Performed by: INTERNAL MEDICINE

## 2022-05-02 RX ORDER — TRAMADOL HYDROCHLORIDE 50 MG/1
50 TABLET ORAL EVERY 8 HOURS PRN
Qty: 90 TABLET | Refills: 2 | Status: SHIPPED | OUTPATIENT
Start: 2022-05-02 | End: 2022-08-09 | Stop reason: SDUPTHER

## 2022-05-02 RX ORDER — CYCLOBENZAPRINE HCL 10 MG
10 TABLET ORAL 2 TIMES DAILY PRN
Qty: 60 TABLET | Refills: 2 | Status: SHIPPED | OUTPATIENT
Start: 2022-05-02 | End: 2022-08-09 | Stop reason: SDUPTHER

## 2022-05-02 RX ORDER — TRIAMTERENE AND HYDROCHLOROTHIAZIDE 37.5; 25 MG/1; MG/1
1 TABLET ORAL DAILY
Qty: 30 TABLET | Refills: 5 | Status: SHIPPED | OUTPATIENT
Start: 2022-05-02 | End: 2022-11-01 | Stop reason: SDUPTHER

## 2022-05-02 RX ORDER — TRAMADOL HYDROCHLORIDE 50 MG/1
50 TABLET ORAL EVERY 6 HOURS PRN
COMMUNITY
End: 2022-05-02 | Stop reason: SDUPTHER

## 2022-05-02 ASSESSMENT — PATIENT HEALTH QUESTIONNAIRE - PHQ9
SUM OF ALL RESPONSES TO PHQ9 QUESTIONS 1 & 2: 0
3. TROUBLE FALLING OR STAYING ASLEEP: 0
6. FEELING BAD ABOUT YOURSELF - OR THAT YOU ARE A FAILURE OR HAVE LET YOURSELF OR YOUR FAMILY DOWN: 0
SUM OF ALL RESPONSES TO PHQ QUESTIONS 1-9: 0
SUM OF ALL RESPONSES TO PHQ QUESTIONS 1-9: 0
9. THOUGHTS THAT YOU WOULD BE BETTER OFF DEAD, OR OF HURTING YOURSELF: 0
SUM OF ALL RESPONSES TO PHQ QUESTIONS 1-9: 0
10. IF YOU CHECKED OFF ANY PROBLEMS, HOW DIFFICULT HAVE THESE PROBLEMS MADE IT FOR YOU TO DO YOUR WORK, TAKE CARE OF THINGS AT HOME, OR GET ALONG WITH OTHER PEOPLE: 0
SUM OF ALL RESPONSES TO PHQ QUESTIONS 1-9: 0
5. POOR APPETITE OR OVEREATING: 0
4. FEELING TIRED OR HAVING LITTLE ENERGY: 0
8. MOVING OR SPEAKING SO SLOWLY THAT OTHER PEOPLE COULD HAVE NOTICED. OR THE OPPOSITE, BEING SO FIGETY OR RESTLESS THAT YOU HAVE BEEN MOVING AROUND A LOT MORE THAN USUAL: 0
2. FEELING DOWN, DEPRESSED OR HOPELESS: 0
1. LITTLE INTEREST OR PLEASURE IN DOING THINGS: 0
7. TROUBLE CONCENTRATING ON THINGS, SUCH AS READING THE NEWSPAPER OR WATCHING TELEVISION: 0

## 2022-05-02 ASSESSMENT — ENCOUNTER SYMPTOMS
CHOKING: 0
BLOOD IN STOOL: 0
ABDOMINAL DISTENTION: 0
PHOTOPHOBIA: 0
APNEA: 0
FACIAL SWELLING: 0

## 2022-05-02 NOTE — PROGRESS NOTES
Fay Marin 73 y.o. female presents today with   Chief Complaint   Patient presents with    3 Month Follow-Up    Lower Back Pain    Medication Refill    Foot Problem     discoloration on left foot red/purple       Back Pain  This is a chronic problem. The current episode started more than 1 year ago. The problem occurs daily. The problem has been waxing and waning since onset. The pain is present in the lumbar spine and gluteal. The quality of the pain is described as aching. The pain is at a severity of 6/10. The pain is severe. The symptoms are aggravated by bending. Pertinent negatives include no chest pain or fever. puff slighly color change. Had a fx 4th.   Back still present    Past Medical History:   Diagnosis Date    Arthritis     seen dr Albino Naqvi    Chronic back pain     seen dr Madeleine Becerra in past    Depression     Hypothyroidism     Osteoarthritis     Osteoporosis     Restless legs syndrome     Tachycardia      Patient Active Problem List    Diagnosis Date Noted    Dupuytren's contracture 03/02/2022    Localized, primary osteoarthritis of hand 03/02/2022    Parkinson's disease ([facility] [address] 08/02/2021    Kyphoscoliosis 11/24/2020    Ataxic gait 07/21/2020    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 07/21/2020    Cervicalgia 07/21/2020    Hyperreflexia 07/21/2020    Spondylosis of lumbar region without myelopathy or radiculopathy 07/29/2016    Hypothyroid 07/29/2013    Depressive disorder, not elsewhere classified 07/29/2013    Palpitations 07/29/2013    Tachycardia 07/29/2013     Past Surgical History:   Procedure Laterality Date    CHOLECYSTECTOMY      HYSTERECTOMY      KNEE ARTHROSCOPY Right     OVARY REMOVAL      TONSILLECTOMY       Family History   Problem Relation Age of Onset    Heart Disease Mother     High Blood Pressure Mother     Kidney Disease Mother     Cancer Father         bone, prostate, skin    Heart Disease Brother     Cancer Sister         kidney, lung Social History     Socioeconomic History    Marital status:      Spouse name: None    Number of children: None    Years of education: None    Highest education level: None   Occupational History    None   Tobacco Use    Smoking status: Never Smoker    Smokeless tobacco: Never Used   Substance and Sexual Activity    Alcohol use: Yes     Alcohol/week: 0.0 standard drinks     Comment: social    Drug use: No    Sexual activity: None   Other Topics Concern    None   Social History Narrative    None     Social Determinants of Health     Financial Resource Strain: Low Risk     Difficulty of Paying Living Expenses: Not hard at all   Food Insecurity: No Food Insecurity    Worried About Running Out of Food in the Last Year: Never true    Cris of Food in the Last Year: Never true   Transportation Needs: No Transportation Needs    Lack of Transportation (Medical): No    Lack of Transportation (Non-Medical): No   Physical Activity:     Days of Exercise per Week: Not on file    Minutes of Exercise per Session: Not on file   Stress:     Feeling of Stress : Not on file   Social Connections:     Frequency of Communication with Friends and Family: Not on file    Frequency of Social Gatherings with Friends and Family: Not on file    Attends Latter-day Services: Not on file    Active Member of [address] [place] [address] or Organizations: Not on file    Attends Club or Organization Meetings: Not on file    Marital Status: Not on file   Intimate Partner Violence:     Fear of Current or Ex-Partner: Not on file    Emotionally Abused: Not on file    Physically Abused: Not on file    Sexually Abused: Not on file   Housing Stability:     Unable to Pay for Housing in the Last Year: Not on file    Number of [place] in the Last Year: Not on file    Unstable Housing in the Last Year: Not on file     No Known Allergies    Review of Systems   Constitutional: Negative for chills and fever.    HENT: Negative for facial swelling and nosebleeds. Eyes: Negative for photophobia and visual disturbance. Respiratory: Negative for apnea and choking. Cardiovascular: Positive for leg swelling. Negative for chest pain and palpitations. Gastrointestinal: Negative for abdominal distention and blood in stool. Genitourinary: Negative for enuresis, hematuria and vaginal bleeding. Musculoskeletal: Positive for back pain. Negative for gait problem and joint swelling. Skin: Negative for rash. Neurological: Negative for syncope and speech difficulty. Hematological: Does not bruise/bleed easily. Psychiatric/Behavioral: Negative for hallucinations and suicidal ideas. Vitals:    05/02/22 1446   BP: 124/68   Site: Left Upper Arm   Cuff Size: Medium Adult   Pulse: 52   Temp: 97.6 °F (36.4 °C)   SpO2: 98%   Weight: 93 lb (42.2 kg)   Height: 5' 1\" (1.549 m)       Physical Exam  Constitutional:       Appearance: She is well-developed. HENT:      Head: Normocephalic and atraumatic. Eyes:      Pupils: Pupils are equal, round, and reactive to light. Cardiovascular:      Rate and Rhythm: Normal rate and regular rhythm. Heart sounds: Normal heart sounds. Pulmonary:      Effort: No respiratory distress. Breath sounds: Normal breath sounds. No wheezing. Abdominal:      General: There is no distension. Musculoskeletal:         General: Normal range of motion. Cervical back: Normal range of motion. Skin:         Neurological:      Mental Status: She is alert and oriented to person, place, and time. Psychiatric:         Mood and Affect: Mood normal.      support stocking knee high  Assessment/Plan  Celine Blackmon was seen today for 3 month follow-up, lower back pain, medication refill and foot problem.     Diagnoses and all orders for this visit:    Varicose veins of left lower extremity with pain  -     Aide Lamb MD, Interventional Radiology, [place]    Leg pain, left  -     US DUP LOWER EXTREMITY LEFT ARTERIES; Future  -     US DUP LOWER EXTREMITY LEFT THOMAS; Future    Edema of left lower leg  -     triamterene-hydroCHLOROthiazide (MAXZIDE-25) 37.5-25 MG per tablet; Take 1 tablet by mouth daily    Iliac artery stenosis, left (HCC)  -     US DUP LOWER EXTREMITY LEFT ARTERIES; Future    Low back pain radiating to right leg  -     traMADol (ULTRAM) 50 MG tablet; Take 1 tablet by mouth every 8 hours as needed for Pain for up to 90 days. -     cyclobenzaprine (FLEXERIL) 10 MG tablet; Take 1 tablet by mouth 2 times daily as needed for Muscle spasms    Need for pneumococcal vaccination  -     PNEUMOVAX 23 subcutaneous/IM (Pneumococcal polysaccharide vaccine 23-valent >= 3yo)    Preventative health care        No follow-ups on file.     Carol Rasmussen MD

## 2022-05-15 ASSESSMENT — ENCOUNTER SYMPTOMS: BACK PAIN: 1

## 2022-05-16 ENCOUNTER — HOSPITAL ENCOUNTER (OUTPATIENT)
Dept: ULTRASOUND IMAGING | Age: 72
Discharge: HOME | End: 2022-05-18
Payer: MEDICARE

## 2022-05-16 ENCOUNTER — CLINICAL DOCUMENTATION (OUTPATIENT)
Dept: SPIRITUAL SERVICES | Age: 72
End: 2022-05-16

## 2022-05-16 DIAGNOSIS — M79.605 LEG PAIN, LEFT: ICD-10-CM

## 2022-05-16 DIAGNOSIS — I77.1 ILIAC ARTERY STENOSIS, LEFT (HCC): ICD-10-CM

## 2022-05-16 PROCEDURE — 93971 EXTREMITY STUDY: CPT

## 2022-05-16 PROCEDURE — 93926 LOWER EXTREMITY STUDY: CPT

## 2022-05-16 NOTE — ACP (ADVANCE CARE PLANNING)
Advance Care Planning    Ambulatory ACP Specialist Patient Outreach    Date:  5/16/2022  ACP Specialist:  Nida John    Outreach call to patient in follow-up to ACP Specialist referral from: Nacho Ramires MD    [x] PCP  [] Provider   [] Ambulatory Care Management [] Other for Reason:        [] Early ACP Decision-Making   [x] Advance Directive Assistance   [] Discuss Goals of Care   [] Code Status Discussion   [] Completion of Portable DNR order   [] Complete POST/MOST   [] Other (Specify)    Date Referral Received: 5/15/2022    Today's Outreach:  [x] First   [] Second  [] Third                               First outreach made by [x]  phone  [] email [x]   [place]     Intervention:  [] Spoke with Patient  [x] Left VM requesting return call      Outcome:  Left detailed VM offering appointment with ACP Specialist for assistance with advance care planning. No Email on file. [employer]. Will reach out again in one week if return call not received. Next Step:   [] ACP scheduled conversation  [x] Outreach again in one week               [] Email / Mail ACP Info Sheets  [] Email / Mail Advance Directive            [] Close Referral. Routing closure to referring provider/staff and to ACP Specialist .      Thank you for this referral.

## 2022-05-23 ENCOUNTER — CLINICAL DOCUMENTATION (OUTPATIENT)
Dept: SPIRITUAL SERVICES | Age: 72
End: 2022-05-23

## 2022-05-23 ENCOUNTER — INITIAL CONSULT (OUTPATIENT)
Dept: INTERVENTIONAL RADIOLOGY/VASCULAR | Age: 72
End: 2022-05-23
Payer: MEDICARE

## 2022-05-23 VITALS
DIASTOLIC BLOOD PRESSURE: 90 MMHG | SYSTOLIC BLOOD PRESSURE: 139 MMHG | BODY MASS INDEX: 17.57 KG/M2 | HEART RATE: 73 BPM | WEIGHT: 93 LBS

## 2022-05-23 DIAGNOSIS — R29.898 LEG FATIGUE: ICD-10-CM

## 2022-05-23 DIAGNOSIS — I83.813 PAIN DUE TO VARICOSE VEINS OF BOTH LOWER EXTREMITIES: ICD-10-CM

## 2022-05-23 DIAGNOSIS — I73.9 PERIPHERAL VASCULAR DISEASE OF LOWER EXTREMITY (HCC): Primary | ICD-10-CM

## 2022-05-23 DIAGNOSIS — R29.898 HEAVY SENSATION OF LOWER EXTREMITY: ICD-10-CM

## 2022-05-23 DIAGNOSIS — M79.89 PAIN AND SWELLING OF LOWER LEG, UNSPECIFIED LATERALITY: ICD-10-CM

## 2022-05-23 DIAGNOSIS — M79.669 PAIN AND SWELLING OF LOWER LEG, UNSPECIFIED LATERALITY: ICD-10-CM

## 2022-05-23 DIAGNOSIS — I83.892 BLEEDING FROM VARICOSE VEINS OF LEFT LOWER EXTREMITY: ICD-10-CM

## 2022-05-23 PROCEDURE — 99204 OFFICE O/P NEW MOD 45 MIN: CPT | Performed by: NURSE PRACTITIONER

## 2022-05-23 ASSESSMENT — ENCOUNTER SYMPTOMS
EYES NEGATIVE: 1
SORE THROAT: 0
SHORTNESS OF BREATH: 0
WHEEZING: 0
NAUSEA: 0
COLOR CHANGE: 0
ABDOMINAL PAIN: 0
COUGH: 0
RESPIRATORY NEGATIVE: 1
TROUBLE SWALLOWING: 0
DIARRHEA: 0
BACK PAIN: 1
VOMITING: 0
GASTROINTESTINAL NEGATIVE: 1

## 2022-05-23 NOTE — ACP (ADVANCE CARE PLANNING)
Advance Care Planning    Ambulatory ACP Specialist Patient Outreach    Date:  5/23/2022  ACP Specialist:  Mini Peacock    Outreach call to patient in follow-up to ACP Specialist referral from: Carol Rasmussen MD    [x] PCP  [] Provider   [] Ambulatory Care Management [] Other for Reason:        [] Early ACP Decision-Making   [x] Advance Directive Assistance   [] Discuss Goals of Care   [] Code Status Discussion   [] Completion of Portable DNR order   [] Complete POST/MOST   [] Other (Specify)    Date Referral Received:  5/20/2022    Today's Outreach:  [] First   [x] Second  [] Third                               Third outreach made by []  phone  [] email []   [employer]     Intervention:  [] Spoke with Patient  [] Left VM requesting return call      Outcome:  Second outreach attempt to offer conversation with ACP Specialist for Advance Care Planning assistance unsuccessful. Left VM requesting return call. No email on file. Next Step:   [] ACP scheduled conversation  [x] Outreach again in one week               [] Email / Mail ACP Info Sheets  [] Email / Mail Advance Directive            [] Close Referral. Routing closure to referring provider/staff and to ACP Specialist .      Thank you for this referral.

## 2022-05-23 NOTE — PROGRESS NOTES
Vascular Medicine and Interventional Radiology:    Chela Cruz, a female of 73 y.o. came to the office 5/23/2022. Chief Complaint   Patient presents with    Consultation     varicose veins        5/23/2022 HPI: Chela Cruz referred by PCP for evaluation of Painful LE varicose veins. Patient presents with symptoms of: painful varicose veins left lower leg. Both legs with chronic aching, with fatigue and heaviness sensation daily and worse by end of each day and symptoms worse with prolonged standing. VVs to left lower leg will break open and bleed. Occasional left ankle edema, not daily. LLE arterial US duplex 5/2022 ordered by PCP negative for any significant flow limiting stenosis. LLE venous US duplex 5/20/2022 ordered by PCP negative for DVT  Reports have lower back pain with bilateral radiculopathy. NSAIDS:  Tolerates pain, no meds. Leg elevation:  Daily with moderate relief. Stocking use and dates: Has not done conservative therapy with daily consistent wearing of class two compression stockings for at least 6 weeks. Denies claudication. Denies chest pain. Denies dyspnea.      Family History   Problem Relation Age of Onset    Heart Disease Mother     High Blood Pressure Mother     Kidney Disease Mother     Cancer Father         bone, prostate, skin    Heart Disease Brother     Cancer Sister         kidney, lung       Past Surgical History:   Procedure Laterality Date    CHOLECYSTECTOMY      HYSTERECTOMY      KNEE ARTHROSCOPY Right     OVARY REMOVAL      TONSILLECTOMY          Past Medical History:   Diagnosis Date    Arthritis     seen dr Quyen Wiseman    Chronic back pain     seen dr Cody Russell in past    Depression     Hypothyroidism     Osteoarthritis     Osteoporosis     Restless legs syndrome     Tachycardia        Social History     Socioeconomic History    Marital status:      Spouse name: Not on file    Number of children: Not on file    Years of education: Not on file    Highest education level: Not on file   Occupational History    Not on file   Tobacco Use    Smoking status: Never Smoker    Smokeless tobacco: Never Used   Substance and Sexual Activity    Alcohol use: Yes     Alcohol/week: 0.0 standard drinks     Comment: social    Drug use: No    Sexual activity: Not on file   Other Topics Concern    Not on file   Social History Narrative    Not on file     Social Determinants of Health     Financial Resource Strain: Low Risk     Difficulty of Paying Living Expenses: Not hard at all   Food Insecurity: No Food Insecurity    Worried About Running Out of Food in the Last Year: Never true    Cris of Food in the Last Year: Never true   Transportation Needs: No Transportation Needs    Lack of Transportation (Medical): No    Lack of Transportation (Non-Medical):  No   Physical Activity:     Days of Exercise per Week: Not on file    Minutes of Exercise per Session: Not on file   Stress:     Feeling of Stress : Not on file   Social Connections:     Frequency of Communication with Friends and Family: Not on file    Frequency of Social Gatherings with Friends and Family: Not on file    Attends Church Services: Not on file    Active Member of [address] [place] [address] or Organizations: Not on file    Attends Club or Organization Meetings: Not on file    Marital Status: Not on file   Intimate Partner Violence:     Fear of Current or Ex-Partner: Not on file    Emotionally Abused: Not on file    Physically Abused: Not on file    Sexually Abused: Not on file   Housing Stability:     Unable to Pay for Housing in the Last Year: Not on file    Number of [place] in the Last Year: Not on file    Unstable Housing in the Last Year: Not on file       No Known Allergies    Current Outpatient Medications on File Prior to Visit   Medication Sig Dispense Refill    triamterene-hydroCHLOROthiazide (MAXZIDE-25) 37.5-25 MG per tablet Take 1 tablet by mouth daily 30 tablet 5    traMADol (ULTRAM) 50 MG tablet Take 1 tablet by mouth every 8 hours as needed for Pain for up to 90 days. 90 tablet 2    cyclobenzaprine (FLEXERIL) 10 MG tablet Take 1 tablet by mouth 2 times daily as needed for Muscle spasms 60 tablet 2    sertraline (ZOLOFT) 50 MG tablet TAKE 1 TABLET DAILY po 90 tablet 1    carbidopa-levodopa (SINEMET)  MG per tablet Half qid 180 tablet 3    levothyroxine (SYNTHROID) 50 MCG tablet TAKE 1 TABLET ONCE DAILY 90 tablet 2    ondansetron (ZOFRAN) 4 MG tablet Take 1 tablet by mouth 3 times daily as needed for Nausea or Vomiting 90 tablet 2    Cholecalciferol (VITAMIN D3) 1.25 MG (28114 UT) CAPS TAKE 1 CAPSULE BY MOUTH ONCE A WEEK 12 capsule 1    colchicine (MITIGARE) 0.6 MG capsule       metoprolol succinate (TOPROL XL) 25 MG extended release tablet TAKE 1 TABLET DAILY 90 tablet 2    hydroxychloroquine (PLAQUENIL) 200 MG tablet TAKE 1 TABLET BY MOUTH TWICE DAILY (Patient taking differently: Take 200 mg by mouth daily ) 60 tablet 2    predniSONE (DELTASONE) 5 MG tablet TAKE 1 TABLET DAILY 90 tablet 1     No current facility-administered medications on file prior to visit. Review of Systems   Constitutional: Negative. Negative for chills, fatigue and fever. HENT: Negative. Negative for congestion, ear pain, sore throat and trouble swallowing. Eyes: Negative. Negative for visual disturbance. Respiratory: Negative. Negative for cough, shortness of breath and wheezing. Cardiovascular: Positive for leg swelling (mild chronic left ankle). Negative for chest pain and palpitations. Painful varicose veins both legs   Gastrointestinal: Negative. Negative for abdominal pain, diarrhea, nausea and vomiting. Endocrine: Negative. Genitourinary: Negative. Negative for difficulty urinating, dysuria and hematuria.    Musculoskeletal: Positive for arthralgias (hips), back pain (chronic lower back radiates down both legs) and gait problem (uses cane). Negative for neck pain and neck stiffness. Chronic BLE aching, fatigue and heaviness sensation   Skin: Negative. Negative for color change, rash and wound. Neurological: Negative for dizziness, weakness, light-headedness, numbness and headaches. Hematological: Negative. Does not bruise/bleed easily. Psychiatric/Behavioral: Negative. The patient is not nervous/anxious. All other systems reviewed and are negative. OBJECTIVE:  BP (!) 139/90   Pulse 73   Wt 93 lb (42.2 kg)   BMI 17.57 kg/m²     Physical Exam  Constitutional:       General: She is not in acute distress. Appearance: Normal appearance. She is well-developed. She is not ill-appearing. HENT:      Head: Normocephalic and atraumatic. Nose: Nose normal. No congestion. Neck:      Thyroid: No thyromegaly. Vascular: No JVD. Trachea: No tracheal deviation. Cardiovascular:      Rate and Rhythm: Normal rate and regular rhythm. Pulses: Normal pulses. Dorsalis pedis pulses are 2+ on the right side and 2+ on the left side. Posterior tibial pulses are 2+ on the right side and 2+ on the left side. Heart sounds: Normal heart sounds. No murmur heard. Pulmonary:      Effort: Pulmonary effort is normal.   Abdominal:      General: Bowel sounds are normal. There is no distension. Palpations: Abdomen is soft. Musculoskeletal:         General: Tenderness (with palpation to LE VV's) present. No signs of injury. Normal range of motion. Cervical back: Normal range of motion. Right lower leg: No edema. Left lower leg: No edema. Skin:     General: Skin is warm and dry. Capillary Refill: Capillary refill takes 2 to 3 seconds. Findings: No bruising, erythema or lesion. Neurological:      Mental Status: She is alert and oriented to person, place, and time.    Psychiatric:         Mood and Affect: Mood normal.         Behavior: Behavior normal. ASSESSMENT AND PLAN:  Chart review as noted  Medications evaluated for any [address]  Above labs reviewed. Diagnosis Orders   1. Peripheral vascular disease of lower extremity (HCC)  US NON-INV EXTREMITY VEINS BILAT COMP    US DUP LOWER EXTREMITY RIGHT THOMAS   2. Pain due to varicose veins of both lower extremities  US NON-INV EXTREMITY VEINS BILAT COMP    US DUP LOWER EXTREMITY RIGHT THOMAS   3. Bleeding from varicose veins of left lower extremity  US NON-INV EXTREMITY VEINS BILAT COMP    US DUP LOWER EXTREMITY RIGHT THOMAS   4. Pain and swelling of lower leg, unspecified laterality  US NON-INV EXTREMITY VEINS BILAT COMP    US DUP LOWER EXTREMITY RIGHT THOMAS   5. Leg fatigue  US NON-INV EXTREMITY VEINS BILAT COMP    US DUP LOWER EXTREMITY RIGHT THOMAS   6. Heavy sensation of lower extremity  US NON-INV EXTREMITY VEINS BILAT COMP    US DUP LOWER EXTREMITY RIGHT THOMAS          Plan:     Orders Placed This Encounter   Procedures    US NON-INV EXTREMITY VEINS BILAT COMP     This procedure can be scheduled via [facility]. Access your [email or web address] account by visiting [email or web address]. Standing Status:   Future     Standing Expiration Date:   5/23/2023    US DUP LOWER EXTREMITY RIGHT THOMAS     Standing Status:   Future     Standing Expiration Date:   5/23/2023      No orders of the defined types were placed in this encounter. --  RLE Venous US duplex and BLE venous insufficiency with office return for results. Educated in detail disease process of venous insufficiency, purpose of ultrasound, and purpose of compression stockings. Will evaluate need for class II compression to legs once US scan and results done. --  Elevate PRN LE's when sitting and/or lying for management of LE edema. --  Follow up with general practitioner for other medical concerns and management. Thank You Dr. Triny Asif for referral of your patient to our practice for care. Total time spent for this encounter: 32 minutes.     Haven Carr Katie, ZACK - CNP

## 2022-05-31 ENCOUNTER — CLINICAL DOCUMENTATION (OUTPATIENT)
Dept: SPIRITUAL SERVICES | Age: 72
End: 2022-05-31

## 2022-05-31 NOTE — ACP (ADVANCE CARE PLANNING)
Advance Care Planning    Ambulatory ACP Specialist Patient Outreach    Date:  5/31/2022  ACP Specialist:  Dimple Hernandez    Outreach call to patient in follow-up to ACP Specialist referral from: aSra Kruger MD    [x] PCP  [] Provider   [] Ambulatory Care Management [] Other for Reason:        [] Early ACP Decision-Making   [x] Advance Directive Assistance   [] Discuss Goals of Care   [] Code Status Discussion   [] Completion of Portable DNR order   [] Complete POST/MOST   [] Other (Specify)    Date Referral Received: 5/15/2022    Today's Outreach:  [] First   [] Second  [x] Third                               Third outreach made by []  phone  [] email []  [address] [x]  USPS Mail     Intervention:  [] Spoke with Patient  [] Left VM requesting return call      Outcome:  Outreach attempts to offer patient conversation with an ACP Specialist for [address] [place] [address] assistance unsuccessful. No response received from patient. Referral closure notification sent to patients home address by USPS mail per ACP Program process. Next Step:   [] ACP scheduled conversation  [] Outreach again in one week               [x] Email / Mail ACP Info Sheets  [] Email / Mail Advance Directive            [x] Close Referral. Routing closure to referring provider/staff and to ACP Specialist .      Thank you for this referral.

## 2022-06-03 RX ORDER — METOPROLOL SUCCINATE 25 MG/1
TABLET, EXTENDED RELEASE ORAL
Qty: 90 TABLET | Refills: 2 | OUTPATIENT
Start: 2022-06-03

## 2022-06-03 RX ORDER — METOPROLOL SUCCINATE 25 MG/1
TABLET, EXTENDED RELEASE ORAL
Qty: 90 TABLET | Refills: 2 | Status: SHIPPED | OUTPATIENT
Start: 2022-06-03

## 2022-06-13 ENCOUNTER — COMMUNITY OUTREACH (OUTPATIENT)
Dept: PRIMARY CARE CLINIC | Age: 72
End: 2022-06-13

## 2022-06-13 NOTE — PROGRESS NOTES
Patient's HM shows they are overdue for Colonoscopy . Personal and  files searched without success. Note added to upcoming appointment to discuss Care Gap.

## 2022-06-20 ENCOUNTER — OFFICE VISIT (OUTPATIENT)
Dept: INTERVENTIONAL RADIOLOGY/VASCULAR | Age: 72
End: 2022-06-20
Payer: MEDICARE

## 2022-06-20 VITALS
WEIGHT: 93 LBS | BODY MASS INDEX: 17.57 KG/M2 | SYSTOLIC BLOOD PRESSURE: 135 MMHG | DIASTOLIC BLOOD PRESSURE: 83 MMHG | HEART RATE: 74 BPM

## 2022-06-20 DIAGNOSIS — R29.898 HEAVY SENSATION OF LOWER EXTREMITY: ICD-10-CM

## 2022-06-20 DIAGNOSIS — I73.9 PERIPHERAL VASCULAR DISEASE OF LOWER EXTREMITY (HCC): Primary | ICD-10-CM

## 2022-06-20 DIAGNOSIS — M79.669 PAIN AND SWELLING OF LOWER LEG, UNSPECIFIED LATERALITY: ICD-10-CM

## 2022-06-20 DIAGNOSIS — I83.813 PAIN DUE TO VARICOSE VEINS OF BOTH LOWER EXTREMITIES: ICD-10-CM

## 2022-06-20 DIAGNOSIS — R29.898 LEG FATIGUE: ICD-10-CM

## 2022-06-20 DIAGNOSIS — I87.2 EDEMA OF LEFT LOWER EXTREMITY DUE TO PERIPHERAL VENOUS INSUFFICIENCY: ICD-10-CM

## 2022-06-20 DIAGNOSIS — I83.892 BLEEDING FROM VARICOSE VEINS OF LEFT LOWER EXTREMITY: ICD-10-CM

## 2022-06-20 DIAGNOSIS — M79.89 PAIN AND SWELLING OF LOWER LEG, UNSPECIFIED LATERALITY: ICD-10-CM

## 2022-06-20 PROCEDURE — 99213 OFFICE O/P EST LOW 20 MIN: CPT | Performed by: NURSE PRACTITIONER

## 2022-06-20 PROCEDURE — 1123F ACP DISCUSS/DSCN MKR DOCD: CPT | Performed by: NURSE PRACTITIONER

## 2022-06-20 ASSESSMENT — ENCOUNTER SYMPTOMS
VOMITING: 0
EYES NEGATIVE: 1
ABDOMINAL PAIN: 0
SHORTNESS OF BREATH: 0
BACK PAIN: 1
DIARRHEA: 0
COLOR CHANGE: 0
SORE THROAT: 0
RESPIRATORY NEGATIVE: 1
GASTROINTESTINAL NEGATIVE: 1
NAUSEA: 0
COUGH: 0
TROUBLE SWALLOWING: 0
WHEEZING: 0

## 2022-06-20 NOTE — PROGRESS NOTES
Vascular Medicine and Interventional Radiology:    Shanice Talavera, a female of 73 y.o. came to the office 6/20/2022. Chief Complaint   Patient presents with    Results     us results      PROGRESS NOTE:     SUBJECTIVE:     6/20/2022: Shanice Talavera is here to go over results of BLE US venous insufficiency and US duplex RLE results. RLE negative US duplex and CVI in Left GSV at level of calf. She reports BLE symptoms persist unchanged of painful BLE varicose veins, LLE bleeding VV's, and Both legs with chronic aching, with fatigue and heaviness sensation, and intermittent Left ankle edema, daily and worse by end of each day and symptoms worse with prolonged standing. Has not done conservative therapy with daily consistent wearing of class two compression stockings for at least 6 weeks. Denies claudication. Denies chest pain. Denies dyspnea. 5/23/2022 HPI: Shanice Talavera referred by PCP for evaluation of Painful LE varicose veins. Patient presents with symptoms of: painful varicose veins left lower leg. Both legs with chronic aching, with fatigue and heaviness sensation daily and worse by end of each day and symptoms worse with prolonged standing. VVs to left lower leg will break open and bleed. Occasional left ankle edema, not daily. LLE arterial US duplex 5/2022 ordered by PCP negative for any significant flow limiting stenosis. LLE venous US duplex 5/20/2022 ordered by PCP negative for DVT  Reports have lower back pain with bilateral radiculopathy. NSAIDS:  Tolerates pain, no meds. Leg elevation:  Daily with moderate relief. Stocking use and dates: Has not done conservative therapy with daily consistent wearing of class two compression stockings for at least 6 weeks. Denies claudication. Denies chest pain. Denies dyspnea.      Family History   Problem Relation Age of Onset    Heart Disease Mother     High Blood Pressure Mother     Kidney Disease Mother     Cancer Club or Organization Meetings: Not on file    Marital Status: Not on file   Intimate Partner Violence:     Fear of Current or Ex-Partner: Not on file    Emotionally Abused: Not on file    Physically Abused: Not on file    Sexually Abused: Not on file   Housing Stability:     Unable to Pay for Housing in the Last Year: Not on file    Number of [place] in the Last Year: Not on file    Unstable Housing in the Last Year: Not on file       No Known Allergies    Current Outpatient Medications on File Prior to Visit   Medication Sig Dispense Refill    metoprolol succinate (TOPROL XL) 25 MG extended release tablet TAKE 1 TABLET DAILY 90 tablet 2    triamterene-hydroCHLOROthiazide (MAXZIDE-25) 37.5-25 MG per tablet Take 1 tablet by mouth daily 30 tablet 5    traMADol (ULTRAM) 50 MG tablet Take 1 tablet by mouth every 8 hours as needed for Pain for up to 90 days. 90 tablet 2    cyclobenzaprine (FLEXERIL) 10 MG tablet Take 1 tablet by mouth 2 times daily as needed for Muscle spasms 60 tablet 2    sertraline (ZOLOFT) 50 MG tablet TAKE 1 TABLET DAILY po 90 tablet 1    carbidopa-levodopa (SINEMET)  MG per tablet Half qid 180 tablet 3    levothyroxine (SYNTHROID) 50 MCG tablet TAKE 1 TABLET ONCE DAILY 90 tablet 2    ondansetron (ZOFRAN) 4 MG tablet Take 1 tablet by mouth 3 times daily as needed for Nausea or Vomiting 90 tablet 2    Cholecalciferol (VITAMIN D3) 1.25 MG (36440 UT) CAPS TAKE 1 CAPSULE BY MOUTH ONCE A WEEK 12 capsule 1    colchicine (MITIGARE) 0.6 MG capsule       hydroxychloroquine (PLAQUENIL) 200 MG tablet TAKE 1 TABLET BY MOUTH TWICE DAILY (Patient taking differently: Take 200 mg by mouth daily ) 60 tablet 2    predniSONE (DELTASONE) 5 MG tablet TAKE 1 TABLET DAILY 90 tablet 1     No current facility-administered medications on file prior to visit. Review of Systems   Constitutional: Negative. Negative for chills, fatigue and fever. HENT: Negative.   Negative for congestion, ear pain, sore throat and trouble swallowing. Eyes: Negative. Negative for visual disturbance. Respiratory: Negative. Negative for cough, shortness of breath and wheezing. Cardiovascular: Positive for leg swelling (mild chronic left ankle). Negative for chest pain and palpitations. Painful varicose veins both legs   Gastrointestinal: Negative. Negative for abdominal pain, diarrhea, nausea and vomiting. Endocrine: Negative. Genitourinary: Negative. Negative for difficulty urinating, dysuria and hematuria. Musculoskeletal: Positive for arthralgias (hips), back pain (chronic lower back radiates down both legs) and gait problem (uses cane). Negative for neck pain and neck stiffness. Chronic BLE aching, fatigue and heaviness sensation   Skin: Negative. Negative for color change, rash and wound. Neurological: Negative for dizziness, weakness, light-headedness, numbness and headaches. Hematological: Negative. Does not bruise/bleed easily. Psychiatric/Behavioral: Negative. The patient is not nervous/anxious. All other systems reviewed and are negative. OBJECTIVE:  /83   Pulse 74   Wt 93 lb (42.2 kg)   BMI 17.57 kg/m²     Physical Exam  Constitutional:       General: She is not in acute distress. Appearance: Normal appearance. She is well-developed. She is not ill-appearing. HENT:      Head: Normocephalic and atraumatic. Nose: Nose normal. No congestion. Neck:      Thyroid: No thyromegaly. Vascular: No JVD. Trachea: No tracheal deviation. Cardiovascular:      Rate and Rhythm: Normal rate and regular rhythm. Pulses: Normal pulses. Dorsalis pedis pulses are 2+ on the right side and 2+ on the left side. Posterior tibial pulses are 2+ on the right side and 2+ on the left side. Heart sounds: Normal heart sounds. No murmur heard.       Pulmonary:      Effort: Pulmonary effort is normal.   Abdominal:      General: Bowel sounds are normal. There is no distension. Palpations: Abdomen is soft. Musculoskeletal:         General: Tenderness (with palpation to LE VV's) present. No signs of injury. Normal range of motion. Cervical back: Normal range of motion. Right lower leg: No edema. Left lower leg: No edema. Skin:     General: Skin is warm and dry. Capillary Refill: Capillary refill takes 2 to 3 seconds. Findings: No bruising, erythema or lesion. Neurological:      Mental Status: She is alert and oriented to person, place, and time. Psychiatric:         Mood and Affect: Mood normal.         Behavior: Behavior normal.                 Impression   1. No signs of deep venous thrombosis in the bilateral lower extremities. 2.There is insufficiency of the left great saphenous vein at the level of the proximal calf and distal calf. Duration is 6 seconds, diameter is 4 mm. 3.Note is made of multiple varicose veins in the bilateral legs.           COMPARISON:No prior studies are available for comparison.  .       DIAGNOSIS: I83.813 Pain due to varicose veins of both lower extremities ICD10       COMMENTS: Pain with varicose veins of both lower extremities. Bleeding from varicose veins with pain, swelling, and leg fatigue and heaviness sensation. ASSESSMENT AND PLAN:  Above BLE US insufficiency and RLE duplex US scans reviewed personally by myself and discussed with patient. Negative RLE US duplex and there is CVI to left GSV at level of proximal and distal calf. Diagnosis Orders   1. Peripheral vascular disease of lower extremity (HCC)  Elastic Bandages & Supports (MEDICAL COMPRESSION STOCKINGS) MISC   2. Pain due to varicose veins of both lower extremities  Elastic Bandages & Supports (MEDICAL COMPRESSION STOCKINGS) MISC   3. Bleeding from varicose veins of left lower extremity  Elastic Bandages & Supports (MEDICAL COMPRESSION STOCKINGS) MISC   4.  Pain and swelling of lower leg, unspecified laterality  Elastic Bandages & Supports (MEDICAL COMPRESSION STOCKINGS) MISC   5. Leg fatigue  Elastic Bandages & Supports (MEDICAL COMPRESSION STOCKINGS) MISC   6. Heavy sensation of lower extremity  Elastic Bandages & Supports (MEDICAL COMPRESSION STOCKINGS) MISC   7. Edema of left lower extremity due to peripheral venous insufficiency  Elastic Bandages & Supports (MEDICAL COMPRESSION STOCKINGS) MISC        --  LLE arterial US duplex 2022 ordered by PCP negative for any significant flow limiting stenosis. --  LLE venous US duplex 2022 ordered by PCP negative for DVT    Plan:     No orders of the defined types were placed in this encounter. Orders Placed This Encounter   Medications    Elastic Bandages & Supports (MEDICAL COMPRESSION STOCKINGS) MISC     Si each by Does not apply route daily Knee high 20-30 mmhg graduated compression stockings both legs wear daily during day and off Qhs. Wear as tolerated. Do not wear if they cause increased pain. Dispense:  1 each     Refill:  5       --  Rx knee high compression 20-30 mmhg take to [facility]. Wear daily during day and off Qhs. Wear for initial treatment of LLE venous insufficiency and BLE painful varicose veins. Wear as tolerated. Do not wear if they cause increased discomfort or lesions and notify vascular clinic. Educated in detail disease process of venous insufficiency and purpose of compression stockings. Wear for 12 week trial and return to evaluate effectiveness and management of LE symptoms. If no relief, will discuss possible need for venous procedures. Patient is responsible for cost of compression socks if not covered by insurance. Discussed in depth with patient treatment for CVI and/or varicose veins is not a 100% guarantee of BLE symptom relief as there can also be other contributing health factors. Patient verbalized understanding.     --  Elevate LE's when sitting and/or lying for management of LE edema.      --  Follow up with general practitioner for other medical concerns and management. Total time spent for this encounter: 25 minutes.       ZACK Ordaz - CNP

## 2022-06-27 DIAGNOSIS — E55.9 VITAMIN D DEFICIENCY: ICD-10-CM

## 2022-06-27 RX ORDER — CHOLECALCIFEROL (VITAMIN D3) 1250 MCG
1 CAPSULE ORAL WEEKLY
Qty: 12 CAPSULE | Refills: 1 | Status: SHIPPED | OUTPATIENT
Start: 2022-06-27

## 2022-07-11 ENCOUNTER — TELEPHONE (OUTPATIENT)
Dept: GASTROENTEROLOGY | Age: 72
End: 2022-07-11

## 2022-08-02 ENCOUNTER — OFFICE VISIT (OUTPATIENT)
Dept: NEUROLOGY | Age: 72
End: 2022-08-02
Payer: MEDICARE

## 2022-08-02 VITALS
BODY MASS INDEX: 17.21 KG/M2 | DIASTOLIC BLOOD PRESSURE: 78 MMHG | WEIGHT: 91.1 LBS | HEART RATE: 94 BPM | SYSTOLIC BLOOD PRESSURE: 130 MMHG

## 2022-08-02 DIAGNOSIS — G96.9 TREMOR DUE TO DISORDER OF CENTRAL NERVOUS SYSTEM: ICD-10-CM

## 2022-08-02 DIAGNOSIS — R26.0 ATAXIC GAIT: ICD-10-CM

## 2022-08-02 DIAGNOSIS — K59.03 DRUG-INDUCED CONSTIPATION: ICD-10-CM

## 2022-08-02 DIAGNOSIS — R25.1 TREMOR DUE TO DISORDER OF CENTRAL NERVOUS SYSTEM: ICD-10-CM

## 2022-08-02 DIAGNOSIS — G20 PARKINSON'S DISEASE (HCC): Primary | ICD-10-CM

## 2022-08-02 DIAGNOSIS — M47.816 SPONDYLOSIS OF LUMBAR REGION WITHOUT MYELOPATHY OR RADICULOPATHY: ICD-10-CM

## 2022-08-02 DIAGNOSIS — R29.2 HYPERREFLEXIA: ICD-10-CM

## 2022-08-02 DIAGNOSIS — M41.9 KYPHOSCOLIOSIS: ICD-10-CM

## 2022-08-02 PROCEDURE — 1123F ACP DISCUSS/DSCN MKR DOCD: CPT | Performed by: PSYCHIATRY & NEUROLOGY

## 2022-08-02 PROCEDURE — 99214 OFFICE O/P EST MOD 30 MIN: CPT | Performed by: PSYCHIATRY & NEUROLOGY

## 2022-08-02 RX ORDER — LACTULOSE 10 G/15ML
10 SOLUTION ORAL EVERY EVENING
Qty: 500 ML | Refills: 3 | Status: SHIPPED | OUTPATIENT
Start: 2022-08-02

## 2022-08-02 ASSESSMENT — ENCOUNTER SYMPTOMS
PHOTOPHOBIA: 0
VOMITING: 0
CHOKING: 0
SHORTNESS OF BREATH: 0
NAUSEA: 0
TROUBLE SWALLOWING: 0
COLOR CHANGE: 0
BACK PAIN: 1

## 2022-08-02 NOTE — PROGRESS NOTES
Subjective:      Patient ID: Jada White is a 73 y.o. female who presents today for:  Chief Complaint   Patient presents with    Follow-up     Pt states that she is doing okay. She states that she is getting a little more shaky and her balance is a little more off. No recent falls. She states that if she is a hurry she really notices the tremor increases and if she is tired. PT states that she is also having some constipation problems. HPI 73 right-handed female with history of Parkinson's disease. Patient is on carbidopa levodopa. Patient also has kyphoscoliosis with cervicalgia and hyperreflexia. Patient is now on 3 tablets of carbidopa-levodopa and feeling somewhat better. Her major issues appears to be her kyphoscoliosis which does limit her walking. When last stimulated she was taking carbidopa-levodopa, 1 tablet 4 times a day. She has not any hallucinations or any side effects. Getting somewhat shaky and loss of balance and she notices a tremor when she is tired  Patient's main issues appears to be constipation she has tried several things none of them are worked up. She does get shaky at times but has not any falls. She has occasional vivid dreaming.     Past Medical History:   Diagnosis Date    Arthritis     seen dr Beth Dunaway    Chronic back pain     seen dr Paras Mckeon in past    Depression     Hypothyroidism     Osteoarthritis     Osteoporosis     Restless legs syndrome     Tachycardia      Past Surgical History:   Procedure Laterality Date    CHOLECYSTECTOMY      HYSTERECTOMY      KNEE ARTHROSCOPY Right     OOPHORECTOMY      TONSILLECTOMY       Social History     Socioeconomic History    Marital status:      Spouse name: Not on file    Number of children: Not on file    Years of education: Not on file    Highest education level: Not on file   Occupational History    Not on file   Tobacco Use    Smoking status: Never    Smokeless tobacco: Never   Substance and Sexual Activity    Alcohol use: Yes     Alcohol/week: 0.0 standard drinks     Comment: social    Drug use: No    Sexual activity: Not on file   Other Topics Concern    Not on file   Social History Narrative    Not on file     Social Determinants of Health     Financial Resource Strain: Not on file   Food Insecurity: Not on file   Transportation Needs: Not on file   Physical Activity: Not on file   Stress: Not on file   Social Connections: Not on file   Intimate Partner Violence: Not on file   Housing Stability: Not on file     Family History   Problem Relation Age of Onset    Heart Disease Mother     High Blood Pressure Mother     Kidney Disease Mother     Cancer Father         bone, prostate, skin    Heart Disease Brother     Cancer Sister         kidney, lung     No Known Allergies    Current Outpatient Medications   Medication Sig Dispense Refill    Cholecalciferol (VITAMIN D3) 1.25 MG (02621 UT) CAPS TAKE 1 CAPSULE BY MOUTH ONCE A WEEK 12 capsule 1    Elastic Bandages & Supports (MEDICAL COMPRESSION STOCKINGS) MISC 1 each by Does not apply route daily Knee high 20-30 mmhg graduated compression stockings both legs wear daily during day and off Qhs. Wear as tolerated. Do not wear if they cause increased pain.  1 each 5    metoprolol succinate (TOPROL XL) 25 MG extended release tablet TAKE 1 TABLET DAILY 90 tablet 2    triamterene-hydroCHLOROthiazide (MAXZIDE-25) 37.5-25 MG per tablet Take 1 tablet by mouth daily 30 tablet 5    cyclobenzaprine (FLEXERIL) 10 MG tablet Take 1 tablet by mouth 2 times daily as needed for Muscle spasms 60 tablet 2    sertraline (ZOLOFT) 50 MG tablet TAKE 1 TABLET DAILY po 90 tablet 1    carbidopa-levodopa (SINEMET)  MG per tablet Half qid 180 tablet 3    levothyroxine (SYNTHROID) 50 MCG tablet TAKE 1 TABLET ONCE DAILY 90 tablet 2    ondansetron (ZOFRAN) 4 MG tablet Take 1 tablet by mouth 3 times daily as needed for Nausea or Vomiting 90 tablet 2    colchicine (MITIGARE) 0.6 MG capsule Take 0.6 mg by mouth in the morning. hydroxychloroquine (PLAQUENIL) 200 MG tablet TAKE 1 TABLET BY MOUTH TWICE DAILY (Patient taking differently: Take 200 mg by mouth in the morning.) 60 tablet 2    predniSONE (DELTASONE) 5 MG tablet TAKE 1 TABLET DAILY 90 tablet 1     No current facility-administered medications for this visit. Review of Systems   Constitutional:  Negative for fever. HENT:  Negative for ear pain, tinnitus and trouble swallowing. Eyes:  Negative for photophobia and visual disturbance. Respiratory:  Negative for choking and shortness of breath. Cardiovascular:  Negative for chest pain and palpitations. Gastrointestinal:  Negative for nausea and vomiting. Musculoskeletal:  Positive for back pain, gait problem and myalgias. Negative for joint swelling, neck pain and neck stiffness. Skin:  Negative for color change. Allergic/Immunologic: Negative for food allergies. Neurological:  Positive for tremors and weakness. Negative for dizziness, seizures, syncope, facial asymmetry, speech difficulty, light-headedness, numbness and headaches. Psychiatric/Behavioral:  Negative for behavioral problems, confusion, hallucinations and sleep disturbance. Objective:   /78 (Site: Right Upper Arm, Position: Sitting, Cuff Size: Medium Adult)   Pulse 94   Wt 91 lb 1.6 oz (41.3 kg)   BMI 17.21 kg/m²     Physical Exam  Vitals reviewed. Eyes:      Pupils: Pupils are equal, round, and reactive to light. Cardiovascular:      Rate and Rhythm: Normal rate and regular rhythm. Heart sounds: No murmur heard. Pulmonary:      Effort: Pulmonary effort is normal.      Breath sounds: Normal breath sounds. Abdominal:      General: Bowel sounds are normal.   Musculoskeletal:         General: Normal range of motion. Cervical back: Normal range of motion. Skin:     General: Skin is warm. Neurological:      Mental Status: She is alert and oriented to person, place, and time.       Cranial Nerves: No cranial nerve deficit. Sensory: No sensory deficit. Motor: No abnormal muscle tone. Coordination: Coordination normal.      Deep Tendon Reflexes: Reflexes are normal and symmetric. Babinski sign absent on the right side. Babinski sign absent on the left side. Comments: Patient is very thinly built with a significant kyphoscoliosis with tilting to the right. She is not myelopathic though. She walks slowly with a cane with some minor shuffle. Psychiatric:         Mood and Affect: Mood normal.       US DUP LOWER EXTREMITY LEFT ARTERIES    Result Date: 5/16/2022  US DUP LOWER EXTREMITY LEFT ARTERIES: 5/16/2022 2:03 PM CLINICAL HISTORY: M79.605 Leg pain, left ICD10. ? Iliac artery stenosis. COMPARISON: None available. Grayscale, color and waveform fourth Doppler analysis of both lower extremity arterial systems was performed. FINDINGS: No significant atherosclerotic plaque is present, with pulsatile waveforms. Doppler tracings are biphasic in the femoral artery and the runoff vessels. Doppler tracings are triphasic in the common femoral and popliteal arteries. There are no significantly elevated velocities to suggest a flow-limiting stenosis, or arterial occlusion identified. No dampening of arterial waveforms within the common femoral artery distally. The arterial system is normal in caliber, without evidence of aneurysm or dissection. Maximum systolic velocities are: LEFT LOWER EXTREMITY: Left common femoral artery 96 cm/s. Left proximal superficial femoral artery 143 cm/s. Left mid superficial femoral artery 105 cm/s. Left distal superficial femoral artery 96 cm/s. Left popliteal artery 72 cm/s. Left proximal anterior tibial artery 92 cm/s. Left mid anterior tibial artery 82  cm/s. Left distal anterior tibial artery 73 cm/s. Left proximal posterior tibial artery 62 cm/s. Left mid posterior tibial artery 72  cm/s. Left distal posterior tibial artery 75  cm/s.  Left proximal peroneal artery 61 cm/s. Left mid peroneal artery 60 cm/s. Left distal peroneal artery 47 cm/s. NO EVIDENCE OF A FLOW-LIMITING STENOSIS, ARTERIAL OCCLUSION, OR ANEURYSM IDENTIFIED. NO SIGNIFICANT ATHEROSCLEROTIC VISUALIZED IN LEFT LOWER EXTREMITY. NO FINDINGS SUGGESTIVE OF LEFT ILIAC ARTERY STENOSIS. STENOSIS CRITERIA % Stenosis                        Peak Camilo Normal                                <150 cm/s 30%-49%                            150-200 cm/s 50%-75%                            200-400 cm/s >75%                                  >400 cm/s Occlusion                              No color saturation ____________________________________________ Vandana Patricia DV, Victor M JE, et al. Comparison of contrast angiography to arterial mapping with color flow duplex imaging in the lower extremities. J Vasc Surg 1989;10:522-32     US DUP LOWER EXTREMITY LEFT THOMAS    Result Date: 5/16/2022  EXAMINATION:  VENOUS DOPPLER LEFT LEG. CLINICAL HISTORY:  LEG PAIN. COMPARISONS:  NONE AVAILABLE TECHNIQUE:  Doppler and planar images were obtained. FINDINGS:  Doppler scans demonstrates flow throughout the deep venous system of the left leg. The right. A B normal impression and augmentation. No soft tissue abnormality is demonstrated. VENOUS DOPPLER STUDY OF THE LEFT LEG APPEARS NORMAL.        Lab Results   Component Value Date/Time    WBC 4.0 06/22/2021 01:14 PM    RBC 4.70 06/22/2021 01:14 PM    HGB 15.3 06/22/2021 01:14 PM    HCT 45.8 06/22/2021 01:14 PM    MCV 97.5 06/22/2021 01:14 PM    MCH 32.5 06/22/2021 01:14 PM    MCHC 33.4 06/22/2021 01:14 PM    RDW 13.3 06/22/2021 01:14 PM     06/22/2021 01:14 PM    MPV 8.4 08/14/2013 08:26 AM     Lab Results   Component Value Date/Time     06/22/2021 01:14 PM    K 3.2 06/22/2021 01:14 PM     06/22/2021 01:14 PM    CO2 27 06/22/2021 01:14 PM    BUN 13 06/22/2021 01:14 PM    CREATININE 0.74 06/22/2021 01:14 PM    GFRAA >60.0 06/22/2021 01:14 PM LABGLOM >60.0 06/22/2021 01:14 PM    GLUCOSE 52 06/22/2021 01:14 PM    PROT 6.4 06/22/2021 01:14 PM    LABALBU 4.4 06/22/2021 01:14 PM    CALCIUM 9.7 06/22/2021 01:14 PM    BILITOT 0.4 06/22/2021 01:14 PM    ALKPHOS 74 06/22/2021 01:14 PM    AST 25 06/22/2021 01:14 PM    ALT <5 06/22/2021 01:14 PM     No results found for: PROTIME, INR  Lab Results   Component Value Date/Time    TSH 0.632 06/30/2020 08:35 AM    UCYSUIAR23 294 06/22/2021 01:14 PM    FOLATE >20.0 04/04/2018 12:21 PM     Lab Results   Component Value Date/Time    TRIG 79 06/22/2021 01:14 PM     06/22/2021 01:14 PM    LDLCALC 55 06/22/2021 01:14 PM     Lab Results   Component Value Date/Time    LABAMPH Neg 10/28/2021 01:51 PM    BARBSCNU Neg 10/28/2021 01:51 PM    LABBENZ Neg 10/28/2021 01:51 PM    LABMETH Neg 10/28/2021 01:51 PM    OPIATESCREENURINE Neg 10/28/2021 01:51 PM    PHENCYCLIDINESCREENURINE Neg 10/28/2021 01:51 PM     No results found for: LITHIUM, DILFRTOT, VALPROATE    Assessment:       Diagnosis Orders   1. Parkinson's disease ([facility] [address]        2. Spondylosis of lumbar region without myelopathy or radiculopathy        3. Ataxic gait        4. 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        5. Hyperreflexia        6. Kyphoscoliosis        7. Drug-induced constipation        Parkinson's disease with significant other comorbidities including severe kyphoscoliosis and cervicalgia. Patient is responsive to carbidopa levodopa half a tablet 4 times a day. She does get shaky at times which she is tired and I recommended that she may add half a tablet in between here and there as she has not developed any side effects of hallucinations dyskinesias falls injuries trauma or cognitive issues. She occasionally has vivid dreaming. She is not any falls. She walks with a cane. Her main issues appears to be constipation which is a part of Parkinson's disease and carbidopa levodopa itself. She tried over-the-counter Multiple medications.   I recommended we try her on lactulose 10 g to see if this helps if not we can always increase this. We will keep her on the same medication for now and review her in 4 months to titrate her medication further      Plan:      No orders of the defined types were placed in this encounter. No orders of the defined types were placed in this encounter. No follow-ups on file.       Haider Boykin MD

## 2022-08-09 ENCOUNTER — OFFICE VISIT (OUTPATIENT)
Dept: PRIMARY CARE CLINIC | Age: 72
End: 2022-08-09
Payer: MEDICARE

## 2022-08-09 VITALS
OXYGEN SATURATION: 100 % | HEART RATE: 92 BPM | BODY MASS INDEX: 17.22 KG/M2 | HEIGHT: 61 IN | WEIGHT: 91.2 LBS | DIASTOLIC BLOOD PRESSURE: 66 MMHG | TEMPERATURE: 97.8 F | SYSTOLIC BLOOD PRESSURE: 120 MMHG

## 2022-08-09 DIAGNOSIS — M54.50 LOW BACK PAIN RADIATING TO RIGHT LEG: ICD-10-CM

## 2022-08-09 DIAGNOSIS — E03.9 HYPOTHYROIDISM, UNSPECIFIED TYPE: ICD-10-CM

## 2022-08-09 DIAGNOSIS — R73.9 HYPERGLYCEMIA: ICD-10-CM

## 2022-08-09 DIAGNOSIS — E78.5 HYPERLIPIDEMIA, UNSPECIFIED HYPERLIPIDEMIA TYPE: ICD-10-CM

## 2022-08-09 DIAGNOSIS — M79.604 LOW BACK PAIN RADIATING TO RIGHT LEG: ICD-10-CM

## 2022-08-09 DIAGNOSIS — Z00.00 PREVENTATIVE HEALTH CARE: ICD-10-CM

## 2022-08-09 DIAGNOSIS — F32.A DEPRESSION, UNSPECIFIED DEPRESSION TYPE: Primary | ICD-10-CM

## 2022-08-09 DIAGNOSIS — Z12.11 COLON CANCER SCREENING: ICD-10-CM

## 2022-08-09 PROBLEM — F33.1 MAJOR DEPRESSIVE DISORDER, RECURRENT, MODERATE (HCC): Status: ACTIVE | Noted: 2022-08-09

## 2022-08-09 PROBLEM — F33.9 MAJOR DEPRESSIVE DISORDER, RECURRENT, UNSPECIFIED (HCC): Status: ACTIVE | Noted: 2022-08-09

## 2022-08-09 PROBLEM — F33.0 MAJOR DEPRESSIVE DISORDER, RECURRENT, MILD (HCC): Status: ACTIVE | Noted: 2022-08-09

## 2022-08-09 LAB
ALBUMIN SERPL-MCNC: 4.4 G/DL (ref 3.5–4.6)
ALP BLD-CCNC: 69 U/L (ref 40–130)
ALT SERPL-CCNC: 8 U/L (ref 0–33)
ANION GAP SERPL CALCULATED.3IONS-SCNC: 17 MEQ/L (ref 9–15)
AST SERPL-CCNC: 24 U/L (ref 0–35)
BASOPHILS ABSOLUTE: 0.1 K/UL (ref 0–0.2)
BASOPHILS RELATIVE PERCENT: 0.8 %
BILIRUB SERPL-MCNC: 0.3 MG/DL (ref 0.2–0.7)
BUN BLDV-MCNC: 23 MG/DL (ref 8–23)
CALCIUM SERPL-MCNC: 9.3 MG/DL (ref 8.5–9.9)
CHLORIDE BLD-SCNC: 100 MEQ/L (ref 95–107)
CO2: 24 MEQ/L (ref 20–31)
CREAT SERPL-MCNC: 0.76 MG/DL (ref 0.5–0.9)
EOSINOPHILS ABSOLUTE: 0 K/UL (ref 0–0.7)
EOSINOPHILS RELATIVE PERCENT: 0.6 %
GFR AFRICAN AMERICAN: >60
GFR NON-AFRICAN AMERICAN: >60
GLOBULIN: 2 G/DL (ref 2.3–3.5)
GLUCOSE BLD-MCNC: 119 MG/DL (ref 70–99)
HBA1C MFR BLD: 5 % (ref 4.8–5.9)
HCT VFR BLD CALC: 41.8 % (ref 37–47)
HEMOGLOBIN: 14 G/DL (ref 12–16)
LYMPHOCYTES ABSOLUTE: 0.5 K/UL (ref 1–4.8)
LYMPHOCYTES RELATIVE PERCENT: 6.9 %
MCH RBC QN AUTO: 31.6 PG (ref 27–31.3)
MCHC RBC AUTO-ENTMCNC: 33.5 % (ref 33–37)
MCV RBC AUTO: 94.2 FL (ref 82–100)
MONOCYTES ABSOLUTE: 0.5 K/UL (ref 0.2–0.8)
MONOCYTES RELATIVE PERCENT: 6.9 %
NEUTROPHILS ABSOLUTE: 6.3 K/UL (ref 1.4–6.5)
NEUTROPHILS RELATIVE PERCENT: 84.8 %
PDW BLD-RTO: 12.9 % (ref 11.5–14.5)
PLATELET # BLD: 256 K/UL (ref 130–400)
POTASSIUM SERPL-SCNC: 3.4 MEQ/L (ref 3.4–4.9)
RBC # BLD: 4.44 M/UL (ref 4.2–5.4)
SODIUM BLD-SCNC: 141 MEQ/L (ref 135–144)
TOTAL PROTEIN: 6.4 G/DL (ref 6.3–8)
TSH REFLEX: 1.34 UIU/ML (ref 0.44–3.86)
WBC # BLD: 7.4 K/UL (ref 4.8–10.8)

## 2022-08-09 PROCEDURE — 1123F ACP DISCUSS/DSCN MKR DOCD: CPT | Performed by: INTERNAL MEDICINE

## 2022-08-09 PROCEDURE — 99214 OFFICE O/P EST MOD 30 MIN: CPT | Performed by: INTERNAL MEDICINE

## 2022-08-09 RX ORDER — CYCLOBENZAPRINE HCL 10 MG
10 TABLET ORAL 2 TIMES DAILY PRN
Qty: 60 TABLET | Refills: 2 | Status: SHIPPED | OUTPATIENT
Start: 2022-08-09

## 2022-08-09 RX ORDER — DULOXETIN HYDROCHLORIDE 30 MG/1
30 CAPSULE, DELAYED RELEASE ORAL DAILY
Qty: 30 CAPSULE | Refills: 0 | Status: SHIPPED | OUTPATIENT
Start: 2022-08-09 | End: 2022-09-07 | Stop reason: SDUPTHER

## 2022-08-09 RX ORDER — TRAMADOL HYDROCHLORIDE 50 MG/1
50 TABLET ORAL EVERY 8 HOURS PRN
Qty: 90 TABLET | Refills: 2 | Status: SHIPPED | OUTPATIENT
Start: 2022-08-09 | End: 2022-11-07

## 2022-08-09 RX ORDER — DULOXETIN HYDROCHLORIDE 60 MG/1
60 CAPSULE, DELAYED RELEASE ORAL DAILY
Qty: 30 CAPSULE | Refills: 5 | Status: SHIPPED | OUTPATIENT
Start: 2022-08-09

## 2022-08-09 SDOH — ECONOMIC STABILITY: FOOD INSECURITY: WITHIN THE PAST 12 MONTHS, YOU WORRIED THAT YOUR FOOD WOULD RUN OUT BEFORE YOU GOT MONEY TO BUY MORE.: NEVER TRUE

## 2022-08-09 SDOH — ECONOMIC STABILITY: FOOD INSECURITY: WITHIN THE PAST 12 MONTHS, THE FOOD YOU BOUGHT JUST DIDN'T LAST AND YOU DIDN'T HAVE MONEY TO GET MORE.: NEVER TRUE

## 2022-08-09 ASSESSMENT — PATIENT HEALTH QUESTIONNAIRE - PHQ9
SUM OF ALL RESPONSES TO PHQ QUESTIONS 1-9: 8
SUM OF ALL RESPONSES TO PHQ QUESTIONS 1-9: 8
7. TROUBLE CONCENTRATING ON THINGS, SUCH AS READING THE NEWSPAPER OR WATCHING TELEVISION: 0
SUM OF ALL RESPONSES TO PHQ9 QUESTIONS 1 & 2: 3
SUM OF ALL RESPONSES TO PHQ QUESTIONS 1-9: 8
10. IF YOU CHECKED OFF ANY PROBLEMS, HOW DIFFICULT HAVE THESE PROBLEMS MADE IT FOR YOU TO DO YOUR WORK, TAKE CARE OF THINGS AT HOME, OR GET ALONG WITH OTHER PEOPLE: 0
4. FEELING TIRED OR HAVING LITTLE ENERGY: 3
SUM OF ALL RESPONSES TO PHQ QUESTIONS 1-9: 8
8. MOVING OR SPEAKING SO SLOWLY THAT OTHER PEOPLE COULD HAVE NOTICED. OR THE OPPOSITE, BEING SO FIGETY OR RESTLESS THAT YOU HAVE BEEN MOVING AROUND A LOT MORE THAN USUAL: 0
6. FEELING BAD ABOUT YOURSELF - OR THAT YOU ARE A FAILURE OR HAVE LET YOURSELF OR YOUR FAMILY DOWN: 0
3. TROUBLE FALLING OR STAYING ASLEEP: 2
9. THOUGHTS THAT YOU WOULD BE BETTER OFF DEAD, OR OF HURTING YOURSELF: 0
1. LITTLE INTEREST OR PLEASURE IN DOING THINGS: 0
5. POOR APPETITE OR OVEREATING: 0
2. FEELING DOWN, DEPRESSED OR HOPELESS: 3

## 2022-08-09 ASSESSMENT — ENCOUNTER SYMPTOMS
APNEA: 0
BACK PAIN: 1
ABDOMINAL DISTENTION: 0
BLOOD IN STOOL: 0
CHOKING: 0
FACIAL SWELLING: 0
ABDOMINAL PAIN: 0
PHOTOPHOBIA: 0

## 2022-08-09 ASSESSMENT — SOCIAL DETERMINANTS OF HEALTH (SDOH): HOW HARD IS IT FOR YOU TO PAY FOR THE VERY BASICS LIKE FOOD, HOUSING, MEDICAL CARE, AND HEATING?: NOT HARD AT ALL

## 2022-08-09 NOTE — PROGRESS NOTES
Akshat Marin 73 y.o. female presents today with   Chief Complaint   Patient presents with    3 Month Follow-Up    Lower Back Pain    Depression     Been on the  medication for years, getting depressed at times. Urinary Tract Infection     Sx started x 1 week ago, frequently go with small amounts of urine. Burning sensation. She can't go to the bathroom right now to give us a sample    Medication Refill       Urinary Tract Infection   This is a new problem. The current episode started in the past 7 days. The problem occurs intermittently. The problem has been waxing and waning. The quality of the pain is described as aching. The pain is at a severity of 1/10. The pain is mild. Associated symptoms include urgency. Pertinent negatives include no chills or hematuria. Mental Health Problem  The primary symptoms include dysphoric mood and somatic symptoms. The primary symptoms do not include hallucinations. The current episode started more than 1 month ago. This is a recurrent problem. Somatic symptoms include back pain. Somatic symptoms do not include abdominal pain. The onset of the illness is precipitated by emotional stress and a stressful event. The degree of incapacity that she is experiencing as a consequence of her illness is moderate. Additional symptoms of the illness include anhedonia, insomnia and agitation. Additional symptoms of the illness do not include abdominal pain. She does not admit to suicidal ideas. She has not already injured self. Risk factors that are present for mental illness include a history of mental illness. Back Pain  This is a chronic problem. The current episode started more than 1 year ago. The problem occurs daily. The problem has been waxing and waning since onset. The pain is present in the lumbar spine and gluteal. The quality of the pain is described as aching. The pain radiates to the right foot. The pain is at a severity of 7/10. The pain is severe.  The symptoms are aggravated by twisting, bending, standing and sitting. Associated symptoms include numbness. Pertinent negatives include no abdominal pain, chest pain or fever. Check thyroid. Past Medical History:   Diagnosis Date    Arthritis     seen dr Leonid Mitchell    Chronic back pain     seen dr Carla Sexton in past    Depression     Hypothyroidism     Osteoarthritis     Osteoporosis     Restless legs syndrome     Tachycardia      Patient Active Problem List    Diagnosis Date Noted    Major depressive disorder, recurrent, mild 08/09/2022    Major depressive disorder, recurrent, moderate 08/09/2022    Major depressive disorder, recurrent, unspecified 08/09/2022    Drug-induced constipation 08/02/2022    Dupuytren's contracture 03/02/2022    Localized, primary osteoarthritis of hand 03/02/2022    Parkinson's disease ([facility] [address] 08/02/2021    Kyphoscoliosis 11/24/2020    Ataxic gait 07/21/2020    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 07/21/2020    Cervicalgia 07/21/2020    Hyperreflexia 07/21/2020    Spondylosis of lumbar region without myelopathy or radiculopathy 07/29/2016    Hypothyroid 07/29/2013    Depressive disorder, not elsewhere classified 07/29/2013    Palpitations 07/29/2013    Tachycardia 07/29/2013     Past Surgical History:   Procedure Laterality Date    CHOLECYSTECTOMY      HYSTERECTOMY (CERVIX STATUS UNKNOWN)      KNEE ARTHROSCOPY Right     OVARY REMOVAL      TONSILLECTOMY       Family History   Problem Relation Age of Onset    Heart Disease Mother     High Blood Pressure Mother     Kidney Disease Mother     Cancer Father         bone, prostate, skin    Heart Disease Brother     Cancer Sister         kidney, lung     Social History     Socioeconomic History    Marital status:      Spouse name: None    Number of children: None    Years of education: None    Highest education level: None   Tobacco Use    Smoking status: Never    Smokeless tobacco: Never   Substance and Sexual Activity    Alcohol use:  Yes Alcohol/week: 0.0 standard drinks     Comment: social    Drug use: No     Social Determinants of Health     Financial Resource Strain: Low Risk     Difficulty of Paying Living Expenses: Not hard at all   Food Insecurity: No Food Insecurity    Worried About Running Out of Food in the Last Year: Never true    Ran Out of Food in the Last Year: Never true     No Known Allergies    Review of Systems   Constitutional:  Negative for chills and fever. HENT:  Negative for facial swelling and nosebleeds. Eyes:  Negative for photophobia and visual disturbance. Respiratory:  Negative for apnea and choking. Cardiovascular:  Negative for chest pain and palpitations. Gastrointestinal:  Negative for abdominal distention, abdominal pain and blood in stool. Genitourinary:  Positive for urgency. Negative for enuresis, hematuria and vaginal bleeding. Musculoskeletal:  Positive for arthralgias and back pain. Negative for gait problem and joint swelling. Skin:  Negative for rash. Neurological:  Positive for numbness. Negative for syncope and speech difficulty. Hematological:  Does not bruise/bleed easily. Psychiatric/Behavioral:  Positive for agitation and dysphoric mood. Negative for hallucinations and suicidal ideas. The patient has insomnia. Vitals:    08/09/22 1437   BP: 120/66   Site: Left Upper Arm   Position: Sitting   Cuff Size: Medium Adult   Pulse: 92   Temp: 97.8 °F (36.6 °C)   SpO2: 100%   Weight: 91 lb 3.2 oz (41.4 kg)   Height: 5' 1\" (1.549 m)       Physical Exam  Constitutional:       Appearance: She is well-developed. HENT:      Head: Normocephalic and atraumatic. Eyes:      Pupils: Pupils are equal, round, and reactive to light. Cardiovascular:      Rate and Rhythm: Normal rate and regular rhythm. Heart sounds: Normal heart sounds. Pulmonary:      Effort: No respiratory distress. Breath sounds: Normal breath sounds. No wheezing.    Abdominal:      General: Bowel sounds are normal. There is no distension. Musculoskeletal:      Cervical back: Normal range of motion. Lumbar back: Spasms present. Decreased range of motion. Skin:     Coloration: Skin is not jaundiced. Neurological:      Mental Status: She is alert and oriented to person, place, and time. Cranial Nerves: No cranial nerve deficit. Assessment/Plan  Remington De Souza was seen today for 3 month follow-up, lower back pain, depression, urinary tract infection and medication refill. Diagnoses and all orders for this visit:    Depression, unspecified depression type  -     DULoxetine (CYMBALTA) 30 MG extended release capsule; Take 1 capsule by mouth in the morning.  -     DULoxetine (CYMBALTA) 60 MG extended release capsule; Take 1 capsule by mouth in the morning. Low back pain radiating to right leg  -     cyclobenzaprine (FLEXERIL) 10 MG tablet; Take 1 tablet by mouth 2 times daily as needed for Muscle spasms  -     traMADol (ULTRAM) 50 MG tablet; Take 1 tablet by mouth every 8 hours as needed for Pain for up to 90 days. Hyperlipidemia, unspecified hyperlipidemia type  -     Lipid, Fasting; Future  -     Comprehensive Metabolic Panel; Future    Colon cancer screening  -     Fecal DNA Colorectal cancer screening (Cologuard)    Hypothyroidism, unspecified type  -     T4, Free; Future  -     TSH with Reflex; Future    Hyperglycemia  -     Hemoglobin A1C; Future  -     Comprehensive Metabolic Panel; Future    Preventative health care  -     Hemoglobin A1C; Future  -     Lipid, Fasting; Future  -     CBC with Auto Differential; Future  -     Comprehensive Metabolic Panel; Future  Controlled Substances Monitoring: Periodic Controlled Substance Monitoring: Possible medication side effects, risk of tolerance/dependence & alternative treatments discussed., No signs of potential drug abuse or diversion identified. , Assessed functional status.  Antonio Hill MD)      Return in about 6 months (around 2/9/2023), or if symptoms worsen or fail to improve.     Emery Ricks MD

## 2022-08-10 ENCOUNTER — TELEPHONE (OUTPATIENT)
Dept: PRIMARY CARE CLINIC | Age: 72
End: 2022-08-10

## 2022-08-10 LAB — T4 FREE: 1.19 NG/DL (ref 0.84–1.68)

## 2022-08-10 RX ORDER — NITROFURANTOIN 25; 75 MG/1; MG/1
100 CAPSULE ORAL 2 TIMES DAILY
Qty: 14 CAPSULE | Refills: 0 | Status: SHIPPED | OUTPATIENT
Start: 2022-08-10 | End: 2022-08-17

## 2022-08-29 DIAGNOSIS — M19.049 ARTHRITIS OF HAND: ICD-10-CM

## 2022-08-29 RX ORDER — PREDNISONE 1 MG/1
TABLET ORAL
Qty: 14 TABLET | Refills: 0 | Status: SHIPPED | OUTPATIENT
Start: 2022-08-29 | End: 2022-08-30 | Stop reason: SDUPTHER

## 2022-08-29 RX ORDER — HYDROXYCHLOROQUINE SULFATE 200 MG/1
TABLET, FILM COATED ORAL
Qty: 14 TABLET | Refills: 0 | Status: SHIPPED | OUTPATIENT
Start: 2022-08-29 | End: 2022-08-30 | Stop reason: SDUPTHER

## 2022-08-30 ENCOUNTER — TELEPHONE (OUTPATIENT)
Dept: PRIMARY CARE CLINIC | Age: 72
End: 2022-08-30

## 2022-08-30 DIAGNOSIS — Z00.00 PREVENTATIVE HEALTH CARE: ICD-10-CM

## 2022-08-30 DIAGNOSIS — M19.049 ARTHRITIS OF HAND: ICD-10-CM

## 2022-08-30 DIAGNOSIS — E78.5 HYPERLIPIDEMIA, UNSPECIFIED HYPERLIPIDEMIA TYPE: ICD-10-CM

## 2022-08-30 LAB
CHOLESTEROL, FASTING: 187 MG/DL (ref 0–199)
HDLC SERPL-MCNC: 109 MG/DL (ref 40–59)
LDL CHOLESTEROL CALCULATED: 62 MG/DL (ref 0–129)
TRIGLYCERIDE, FASTING: 79 MG/DL (ref 0–150)

## 2022-08-30 RX ORDER — HYDROXYCHLOROQUINE SULFATE 200 MG/1
TABLET, FILM COATED ORAL
Qty: 180 TABLET | Refills: 2 | OUTPATIENT
Start: 2022-08-30

## 2022-08-30 RX ORDER — HYDROXYCHLOROQUINE SULFATE 200 MG/1
TABLET, FILM COATED ORAL
Qty: 60 TABLET | Refills: 0 | Status: SHIPPED | OUTPATIENT
Start: 2022-08-30

## 2022-08-30 RX ORDER — PREDNISONE 1 MG/1
TABLET ORAL
Qty: 30 TABLET | Refills: 0 | Status: SHIPPED | OUTPATIENT
Start: 2022-08-30 | End: 2022-10-11

## 2022-08-30 RX ORDER — PREDNISONE 1 MG/1
TABLET ORAL
Qty: 180 TABLET | Refills: 1 | OUTPATIENT
Start: 2022-08-30

## 2022-08-30 NOTE — TELEPHONE ENCOUNTER
I spoke with pt that stated she had discussed with you at her OV that Dr Saud Verdugo use to write these scripts for her arthritis , and you said that you could write rx's.

## 2022-09-03 LAB — NONINV COLON CA DNA+OCC BLD SCRN STL QL: POSITIVE

## 2022-09-07 DIAGNOSIS — F32.A DEPRESSION, UNSPECIFIED DEPRESSION TYPE: ICD-10-CM

## 2022-09-07 RX ORDER — DULOXETIN HYDROCHLORIDE 30 MG/1
30 CAPSULE, DELAYED RELEASE ORAL DAILY
Qty: 30 CAPSULE | Refills: 0 | Status: SHIPPED | OUTPATIENT
Start: 2022-09-07 | End: 2022-10-06 | Stop reason: SDUPTHER

## 2022-09-12 ENCOUNTER — TELEPHONE (OUTPATIENT)
Dept: PRIMARY CARE CLINIC | Age: 72
End: 2022-09-12

## 2022-09-12 DIAGNOSIS — U07.1 COVID: Primary | ICD-10-CM

## 2022-09-12 RX ORDER — NIRMATRELVIR AND RITONAVIR 150-100 MG
KIT ORAL
Qty: 20 TABLET | Refills: 0 | Status: SHIPPED | OUTPATIENT
Start: 2022-09-12 | End: 2022-09-17

## 2022-09-12 NOTE — TELEPHONE ENCOUNTER
Patient got booster shot on Friday, symptoms started Saturday with cough and headache. Tested positive Sunday with an at home COVID. Currently has headache, bad cough, and runny nose.     Patient wondering with their med conditions if they should take any covid medication like paxlovid,    Please advise

## 2022-09-30 ENCOUNTER — OFFICE VISIT (OUTPATIENT)
Dept: INTERVENTIONAL RADIOLOGY/VASCULAR | Age: 72
End: 2022-09-30
Payer: MEDICARE

## 2022-09-30 VITALS — DIASTOLIC BLOOD PRESSURE: 80 MMHG | SYSTOLIC BLOOD PRESSURE: 118 MMHG | HEART RATE: 67 BPM

## 2022-09-30 DIAGNOSIS — M79.89 PAIN AND SWELLING OF LOWER LEG, UNSPECIFIED LATERALITY: ICD-10-CM

## 2022-09-30 DIAGNOSIS — I73.9 PERIPHERAL VASCULAR DISEASE OF LOWER EXTREMITY (HCC): ICD-10-CM

## 2022-09-30 DIAGNOSIS — I83.892 BLEEDING FROM VARICOSE VEINS OF LEFT LOWER EXTREMITY: ICD-10-CM

## 2022-09-30 DIAGNOSIS — R29.898 LEG FATIGUE: ICD-10-CM

## 2022-09-30 DIAGNOSIS — M79.669 PAIN AND SWELLING OF LOWER LEG, UNSPECIFIED LATERALITY: ICD-10-CM

## 2022-09-30 DIAGNOSIS — I83.813 PAIN DUE TO VARICOSE VEINS OF BOTH LOWER EXTREMITIES: ICD-10-CM

## 2022-09-30 DIAGNOSIS — I87.2 EDEMA OF LEFT LOWER EXTREMITY DUE TO PERIPHERAL VENOUS INSUFFICIENCY: Primary | ICD-10-CM

## 2022-09-30 DIAGNOSIS — R29.898 HEAVY SENSATION OF LOWER EXTREMITY: ICD-10-CM

## 2022-09-30 PROCEDURE — 99213 OFFICE O/P EST LOW 20 MIN: CPT | Performed by: NURSE PRACTITIONER

## 2022-09-30 PROCEDURE — 1123F ACP DISCUSS/DSCN MKR DOCD: CPT | Performed by: NURSE PRACTITIONER

## 2022-09-30 ASSESSMENT — ENCOUNTER SYMPTOMS
ABDOMINAL PAIN: 0
EYES NEGATIVE: 1
DIARRHEA: 0
BACK PAIN: 1
WHEEZING: 0
NAUSEA: 0
SHORTNESS OF BREATH: 0
COUGH: 0
GASTROINTESTINAL NEGATIVE: 1
COLOR CHANGE: 0
TROUBLE SWALLOWING: 0
VOMITING: 0
RESPIRATORY NEGATIVE: 1
SORE THROAT: 0

## 2022-09-30 NOTE — PROGRESS NOTES
Vascular Medicine and Interventional Radiology:    Serenity Cummings, a female of 81 y.o. came to the office 9/30/2022. Chief Complaint   Patient presents with    Follow-up     3m CST     PROGRESS NOTE:     SUBJECTIVE:     9/30/2022: Serenity Cummings returns for evaluation of 10 week compression stocking trial for initial treatment of LLE chronic venous insufficiency and BLE painful and bleeding varicose veins, BLE  chronic aching, with fatigue and heaviness sensation, and intermittent Left ankle edema. Reports almost complete relief from compression stockings for LE symptoms which are not tolerable. Has not done conservative therapy with daily consistent wearing of class two compression stockings for at least 6 weeks. Denies claudication. Denies chest pain. Denies dyspnea. 6/20/2022: Serenity Cummings is here to go over results of BLE US venous insufficiency and US duplex RLE results. RLE negative US duplex and CVI in Left GSV at level of calf. She reports BLE symptoms persist unchanged of painful BLE varicose veins, LLE bleeding VV's, and Both legs with chronic aching, with fatigue and heaviness sensation, and intermittent Left ankle edema, daily and worse by end of each day and symptoms worse with prolonged standing. Has not done conservative therapy with daily consistent wearing of class two compression stockings for at least 6 weeks. Denies claudication. Denies chest pain. Denies dyspnea. 5/23/2022 HPI: Serenity Cummings referred by PCP for evaluation of Painful LE varicose veins. Patient presents with symptoms of: painful varicose veins left lower leg. Both legs with chronic aching, with fatigue and heaviness sensation daily and worse by end of each day and symptoms worse with prolonged standing. VVs to left lower leg will break open and bleed. Occasional left ankle edema, not daily.    LLE arterial US duplex 5/2022 ordered by PCP negative for any significant flow limiting stenosis. LLE venous US duplex 5/20/2022 ordered by PCP negative for DVT  Reports have lower back pain with bilateral radiculopathy. NSAIDS:  Tolerates pain, no meds. Leg elevation:  Daily with moderate relief. Stocking use and dates: Has not done conservative therapy with daily consistent wearing of class two compression stockings for at least 6 weeks. Denies claudication. Denies chest pain. Denies dyspnea. Family History   Problem Relation Age of Onset    Heart Disease Mother     High Blood Pressure Mother     Kidney Disease Mother     Cancer Father         bone, prostate, skin    Heart Disease Brother     Cancer Sister         kidney, lung       Past Surgical History:   Procedure Laterality Date    CHOLECYSTECTOMY      HYSTERECTOMY (CERVIX STATUS UNKNOWN)      KNEE ARTHROSCOPY Right     OVARY REMOVAL      TONSILLECTOMY          Past Medical History:   Diagnosis Date    Arthritis     seen dr Karol Montes De Oca    Chronic back pain     seen dr Rosa Maria Burdick in past    Depression     Hypothyroidism     Osteoarthritis     Osteoporosis     Restless legs syndrome     Tachycardia        Social History     Socioeconomic History    Marital status:    Tobacco Use    Smoking status: Never    Smokeless tobacco: Never   Substance and Sexual Activity    Alcohol use:  Yes     Alcohol/week: 0.0 standard drinks     Comment: social    Drug use: No     Social Determinants of Health     Financial Resource Strain: Low Risk     Difficulty of Paying Living Expenses: Not hard at all   Food Insecurity: No Food Insecurity    Worried About Running Out of Food in the Last Year: Never true    Ran Out of Food in the Last Year: Never true       No Known Allergies    Current Outpatient Medications on File Prior to Visit   Medication Sig Dispense Refill    DULoxetine (CYMBALTA) 30 MG extended release capsule Take 1 capsule by mouth daily 30 capsule 0    predniSONE (DELTASONE) 5 MG tablet TAKE 1 TABLET DAILY 30 tablet 0    hydroxychloroquine (PLAQUENIL) 200 MG tablet TAKE 1 TABLET BY MOUTH TWICE DAILY 60 tablet 0    DULoxetine (CYMBALTA) 60 MG extended release capsule Take 1 capsule by mouth in the morning. 30 capsule 5    cyclobenzaprine (FLEXERIL) 10 MG tablet Take 1 tablet by mouth 2 times daily as needed for Muscle spasms 60 tablet 2    traMADol (ULTRAM) 50 MG tablet Take 1 tablet by mouth every 8 hours as needed for Pain for up to 90 days. 90 tablet 2    lactulose (CHRONULAC) 10 GM/15ML solution Take 15 mLs by mouth every evening 500 mL 3    Cholecalciferol (VITAMIN D3) 1.25 MG (11467 UT) CAPS TAKE 1 CAPSULE BY MOUTH ONCE A WEEK 12 capsule 1    Elastic Bandages & Supports (MEDICAL COMPRESSION STOCKINGS) MISC 1 each by Does not apply route daily Knee high 20-30 mmhg graduated compression stockings both legs wear daily during day and off Qhs. Wear as tolerated. Do not wear if they cause increased pain. 1 each 5    metoprolol succinate (TOPROL XL) 25 MG extended release tablet TAKE 1 TABLET DAILY 90 tablet 2    triamterene-hydroCHLOROthiazide (MAXZIDE-25) 37.5-25 MG per tablet Take 1 tablet by mouth daily 30 tablet 5    sertraline (ZOLOFT) 50 MG tablet TAKE 1 TABLET DAILY po 90 tablet 1    carbidopa-levodopa (SINEMET)  MG per tablet Half qid 180 tablet 3    levothyroxine (SYNTHROID) 50 MCG tablet TAKE 1 TABLET ONCE DAILY 90 tablet 2    ondansetron (ZOFRAN) 4 MG tablet Take 1 tablet by mouth 3 times daily as needed for Nausea or Vomiting 90 tablet 2    colchicine (MITIGARE) 0.6 MG capsule Take 0.6 mg by mouth in the morning. No current facility-administered medications on file prior to visit. Review of Systems   Constitutional: Negative. Negative for chills, fatigue and fever. HENT: Negative. Negative for congestion, ear pain, sore throat and trouble swallowing. Eyes: Negative. Negative for visual disturbance. Respiratory: Negative. Negative for cough, shortness of breath and wheezing.     Cardiovascular:  Positive for leg swelling (mild chronic left ankle). Negative for chest pain and palpitations. Painful varicose veins both legs   Gastrointestinal: Negative. Negative for abdominal pain, diarrhea, nausea and vomiting. Endocrine: Negative. Genitourinary: Negative. Negative for difficulty urinating, dysuria and hematuria. Musculoskeletal:  Positive for arthralgias (hips), back pain (chronic lower back radiates down both legs) and gait problem (uses cane). Negative for neck pain and neck stiffness. Chronic BLE aching, fatigue and heaviness sensation   Skin: Negative. Negative for color change, rash and wound. Neurological:  Negative for dizziness, weakness, light-headedness, numbness and headaches. Hematological: Negative. Does not bruise/bleed easily. Psychiatric/Behavioral: Negative. The patient is not nervous/anxious. All other systems reviewed and are negative. OBJECTIVE:  /80   Pulse 67     Physical Exam  Constitutional:       General: She is not in acute distress. Appearance: Normal appearance. She is well-developed. She is not ill-appearing. HENT:      Head: Normocephalic and atraumatic. Nose: Nose normal. No congestion. Neck:      Thyroid: No thyromegaly. Vascular: No JVD. Trachea: No tracheal deviation. Cardiovascular:      Rate and Rhythm: Normal rate and regular rhythm. Pulses: Normal pulses. Dorsalis pedis pulses are 2+ on the right side and 2+ on the left side. Posterior tibial pulses are 2+ on the right side and 2+ on the left side. Heart sounds: Normal heart sounds. No murmur heard. Pulmonary:      Effort: Pulmonary effort is normal.   Abdominal:      General: Bowel sounds are normal. There is no distension. Palpations: Abdomen is soft. Musculoskeletal:         General: Tenderness (with palpation to LE VV's) present. No signs of injury. Normal range of motion. Cervical back: Normal range of motion.       Right orders of the defined types were placed in this encounter. --  As BLE symptoms are almost completely relieved with daily wearing of class two compression socks and she is comfortable wearing, this is daily treatment. She chooses PRN return if BLE symptoms are no longer relieved with compression socks at which time will discuss possible need for venous procedures as treatment option. --  Elevate LE's when sitting and/or lying for management of LE edema. --  Follow up with general practitioner for other medical concerns and management. Total time spent for this encounter: 20 minutes.       Nathaniel Austin, APRN - CNP

## 2022-10-06 DIAGNOSIS — F32.A DEPRESSION, UNSPECIFIED DEPRESSION TYPE: ICD-10-CM

## 2022-10-06 RX ORDER — DULOXETIN HYDROCHLORIDE 30 MG/1
30 CAPSULE, DELAYED RELEASE ORAL DAILY
Qty: 30 CAPSULE | Refills: 0 | Status: SHIPPED | OUTPATIENT
Start: 2022-10-06

## 2022-10-07 DIAGNOSIS — R19.5 POSITIVE COLORECTAL CANCER SCREENING USING COLOGUARD TEST: Primary | ICD-10-CM

## 2022-10-11 DIAGNOSIS — M19.049 ARTHRITIS OF HAND: ICD-10-CM

## 2022-10-11 RX ORDER — PREDNISONE 1 MG/1
TABLET ORAL
Qty: 30 TABLET | Refills: 0 | Status: SHIPPED | OUTPATIENT
Start: 2022-10-11

## 2022-10-20 NOTE — PROGRESS NOTES
Annalisa Marin 73 y.o. female presents today with   Chief Complaint   Patient presents with    3 Month Follow-Up    Lower Back Pain     x1 year ago , Pt stated pain is still shooting down the leg     Leg Pain     x 7 months ago, pain when walking still, pt stated she felt like nothing has change since the last time    Nausea     onset x3 months ago, seems like its getting worst, she can hold down food. Pt states it feels like \"morning sickness\"     Medication Refill       Back Pain  This is a chronic problem. The current episode started more than 1 year ago. The problem occurs daily. The problem has been waxing and waning since onset. The pain is present in the lumbar spine and gluteal. The quality of the pain is described as aching. The pain is at a severity of 7/10. The pain is severe. The symptoms are aggravated by twisting, standing and bending. Pertinent negatives include no abdominal pain, chest pain or fever. Hand Pain   The incident occurred more than 1 week ago. The incident occurred at home. There was no injury mechanism. The quality of the pain is described as aching. The pain is at a severity of 1/10. The pain is mild. The pain has been fluctuating since the incident. Pertinent negatives include no chest pain.        Past Medical History:   Diagnosis Date    Arthritis     seen dr [place] President    Chronic back pain     seen dr Christiano aMo in past    Depression     Hypothyroidism     Osteoarthritis     Osteoporosis     Restless legs syndrome     Tachycardia      Patient Active Problem List    Diagnosis Date Noted    [facility] 08/02/2021    Kyphoscoliosis 11/24/2020    Ataxic gait 07/21/2020    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 07/21/2020    Cervicalgia 07/21/2020    Hyperreflexia 07/21/2020    Spondylosis of lumbar region without myelopathy or radiculopathy 07/29/2016    Hypothyroid 07/29/2013    Depressive disorder, not elsewhere classified 07/29/2013    How Many Skin Cancers Have You Had?: more than one Palpitations 07/29/2013    Tachycardia 07/29/2013     Past Surgical History:   Procedure Laterality Date    CHOLECYSTECTOMY      HYSTERECTOMY      KNEE ARTHROSCOPY Right     TONSILLECTOMY       Family History   Problem Relation Age of Onset    Heart Disease Mother     High Blood Pressure Mother     Kidney Disease Mother     Cancer Father         bone, prostate, skin    Heart Disease Brother     Cancer Sister         kidney, lung     Social History     Socioeconomic History    Marital status:      Spouse name: None    Number of children: None    Years of education: None    Highest education level: None   Occupational History    None   Tobacco Use    Smoking status: Never Smoker    Smokeless tobacco: Never Used   Substance and Sexual Activity    Alcohol use: Yes     Alcohol/week: 0.0 standard drinks     Comment: social    Drug use: No    Sexual activity: None   Other Topics Concern    None   Social History Narrative    None     Social Determinants of Health     Financial Resource Strain: Low Risk     Difficulty of Paying Living Expenses: Not hard at all   Food Insecurity: No Food Insecurity    Worried About Running Out of Food in the Last Year: Never true    Cris of Food in the Last Year: Never true   Transportation Needs: No Transportation Needs    Lack of Transportation (Medical): No    Lack of Transportation (Non-Medical):  No   Physical Activity:     Days of Exercise per Week: Not on file    Minutes of Exercise per Session: Not on file   Stress:     Feeling of Stress : Not on file   Social Connections:     Frequency of Communication with Friends and Family: Not on file    Frequency of Social Gatherings with Friends and Family: Not on file    Attends Catholic Services: Not on file    Active Member of Clubs or Organizations: Not on file    Attends Club or Organization Meetings: Not on file    Marital Status: Not on file   Intimate Partner Violence:     Fear of Current What Is The Reason For Today's Visit?: History of Non-Melanoma Skin Cancer When Was Your Last Cancer Diagnosed?: 6 months or Ex-Partner: Not on file    Emotionally Abused: Not on file    Physically Abused: Not on file    Sexually Abused: Not on file   Housing Stability:     Unable to Pay for Housing in the Last Year: Not on file    Number of Places Lived in the Last Year: Not on file    Unstable Housing in the Last Year: Not on file     No Known Allergies    Review of Systems   Constitutional: Negative for fever. HENT: Negative for facial swelling and nosebleeds. Eyes: Negative for photophobia and visual disturbance. Respiratory: Negative for apnea and choking. Cardiovascular: Negative for chest pain and palpitations. Gastrointestinal: Negative for abdominal distention, abdominal pain and blood in stool. Genitourinary: Negative for enuresis and hematuria. Musculoskeletal: Positive for arthralgias, back pain, gait problem and myalgias. Negative for joint swelling. Skin: Negative for rash. Neurological: Negative for syncope and speech difficulty. Hematological: Does not bruise/bleed easily. Psychiatric/Behavioral: Negative for hallucinations and suicidal ideas. Vitals:    01/31/22 1403   BP: 112/70   Site: Left Upper Arm   Position: Sitting   Pulse: 88   Temp: 97.1 °F (36.2 °C)   SpO2: 100%   Weight: 93 lb (42.2 kg)   Height: 5' 1\" (1.549 m)       Physical Exam  Constitutional:       Appearance: She is well-developed. HENT:      Head: Normocephalic. Eyes:      Pupils: Pupils are equal, round, and reactive to light. Cardiovascular:      Rate and Rhythm: Normal rate and regular rhythm. Heart sounds: Normal heart sounds. Pulmonary:      Effort: No respiratory distress. Breath sounds: Normal breath sounds. Abdominal:      General: Bowel sounds are normal.   Musculoskeletal:        Arms:       Cervical back: Normal range of motion. Lumbar back: Spasms present. Decreased range of motion. Neurological:      Mental Status: She is alert and oriented to person, place, and time. Cranial Nerves: No cranial nerve deficit. Psychiatric:         Mood and Affect: Mood normal.        Assessment/Plan  Andrea Dennison was seen today for 3 month follow-up, lower back pain, leg pain, nausea and medication refill. Diagnoses and all orders for this visit:    Low back pain radiating to right leg  -     traMADol (ULTRAM) 50 MG tablet; Take 1 tablet by mouth every 8 hours as needed for Pain for up to 90 days. -     cyclobenzaprine (FLEXERIL) 10 MG tablet; Take 1 tablet by mouth 2 times daily as needed for Muscle spasms    Dupuytren's contracture of left hand  -     [address], [place]    Nausea  -     ondansetron (ZOFRAN) 4 MG tablet; Take 1 tablet by mouth 3 times daily as needed for Nausea or Vomiting    Parkinson's disease ([facility]  -     ondansetron (ZOFRAN) 4 MG tablet; Take 1 tablet by mouth 3 times daily as needed for Nausea or Vomiting    Screening mammogram for breast cancer  -     [facility] DIGITAL SCREEN W OR WO CAD BILATERAL; Future    Controlled Substances Monitoring: Periodic Controlled Substance Monitoring: Possible medication side effects, risk of tolerance/dependence & alternative treatments discussed. ,Assessed functional status. ,No signs of potential drug abuse or diversion identified. Polly Wheeler MD)      Return if symptoms worsen or fail to improve.     Polly Wheeler MD

## 2022-10-21 ENCOUNTER — TELEPHONE (OUTPATIENT)
Dept: PRIMARY CARE CLINIC | Age: 72
End: 2022-10-21

## 2022-10-24 DIAGNOSIS — G20 PARKINSON'S DISEASE (HCC): ICD-10-CM

## 2022-10-24 DIAGNOSIS — R11.0 NAUSEA: ICD-10-CM

## 2022-10-25 ENCOUNTER — TELEPHONE (OUTPATIENT)
Dept: FAMILY MEDICINE CLINIC | Age: 72
End: 2022-10-25

## 2022-10-25 RX ORDER — ONDANSETRON 4 MG/1
4 TABLET, FILM COATED ORAL EVERY 8 HOURS PRN
Qty: 90 TABLET | Refills: 4 | Status: SHIPPED | OUTPATIENT
Start: 2022-10-25

## 2022-10-25 RX ORDER — ONDANSETRON 4 MG/1
TABLET, FILM COATED ORAL
Qty: 90 TABLET | Refills: 0 | Status: SHIPPED | OUTPATIENT
Start: 2022-10-25 | End: 2022-11-29 | Stop reason: SDUPTHER

## 2022-10-27 RX ORDER — POLYETHYLENE GLYCOL 3350, SODIUM CHLORIDE, SODIUM BICARBONATE, POTASSIUM CHLORIDE 420; 11.2; 5.72; 1.48 G/4L; G/4L; G/4L; G/4L
4000 POWDER, FOR SOLUTION ORAL ONCE
Qty: 4000 ML | Refills: 0 | Status: SHIPPED | OUTPATIENT
Start: 2022-10-27 | End: 2022-10-27

## 2022-10-31 DIAGNOSIS — R60.0 EDEMA OF LEFT LOWER LEG: ICD-10-CM

## 2022-11-01 RX ORDER — TRIAMTERENE AND HYDROCHLOROTHIAZIDE 37.5; 25 MG/1; MG/1
1 TABLET ORAL DAILY
Qty: 30 TABLET | Refills: 5 | Status: SHIPPED | OUTPATIENT
Start: 2022-11-01

## 2022-11-07 DIAGNOSIS — M19.049 ARTHRITIS OF HAND: ICD-10-CM

## 2022-11-07 DIAGNOSIS — F32.A DEPRESSION, UNSPECIFIED DEPRESSION TYPE: ICD-10-CM

## 2022-11-07 RX ORDER — PREDNISONE 1 MG/1
TABLET ORAL
Qty: 30 TABLET | Refills: 0 | Status: SHIPPED | OUTPATIENT
Start: 2022-11-07

## 2022-11-07 RX ORDER — DULOXETIN HYDROCHLORIDE 30 MG/1
30 CAPSULE, DELAYED RELEASE ORAL DAILY
Qty: 30 CAPSULE | Refills: 0 | Status: SHIPPED | OUTPATIENT
Start: 2022-11-07

## 2022-11-13 DIAGNOSIS — M54.50 LOW BACK PAIN RADIATING TO RIGHT LEG: ICD-10-CM

## 2022-11-13 DIAGNOSIS — M79.604 LOW BACK PAIN RADIATING TO RIGHT LEG: ICD-10-CM

## 2022-11-13 DIAGNOSIS — M19.049 ARTHRITIS OF HAND: ICD-10-CM

## 2022-11-14 DIAGNOSIS — M54.50 LOW BACK PAIN RADIATING TO RIGHT LEG: ICD-10-CM

## 2022-11-14 DIAGNOSIS — M79.604 LOW BACK PAIN RADIATING TO RIGHT LEG: ICD-10-CM

## 2022-11-14 DIAGNOSIS — M19.049 ARTHRITIS OF HAND: ICD-10-CM

## 2022-11-14 RX ORDER — CYCLOBENZAPRINE HCL 10 MG
TABLET ORAL
Qty: 60 TABLET | Refills: 0 | Status: SHIPPED | OUTPATIENT
Start: 2022-11-14 | End: 2022-11-15 | Stop reason: SDUPTHER

## 2022-11-14 RX ORDER — HYDROXYCHLOROQUINE SULFATE 200 MG/1
TABLET, FILM COATED ORAL
Qty: 60 TABLET | Refills: 0 | Status: SHIPPED | OUTPATIENT
Start: 2022-11-14 | End: 2022-11-15 | Stop reason: SDUPTHER

## 2022-11-14 RX ORDER — HYDROXYCHLOROQUINE SULFATE 200 MG/1
TABLET, FILM COATED ORAL
Qty: 60 TABLET | Refills: 0 | OUTPATIENT
Start: 2022-11-14

## 2022-11-14 RX ORDER — TRAMADOL HYDROCHLORIDE 50 MG/1
50 TABLET ORAL EVERY 8 HOURS PRN
Qty: 90 TABLET | Refills: 0 | Status: SHIPPED | OUTPATIENT
Start: 2022-11-14 | End: 2022-11-15 | Stop reason: SDUPTHER

## 2022-11-14 RX ORDER — CYCLOBENZAPRINE HCL 10 MG
10 TABLET ORAL 2 TIMES DAILY PRN
Qty: 60 TABLET | Refills: 2 | OUTPATIENT
Start: 2022-11-14

## 2022-11-15 ENCOUNTER — OFFICE VISIT (OUTPATIENT)
Dept: PRIMARY CARE CLINIC | Age: 72
End: 2022-11-15
Payer: MEDICARE

## 2022-11-15 VITALS
WEIGHT: 91 LBS | BODY MASS INDEX: 17.18 KG/M2 | OXYGEN SATURATION: 99 % | HEART RATE: 98 BPM | SYSTOLIC BLOOD PRESSURE: 132 MMHG | HEIGHT: 61 IN | DIASTOLIC BLOOD PRESSURE: 80 MMHG

## 2022-11-15 DIAGNOSIS — Z00.00 MEDICARE ANNUAL WELLNESS VISIT, SUBSEQUENT: Primary | ICD-10-CM

## 2022-11-15 DIAGNOSIS — M05.742 RHEUMATOID ARTHRITIS INVOLVING BOTH HANDS WITH POSITIVE RHEUMATOID FACTOR (HCC): ICD-10-CM

## 2022-11-15 DIAGNOSIS — M05.9 RHEUMATOID ARTHRITIS WITH RHEUMATOID FACTOR, UNSPECIFIED (HCC): ICD-10-CM

## 2022-11-15 DIAGNOSIS — M19.049 ARTHRITIS OF HAND: ICD-10-CM

## 2022-11-15 DIAGNOSIS — M54.50 LOW BACK PAIN RADIATING TO RIGHT LEG: ICD-10-CM

## 2022-11-15 DIAGNOSIS — M05.741 RHEUMATOID ARTHRITIS INVOLVING BOTH HANDS WITH POSITIVE RHEUMATOID FACTOR (HCC): ICD-10-CM

## 2022-11-15 DIAGNOSIS — M79.604 LOW BACK PAIN RADIATING TO RIGHT LEG: ICD-10-CM

## 2022-11-15 DIAGNOSIS — F33.0 MILD EPISODE OF RECURRENT MAJOR DEPRESSIVE DISORDER (HCC): ICD-10-CM

## 2022-11-15 PROBLEM — M06.9 RHEUMATOID ARTHRITIS, UNSPECIFIED (HCC): Status: ACTIVE | Noted: 2022-11-15

## 2022-11-15 PROCEDURE — G0439 PPPS, SUBSEQ VISIT: HCPCS | Performed by: INTERNAL MEDICINE

## 2022-11-15 PROCEDURE — 1123F ACP DISCUSS/DSCN MKR DOCD: CPT | Performed by: INTERNAL MEDICINE

## 2022-11-15 PROCEDURE — 99213 OFFICE O/P EST LOW 20 MIN: CPT | Performed by: INTERNAL MEDICINE

## 2022-11-15 RX ORDER — TRAMADOL HYDROCHLORIDE 50 MG/1
50 TABLET ORAL EVERY 8 HOURS PRN
Qty: 90 TABLET | Refills: 2 | Status: SHIPPED | OUTPATIENT
Start: 2022-11-15 | End: 2023-02-13

## 2022-11-15 RX ORDER — CYCLOBENZAPRINE HCL 10 MG
10 TABLET ORAL 2 TIMES DAILY PRN
Qty: 60 TABLET | Refills: 2 | Status: SHIPPED | OUTPATIENT
Start: 2022-11-15

## 2022-11-15 RX ORDER — HYDROXYCHLOROQUINE SULFATE 200 MG/1
TABLET, FILM COATED ORAL
Qty: 30 TABLET | Refills: 5 | Status: SHIPPED | OUTPATIENT
Start: 2022-11-15

## 2022-11-15 ASSESSMENT — PATIENT HEALTH QUESTIONNAIRE - PHQ9
7. TROUBLE CONCENTRATING ON THINGS, SUCH AS READING THE NEWSPAPER OR WATCHING TELEVISION: 0
3. TROUBLE FALLING OR STAYING ASLEEP: 0
1. LITTLE INTEREST OR PLEASURE IN DOING THINGS: 0
9. THOUGHTS THAT YOU WOULD BE BETTER OFF DEAD, OR OF HURTING YOURSELF: 0
4. FEELING TIRED OR HAVING LITTLE ENERGY: 0
5. POOR APPETITE OR OVEREATING: 0
SUM OF ALL RESPONSES TO PHQ QUESTIONS 1-9: 0
8. MOVING OR SPEAKING SO SLOWLY THAT OTHER PEOPLE COULD HAVE NOTICED. OR THE OPPOSITE, BEING SO FIGETY OR RESTLESS THAT YOU HAVE BEEN MOVING AROUND A LOT MORE THAN USUAL: 0
SUM OF ALL RESPONSES TO PHQ QUESTIONS 1-9: 0
SUM OF ALL RESPONSES TO PHQ9 QUESTIONS 1 & 2: 0
2. FEELING DOWN, DEPRESSED OR HOPELESS: 0
6. FEELING BAD ABOUT YOURSELF - OR THAT YOU ARE A FAILURE OR HAVE LET YOURSELF OR YOUR FAMILY DOWN: 0
10. IF YOU CHECKED OFF ANY PROBLEMS, HOW DIFFICULT HAVE THESE PROBLEMS MADE IT FOR YOU TO DO YOUR WORK, TAKE CARE OF THINGS AT HOME, OR GET ALONG WITH OTHER PEOPLE: 0

## 2022-11-15 ASSESSMENT — LIFESTYLE VARIABLES
HOW OFTEN DO YOU HAVE A DRINK CONTAINING ALCOHOL: NEVER
HOW OFTEN DO YOU HAVE A DRINK CONTAINING ALCOHOL: NEVER

## 2022-11-15 NOTE — PROGRESS NOTES
Krystle Marin 81 y.o. female presents today with   Chief Complaint   Patient presents with    Follow-up    Back Pain     Pain running down left leg    Other     [facility]: Major depressive disorder, recurrent, unspecified ([address]    Medication Refill       Back Pain  This is a chronic problem. The current episode started more than 1 year ago. The problem occurs daily. The problem has been waxing and waning since onset. The pain is present in the lumbar spine and gluteal. The quality of the pain is described as aching. The pain is at a severity of 8/10. The pain is severe. Pertinent negatives include no chest pain or fever. Hand Pain   The incident occurred more than 1 week ago. The incident occurred at home. There was no injury mechanism. The pain is present in the left hand and right hand. The quality of the pain is described as aching. The pain is at a severity of 6/10. The pain is severe. The pain has been Fluctuating since the incident. Pertinent negatives include no chest pain.      Past Medical History:   Diagnosis Date    Arthritis     seen dr Erika Turner    Chronic back pain     seen dr Shani Roy in past    Depression     Hypothyroidism     Osteoarthritis     Osteoporosis     Restless legs syndrome     Tachycardia      Patient Active Problem List    Diagnosis Date Noted    Rheumatoid arthritis with rheumatoid factor, unspecified 11/15/2022    Rheumatoid arthritis, unspecified 11/15/2022    Major depressive disorder, recurrent, mild 08/09/2022    Major depressive disorder, recurrent, moderate 08/09/2022    Major depressive disorder, recurrent, unspecified 08/09/2022    Drug-induced constipation 08/02/2022    Dupuytren's contracture 03/02/2022    Localized, primary osteoarthritis of hand 03/02/2022    Parkinson's disease ([facility] 08/02/2021    Kyphoscoliosis 11/24/2020    Ataxic gait 07/21/2020    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 07/21/2020    Cervicalgia 07/21/2020    Hyperreflexia 07/21/2020 Spondylosis of lumbar region without myelopathy or radiculopathy 07/29/2016    Hypothyroid 07/29/2013    Depressive disorder, not elsewhere classified 07/29/2013    Palpitations 07/29/2013    Tachycardia 07/29/2013     Past Surgical History:   Procedure Laterality Date    CHOLECYSTECTOMY      COLONOSCOPY N/A 11/22/2022    COLONOSCOPY DIAGNOSTIC performed by Laly Beard MD at [address] ([facility])      KNEE ARTHROSCOPY Right     OVARY REMOVAL      TONSILLECTOMY       Family History   Problem Relation Age of Onset    Heart Disease Mother     High Blood Pressure Mother     Kidney Disease Mother     Cancer Father         bone, prostate, skin    Cancer Sister         kidney, lung    Heart Disease Brother     Colon Cancer Other         pt niece     Social History     Socioeconomic History    Marital status:      Spouse name: None    Number of children: None    Years of education: None    Highest education level: None   Tobacco Use    Smoking status: Never    Smokeless tobacco: Never   Vaping Use    Vaping Use: Never used   Substance and Sexual Activity    Alcohol use: Yes     Alcohol/week: 0.0 standard drinks     Comment: social    Drug use: No     Social Determinants of Health     Financial Resource Strain: Low Risk     Difficulty of Paying Living Expenses: Not hard at all   Food Insecurity: No Food Insecurity    Worried About Running Out of Food in the Last Year: Never true    Ran Out of Food in the Last Year: Never true   Physical Activity: Sufficiently Active    Days of Exercise per Week: 7 days    Minutes of Exercise per Session: 30 min     No Known Allergies    Review of Systems   Constitutional:  Negative for chills and fever. HENT:  Negative for facial swelling and nosebleeds. Eyes:  Negative for photophobia and visual disturbance. Respiratory:  Negative for apnea and choking. Cardiovascular:  Negative for chest pain and palpitations.    Gastrointestinal: Negative for abdominal distention and blood in stool. Genitourinary:  Negative for enuresis, hematuria and vaginal bleeding. Musculoskeletal:  Positive for arthralgias, back pain and gait problem. Negative for joint swelling. Skin:  Negative for rash. Neurological:  Negative for syncope and speech difficulty. Hematological:  Does not bruise/bleed easily. Psychiatric/Behavioral:  Negative for hallucinations and suicidal ideas. Vitals:    11/15/22 1140   BP: 132/80   Pulse: 98   SpO2: 99%   Weight: 91 lb (41.3 kg)   Height: 5' 1\" (1.549 m)       Physical Exam  Constitutional:       Appearance: She is well-developed. HENT:      Head: Normocephalic. Eyes:      Conjunctiva/sclera: Conjunctivae normal.   Cardiovascular:      Rate and Rhythm: Normal rate and regular rhythm. Heart sounds: Normal heart sounds. Pulmonary:      Breath sounds: Normal breath sounds. Abdominal:      General: There is no distension. Musculoskeletal:         General: Normal range of motion. Cervical back: Normal range of motion. Neurological:      Mental Status: She is alert and oriented to person, place, and time. Assessment/Plan  Mary Ayala was seen today for follow-up, back pain, other and medication refill. Diagnoses and all orders for this visit:    Low back pain radiating to right leg  -     traMADol (ULTRAM) 50 MG tablet; Take 1 tablet by mouth every 8 hours as needed for Pain for up to 90 days. -     cyclobenzaprine (FLEXERIL) 10 MG tablet; Take 1 tablet by mouth 2 times daily as needed for Muscle spasms    Mild episode of recurrent major depressive disorder (HCC)    Arthritis of hand  -     hydroxychloroquine (PLAQUENIL) 200 MG tablet; TAKE 1 TABLET BY MOUTH DAILY    Rheumatoid arthritis with rheumatoid factor, unspecified    Rheumatoid arthritis involving both hands with positive rheumatoid factor (HCC)      No follow-ups on file.     Bakari Bird MD

## 2022-11-16 VITALS
SYSTOLIC BLOOD PRESSURE: 132 MMHG | WEIGHT: 91 LBS | BODY MASS INDEX: 17.18 KG/M2 | OXYGEN SATURATION: 99 % | DIASTOLIC BLOOD PRESSURE: 80 MMHG | HEART RATE: 98 BPM | HEIGHT: 61 IN

## 2022-11-22 ENCOUNTER — ANESTHESIA (OUTPATIENT)
Dept: ENDOSCOPY | Age: 72
End: 2022-11-22
Payer: MEDICARE

## 2022-11-22 ENCOUNTER — HOSPITAL ENCOUNTER (OUTPATIENT)
Age: 72
Setting detail: OUTPATIENT SURGERY
Discharge: HOME | End: 2022-11-22
Attending: SPECIALIST | Admitting: SPECIALIST
Payer: MEDICARE

## 2022-11-22 ENCOUNTER — ANESTHESIA EVENT (OUTPATIENT)
Dept: ENDOSCOPY | Age: 72
End: 2022-11-22
Payer: MEDICARE

## 2022-11-22 VITALS
DIASTOLIC BLOOD PRESSURE: 87 MMHG | BODY MASS INDEX: 17.18 KG/M2 | SYSTOLIC BLOOD PRESSURE: 118 MMHG | RESPIRATION RATE: 18 BRPM | HEART RATE: 107 BPM | TEMPERATURE: 98.2 F | WEIGHT: 91 LBS | HEIGHT: 61 IN | OXYGEN SATURATION: 100 %

## 2022-11-22 PROCEDURE — 2500000003 HC RX 250 WO HCPCS: Performed by: NURSE ANESTHETIST, CERTIFIED REGISTERED

## 2022-11-22 PROCEDURE — 2709999900 HC NON-CHARGEABLE SUPPLY: Performed by: SPECIALIST

## 2022-11-22 PROCEDURE — 6360000002 HC RX W HCPCS: Performed by: NURSE ANESTHETIST, CERTIFIED REGISTERED

## 2022-11-22 PROCEDURE — 6370000000 HC RX 637 (ALT 250 FOR IP): Performed by: SPECIALIST

## 2022-11-22 PROCEDURE — 45378 DIAGNOSTIC COLONOSCOPY: CPT | Performed by: SPECIALIST

## 2022-11-22 PROCEDURE — 3700000001 HC ADD 15 MINUTES (ANESTHESIA): Performed by: SPECIALIST

## 2022-11-22 PROCEDURE — 2580000003 HC RX 258: Performed by: SPECIALIST

## 2022-11-22 PROCEDURE — 3700000000 HC ANESTHESIA ATTENDED CARE: Performed by: SPECIALIST

## 2022-11-22 PROCEDURE — 7100000011 HC PHASE II RECOVERY - ADDTL 15 MIN: Performed by: SPECIALIST

## 2022-11-22 PROCEDURE — 7100000010 HC PHASE II RECOVERY - FIRST 15 MIN: Performed by: SPECIALIST

## 2022-11-22 PROCEDURE — 2580000003 HC RX 258

## 2022-11-22 PROCEDURE — 3609027000 HC COLONOSCOPY: Performed by: SPECIALIST

## 2022-11-22 RX ORDER — GLYCOPYRROLATE 1 MG/5 ML
SYRINGE (ML) INTRAVENOUS PRN
Status: DISCONTINUED | OUTPATIENT
Start: 2022-11-22 | End: 2022-11-22 | Stop reason: SDUPTHER

## 2022-11-22 RX ORDER — SIMETHICONE 20 MG/.3ML
EMULSION ORAL PRN
Status: DISCONTINUED | OUTPATIENT
Start: 2022-11-22 | End: 2022-11-22 | Stop reason: ALTCHOICE

## 2022-11-22 RX ORDER — LIDOCAINE HYDROCHLORIDE 20 MG/ML
INJECTION, SOLUTION INFILTRATION; PERINEURAL PRN
Status: DISCONTINUED | OUTPATIENT
Start: 2022-11-22 | End: 2022-11-22 | Stop reason: SDUPTHER

## 2022-11-22 RX ORDER — PROPOFOL 10 MG/ML
INJECTION, EMULSION INTRAVENOUS PRN
Status: DISCONTINUED | OUTPATIENT
Start: 2022-11-22 | End: 2022-11-22 | Stop reason: SDUPTHER

## 2022-11-22 RX ORDER — SODIUM CHLORIDE 9 MG/ML
INJECTION, SOLUTION INTRAVENOUS
Status: COMPLETED
Start: 2022-11-22 | End: 2022-11-22

## 2022-11-22 RX ORDER — SODIUM CHLORIDE 9 MG/ML
INJECTION, SOLUTION INTRAVENOUS CONTINUOUS
Status: DISCONTINUED | OUTPATIENT
Start: 2022-11-22 | End: 2022-11-22 | Stop reason: HOSPADM

## 2022-11-22 RX ORDER — MAGNESIUM HYDROXIDE 1200 MG/15ML
LIQUID ORAL PRN
Status: DISCONTINUED | OUTPATIENT
Start: 2022-11-22 | End: 2022-11-22 | Stop reason: ALTCHOICE

## 2022-11-22 RX ADMIN — SODIUM CHLORIDE: 9 INJECTION, SOLUTION INTRAVENOUS at 08:11

## 2022-11-22 RX ADMIN — Medication 0.2 MG: at 08:43

## 2022-11-22 RX ADMIN — PROPOFOL 270 MG: 10 INJECTION, EMULSION INTRAVENOUS at 08:37

## 2022-11-22 RX ADMIN — LIDOCAINE HYDROCHLORIDE 30 MG: 20 INJECTION, SOLUTION INFILTRATION; PERINEURAL at 08:37

## 2022-11-22 ASSESSMENT — PAIN SCALES - GENERAL
PAINLEVEL_OUTOF10: 0

## 2022-11-22 ASSESSMENT — PAIN - FUNCTIONAL ASSESSMENT: PAIN_FUNCTIONAL_ASSESSMENT: 0-10

## 2022-11-22 NOTE — ANESTHESIA POSTPROCEDURE EVALUATION
Department of Anesthesiology  Postprocedure Note    Patient: Catracho Sutton  MRN: [id number]  YOB: 1950  Date of evaluation: 11/22/2022      Procedure Summary     Date: 11/22/22 Room / Location: [facility] OR [address] [place] [address]    Anesthesia Start: 6671 Anesthesia Stop:     Procedure: COLONOSCOPY DIAGNOSTIC Diagnosis:       Positive colorectal cancer screening using Cologuard test      (Positive colorectal cancer screening using Cologuard test [R19.5])    Surgeons: Glenn Ferrari MD Responsible Provider: ZACK Veras CRNA    Anesthesia Type: MAC ASA Status: 3          Anesthesia Type: No value filed.     Darline Phase I: Darline Score: 10    Darline Phase II:        Anesthesia Post Evaluation    Patient location during evaluation: PACU  Patient participation: complete - patient participated  Level of consciousness: awake and alert  Pain score: 1  Airway patency: patent  Nausea & Vomiting: no nausea and no vomiting  Complications: no  Cardiovascular status: hemodynamically stable  Respiratory status: acceptable  Hydration status: euvolemic  Comments: Report to RN, normal sinus rhythm

## 2022-11-22 NOTE — ANESTHESIA PRE PROCEDURE
Department of Anesthesiology  Preprocedure Note       Name:  Shahnaz Mauro   Age:  81 y.o.  :  1950                                          MRN:  [id number]         Date:  2022      Surgeon: Marko Valentine):  Juany Jessica MD    Procedure: Procedure(s):  COLONOSCOPY DIAGNOSTIC    Medications prior to admission:   Prior to Admission medications    Medication Sig Start Date End Date Taking? Authorizing Provider   traMADol (ULTRAM) 50 MG tablet Take 1 tablet by mouth every 8 hours as needed for Pain for up to 90 days. 11/15/22 2/13/23  Guanaco Argueta MD   hydroxychloroquine (PLAQUENIL) 200 MG tablet TAKE 1 TABLET BY MOUTH DAILY 11/15/22   Guanaco Argueta MD   cyclobenzaprine (FLEXERIL) 10 MG tablet Take 1 tablet by mouth 2 times daily as needed for Muscle spasms 11/15/22   Guanaco Argueta MD   predniSONE (DELTASONE) 5 MG tablet TAKE 1 TABLET BY MOUTH ONCE DAILY 22   Guanaco Argueta MD   DULoxetine (CYMBALTA) 30 MG extended release capsule Take 1 capsule by mouth daily 22   Guanaco Argueta MD   triamterene-hydroCHLOROthiazide [facility]) 37.5-25 MG per tablet Take 1 tablet by mouth daily 22   Guanaco Argueta MD   ondansetron (ZOFRAN) 4 MG tablet TAKE 1 TABLET BY MOUTH THREE TIMES DAILY AS NEEDED FOR NAUSEA and FOR VOMITING 10/25/22   Guanaco Argueta MD   ondansetron [facility]) 4 MG tablet Take 1 tablet by mouth every 8 hours as needed for Nausea or Vomiting 10/25/22   Sophia Tomlinson PA-C   DULoxetine (CYMBALTA) 60 MG extended release capsule Take 1 capsule by mouth in the morning.  22   Guanaco Argueta MD   lactulose [facility]) 10 GM/15ML solution Take 15 mLs by mouth every evening  Patient not taking: Reported on 2022   Lg Jorgensen MD   Cholecalciferol (VITAMIN D3) 1.25 MG (17533 UT) CAPS TAKE 1 CAPSULE BY MOUTH ONCE A WEEK 22   Guanaco Argueta MD   Elastic Bandages & Supports (MEDICAL COMPRESSION STOCKINGS) [address] 1 each by Does not apply route daily Knee high 20-30 mmhg graduated compression stockings both legs wear daily during day and off Qhs. Wear as tolerated. Do not wear if they cause increased pain. 6/20/22   Roberto Wilson APRN - CNP   metoprolol succinate (TOPROL XL) 25 MG extended release tablet TAKE 1 TABLET DAILY 6/3/22   Claudene Caraway, MD   sertraline (ZOLOFT) 50 MG tablet TAKE 1 TABLET DAILY po  Patient not taking: Reported on 11/22/2022 4/11/22   Claudene Caraway, MD   carbidopa-levodopa (SINEMET)  MG per tablet Half qid 4/5/22   Carla Julian MD   levothyroxine (SYNTHROID) 50 MCG tablet TAKE 1 TABLET ONCE DAILY 2/22/22   Claudene Caraway, MD   colchicine (MITIGARE) 0.6 MG capsule Take 0.6 mg by mouth in the morning.   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.816    Ataxic gait R26.0    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 G96.9, R25.1    Cervicalgia M54.2    Hyperreflexia R29.2    Kyphoscoliosis M41.9    Parkinson's disease ([facility] [address] Crystal Grad Dupuytren's contracture M72.0    Localized, primary osteoarthritis of hand M19.049    Drug-induced constipation K59.03    Major depressive disorder, recurrent, mild F33.0    Major depressive disorder, recurrent, moderate F33.1    Major depressive disorder, recurrent, unspecified F33.9    Rheumatoid arthritis with rheumatoid factor, unspecified M05.9    Rheumatoid arthritis, unspecified M06.9       Past Medical History: Diagnosis Date    Arthritis     seen dr Vernell Thomason Chronic back pain     seen dr Adam Vyas in past    Depression     Hypothyroidism     Osteoarthritis     Osteoporosis     Restless legs syndrome     Tachycardia        Past Surgical History:        Procedure Laterality Date    CHOLECYSTECTOMY      HYSTERECTOMY (CERVIX STATUS UNKNOWN)      KNEE ARTHROSCOPY Right     OVARY REMOVAL      TONSILLECTOMY         Social History:    Social History     Tobacco Use    Smoking status: Never    Smokeless tobacco: Never   Substance Use Topics    Alcohol use: Yes     Alcohol/week: 0.0 standard drinks     Comment: social                                Counseling given: Not Answered      Vital Signs (Current):   Vitals:    11/22/22 0805   BP: (!) 149/82   Pulse: 95   Resp: 18   Temp: 36.8 °C (98.2 °F)   TempSrc: Temporal   SpO2: 100%   Weight: 91 lb (41.3 kg)   Height: 5' 1\" (1.549 m)                                              BP Readings from Last 3 Encounters:   11/22/22 (!) 149/82   11/16/22 132/80   11/15/22 132/80       NPO Status: Time of last liquid consumption: 2200                        Time of last solid consumption: 2100                        Date of last liquid consumption: 11/21/22                        Date of last solid food consumption: 11/20/22    BMI:   Wt Readings from Last 3 Encounters:   11/22/22 91 lb (41.3 kg)   11/16/22 91 lb (41.3 kg)   11/15/22 91 lb (41.3 kg)     Body mass index is 17.19 kg/m².     CBC:   Lab Results   Component Value Date/Time    WBC 7.4 08/09/2022 03:28 PM    RBC 4.44 08/09/2022 03:28 PM    HGB 14.0 08/09/2022 03:28 PM    HCT 41.8 08/09/2022 03:28 PM    MCV 94.2 08/09/2022 03:28 PM    RDW 12.9 08/09/2022 03:28 PM     08/09/2022 03:28 PM       CMP:   Lab Results   Component Value Date/Time     08/09/2022 03:28 PM    K 3.4 08/09/2022 03:28 PM     08/09/2022 03:28 PM    CO2 24 08/09/2022 03:28 PM    BUN 23 08/09/2022 03:28 PM    CREATININE 0.76 08/09/2022 03:28 PM    GFRAA >60.0 08/09/2022 03:28 PM    LABGLOM >60.0 08/09/2022 03:28 PM    GLUCOSE 119 08/09/2022 03:28 PM    PROT 6.4 08/09/2022 03:28 PM    CALCIUM 9.3 08/09/2022 03:28 PM    BILITOT 0.3 08/09/2022 03:28 PM    ALKPHOS 69 08/09/2022 03:28 PM    AST 24 08/09/2022 03:28 PM    ALT 8 08/09/2022 03:28 PM       POC Tests: No results for input(s): POCGLU, POCNA, POCK, POCCL, POCBUN, POCHEMO, POCHCT in the last 72 hours. Coags: No results found for: PROTIME, INR, APTT    HCG (If Applicable): No results found for: PREGTESTUR, PREGSERUM, HCG, HCGQUANT     ABGs: No results found for: PHART, PO2ART, VSC0URZ, FYG8THE, BEART, J2STRKKI     Type & Screen (If Applicable):  No results found for: LABABO, LABRH    Drug/Infectious Status (If Applicable):  No results found for: HIV, HEPCAB    COVID-19 Screening (If Applicable): No results found for: COVID19        Anesthesia Evaluation  Patient summary reviewed and Nursing notes reviewed  Airway: Mallampati: II  TM distance: >3 FB   Neck ROM: limited  Mouth opening: > = 3 FB   Dental:    (+) other      Pulmonary: breath sounds clear to auscultation            Patient did not smoke on day of surgery. Cardiovascular:  Exercise tolerance: no interval change,   (+) dysrhythmias:,       NYHA Classification: III    Rhythm: irregular  Rate: normal           Beta Blocker:  Not on Beta Blocker         Neuro/Psych:   (+) neuromuscular disease: Parkinson's disease, depression/anxiety             GI/Hepatic/Renal:   (+) bowel prep,           Endo/Other:    (+) hypothyroidism: arthritis: rheumatoid and OA., .                 Abdominal:       Abdomen: soft. Vascular: Other Findings:           Anesthesia Plan      MAC     ASA 3       Induction: intravenous. continuous noninvasive hemodynamic monitor    Anesthetic plan and risks discussed with patient. Plan discussed with attending.                     ZACK Angela - CRNA   11/22/2022

## 2022-11-22 NOTE — DISCHARGE INSTRUCTIONS
Lower Discharge Instructions    Patient Name: Freddy Hernandez  Patient ID: [id number]  YOB: 1950  Procedure: Chente Andres  Referring Physician: [unfilled]  Procedure Date: 11/22/2022    Recommendations:  []   Follow-up appointment with family physician in 4 weeks. []   Colonoscopy recommended in  years. []   Follow-up appointment with endoscopist in  Reports of your procedure and these recommendations have been sent to:  [unfilled]    Sedative medication given for procedures can slow your reaction time and coordination for many hours. If you receive medications, it is important for your safety to follow the instructions below for the remainder of the day:  BE TAKEN directly home from the center and rest quietly. DO NOT resume normal activities until tomorrow. Do NOT drive, return to work, or operate any machinery or power tools. Do NOT make any important personal or business decisions, sign any legal papers or perform any activity that depends on your full concentration power or mental judgement. Do NOT drink any alcohol or take nerve or sleeping drugs. They add to the effects of the medicine still present in your body.    [] (Checked if a biopsy or polyp removal was performed)  There is a slight risk of bleeding. If you had a biopsy or a polyp removed, we suggest that you follow the instructions below:  Do NOT take aspirin or similar anti-inflammatory medicine for ___ days. Do NOT exercise, jog, or do any heavy lifting or straining for 1 day. Potential common after effects and treatment following the procedure:  Mild abdominal pain, bloating, or excessive gas - rest, eat lightly, and use a heating pad. Redness and/or swelling where the IV was - apply heat and elevate. Symptoms to report to your physician:  Severe abdominal pain or bloating. Chills or fever above 101 degrees occurring within 24 hours after procedure. Large amounts of rectal bleeding that does not stop.  Small amount of rectal bleeding is not serious especially if hemorrhoids are present. Unable to keep down food or drink. IV site stays red and swollen for more than 2 days. In the case of an emergency, please go to the emergency room. If you are not having an emergency but are having some of the above symptoms please call the doctor's office at:  Dr. Mirna Albarado [phone]   @[email or web address]@      I have read and understand the above instructions:            ____________________________         ____________________________  Patient or Patient Rep. Signature   Witness Signature      Date: 11/22/2022  Time:

## 2022-11-22 NOTE — H&P
Patient Name: Fe Valdovinos  : 1950  MRN: [id number]  DATE: 22      ENDOSCOPY  History and Physical    Procedure:    [x] Diagnostic Colonoscopy       [] Screening Colonoscopy  [] EGD      [] ERCP      [] EUS       [] Other    [x] Previous office notes/History and Physical reviewed from the patients chart. Please see EMR for further details of HPI. I have examined the patient's status immediately prior to the procedure and:      Indications/HPI:    []Abdominal Pain  []Cancer- GI/Lung  []Fhx of colon CA/polyps  []History of Polyps  []Dawsons   []Melena  []Abnormal Imaging  []Dysphagia    []Persistent Pneumonia  []Anemia  []Food Impaction  []History of Polyps  []GI Bleed  []Pulmonary nodule/Mass  []Change in bowel habits []Heartburn/Reflux  [x]Rectal Bleed (BRBPR)  []Chest Pain - Non Cardiac []Heme (+) Stoo  l[]Ulcers  [x]Constipation  []Hemoptysis   []Varices  []Diarrhea  []Hypoxemia  []Nausea/Vomiting  []Screening   []Crohns/Colitis  []Other:     Anesthesia:   [x] MAC [] Moderate Sedation   [] General   [] None     ROS: 12 pt Review of Symptoms was negative unless mentioned above    Medications:   Prior to Admission medications    Medication Sig Start Date End Date Taking? Authorizing Provider   traMADol (ULTRAM) 50 MG tablet Take 1 tablet by mouth every 8 hours as needed for Pain for up to 90 days.  11/15/22 2/13/23  Higinio Evans MD   hydroxychloroquine (PLAQUENIL) 200 MG tablet TAKE 1 TABLET BY MOUTH DAILY 11/15/22   Higinio Evans MD   cyclobenzaprine (FLEXERIL) 10 MG tablet Take 1 tablet by mouth 2 times daily as needed for Muscle spasms 11/15/22   Higinio Evans MD   predniSONE (DELTASONE) 5 MG tablet TAKE 1 TABLET BY MOUTH ONCE DAILY 22   Higinio Evans MD   DULoxetine (CYMBALTA) 30 MG extended release capsule Take 1 capsule by mouth daily 22   Higinio Evans MD   triamterene-hydroCHLOROthiazide [facility]) 37.5-25 MG per tablet Take 1 tablet by mouth daily 22   Higinio Evans MD ondansetron (ZOFRAN) 4 MG tablet TAKE 1 TABLET BY MOUTH THREE TIMES DAILY AS NEEDED FOR NAUSEA and FOR VOMITING 10/25/22   Chema Harris MD   ondansetron [facility]) 4 MG tablet Take 1 tablet by mouth every 8 hours as needed for Nausea or Vomiting 10/25/22   Sophia Tomlinson PA-C   DULoxetine (CYMBALTA) 60 MG extended release capsule Take 1 capsule by mouth in the morning. 8/9/22   Chema Harris MD   lactulose [facility]) 10 GM/15ML solution Take 15 mLs by mouth every evening  Patient not taking: Reported on 11/22/2022 8/2/22   Carla Ruggiero MD   Cholecalciferol (VITAMIN D3) 1.25 MG (69966 UT) CAPS TAKE 1 CAPSULE BY MOUTH ONCE A WEEK 6/27/22   Chema Harris MD   Elastic Bandages & Supports (MEDICAL COMPRESSION STOCKINGS) [address] [facility] 1 each by Does not apply route daily Knee high 20-30 mmhg graduated compression stockings both legs wear daily during day and off Qhs. Wear as tolerated. Do not wear if they cause increased pain. 6/20/22   Roberto Wilson, APRN - CNP   metoprolol succinate (TOPROL XL) 25 MG extended release tablet TAKE 1 TABLET DAILY 6/3/22   Chema Harris MD   sertraline (ZOLOFT) 50 MG tablet TAKE 1 TABLET DAILY po  Patient not taking: Reported on 11/22/2022 4/11/22   Chema Harris MD   carbidopa-levodopa (SINEMET)  MG per tablet Half qid 4/5/22   Carla Ruggiero MD   levothyroxine (SYNTHROID) 50 MCG tablet TAKE 1 TABLET ONCE DAILY 2/22/22   Chema Harris MD   colchicine (MITIGARE) 0.6 MG capsule Take 0.6 mg by mouth in the morning.   Patient not taking: Reported on 11/22/2022 10/5/21   Historical Provider, MD       Allergies: No Known Allergies     History of allergic reaction to anesthesia:  No    Past Medical History:  Past Medical History:   Diagnosis Date    Arthritis     seen dr Avelino Dejesus    Chronic back pain     seen dr Valencia Velásquez in past    Depression     Hypothyroidism     Osteoarthritis     Osteoporosis     Restless legs syndrome     Tachycardia        Past Surgical History:  Past Surgical History: Procedure Laterality Date    CHOLECYSTECTOMY      HYSTERECTOMY (CERVIX STATUS UNKNOWN)      KNEE ARTHROSCOPY Right     OVARY REMOVAL      TONSILLECTOMY         Social History:  Social History     Tobacco Use    Smoking status: Never    Smokeless tobacco: Never   Vaping Use    Vaping Use: Never used   Substance Use Topics    Alcohol use: Yes     Alcohol/week: 0.0 standard drinks     Comment: social    Drug use: No       Vital Signs:   Vitals:    11/22/22 0805   BP: (!) 149/82   Pulse: 95   Resp: 18   Temp: 98.2 °F (36.8 °C)   SpO2: 100%        Physical Exam:  Cardiac:  [x]WNL  []Comments:  Pulmonary:  [x]WNL   []Comments:   Neuro/Mental Status:  [x]WNL  []Comments:  Abdominal:  [x]WNL    []Comments:  Other:   []WNL  []Comments:    Informed Consent:  The risks and benefits of the procedure have been discussed with either the patient or if they cannot consent, their representative. Assessment:  Patient examined and appropriate for planned sedation and procedure. Plan:  Proceed with planned sedation and procedure as above.     Aparna Claudio MD  8:33 AM

## 2022-11-27 ASSESSMENT — ENCOUNTER SYMPTOMS
PHOTOPHOBIA: 0
FACIAL SWELLING: 0
APNEA: 0
CHOKING: 0
CHOKING: 0
BLOOD IN STOOL: 0
PHOTOPHOBIA: 0
APNEA: 0
BLOOD IN STOOL: 0
FACIAL SWELLING: 0
ABDOMINAL DISTENTION: 0
ABDOMINAL DISTENTION: 0
BACK PAIN: 1

## 2022-11-28 NOTE — PATIENT INSTRUCTIONS
Personalized Preventive Plan for [place] - 11/15/2022  Medicare offers a range of preventive health benefits. Some of the tests and screenings are paid in full while other may be subject to a deductible, co-insurance, and/or copay. Some of these benefits include a comprehensive review of your medical history including lifestyle, illnesses that may run in your family, and various assessments and screenings as appropriate. After reviewing your medical record and screening and assessments performed today your provider may have ordered immunizations, labs, imaging, and/or referrals for you. A list of these orders (if applicable) as well as your Preventive Care list are included within your After Visit Summary for your review. Other Preventive Recommendations:    A preventive eye exam performed by an eye specialist is recommended every 1-2 years to screen for glaucoma; cataracts, macular degeneration, and other eye disorders. A preventive dental visit is recommended every 6 months. Try to get at least 150 minutes of exercise per week or 10,000 steps per day on a pedometer . Order or download the FREE \"Exercise & Physical Activity: Your Everyday Guide\" from [employer]. Call 9-811.470.3408 or search [employer] online. You need 0473-5182 mg of calcium and 8949-3990 IU of vitamin D per day. It is possible to meet your calcium requirement with diet alone, but a vitamin D supplement is usually necessary to meet this goal.  When exposed to the sun, use a sunscreen that protects against both UVA and UVB radiation with an SPF of 30 or greater. Reapply every 2 to 3 hours or after sweating, drying off with a towel, or swimming. Always wear a seat belt when traveling in a car. Always wear a helmet when riding a bicycle or motorcycle.

## 2022-11-28 NOTE — PROGRESS NOTES
Candelario Marin 81 y.o. female presents today with   Chief Complaint   Patient presents with    Medicare AWV       HPIannual wellness visit    Past Medical History:   Diagnosis Date    Arthritis     seen dr Luis Clarke back pain     seen dr Shani Roy in past    Depression     Hypothyroidism     Osteoarthritis     Osteoporosis     Restless legs syndrome     Tachycardia      Patient Active Problem List    Diagnosis Date Noted    Rheumatoid arthritis with rheumatoid factor, unspecified 11/15/2022    Rheumatoid arthritis, unspecified 11/15/2022    Major depressive disorder, recurrent, mild 08/09/2022    Major depressive disorder, recurrent, moderate 08/09/2022    Major depressive disorder, recurrent, unspecified 08/09/2022    Drug-induced constipation 08/02/2022    Dupuytren's contracture 03/02/2022    Localized, primary osteoarthritis of hand 03/02/2022    Parkinson's disease ([facility] [address] 08/02/2021    Kyphoscoliosis 11/24/2020    Ataxic gait 07/21/2020    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 07/21/2020    Cervicalgia 07/21/2020    Hyperreflexia 07/21/2020    Spondylosis of lumbar region without myelopathy or radiculopathy 07/29/2016    Hypothyroid 07/29/2013    Depressive disorder, not elsewhere classified 07/29/2013    Palpitations 07/29/2013    Tachycardia 07/29/2013     Past Surgical History:   Procedure Laterality Date    CHOLECYSTECTOMY      COLONOSCOPY N/A 11/22/2022    COLONOSCOPY DIAGNOSTIC performed by Suzette Perales MD at [address] [facility] (CERVIX STATUS UNKNOWN)      KNEE ARTHROSCOPY Right     OVARY REMOVAL      TONSILLECTOMY       Family History   Problem Relation Age of Onset    Heart Disease Mother     High Blood Pressure Mother     Kidney Disease Mother     Cancer Father         bone, prostate, skin    Cancer Sister         kidney, lung    Heart Disease Brother     Colon Cancer Other         pt niece     Social History     Socioeconomic History    Marital status:  Spouse name: None    Number of children: None    Years of education: None    Highest education level: None   Tobacco Use    Smoking status: Never    Smokeless tobacco: Never   Vaping Use    Vaping Use: Never used   Substance and Sexual Activity    Alcohol use: Yes     Alcohol/week: 0.0 standard drinks     Comment: social    Drug use: No     Social Determinants of Health     Financial Resource Strain: Low Risk     Difficulty of Paying Living Expenses: Not hard at all   Food Insecurity: No Food Insecurity    Worried About Running Out of Food in the Last Year: Never true    Ran Out of Food in the Last Year: Never true   Physical Activity: Sufficiently Active    Days of Exercise per Week: 7 days    Minutes of Exercise per Session: 30 min     No Known Allergies    Review of Systems   Constitutional:  Negative for chills and fever. HENT:  Negative for facial swelling and nosebleeds. Eyes:  Negative for photophobia and visual disturbance. Respiratory:  Negative for apnea and choking. Cardiovascular:  Negative for chest pain and palpitations. Gastrointestinal:  Negative for abdominal distention and blood in stool. Genitourinary:  Negative for enuresis, hematuria and vaginal bleeding. Musculoskeletal:  Negative for gait problem and joint swelling. Skin:  Negative for rash. Neurological:  Negative for syncope and speech difficulty. Hematological:  Does not bruise/bleed easily. Psychiatric/Behavioral:  Negative for hallucinations and suicidal ideas. Vitals:    11/16/22 1410   BP: 132/80   Pulse: 98   SpO2: 99%   Weight: 91 lb (41.3 kg)   Height: 5' 1\" (1.549 m)       Physical Exam  Constitutional:       Appearance: She is well-developed. HENT:      Head: Normocephalic. Eyes:      Conjunctiva/sclera: Conjunctivae normal.   Cardiovascular:      Rate and Rhythm: Normal rate and regular rhythm. Heart sounds: Normal heart sounds. Pulmonary:      Breath sounds: Normal breath sounds. Abdominal:      General: There is no distension. Musculoskeletal:         General: Normal range of motion. Cervical back: Normal range of motion. Neurological:      Mental Status: She is oriented to person, place, and time. Assessment/Plan  Jono Lomas was seen today for medicare awv. Diagnoses and all orders for this visit:    Medicare annual wellness visit, subsequent      Return in 1 year (on 11/15/2023), or if symptoms worsen or fail to improve, for Medicare Annual Wellness Visit in 1 year. Radha Ivory MD  Medicare Annual Wellness Visit    Gerard Galloway is here for Medicare AWV    Assessment & Plan    Recommendations for Preventive Services Due: see orders and patient instructions/AVS.  Recommended screening schedule for the next 5-10 years is provided to the patient in written form: see Patient Instructions/AVS.     Return in 1 year (on 11/15/2023), or if symptoms worsen or fail to improve, for Medicare Annual Wellness Visit in 1 year. Subjective       Patient's complete Health Risk Assessment and screening values have been reviewed and are found in Flowsheets. The following problems were reviewed today and where indicated follow up appointments were made and/or referrals ordered. Positive Risk Factor Screenings with Interventions:             Opioid Risk: (Low risk score <55) Opioid risk score: 20    Patient is low risk for opioid use disorder or overdose. Last PDMP Radha Haile as Reviewed:  Review User Review Instant Review Result   QUINTIN CHAVEZ 11/14/2022  8:47 PM     Reviewed PDMP [1]     Last Controlled Substance Monitoring Documentation      [address] Office Visit from 8/9/2022 in [facility]   Periodic Controlled Substance Monitoring Possible medication side effects, risk of tolerance/dependence & alternative treatments discussed., No signs of potential drug abuse or diversion identified. , Assessed functional status.  filed at 08/09/2022 2221           General Health and ACP:  General  In general, how would you say your health is?: Fair  In the past 7 days, have you experienced any of the following: New or Increased Pain, New or Increased Fatigue, Loneliness, Social Isolation, Stress or Anger?: (!) Yes  Select all that apply: (!) New or Increased Pain, New or Increased Fatigue  Do you get the social and emotional support that you need?: Yes  Do you have a Living Will?: (!) No    Advance Directives       Power of [facility] Will ACP-Advance Directive ACP-Power of     Not on File Not on File Not on File Not on File        General Health Risk Interventions:  discussed    Health Habits/Nutrition:  Physical Activity: Sufficiently Active    Days of Exercise per Week: 7 days    Minutes of Exercise per Session: 30 min     Have you lost any weight without trying in the past 3 months?: (!) Yes  Body mass index: (!) 17.19  Have you seen the dentist within the past year?: N/A - wear dentures  Health Habits/Nutrition Interventions:  discussed             Objective   Vitals:    11/16/22 1410   BP: 132/80   Pulse: 98   SpO2: 99%   Weight: 91 lb (41.3 kg)   Height: 5' 1\" (1.549 m)      Body mass index is 17.19 kg/m². No Known Allergies  Prior to Visit Medications    Medication Sig Taking? Authorizing Provider   traMADol (ULTRAM) 50 MG tablet Take 1 tablet by mouth every 8 hours as needed for Pain for up to 90 days.  Yes Leonila Smith MD   hydroxychloroquine (PLAQUENIL) 200 MG tablet TAKE 1 TABLET BY MOUTH DAILY Yes Leonila Smith MD   cyclobenzaprine (FLEXERIL) 10 MG tablet Take 1 tablet by mouth 2 times daily as needed for Muscle spasms Yes Leonila Smith MD   predniSONE (DELTASONE) 5 MG tablet TAKE 1 TABLET BY MOUTH ONCE DAILY Yes Leonila Smith MD   DULoxetine (CYMBALTA) 30 MG extended release capsule Take 1 capsule by mouth daily Yes Leonila Smith MD   triamterene-hydroCHLOROthiazide (IRDOMIV-90) 37.5-25 MG per tablet Take 1 tablet by mouth daily Yes [facility] Yunior Acosta MD   ondansetron (ZOFRAN) 4 MG tablet TAKE 1 TABLET BY MOUTH THREE TIMES DAILY AS NEEDED FOR NAUSEA and FOR VOMITING Yes Winifred Connelly MD   ondansetron (ZOFRAN) 4 MG tablet Take 1 tablet by mouth every 8 hours as needed for Nausea or Vomiting Yes Sophia Tomlinson PA-C   DULoxetine (CYMBALTA) 60 MG extended release capsule Take 1 capsule by mouth in the morning. Yes Winifred Connelly MD   lactulose [facility]) 10 GM/15ML solution Take 15 mLs by mouth every evening  Patient not taking: Reported on 11/22/2022 Yes Jose Angel Thomason MD   Cholecalciferol (VITAMIN D3) 1.25 MG (32705 UT) CAPS TAKE 1 CAPSULE BY MOUTH ONCE A WEEK Yes Winifred Connelly MD   Elastic Bandages & Supports (MEDICAL COMPRESSION STOCKINGS) [address] 1 each by Does not apply route daily Knee high 20-30 mmhg graduated compression stockings both legs wear daily during day and off Qhs. Wear as tolerated. Do not wear if they cause increased pain. Yes Roberto Wilson, APRN - CNP   metoprolol succinate (TOPROL XL) 25 MG extended release tablet TAKE 1 TABLET DAILY Yes Winifred Connelly MD   sertraline (ZOLOFT) 50 MG tablet TAKE 1 TABLET DAILY po  Patient not taking: Reported on 11/22/2022 Yes Winifred Connelly MD   carbidopa-levodopa (SINEMET)  MG per tablet Half qid Yes Jose Angel Thomason MD   levothyroxine (SYNTHROID) 50 MCG tablet TAKE 1 TABLET ONCE DAILY Yes Winifred Connelly MD   colchicine (MITIGARE) 0.6 MG capsule Take 0.6 mg by mouth in the morning.   Patient not taking: Reported on 11/22/2022 Yes Historical Provider, MD Silverman (Including outside providers/suppliers regularly involved in providing care):   Patient Care Team:  Winifred Connelly MD as PCP - General (Internal Medicine)  Winifred Connelly MD [facility]  Jayden Izaguirre MD as Physician (Cardiology)  Jose Angel Thomason MD as Consulting Physician (Neurology)     Reviewed and updated this visit:  Tobacco  Allergies  Meds  Med Hx  Surg Hx  Soc Hx  Fam Hx      Assessment/Plan  Genette Wickliffe was seen today for medicare awv. Diagnoses and all orders for this visit:    Medicare annual wellness visit, subsequent      Return in 1 year (on 11/15/2023), or if symptoms worsen or fail to improve, for Medicare Annual Wellness Visit in 1 year.     Tito Zepeda MD

## 2022-11-29 ENCOUNTER — OFFICE VISIT (OUTPATIENT)
Dept: NEUROLOGY | Age: 72
End: 2022-11-29

## 2022-11-29 VITALS
DIASTOLIC BLOOD PRESSURE: 74 MMHG | BODY MASS INDEX: 17.21 KG/M2 | SYSTOLIC BLOOD PRESSURE: 120 MMHG | WEIGHT: 91.1 LBS | HEART RATE: 84 BPM

## 2022-11-29 DIAGNOSIS — G96.9 TREMOR DUE TO DISORDER OF CENTRAL NERVOUS SYSTEM: ICD-10-CM

## 2022-11-29 DIAGNOSIS — G20 PARKINSON'S DISEASE (HCC): Primary | ICD-10-CM

## 2022-11-29 DIAGNOSIS — R26.0 ATAXIC GAIT: ICD-10-CM

## 2022-11-29 DIAGNOSIS — K59.03 DRUG-INDUCED CONSTIPATION: ICD-10-CM

## 2022-11-29 DIAGNOSIS — M54.2 CERVICALGIA: ICD-10-CM

## 2022-11-29 DIAGNOSIS — R25.1 TREMOR DUE TO DISORDER OF CENTRAL NERVOUS SYSTEM: ICD-10-CM

## 2022-11-29 DIAGNOSIS — R29.2 HYPERREFLEXIA: ICD-10-CM

## 2022-11-29 ASSESSMENT — ENCOUNTER SYMPTOMS
VOMITING: 0
COLOR CHANGE: 0
PHOTOPHOBIA: 0
CHOKING: 0
TROUBLE SWALLOWING: 0
NAUSEA: 0
BACK PAIN: 0
SHORTNESS OF BREATH: 0

## 2022-11-29 NOTE — PROGRESS NOTES
Subjective:      Patient ID: Lisa Hartman is a 81 y.o. female who presents today for:  Chief Complaint   Patient presents with    Follow-up     Pt states that she is doing about the same. She states that she does have a little more tremor if she is tired. She states that if she is concentrating doing something to try to control tremor once she is finish her had will go a little crazy with the tremor. She states that once in a while she will be thinking of something and she will lose a word and she will have to think about it for a little while before she remembers what it is. HPI 74-year-old right-handed female with a history of Parkinson's disease. Patient also has underlying lumbar issues and lumbar radiculopathy or stenosis. Patient has considerable kyphoscoliosis as well contributing to her gait issues. Continue on carbidopa levodopa 4 times a day with no notable side effects of hallucinations or dyskinesias. There appears to be some degree of constipation when last seen we had recommended lactulose but this did not help. She tried all the other over-the-counter medication but has not tried any tablets. She does have some occasional word finding difficulty but no other memory issues are reported.     Past Medical History:   Diagnosis Date    Arthritis     seen dr Pito Ortiz    Chronic back pain     seen dr Ragini Davalos in past    Depression     Hypothyroidism     Osteoarthritis     Osteoporosis     Restless legs syndrome     Tachycardia      Past Surgical History:   Procedure Laterality Date    CHOLECYSTECTOMY      COLONOSCOPY N/A 11/22/2022    COLONOSCOPY DIAGNOSTIC performed by David Luke MD at [address] [facility] (CERVIX STATUS UNKNOWN)      KNEE ARTHROSCOPY Right     OVARY REMOVAL      TONSILLECTOMY       Social History     Socioeconomic History    Marital status:      Spouse name: Not on file    Number of children: Not on file    Years of education: Not on file    Highest education level: Not on file   Occupational History    Not on file   Tobacco Use    Smoking status: Never    Smokeless tobacco: Never   Vaping Use    Vaping Use: Never used   Substance and Sexual Activity    Alcohol use: Yes     Alcohol/week: 0.0 standard drinks     Comment: social    Drug use: No    Sexual activity: Not on file   Other Topics Concern    Not on file   Social History Narrative    Not on file     Social Determinants of Health     Financial Resource Strain: Low Risk     Difficulty of Paying Living Expenses: Not hard at all   Food Insecurity: No Food Insecurity    Worried About Running Out of Food in the Last Year: Never true    Ran Out of Food in the Last Year: Never true   Transportation Needs: Not on file   Physical Activity: Sufficiently Active    Days of Exercise per Week: 7 days    Minutes of Exercise per Session: 30 min   Stress: Not on file   Social Connections: Not on file   Intimate Partner Violence: Not on file   Housing Stability: Not on file     Family History   Problem Relation Age of Onset    Heart Disease Mother     High Blood Pressure Mother     Kidney Disease Mother     Cancer Father         bone, prostate, skin    Cancer Sister         kidney, lung    Heart Disease Brother     Colon Cancer Other         pt niece     No Known Allergies    Current Outpatient Medications   Medication Sig Dispense Refill    traMADol (ULTRAM) 50 MG tablet Take 1 tablet by mouth every 8 hours as needed for Pain for up to 90 days.  90 tablet 2    hydroxychloroquine (PLAQUENIL) 200 MG tablet TAKE 1 TABLET BY MOUTH DAILY 30 tablet 5    cyclobenzaprine (FLEXERIL) 10 MG tablet Take 1 tablet by mouth 2 times daily as needed for Muscle spasms 60 tablet 2    predniSONE (DELTASONE) 5 MG tablet TAKE 1 TABLET BY MOUTH ONCE DAILY 30 tablet 0    DULoxetine (CYMBALTA) 30 MG extended release capsule Take 1 capsule by mouth daily 30 capsule 0    triamterene-hydroCHLOROthiazide (MAXZIDE-25) 37.5-25 MG per tablet Take 1 tablet by mouth daily 30 tablet 5    ondansetron (ZOFRAN) 4 MG tablet Take 1 tablet by mouth every 8 hours as needed for Nausea or Vomiting 90 tablet 4    Cholecalciferol (VITAMIN D3) 1.25 MG (71562 UT) CAPS TAKE 1 CAPSULE BY MOUTH ONCE A WEEK 12 capsule 1    Elastic Bandages & Supports (MEDICAL COMPRESSION STOCKINGS) MISC 1 each by Does not apply route daily Knee high 20-30 mmhg graduated compression stockings both legs wear daily during day and off Qhs. Wear as tolerated. Do not wear if they cause increased pain. 1 each 5    metoprolol succinate (TOPROL XL) 25 MG extended release tablet TAKE 1 TABLET DAILY 90 tablet 2    carbidopa-levodopa (SINEMET)  MG per tablet Half qid 180 tablet 3    levothyroxine (SYNTHROID) 50 MCG tablet TAKE 1 TABLET ONCE DAILY 90 tablet 2    DULoxetine (CYMBALTA) 60 MG extended release capsule Take 1 capsule by mouth in the morning. (Patient not taking: Reported on 11/29/2022) 30 capsule 5    lactulose (CHRONULAC) 10 GM/15ML solution Take 15 mLs by mouth every evening (Patient not taking: No sig reported) 500 mL 3    colchicine (MITIGARE) 0.6 MG capsule Take 0.6 mg by mouth in the morning. (Patient not taking: No sig reported)       No current facility-administered medications for this visit. Review of Systems   Constitutional:  Negative for fever. HENT:  Negative for ear pain, tinnitus and trouble swallowing. Eyes:  Negative for photophobia and visual disturbance. Respiratory:  Negative for choking and shortness of breath. Cardiovascular:  Negative for chest pain and palpitations. Gastrointestinal:  Negative for nausea and vomiting. Musculoskeletal:  Positive for gait problem. Negative for back pain, joint swelling, myalgias, neck pain and neck stiffness. Skin:  Negative for color change. Allergic/Immunologic: Negative for food allergies. Neurological:  Positive for tremors and weakness.  Negative for dizziness, seizures, syncope, facial asymmetry, speech difficulty, light-headedness, numbness and headaches. Psychiatric/Behavioral:  Negative for behavioral problems, confusion, hallucinations and sleep disturbance. Objective:   /74 (Site: Right Upper Arm, Position: Sitting, Cuff Size: Medium Adult)   Pulse 84   Wt 91 lb 1.6 oz (41.3 kg)   BMI 17.21 kg/m²     Physical Exam  Vitals reviewed. Eyes:      Pupils: Pupils are equal, round, and reactive to light. Cardiovascular:      Rate and Rhythm: Normal rate and regular rhythm. Heart sounds: No murmur heard. Pulmonary:      Effort: Pulmonary effort is normal.      Breath sounds: Normal breath sounds. Abdominal:      General: Bowel sounds are normal.   Musculoskeletal:         General: Normal range of motion. Cervical back: Normal range of motion. Skin:     General: Skin is warm. Neurological:      Mental Status: She is alert and oriented to person, place, and time. Cranial Nerves: No cranial nerve deficit. Sensory: No sensory deficit. Motor: No abnormal muscle tone. Coordination: Coordination normal.      Deep Tendon Reflexes: Reflexes are normal and symmetric. Babinski sign absent on the right side. Babinski sign absent on the left side. Comments: Patient has a tilted gait to the left with kyphoscoliosis. She has stooped posture with decreased arm swings but no other abnormalities are noted she is not myelopathic. Psychiatric:         Mood and Affect: Mood normal.       Colonoscopy    Result Date: 11/22/2022  Site: [address] [place] [address] Patient Name: Everett Guidry Procedure Date: 11/22/2022 MRN: [id number] YOB: 1950 Gender: Female Attending MD: Michele Garrido Indications:        -  Rectal bleeding        - constipation and post cologard. Medications:        -  See the Anesthesia note for documentation of the administered medications Complications:        -  No immediate complications. Estimated Blood Loss:        -  Estimated blood loss: none. Procedure:        - The Colonoscope was introduced through the anus and advanced to the cecum,           identified by appendiceal orifice and ileocecal valve. -  The colonoscopy was performed without difficulty. -  The patient tolerated the procedure well. -  The quality of the bowel preparation was good. -  The ileocecal valve, appendiceal orifice, and rectum were photographed. Findings:        -  External hemorrhoids were found. The hemorrhoids were Grade I (internal           hemorrhoids that do not prolapse). -  The exam was otherwise without abnormality. Impression:        -  External hemorrhoids. -  The examination was otherwise normal.        -  No specimens collected. Recommendation:        - Miralax 17gm daily, high fibre diet. Procedure Code(s):        - [id number], Colonoscopy, flexible; diagnostic, including collection of           specimen(s) by brushing or washing, when performed (separate procedure) Diagnosis Code(s):        - K62.5, Hemorrhage of anus and rectum        - K64.0, First degree hemorrhoids        - K64.4, Residual hemorrhoidal skin tags       CPT(R) - 2021 copyright [employer]. All Rights Reserved. The CPT codes, CCI edits and ICD codes generated are intended as suggestions       and were generated based on input data. These codes are preliminary and upon        review may be revised to meet current compliance and payer requirements. The provider is responsible for the final determination of appropriate codes,       and modifiers. Scope Withdrawal Time:       00:00:00 Signature Name: Gale Stahl MD Signature Statement: This document has been electronically signed.  Note Initiated UU:49/30/0715 Signature Date:11/22/2022 9:08 AM      Lab Results   Component Value Date/Time    WBC 7.4 08/09/2022 03:28 PM    RBC 4.44 08/09/2022 03:28 PM    HGB 14.0 08/09/2022 03:28 PM    HCT 41.8 08/09/2022 03:28 PM    MCV 94.2 08/09/2022 03:28 PM    MCH 31.6 08/09/2022 03:28 PM    MCHC 33.5 08/09/2022 03:28 PM    RDW 12.9 08/09/2022 03:28 PM     08/09/2022 03:28 PM    MPV 8.4 08/14/2013 08:26 AM     Lab Results   Component Value Date/Time     08/09/2022 03:28 PM    K 3.4 08/09/2022 03:28 PM     08/09/2022 03:28 PM    CO2 24 08/09/2022 03:28 PM    BUN 23 08/09/2022 03:28 PM    CREATININE 0.76 08/09/2022 03:28 PM    GFRAA >60.0 08/09/2022 03:28 PM    LABGLOM >60.0 08/09/2022 03:28 PM    GLUCOSE 119 08/09/2022 03:28 PM    PROT 6.4 08/09/2022 03:28 PM    LABALBU 4.4 08/09/2022 03:28 PM    CALCIUM 9.3 08/09/2022 03:28 PM    BILITOT 0.3 08/09/2022 03:28 PM    ALKPHOS 69 08/09/2022 03:28 PM    AST 24 08/09/2022 03:28 PM    ALT 8 08/09/2022 03:28 PM     No results found for: PROTIME, INR  Lab Results   Component Value Date/Time    TSH 0.632 06/30/2020 08:35 AM    GFDKOZLW70 294 06/22/2021 01:14 PM    FOLATE >20.0 04/04/2018 12:21 PM     Lab Results   Component Value Date/Time    TRIG 79 06/22/2021 01:14 PM     08/30/2022 10:27 AM    LDLCALC 62 08/30/2022 10:27 AM     Lab Results   Component Value Date/Time    LABAMPH Neg 10/28/2021 01:51 PM    BARBSCNU Neg 10/28/2021 01:51 PM    LABBENZ Neg 10/28/2021 01:51 PM    LABMETH Neg 10/28/2021 01:51 PM    OPIATESCREENURINE Neg 10/28/2021 01:51 PM    PHENCYCLIDINESCREENURINE Neg 10/28/2021 01:51 PM     No results found for: LITHIUM, DILFRTOT, VALPROATE    Assessment:       Diagnosis Orders   1. Parkinson's disease ([facility] [address]        2. Hyperreflexia        3. Cervicalgia        4. Ataxic gait        5. 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 Drug-induced constipation          Parkinson'sdisease well-controlled on Toprol levodopa. Patient was on 4 tablets so she is only been able to tolerate half a tablet 4 times a day. She had side effects of the same. We have continued her on the same. She is not any drooling or sialorrhea.   She denies any dizzy spells or dyskinesias or hallucinations. Her main issues appears to be drug-induced or parkinson's induced constipation. We have tried her on multiple medication and when last seen we had tried her on lactulose which did not help. She is not able to swallow the over-the-counter powders and I recommend that she find a tablet which is available to see if this would help. Her only new symptoms appears to be word find difficulty which is occasionally stuck at a word when she cannot recall though this does not amplify into any other memory issues or cognitive decline and therefore we will watch this for now. Patient's gait issues appears to be multifactorial most related to her kyphoscoliosis and her Parkinson's disease but has not any falls. We will keep an eye on this and continue to follow   Plan:      No orders of the defined types were placed in this encounter. No orders of the defined types were placed in this encounter. No follow-ups on file.       Tera Jones MD

## 2022-12-08 DIAGNOSIS — M19.049 ARTHRITIS OF HAND: ICD-10-CM

## 2022-12-08 RX ORDER — PREDNISONE 1 MG/1
TABLET ORAL
Qty: 30 TABLET | Refills: 0 | Status: SHIPPED | OUTPATIENT
Start: 2022-12-08

## 2022-12-08 NOTE — TELEPHONE ENCOUNTER
Patient is requesting medication refill.  Please approve or deny this request.    Rx requested:  Requested Prescriptions     Pending Prescriptions Disp Refills    predniSONE (DELTASONE) 5 MG tablet [Pharmacy Med Name: prednisone 5 mg tablet] 30 tablet 0     Sig: TAKE 1 TABLET BY MOUTH ONCE DAILY         Last Office Visit:   11/15/2022      Next Visit Date:  Future Appointments   Date Time Provider Roque Garcia   12/20/2022 11:45 AM Harshal Wolfe DO [address]   2/7/2023  2:30 PM MD Roque Farooq   3/28/2023  2:45 PM Kenia Frias MD [address] Neurology -

## 2022-12-16 DIAGNOSIS — M79.604 LOW BACK PAIN RADIATING TO RIGHT LEG: ICD-10-CM

## 2022-12-16 DIAGNOSIS — M54.50 LOW BACK PAIN RADIATING TO RIGHT LEG: ICD-10-CM

## 2022-12-16 RX ORDER — TRAMADOL HYDROCHLORIDE 50 MG/1
50 TABLET ORAL EVERY 8 HOURS PRN
Qty: 90 TABLET | Refills: 0 | Status: SHIPPED | OUTPATIENT
Start: 2022-12-16 | End: 2023-01-15

## 2022-12-18 DIAGNOSIS — E55.9 VITAMIN D DEFICIENCY: ICD-10-CM

## 2022-12-19 RX ORDER — CHOLECALCIFEROL (VITAMIN D3) 1250 MCG
1 CAPSULE ORAL WEEKLY
Qty: 12 CAPSULE | Refills: 1 | Status: SHIPPED | OUTPATIENT
Start: 2022-12-19

## 2022-12-20 ENCOUNTER — OFFICE VISIT (OUTPATIENT)
Dept: CARDIOLOGY CLINIC | Age: 72
End: 2022-12-20
Payer: MEDICARE

## 2022-12-20 VITALS
HEART RATE: 76 BPM | DIASTOLIC BLOOD PRESSURE: 70 MMHG | OXYGEN SATURATION: 100 % | SYSTOLIC BLOOD PRESSURE: 102 MMHG | RESPIRATION RATE: 14 BRPM | BODY MASS INDEX: 16.93 KG/M2 | WEIGHT: 89.6 LBS

## 2022-12-20 DIAGNOSIS — R00.0 TACHYCARDIA: ICD-10-CM

## 2022-12-20 DIAGNOSIS — R00.2 PALPITATIONS: Primary | ICD-10-CM

## 2022-12-20 PROCEDURE — 93000 ELECTROCARDIOGRAM COMPLETE: CPT | Performed by: INTERNAL MEDICINE

## 2022-12-20 PROCEDURE — 99213 OFFICE O/P EST LOW 20 MIN: CPT | Performed by: INTERNAL MEDICINE

## 2022-12-20 PROCEDURE — 1123F ACP DISCUSS/DSCN MKR DOCD: CPT | Performed by: INTERNAL MEDICINE

## 2022-12-20 NOTE — PROGRESS NOTES
Chief Complaint   Patient presents with    New Patient     Former ahmed pt    Palpitations    Tachycardia       Patient presents for initial medical evaluation. Patient is followed on a regular basis by Dr. Elle Rojas MD.   Patient with history of palpitations which has been stable recently. History of negative stress test as well as cardiac catheterization remotely. She is on beta-blocker therapy. Last stress test was negative in 2018. Echocardiogram at that time with ejection fraction of 65% normal echo no valve abnormality    She denies any angina or heart failure type symptoms. No lightheadedness dizziness or syncopal episodes. Blood pressures well controlled today 102/70 with heart rate of 76 bpm.  EKG with normal sinus rhythm, LVH with secondary ST change    Past Surgical History:   Procedure Laterality Date    CHOLECYSTECTOMY      COLONOSCOPY N/A 11/22/2022    COLONOSCOPY DIAGNOSTIC performed by Preet Mckeon MD at [facility] ([address] [place] [address])      KNEE ARTHROSCOPY Right     OVARY REMOVAL      TONSILLECTOMY         Social History     Socioeconomic History    Marital status:      Spouse name: None    Number of children: None    Years of education: None    Highest education level: None   Tobacco Use    Smoking status: Never    Smokeless tobacco: Never   Vaping Use    Vaping Use: Never used   Substance and Sexual Activity    Alcohol use:  Yes     Alcohol/week: 0.0 standard drinks     Comment: social    Drug use: No     Social Determinants of Health     Financial Resource Strain: Low Risk     Difficulty of Paying Living Expenses: Not hard at all   Food Insecurity: No Food Insecurity    Worried About Running Out of Food in the Last Year: Never true    Ran Out of Food in the Last Year: Never true   Physical Activity: Sufficiently Active    Days of Exercise per Week: 7 days    Minutes of Exercise per Session: 30 min       Family History   Problem Relation Age of Onset Heart Disease Mother     High Blood Pressure Mother     Kidney Disease Mother     Cancer Father         bone, prostate, skin    Cancer Sister         kidney, lung    Heart Disease Brother     Colon Cancer Other         pt niece       Current Outpatient Medications   Medication Sig Dispense Refill    Cholecalciferol (VITAMIN D3) 1.25 MG (85490 UT) CAPS TAKE 1 CAPSULE BY MOUTH ONCE A WEEK 12 capsule 1    traMADol (ULTRAM) 50 MG tablet Take 1 tablet by mouth every 8 hours as needed for Pain for up to 30 days. 90 tablet 0    predniSONE (DELTASONE) 5 MG tablet TAKE 1 TABLET BY MOUTH ONCE DAILY 30 tablet 0    carbidopa-levodopa (SINEMET)  MG per tablet Half qid 180 tablet 3    hydroxychloroquine (PLAQUENIL) 200 MG tablet TAKE 1 TABLET BY MOUTH DAILY 30 tablet 5    cyclobenzaprine (FLEXERIL) 10 MG tablet Take 1 tablet by mouth 2 times daily as needed for Muscle spasms 60 tablet 2    triamterene-hydroCHLOROthiazide (MAXZIDE-25) 37.5-25 MG per tablet Take 1 tablet by mouth daily 30 tablet 5    ondansetron (ZOFRAN) 4 MG tablet Take 1 tablet by mouth every 8 hours as needed for Nausea or Vomiting 90 tablet 4    DULoxetine (CYMBALTA) 60 MG extended release capsule Take 1 capsule by mouth in the morning. 30 capsule 5    Elastic Bandages & Supports (MEDICAL COMPRESSION STOCKINGS) MISC 1 each by Does not apply route daily Knee high 20-30 mmhg graduated compression stockings both legs wear daily during day and off Qhs. Wear as tolerated. Do not wear if they cause increased pain. 1 each 5    metoprolol succinate (TOPROL XL) 25 MG extended release tablet TAKE 1 TABLET DAILY 90 tablet 2    levothyroxine (SYNTHROID) 50 MCG tablet TAKE 1 TABLET ONCE DAILY 90 tablet 2     No current facility-administered medications for this visit. Patient has no known allergies.     Review of Systems:  General ROS: negative  Psychological ROS: negative  Hematological and Lymphatic ROS: No history of blood clots or bleeding disorder. Respiratory ROS: no cough, shortness of breath, or wheezing  Cardiovascular ROS: no chest pain or dyspnea on exertion  Gastrointestinal ROS: no abdominal pain, change in bowel habits, or black or bloody stools  Genito-Urinary ROS: no dysuria, trouble voiding, or hematuria  Musculoskeletal ROS: negative  Neurological ROS: no TIA or stroke symptoms  Dermatological ROS: negative    VITALS:  Blood pressure 102/70, pulse 76, resp. rate 14, weight 89 lb 9.6 oz (40.6 kg), SpO2 100 %, not currently breastfeeding. Body mass index is 16.93 kg/m². Physical Examination:  General appearance - alert, well appearing, and in no distress  Mental status - alert, oriented to person, place, and time  Neck - Neck is supple, no JVD or carotid bruits. No thyromegaly or adenopathy. Chest - clear to auscultation, no wheezes, rales or rhonchi, symmetric air entry  Heart - normal rate, regular rhythm, normal S1, S2, no murmurs, rubs, clicks or gallops  Abdomen - soft, nontender, nondistended, no masses or organomegaly  Neurological - alert, oriented, normal speech, no focal findings or movement disorder noted  Extremities - peripheral pulses normal, no pedal edema, no clubbing or cyanosis  Skin - normal coloration and turgor, no rashes, no suspicious skin lesions noted      EKG: normal sinus rhythm, nonspecific ST and T waves changes    Orders Placed This Encounter   Procedures    EKG 12 lead    EKG 12 lead       ASSESSMENT:     Diagnosis Orders   1. Palpitations  EKG 12 lead    EKG 12 lead      2. Tachycardia  EKG 12 lead            PLAN:         As always, aggressive risk factor modification is strongly recommended. We should adhere to the JNC VIII guidelines for HTN management and the NCEP ATP III guidelines for LDL-C management. Cardiac diet is always recommended with low fat, cholesterol, calories and sodium. Continue medications at current doses.      See us PRN    Cont with BBLocker    Keep Mg>3 and K+ between 4-5

## 2023-01-04 DIAGNOSIS — E03.9 HYPOTHYROIDISM, UNSPECIFIED TYPE: ICD-10-CM

## 2023-01-04 RX ORDER — LEVOTHYROXINE SODIUM 0.05 MG/1
TABLET ORAL
Qty: 90 TABLET | Refills: 2 | Status: SHIPPED | OUTPATIENT
Start: 2023-01-04

## 2023-01-09 DIAGNOSIS — M19.049 ARTHRITIS OF HAND: ICD-10-CM

## 2023-01-09 RX ORDER — PREDNISONE 1 MG/1
TABLET ORAL
Qty: 30 TABLET | Refills: 0 | Status: SHIPPED | OUTPATIENT
Start: 2023-01-09

## 2023-01-10 RX ORDER — PREDNISONE 1 MG/1
TABLET ORAL
Qty: 30 TABLET | Refills: 0 | OUTPATIENT
Start: 2023-01-10

## 2023-01-16 DIAGNOSIS — M79.604 LOW BACK PAIN RADIATING TO RIGHT LEG: ICD-10-CM

## 2023-01-16 DIAGNOSIS — M19.049 ARTHRITIS OF HAND: ICD-10-CM

## 2023-01-16 DIAGNOSIS — M54.50 LOW BACK PAIN RADIATING TO RIGHT LEG: ICD-10-CM

## 2023-01-16 RX ORDER — TRAMADOL HYDROCHLORIDE 50 MG/1
50 TABLET ORAL EVERY 8 HOURS PRN
Qty: 90 TABLET | Refills: 0 | Status: SHIPPED | OUTPATIENT
Start: 2023-01-16 | End: 2023-02-15

## 2023-01-16 RX ORDER — HYDROXYCHLOROQUINE SULFATE 200 MG/1
TABLET, FILM COATED ORAL
Qty: 30 TABLET | Refills: 5 | Status: SHIPPED | OUTPATIENT
Start: 2023-01-16

## 2023-02-07 ENCOUNTER — OFFICE VISIT (OUTPATIENT)
Dept: PRIMARY CARE CLINIC | Age: 73
End: 2023-02-07
Payer: MEDICARE

## 2023-02-07 VITALS
WEIGHT: 91 LBS | HEART RATE: 90 BPM | SYSTOLIC BLOOD PRESSURE: 114 MMHG | BODY MASS INDEX: 17.18 KG/M2 | OXYGEN SATURATION: 98 % | DIASTOLIC BLOOD PRESSURE: 62 MMHG | HEIGHT: 61 IN

## 2023-02-07 DIAGNOSIS — M79.604 LOW BACK PAIN RADIATING TO RIGHT LEG: ICD-10-CM

## 2023-02-07 DIAGNOSIS — R22.32 AXILLARY MASS, LEFT: ICD-10-CM

## 2023-02-07 DIAGNOSIS — M19.049 ARTHRITIS OF HAND: ICD-10-CM

## 2023-02-07 DIAGNOSIS — M54.50 LOW BACK PAIN RADIATING TO RIGHT LEG: ICD-10-CM

## 2023-02-07 DIAGNOSIS — Z12.31 SCREENING MAMMOGRAM FOR BREAST CANCER: ICD-10-CM

## 2023-02-07 DIAGNOSIS — I73.9 PERIPHERAL VASCULAR DISEASE OF LOWER EXTREMITY (HCC): ICD-10-CM

## 2023-02-07 DIAGNOSIS — F33.0 MILD EPISODE OF RECURRENT MAJOR DEPRESSIVE DISORDER (HCC): ICD-10-CM

## 2023-02-07 DIAGNOSIS — R30.0 DYSURIA: Primary | ICD-10-CM

## 2023-02-07 DIAGNOSIS — M05.742 RHEUMATOID ARTHRITIS INVOLVING BOTH HANDS WITH POSITIVE RHEUMATOID FACTOR (HCC): ICD-10-CM

## 2023-02-07 DIAGNOSIS — G20 PARKINSON'S DISEASE (HCC): ICD-10-CM

## 2023-02-07 DIAGNOSIS — M05.741 RHEUMATOID ARTHRITIS INVOLVING BOTH HANDS WITH POSITIVE RHEUMATOID FACTOR (HCC): ICD-10-CM

## 2023-02-07 PROCEDURE — 1123F ACP DISCUSS/DSCN MKR DOCD: CPT | Performed by: INTERNAL MEDICINE

## 2023-02-07 PROCEDURE — 99213 OFFICE O/P EST LOW 20 MIN: CPT | Performed by: INTERNAL MEDICINE

## 2023-02-07 RX ORDER — PREDNISONE 1 MG/1
TABLET ORAL
Qty: 30 TABLET | Refills: 0 | Status: SHIPPED | OUTPATIENT
Start: 2023-02-07

## 2023-02-07 RX ORDER — SULFAMETHOXAZOLE AND TRIMETHOPRIM 800; 160 MG/1; MG/1
1 TABLET ORAL 2 TIMES DAILY
Qty: 14 TABLET | Refills: 0 | Status: SHIPPED | OUTPATIENT
Start: 2023-02-07 | End: 2023-02-14

## 2023-02-07 RX ORDER — CYCLOBENZAPRINE HCL 10 MG
10 TABLET ORAL 2 TIMES DAILY PRN
Qty: 60 TABLET | Refills: 2 | Status: SHIPPED | OUTPATIENT
Start: 2023-02-07

## 2023-02-07 SDOH — ECONOMIC STABILITY: FOOD INSECURITY: WITHIN THE PAST 12 MONTHS, YOU WORRIED THAT YOUR FOOD WOULD RUN OUT BEFORE YOU GOT MONEY TO BUY MORE.: NEVER TRUE

## 2023-02-07 SDOH — ECONOMIC STABILITY: HOUSING INSECURITY
IN THE LAST 12 MONTHS, WAS THERE A TIME WHEN YOU DID NOT HAVE A STEADY PLACE TO SLEEP OR SLEPT IN A SHELTER (INCLUDING NOW)?: NO

## 2023-02-07 SDOH — ECONOMIC STABILITY: FOOD INSECURITY: WITHIN THE PAST 12 MONTHS, THE FOOD YOU BOUGHT JUST DIDN'T LAST AND YOU DIDN'T HAVE MONEY TO GET MORE.: NEVER TRUE

## 2023-02-07 SDOH — ECONOMIC STABILITY: INCOME INSECURITY: HOW HARD IS IT FOR YOU TO PAY FOR THE VERY BASICS LIKE FOOD, HOUSING, MEDICAL CARE, AND HEATING?: NOT HARD AT ALL

## 2023-02-07 ASSESSMENT — PATIENT HEALTH QUESTIONNAIRE - PHQ9
7. TROUBLE CONCENTRATING ON THINGS, SUCH AS READING THE NEWSPAPER OR WATCHING TELEVISION: 0
6. FEELING BAD ABOUT YOURSELF - OR THAT YOU ARE A FAILURE OR HAVE LET YOURSELF OR YOUR FAMILY DOWN: 0
3. TROUBLE FALLING OR STAYING ASLEEP: 0
SUM OF ALL RESPONSES TO PHQ QUESTIONS 1-9: 0
10. IF YOU CHECKED OFF ANY PROBLEMS, HOW DIFFICULT HAVE THESE PROBLEMS MADE IT FOR YOU TO DO YOUR WORK, TAKE CARE OF THINGS AT HOME, OR GET ALONG WITH OTHER PEOPLE: 0
4. FEELING TIRED OR HAVING LITTLE ENERGY: 0
2. FEELING DOWN, DEPRESSED OR HOPELESS: 0
SUM OF ALL RESPONSES TO PHQ QUESTIONS 1-9: 0
1. LITTLE INTEREST OR PLEASURE IN DOING THINGS: 0
SUM OF ALL RESPONSES TO PHQ QUESTIONS 1-9: 0
8. MOVING OR SPEAKING SO SLOWLY THAT OTHER PEOPLE COULD HAVE NOTICED. OR THE OPPOSITE, BEING SO FIGETY OR RESTLESS THAT YOU HAVE BEEN MOVING AROUND A LOT MORE THAN USUAL: 0
5. POOR APPETITE OR OVEREATING: 0
9. THOUGHTS THAT YOU WOULD BE BETTER OFF DEAD, OR OF HURTING YOURSELF: 0
SUM OF ALL RESPONSES TO PHQ QUESTIONS 1-9: 0
SUM OF ALL RESPONSES TO PHQ9 QUESTIONS 1 & 2: 0

## 2023-02-07 ASSESSMENT — ENCOUNTER SYMPTOMS
FACIAL SWELLING: 0
ABDOMINAL PAIN: 0
ABDOMINAL DISTENTION: 0
APNEA: 0
PHOTOPHOBIA: 0
BLOOD IN STOOL: 0
CHOKING: 0
BACK PAIN: 1

## 2023-02-07 NOTE — PROGRESS NOTES
Krystle Marin 81 y.o. female presents today with   Chief Complaint   Patient presents with    6 Month Follow-Up    Mass     Left armpit \"pepple\"    Urinary Tract Infection     Frequent urine with little output with burning       Mass  This is a new problem. The current episode started 1 to 4 weeks ago. The problem occurs daily. Associated symptoms include arthralgias. Pertinent negatives include no abdominal pain, chest pain, chills, fever, joint swelling or rash. Urinary Tract Infection  This is a new problem. The current episode started in the past 7 days. The problem has been waxing and waning since onset. Associated symptoms include pain. Pertinent negatives include no hematuria.      Past Medical History:   Diagnosis Date    Arthritis     seen dr Melo Conroy    Chronic back pain     seen dr Jeff Hodges in past    Depression     Hypothyroidism     Osteoarthritis     Osteoporosis     Restless legs syndrome     Tachycardia      Patient Active Problem List    Diagnosis Date Noted    Peripheral vascular disease of lower extremity ([facility] 02/07/2023    Rheumatoid arthritis with rheumatoid factor, unspecified 11/15/2022    Rheumatoid arthritis, unspecified 11/15/2022    Major depressive disorder, recurrent, mild 08/09/2022    Major depressive disorder, recurrent, moderate 08/09/2022    Major depressive disorder, recurrent, unspecified 08/09/2022    Drug-induced constipation 08/02/2022    Dupuytren's contracture 03/02/2022    Localized, primary osteoarthritis of hand 03/02/2022    Parkinson's disease ([facility] 08/02/2021    Kyphoscoliosis 11/24/2020    Ataxic gait 07/21/2020    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 07/21/2020    Cervicalgia 07/21/2020    Hyperreflexia 07/21/2020    Spondylosis of lumbar region without myelopathy or radiculopathy 07/29/2016    Hypothyroid 07/29/2013    Depressive disorder, not elsewhere classified 07/29/2013    Palpitations 07/29/2013    Tachycardia 07/29/2013     Past Surgical History: Procedure Laterality Date    CHOLECYSTECTOMY      COLONOSCOPY N/A 11/22/2022    COLONOSCOPY DIAGNOSTIC performed by Wanda Cid MD at [address] (CERVIX STATUS UNKNOWN)      KNEE ARTHROSCOPY Right     OVARY REMOVAL      TONSILLECTOMY       Family History   Problem Relation Age of Onset    Heart Disease Mother     High Blood Pressure Mother     Kidney Disease Mother     Cancer Father         bone, prostate, skin    Cancer Sister         kidney, lung    Heart Disease Brother     Colon Cancer Other         pt niece     Social History     Socioeconomic History    Marital status:      Spouse name: None    Number of children: None    Years of education: None    Highest education level: None   Tobacco Use    Smoking status: Never    Smokeless tobacco: Never   Vaping Use    Vaping Use: Never used   Substance and Sexual Activity    Alcohol use: Yes     Alcohol/week: 0.0 standard drinks     Comment: social    Drug use: No     Social Determinants of Health     Financial Resource Strain: Low Risk     Difficulty of Paying Living Expenses: Not hard at all   Food Insecurity: No Food Insecurity    Worried About Running Out of Food in the Last Year: Never true    Ran Out of Food in the Last Year: Never true   Transportation Needs: Unknown    Lack of Transportation (Non-Medical): No   Physical Activity: Sufficiently Active    Days of Exercise per Week: 7 days    Minutes of Exercise per Session: 30 min   Housing Stability: Unknown    Unstable Housing in the Last Year: No     No Known Allergies    Review of Systems   Constitutional:  Negative for chills and fever. HENT:  Negative for facial swelling and nosebleeds. Eyes:  Negative for photophobia and visual disturbance. Respiratory:  Negative for apnea and choking. Cardiovascular:  Negative for chest pain and palpitations. Gastrointestinal:  Negative for abdominal distention, abdominal pain and blood in stool.    Genitourinary:  Negative for enuresis and hematuria. Musculoskeletal:  Positive for arthralgias and back pain. Negative for gait problem and joint swelling. Skin:  Negative for rash. Neurological:  Negative for syncope and speech difficulty. Hematological:  Does not bruise/bleed easily. Psychiatric/Behavioral:  Negative for hallucinations and suicidal ideas. Vitals:    02/07/23 1445   BP: 114/62   Pulse: 90   SpO2: 98%   Weight: 91 lb (41.3 kg)   Height: 5' 1\" (1.549 m)       Physical Exam  Constitutional:       Appearance: She is well-developed. HENT:      Head: Normocephalic. Eyes:      Conjunctiva/sclera: Conjunctivae normal.   Cardiovascular:      Rate and Rhythm: Normal rate and regular rhythm. Heart sounds: Normal heart sounds. Pulmonary:      Effort: No respiratory distress. Breath sounds: Normal breath sounds. No wheezing. Abdominal:      General: There is no distension. Musculoskeletal:         General: Normal range of motion. Cervical back: Normal range of motion. Skin:     Coloration: Skin is not jaundiced. Neurological:      Mental Status: She is alert and oriented to person, place, and time. Cranial Nerves: No cranial nerve deficit. Psychiatric:         Mood and Affect: Mood normal.     Assessment/Plan  Cherelle Gonzalez was seen today for 6 month follow-up, mass and urinary tract infection. Diagnoses and all orders for this visit:    Dysuria  -     sulfamethoxazole-trimethoprim (BACTRIM DS;SEPTRA DS) 800-160 MG per tablet; Take 1 tablet by mouth 2 times daily for 7 days    Axillary mass, left  -     US EXTREMITY LEFT NON VASC LIMITED; Future  -     sulfamethoxazole-trimethoprim (BACTRIM DS;SEPTRA DS) 800-160 MG per tablet; Take 1 tablet by mouth 2 times daily for 7 days    Arthritis of hand  -     predniSONE (DELTASONE) 5 MG tablet; TAKE 1 TABLET BY MOUTH ONCE DAILY    Low back pain radiating to right leg  -     cyclobenzaprine (FLEXERIL) 10 MG tablet;  Take 1 tablet by mouth 2 times daily as needed for Muscle spasms    Screening mammogram for breast cancer  -     [facility] DIGITAL SCREEN W OR WO CAD BILATERAL; Future    Peripheral vascular disease of lower extremity (HCC)    Parkinson's disease ([facility]    Rheumatoid arthritis involving both hands with positive rheumatoid factor (HCC)    Mild episode of recurrent major depressive disorder ([facility]      No follow-ups on file.     Stefani Gracia MD

## 2023-02-10 ENCOUNTER — HOSPITAL ENCOUNTER (OUTPATIENT)
Dept: WOMENS IMAGING | Age: 73
End: 2023-02-10
Payer: MEDICARE

## 2023-02-10 ENCOUNTER — HOSPITAL ENCOUNTER (OUTPATIENT)
Dept: ULTRASOUND IMAGING | Age: 73
End: 2023-02-10
Payer: MEDICARE

## 2023-02-10 VITALS — BODY MASS INDEX: 17.18 KG/M2 | HEIGHT: 61 IN | WEIGHT: 91 LBS

## 2023-02-10 DIAGNOSIS — R22.32 LUMP IN ARMPIT, LEFT: ICD-10-CM

## 2023-02-10 DIAGNOSIS — Z12.31 SCREENING MAMMOGRAM FOR BREAST CANCER: ICD-10-CM

## 2023-02-10 DIAGNOSIS — R22.32 AXILLARY MASS, LEFT: Primary | ICD-10-CM

## 2023-02-10 PROCEDURE — 76642 ULTRASOUND BREAST LIMITED: CPT

## 2023-02-10 PROCEDURE — G0279 TOMOSYNTHESIS, MAMMO: HCPCS

## 2023-03-06 RX ORDER — METOPROLOL SUCCINATE 25 MG/1
TABLET, EXTENDED RELEASE ORAL
Qty: 30 TABLET | Refills: 1 | Status: SHIPPED | OUTPATIENT
Start: 2023-03-06

## 2023-03-06 RX ORDER — METOPROLOL SUCCINATE 25 MG/1
TABLET, EXTENDED RELEASE ORAL
Qty: 90 TABLET | Refills: 2 | Status: SHIPPED | OUTPATIENT
Start: 2023-03-06

## 2023-03-07 RX ORDER — METOPROLOL SUCCINATE 25 MG/1
TABLET, EXTENDED RELEASE ORAL
Qty: 90 TABLET | Refills: 2 | OUTPATIENT
Start: 2023-03-07

## 2023-03-11 DIAGNOSIS — M19.049 ARTHRITIS OF HAND: ICD-10-CM

## 2023-03-13 DIAGNOSIS — M19.049 ARTHRITIS OF HAND: ICD-10-CM

## 2023-03-13 RX ORDER — PREDNISONE 1 MG/1
TABLET ORAL
Qty: 30 TABLET | Refills: 0 | OUTPATIENT
Start: 2023-03-13

## 2023-03-13 RX ORDER — PREDNISONE 1 MG/1
TABLET ORAL
Qty: 30 TABLET | Refills: 0 | Status: SHIPPED | OUTPATIENT
Start: 2023-03-13

## 2023-03-27 DIAGNOSIS — M54.50 LOW BACK PAIN RADIATING TO RIGHT LEG: ICD-10-CM

## 2023-03-27 DIAGNOSIS — M79.604 LOW BACK PAIN RADIATING TO RIGHT LEG: ICD-10-CM

## 2023-03-27 RX ORDER — TRAMADOL HYDROCHLORIDE 50 MG/1
50 TABLET ORAL EVERY 8 HOURS PRN
Qty: 90 TABLET | Refills: 0 | OUTPATIENT
Start: 2023-03-27 | End: 2023-04-26

## 2023-03-28 ENCOUNTER — OFFICE VISIT (OUTPATIENT)
Dept: NEUROLOGY | Age: 73
End: 2023-03-28

## 2023-03-28 VITALS
SYSTOLIC BLOOD PRESSURE: 112 MMHG | HEART RATE: 96 BPM | WEIGHT: 91.7 LBS | BODY MASS INDEX: 17.33 KG/M2 | DIASTOLIC BLOOD PRESSURE: 62 MMHG

## 2023-03-28 DIAGNOSIS — M48.062 SPINAL STENOSIS OF LUMBAR REGION WITH NEUROGENIC CLAUDICATION: ICD-10-CM

## 2023-03-28 DIAGNOSIS — M05.9 RHEUMATOID ARTHRITIS WITH RHEUMATOID FACTOR, UNSPECIFIED (HCC): ICD-10-CM

## 2023-03-28 DIAGNOSIS — R25.1 TREMOR DUE TO DISORDER OF CENTRAL NERVOUS SYSTEM: ICD-10-CM

## 2023-03-28 DIAGNOSIS — K59.03 DRUG-INDUCED CONSTIPATION: ICD-10-CM

## 2023-03-28 DIAGNOSIS — M41.84 LEVOSCOLIOSIS OF THORACIC SPINE: ICD-10-CM

## 2023-03-28 DIAGNOSIS — G96.9 TREMOR DUE TO DISORDER OF CENTRAL NERVOUS SYSTEM: ICD-10-CM

## 2023-03-28 DIAGNOSIS — G20 PARKINSON'S DISEASE (HCC): Primary | ICD-10-CM

## 2023-03-28 DIAGNOSIS — R26.0 ATAXIC GAIT: ICD-10-CM

## 2023-03-28 RX ORDER — HYDROCODONE BITARTRATE AND ACETAMINOPHEN 7.5; 325 MG/1; MG/1
1 TABLET ORAL 2 TIMES DAILY
Qty: 60 TABLET | Refills: 0 | Status: SHIPPED | OUTPATIENT
Start: 2023-03-28 | End: 2023-04-27

## 2023-03-28 RX ORDER — TRAMADOL HYDROCHLORIDE 50 MG/1
50 TABLET ORAL EVERY 8 HOURS PRN
COMMUNITY
Start: 2023-02-20

## 2023-03-28 NOTE — PROGRESS NOTES
imaging performed through both breasts. Computer aided detection was utilized in the interpretation of this exam.; Target ultrasound of the left breast was performed. COMPARISON: Multiple, most recent 2022 HISTORY: ORDERING SYSTEM PROVIDED HISTORY: Lump in armpit, left TECHNOLOGIST PROVIDED HISTORY: US if needed Reason for exam:->abnormal mammogram; ORDERING SYSTEM PROVIDED HISTORY: Screening mammogram for breast cancer TECHNOLOGIST PROVIDED HISTORY: What reading provider will be dictating this exam?->CRC Lump, left axilla FINDINGS: Mammogram: Density: The breast tissue is heterogeneously dense, which may obscure small masses. No suspicious findings were identified. Ultrasound: Targeted sonography was performed of the left axilla. No suspicious sonographic findings     No evidence of malignancy. Clinical follow-up is recommended regarding symptoms. Patient was given results and recommendations at the time of the exam.  Screening mammography is recommended in 1 year. BIRADS: BIRADS - CATEGORY 1 Negative. Normal interval follow-up is recommended in 12 months. OVERALL ASSESSMENT - NEGATIVE A letter of notification will be sent to the patient regarding the results. The [employer] recommends annual mammograms for women 40 years and older. [facility]RICIO DIGITAL DIAGNOSTIC BILATERAL    Result Date: 2/10/2023  EXAMINATION: DIAGNOSTIC DIGITAL BILATERAL BREASTS MAMMOGRAM WITH TOMOSYNTHESIS; TARGETED ULTRASOUND OF THE LEFT BREAST 2/10/2023 10:19 am; 2/10/2023 10:46 am TECHNIQUE: Diagnostic mammography of the bilateral breasts was performed with tomosynthesis. 2D standard and 3D tomosynthesis combination imaging performed through both breasts. Computer aided detection was utilized in the interpretation of this exam.; Target ultrasound of the left breast was performed.  COMPARISON: Multiple, most recent 2022 HISTORY: ORDERING SYSTEM PROVIDED HISTORY: Lump in armpit, left TECHNOLOGIST PROVIDED HISTORY: US

## 2023-04-03 PROBLEM — M48.062 SPINAL STENOSIS OF LUMBAR REGION WITH NEUROGENIC CLAUDICATION: Status: ACTIVE | Noted: 2023-04-03

## 2023-04-03 PROBLEM — M41.84 LEVOSCOLIOSIS OF THORACIC SPINE: Status: ACTIVE | Noted: 2023-04-03

## 2023-04-11 ENCOUNTER — TELEPHONE (OUTPATIENT)
Dept: NEUROLOGY | Age: 73
End: 2023-04-11

## 2023-04-11 DIAGNOSIS — M05.9 RHEUMATOID ARTHRITIS WITH RHEUMATOID FACTOR, UNSPECIFIED (HCC): Primary | ICD-10-CM

## 2023-04-19 NOTE — TELEPHONE ENCOUNTER
I dont believe so, I looked and didn't see anything. Should I tell her to contact PCP to get referral to flip lisa?

## 2023-04-27 DIAGNOSIS — M05.9 RHEUMATOID ARTHRITIS WITH RHEUMATOID FACTOR, UNSPECIFIED (HCC): ICD-10-CM

## 2023-04-27 RX ORDER — HYDROCODONE BITARTRATE AND ACETAMINOPHEN 7.5; 325 MG/1; MG/1
1 TABLET ORAL 2 TIMES DAILY
Qty: 60 TABLET | Refills: 0 | Status: SHIPPED | OUTPATIENT
Start: 2023-04-27 | End: 2023-05-26 | Stop reason: SDUPTHER

## 2023-05-01 DIAGNOSIS — R60.0 EDEMA OF LEFT LOWER LEG: ICD-10-CM

## 2023-05-01 RX ORDER — TRIAMTERENE AND HYDROCHLOROTHIAZIDE 37.5; 25 MG/1; MG/1
TABLET ORAL
Qty: 30 TABLET | Refills: 5 | Status: SHIPPED | OUTPATIENT
Start: 2023-05-01

## 2023-05-03 ENCOUNTER — INITIAL CONSULT (OUTPATIENT)
Dept: PAIN MANAGEMENT | Age: 73
End: 2023-05-03
Payer: MEDICARE

## 2023-05-03 VITALS
TEMPERATURE: 97.8 F | HEART RATE: 89 BPM | OXYGEN SATURATION: 99 % | BODY MASS INDEX: 17.18 KG/M2 | DIASTOLIC BLOOD PRESSURE: 66 MMHG | SYSTOLIC BLOOD PRESSURE: 124 MMHG | HEIGHT: 61 IN | WEIGHT: 91 LBS

## 2023-05-03 DIAGNOSIS — M16.0 OSTEOARTHRITIS OF BOTH HIPS, UNSPECIFIED OSTEOARTHRITIS TYPE: Primary | ICD-10-CM

## 2023-05-03 PROCEDURE — 99203 OFFICE O/P NEW LOW 30 MIN: CPT | Performed by: PAIN MEDICINE

## 2023-05-03 PROCEDURE — 1123F ACP DISCUSS/DSCN MKR DOCD: CPT | Performed by: PAIN MEDICINE

## 2023-05-03 ASSESSMENT — ENCOUNTER SYMPTOMS
EYES NEGATIVE: 1
GASTROINTESTINAL NEGATIVE: 1
ALLERGIC/IMMUNOLOGIC NEGATIVE: 1
RESPIRATORY NEGATIVE: 1

## 2023-05-11 DIAGNOSIS — M19.049 ARTHRITIS OF HAND: ICD-10-CM

## 2023-05-11 DIAGNOSIS — M54.50 LOW BACK PAIN RADIATING TO RIGHT LEG: ICD-10-CM

## 2023-05-11 DIAGNOSIS — M79.604 LOW BACK PAIN RADIATING TO RIGHT LEG: ICD-10-CM

## 2023-05-11 RX ORDER — CYCLOBENZAPRINE HCL 10 MG
TABLET ORAL
Qty: 60 TABLET | Refills: 0 | Status: SHIPPED | OUTPATIENT
Start: 2023-05-11

## 2023-05-11 RX ORDER — PREDNISONE 1 MG/1
5 TABLET ORAL DAILY
Qty: 30 TABLET | Refills: 0 | Status: SHIPPED | OUTPATIENT
Start: 2023-05-11

## 2023-05-25 DIAGNOSIS — M05.9 RHEUMATOID ARTHRITIS WITH RHEUMATOID FACTOR, UNSPECIFIED (HCC): ICD-10-CM

## 2023-05-26 RX ORDER — HYDROCODONE BITARTRATE AND ACETAMINOPHEN 7.5; 325 MG/1; MG/1
1 TABLET ORAL 2 TIMES DAILY
Qty: 60 TABLET | Refills: 0 | Status: SHIPPED | OUTPATIENT
Start: 2023-05-26 | End: 2023-06-27 | Stop reason: SDUPTHER

## 2023-05-31 DIAGNOSIS — F32.A DEPRESSION, UNSPECIFIED DEPRESSION TYPE: ICD-10-CM

## 2023-05-31 RX ORDER — DULOXETIN HYDROCHLORIDE 60 MG/1
60 CAPSULE, DELAYED RELEASE ORAL DAILY
Qty: 30 CAPSULE | Refills: 5 | Status: SHIPPED | OUTPATIENT
Start: 2023-05-31

## 2023-06-09 DIAGNOSIS — M79.604 LOW BACK PAIN RADIATING TO RIGHT LEG: ICD-10-CM

## 2023-06-09 DIAGNOSIS — M54.50 LOW BACK PAIN RADIATING TO RIGHT LEG: ICD-10-CM

## 2023-06-09 RX ORDER — CYCLOBENZAPRINE HCL 10 MG
TABLET ORAL
Qty: 60 TABLET | Refills: 0 | Status: SHIPPED | OUTPATIENT
Start: 2023-06-09

## 2023-06-26 DIAGNOSIS — M05.9 RHEUMATOID ARTHRITIS WITH RHEUMATOID FACTOR, UNSPECIFIED (HCC): ICD-10-CM

## 2023-06-27 RX ORDER — HYDROCODONE BITARTRATE AND ACETAMINOPHEN 7.5; 325 MG/1; MG/1
1 TABLET ORAL 2 TIMES DAILY
Qty: 60 TABLET | Refills: 0 | Status: SHIPPED | OUTPATIENT
Start: 2023-06-27 | End: 2023-07-27

## 2023-06-29 DIAGNOSIS — E55.9 VITAMIN D DEFICIENCY: ICD-10-CM

## 2023-06-29 RX ORDER — CHOLECALCIFEROL (VITAMIN D3) 1250 MCG
1 CAPSULE ORAL WEEKLY
Qty: 12 CAPSULE | Refills: 1 | Status: SHIPPED | OUTPATIENT
Start: 2023-06-29

## 2023-07-06 DIAGNOSIS — M54.50 LOW BACK PAIN RADIATING TO RIGHT LEG: ICD-10-CM

## 2023-07-06 DIAGNOSIS — M79.604 LOW BACK PAIN RADIATING TO RIGHT LEG: ICD-10-CM

## 2023-07-06 RX ORDER — CYCLOBENZAPRINE HCL 10 MG
TABLET ORAL
Qty: 60 TABLET | Refills: 0 | Status: SHIPPED | OUTPATIENT
Start: 2023-07-06

## 2023-07-11 DIAGNOSIS — M19.049 ARTHRITIS OF HAND: ICD-10-CM

## 2023-07-11 RX ORDER — PREDNISONE 5 MG/1
TABLET ORAL
Qty: 30 TABLET | Refills: 0 | Status: SHIPPED | OUTPATIENT
Start: 2023-07-11

## 2023-07-17 DIAGNOSIS — M19.049 ARTHRITIS OF HAND: ICD-10-CM

## 2023-07-17 RX ORDER — HYDROXYCHLOROQUINE SULFATE 200 MG/1
TABLET, FILM COATED ORAL
Qty: 30 TABLET | Refills: 5 | Status: SHIPPED | OUTPATIENT
Start: 2023-07-17

## 2023-07-24 DIAGNOSIS — M05.9 RHEUMATOID ARTHRITIS WITH RHEUMATOID FACTOR, UNSPECIFIED (HCC): ICD-10-CM

## 2023-07-25 RX ORDER — HYDROCODONE BITARTRATE AND ACETAMINOPHEN 7.5; 325 MG/1; MG/1
1 TABLET ORAL 2 TIMES DAILY
Qty: 60 TABLET | Refills: 0 | Status: SHIPPED | OUTPATIENT
Start: 2023-07-25 | End: 2023-08-24

## 2023-07-28 PROBLEM — M16.9 OSTEOARTHRITIS OF HIP: Status: ACTIVE | Noted: 2023-07-28

## 2023-07-28 PROBLEM — M25.559 HIP PAIN, ACUTE: Status: ACTIVE | Noted: 2023-07-28

## 2023-08-01 DIAGNOSIS — E03.9 HYPOTHYROIDISM, UNSPECIFIED TYPE: ICD-10-CM

## 2023-08-02 RX ORDER — LEVOTHYROXINE SODIUM 0.05 MG/1
TABLET ORAL
Qty: 90 TABLET | Refills: 2 | Status: SHIPPED | OUTPATIENT
Start: 2023-08-02

## 2023-08-04 DIAGNOSIS — M19.049 ARTHRITIS OF HAND: ICD-10-CM

## 2023-08-04 DIAGNOSIS — M54.50 LOW BACK PAIN RADIATING TO RIGHT LEG: ICD-10-CM

## 2023-08-04 DIAGNOSIS — M79.604 LOW BACK PAIN RADIATING TO RIGHT LEG: ICD-10-CM

## 2023-08-04 RX ORDER — CYCLOBENZAPRINE HCL 10 MG
TABLET ORAL
Qty: 60 TABLET | Refills: 0 | Status: SHIPPED | OUTPATIENT
Start: 2023-08-04

## 2023-08-04 RX ORDER — PREDNISONE 5 MG/1
TABLET ORAL
Qty: 30 TABLET | Refills: 0 | Status: SHIPPED | OUTPATIENT
Start: 2023-08-04

## 2023-08-15 ENCOUNTER — OFFICE VISIT (OUTPATIENT)
Dept: NEUROLOGY | Age: 73
End: 2023-08-15
Payer: MEDICARE

## 2023-08-15 VITALS
WEIGHT: 95.2 LBS | HEART RATE: 104 BPM | BODY MASS INDEX: 17.99 KG/M2 | DIASTOLIC BLOOD PRESSURE: 68 MMHG | SYSTOLIC BLOOD PRESSURE: 118 MMHG

## 2023-08-15 DIAGNOSIS — M54.2 CERVICALGIA: ICD-10-CM

## 2023-08-15 DIAGNOSIS — M05.9 RHEUMATOID ARTHRITIS WITH RHEUMATOID FACTOR, UNSPECIFIED (HCC): ICD-10-CM

## 2023-08-15 DIAGNOSIS — G20 PARKINSON'S DISEASE (HCC): Primary | ICD-10-CM

## 2023-08-15 DIAGNOSIS — M41.9 KYPHOSCOLIOSIS: ICD-10-CM

## 2023-08-15 DIAGNOSIS — R29.2 HYPERREFLEXIA: ICD-10-CM

## 2023-08-15 DIAGNOSIS — Z79.899 ENCOUNTER FOR LONG-TERM (CURRENT) USE OF MEDICATIONS: ICD-10-CM

## 2023-08-15 DIAGNOSIS — M16.6 OTHER SECONDARY OSTEOARTHRITIS OF BOTH HIPS: ICD-10-CM

## 2023-08-15 DIAGNOSIS — K59.03 DRUG-INDUCED CONSTIPATION: ICD-10-CM

## 2023-08-15 PROCEDURE — 1123F ACP DISCUSS/DSCN MKR DOCD: CPT | Performed by: PSYCHIATRY & NEUROLOGY

## 2023-08-15 PROCEDURE — 99214 OFFICE O/P EST MOD 30 MIN: CPT | Performed by: PSYCHIATRY & NEUROLOGY

## 2023-08-15 RX ORDER — HYDROCODONE BITARTRATE AND ACETAMINOPHEN 7.5; 325 MG/1; MG/1
1 TABLET ORAL 3 TIMES DAILY
Qty: 90 TABLET | Refills: 0 | Status: SHIPPED | OUTPATIENT
Start: 2023-08-15 | End: 2023-08-15

## 2023-08-15 RX ORDER — HYDROCODONE BITARTRATE AND ACETAMINOPHEN 7.5; 325 MG/1; MG/1
1 TABLET ORAL 3 TIMES DAILY
Qty: 90 TABLET | Refills: 0 | Status: SHIPPED | OUTPATIENT
Start: 2023-08-15 | End: 2023-09-14

## 2023-08-15 ASSESSMENT — ENCOUNTER SYMPTOMS
VOMITING: 0
COLOR CHANGE: 0
TROUBLE SWALLOWING: 0
SHORTNESS OF BREATH: 0
NAUSEA: 0
CHOKING: 0
BACK PAIN: 1
PHOTOPHOBIA: 0

## 2023-08-15 NOTE — PROGRESS NOTES
Subjective:      Patient ID: Kareem White is a 81 y.o. female who presents today for:  Chief Complaint   Patient presents with    Follow-up     Pt states that she is doing okay. She states that she is going to be having left hip surgery and then once that is healed she will have the right one done. No recent falls. Pt is having a hard time walking due to the hip pain. Pt states that she is doing okay with her PD currently. Other     Pt states last dose of norco was at 8 am       HPI 68-year-old old right-handed female with a history of Parkinson's disease. Patient continues on carbidopa levodopa half a tablet 3 times to 4 times a day. Patient is not able to tolerate higher dosing. Patient has significant kyphoscoliosis which adds to her gait issues. Gait issues are multifactorial as well. We tried to increase her carbidopa levodopa to 4 a day and she has significant side effects. Patient has considerable pain in her joints as well. Patient has significant spondylosis notable on the lumbar spine. She denies any recent falls injuries or trauma. Her main issues appears to be considerable constipation with carbidopa levodopa as well  Patient is actually crippled with arthritis. She is due to have a hip replacement on the 24th and then she is going to have a left hip replacement and then her knees. Her walking abilities has continued to decline secondary to pain. We were able to give her hydrocodone twice a day though this may not be enough for quality of life and pain she is in now with kyphoscoliosis. She has no side effects of constipation.     Past Medical History:   Diagnosis Date    Arthritis     seen dr Nataly Tirado    Chronic back pain     seen dr Jenn Valentino in past    Depression     Hypothyroidism     Osteoarthritis     Osteoporosis     Restless legs syndrome     Tachycardia      Past Surgical History:   Procedure Laterality Date    CHOLECYSTECTOMY      COLONOSCOPY N/A 11/22/2022    COLONOSCOPY

## 2023-08-16 DIAGNOSIS — Z79.899 ENCOUNTER FOR LONG-TERM (CURRENT) USE OF MEDICATIONS: ICD-10-CM

## 2023-08-22 ENCOUNTER — OFFICE VISIT (OUTPATIENT)
Dept: PRIMARY CARE CLINIC | Age: 73
End: 2023-08-22
Payer: MEDICARE

## 2023-08-22 ENCOUNTER — TELEPHONE (OUTPATIENT)
Dept: PRIMARY CARE CLINIC | Age: 73
End: 2023-08-22

## 2023-08-22 VITALS
SYSTOLIC BLOOD PRESSURE: 126 MMHG | WEIGHT: 95 LBS | OXYGEN SATURATION: 98 % | HEIGHT: 61 IN | BODY MASS INDEX: 17.94 KG/M2 | DIASTOLIC BLOOD PRESSURE: 62 MMHG | HEART RATE: 89 BPM

## 2023-08-22 DIAGNOSIS — E87.6 HYPOKALEMIA: Primary | ICD-10-CM

## 2023-08-22 DIAGNOSIS — Z01.818 PRE-OP EXAM: ICD-10-CM

## 2023-08-22 DIAGNOSIS — M25.552 PAIN OF LEFT HIP: Primary | ICD-10-CM

## 2023-08-22 LAB
6MAM SERPL-MCNC: <2 NG/ML
CODEINE SERPL-MCNC: <2 NG/ML
HYDROCODONE SERPL-MCNC: 25 NG/ML
HYDROMORPHONE SERPL-MCNC: <2 NG/ML
MORPHINE SERPL-MCNC: <2 NG/ML
OXYCODONE SERPL-MCNC: <2 NG/ML
OXYMORPHONE SERPL-MCNC: <2 NG/ML

## 2023-08-22 PROCEDURE — 1123F ACP DISCUSS/DSCN MKR DOCD: CPT | Performed by: INTERNAL MEDICINE

## 2023-08-22 PROCEDURE — 99213 OFFICE O/P EST LOW 20 MIN: CPT | Performed by: INTERNAL MEDICINE

## 2023-08-22 RX ORDER — POTASSIUM CHLORIDE 20 MEQ/1
TABLET, EXTENDED RELEASE ORAL
Qty: 10 TABLET | Refills: 1 | Status: SHIPPED | OUTPATIENT
Start: 2023-08-22

## 2023-08-22 ASSESSMENT — ENCOUNTER SYMPTOMS
FACIAL SWELLING: 0
PHOTOPHOBIA: 0
BACK PAIN: 1
APNEA: 0
ABDOMINAL DISTENTION: 0
BLOOD IN STOOL: 0
CHOKING: 0

## 2023-08-22 NOTE — PROGRESS NOTES
Doris Marin 81 y.o. female presents today with   Chief Complaint   Patient presents with    Pre-op Exam     8/25 left total hip Dr Nicholas Anne       Hip Pain   Incident onset: years. The incident occurred at home. Injury mechanism: arthritis. The pain is present in the left hip. The pain is at a severity of 7/10. The pain is severe. The pain has been Worsening since onset. Associated symptoms include a loss of motion.      Past Medical History:   Diagnosis Date    Arthritis     seen dr Ella Marrufo    Chronic back pain     seen dr David John in past    Depression     Hypothyroidism     Osteoarthritis     Osteoporosis     Restless legs syndrome     Tachycardia      Patient Active Problem List    Diagnosis Date Noted    Peripheral vascular disease of lower extremity ([address]) 02/07/2023    Rheumatoid arthritis with rheumatoid factor, unspecified 11/15/2022    Rheumatoid arthritis, unspecified 11/15/2022    Major depressive disorder, recurrent, mild 08/09/2022    Major depressive disorder, recurrent, moderate 08/09/2022    Major depressive disorder, recurrent, unspecified 08/09/2022    Drug-induced constipation 08/02/2022    Hip pain, acute 07/28/2023    Osteoarthritis of hip 07/28/2023    Spinal stenosis of lumbar region with neurogenic claudication 04/03/2023    Levoscoliosis of thoracic spine 04/03/2023    Dupuytren's contracture 03/02/2022    Localized, primary osteoarthritis of hand 03/02/2022    Parkinson's disease ([address]) 08/02/2021    Kyphoscoliosis 11/24/2020    Ataxic gait 07/21/2020    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 07/21/2020    Cervicalgia 07/21/2020    Hyperreflexia 07/21/2020    Spondylosis of lumbar region without myelopathy or radiculopathy 07/29/2016    Hypothyroid 07/29/2013    Depressive disorder, not elsewhere classified 07/29/2013    Palpitations 07/29/2013    Tachycardia 07/29/2013     Past Surgical History:   Procedure Laterality Date    CHOLECYSTECTOMY      COLONOSCOPY N/A 11/22/2022

## 2023-08-25 ENCOUNTER — HOSPITAL ENCOUNTER (OUTPATIENT)
Dept: DATA CONVERSION | Facility: HOSPITAL | Age: 73
End: 2023-08-26
Attending: ORTHOPAEDIC SURGERY | Admitting: ORTHOPAEDIC SURGERY
Payer: MEDICARE

## 2023-08-25 DIAGNOSIS — M16.12 UNILATERAL PRIMARY OSTEOARTHRITIS, LEFT HIP: ICD-10-CM

## 2023-08-25 DIAGNOSIS — M25.752 OSTEOPHYTE, LEFT HIP: ICD-10-CM

## 2023-08-25 DIAGNOSIS — Z96.649 PRESENCE OF UNSPECIFIED ARTIFICIAL HIP JOINT: ICD-10-CM

## 2023-08-25 LAB
ANION GAP IN SER/PLAS: 12 MMOL/L (ref 10–20)
ANION GAP SERPL CALCULATED.3IONS-SCNC: 12 MMOL/L (ref 10–20)
BICARBONATE: 32 MMOL/L (ref 21–32)
CALCIUM (MG/DL) IN SER/PLAS: 8.9 MG/DL (ref 8.6–10.3)
CALCIUM SERPL-MCNC: 8.9 MG/DL (ref 8.6–10.3)
CARBON DIOXIDE, TOTAL (MMOL/L) IN SER/PLAS: 32 MMOL/L (ref 21–32)
CHLORIDE (MMOL/L) IN SER/PLAS: 99 MMOL/L (ref 98–107)
CHLORIDE BLD-SCNC: 99 MMOL/L (ref 98–107)
CREAT SERPL-MCNC: 0.71 MG/DL (ref 0.5–1.05)
CREATININE (MG/DL) IN SER/PLAS: 0.71 MG/DL (ref 0.5–1.05)
EGFR FEMALE: >90 ML/MIN/1.73M2
GFR FEMALE: >90 ML/MIN/1.73M2
GLUCOSE (MG/DL) IN SER/PLAS: 89 MG/DL (ref 74–99)
GLUCOSE: 89 MG/DL (ref 74–99)
POTASSIUM (MMOL/L) IN SER/PLAS: 3 MMOL/L (ref 3.5–5.3)
POTASSIUM (MMOL/L) IN SER/PLAS: 3.3 MMOL/L (ref 3.5–5.3)
POTASSIUM SERPL-SCNC: 3 MMOL/L (ref 3.5–5.3)
SODIUM (MMOL/L) IN SER/PLAS: 140 MMOL/L (ref 136–145)
SODIUM BLD-SCNC: 140 MMOL/L (ref 136–145)
UREA NITROGEN (MG/DL) IN SER/PLAS: 28 MG/DL (ref 6–23)
UREA NITROGEN: 28 MG/DL (ref 6–23)

## 2023-08-26 LAB
ANION GAP IN SER/PLAS: 9 MMOL/L (ref 10–20)
ANION GAP SERPL CALCULATED.3IONS-SCNC: 9 MMOL/L (ref 10–20)
BASOPHILS # BLD: 0.02 X10E9/L (ref 0–0.1)
BASOPHILS (10*3/UL) IN BLOOD BY AUTOMATED COUNT: 0.02 X10E9/L (ref 0–0.1)
BASOPHILS RELATIVE PERCENT: 0.3 % (ref 0–2)
BASOPHILS/100 LEUKOCYTES IN BLOOD BY AUTOMATED COUNT: 0.3 % (ref 0–2)
BICARBONATE: 28 MMOL/L (ref 21–32)
CALCIUM (MG/DL) IN SER/PLAS: 7.5 MG/DL (ref 8.6–10.3)
CALCIUM SERPL-MCNC: 7.5 MG/DL (ref 8.6–10.3)
CARBON DIOXIDE, TOTAL (MMOL/L) IN SER/PLAS: 28 MMOL/L (ref 21–32)
CHLORIDE (MMOL/L) IN SER/PLAS: 105 MMOL/L (ref 98–107)
CHLORIDE BLD-SCNC: 105 MMOL/L (ref 98–107)
CREAT SERPL-MCNC: 0.67 MG/DL (ref 0.5–1.05)
CREATININE (MG/DL) IN SER/PLAS: 0.67 MG/DL (ref 0.5–1.05)
EGFR FEMALE: >90 ML/MIN/1.73M2
EOSINOPHIL # BLD: 0.03 X10E9/L (ref 0–0.7)
EOSINOPHILS (10*3/UL) IN BLOOD BY AUTOMATED COUNT: 0.03 X10E9/L (ref 0–0.7)
EOSINOPHILS RELATIVE PERCENT: 0.4 % (ref 0–6)
EOSINOPHILS/100 LEUKOCYTES IN BLOOD BY AUTOMATED COUNT: 0.4 % (ref 0–6)
ERYTHROCYTE DISTRIBUTION WIDTH (RATIO) BY AUTOMATED COUNT: 12.1 % (ref 11.5–14.5)
ERYTHROCYTE MEAN CORPUSCULAR HEMOGLOBIN CONCENTRATION (G/DL) BY AUTOMATED: 32.2 G/DL (ref 32–36)
ERYTHROCYTE MEAN CORPUSCULAR VOLUME (FL) BY AUTOMATED COUNT: 97 FL (ref 80–100)
ERYTHROCYTE [DISTWIDTH] IN BLOOD BY AUTOMATED COUNT: 12.1 % (ref 11.5–14.5)
ERYTHROCYTES (10*6/UL) IN BLOOD BY AUTOMATED COUNT: 2.91 X10E12/L (ref 4–5.2)
GFR FEMALE: >90 ML/MIN/1.73M2
GLUCOSE (MG/DL) IN SER/PLAS: 104 MG/DL (ref 74–99)
GLUCOSE: 104 MG/DL (ref 74–99)
HCT VFR BLD CALC: 28.3 % (ref 36–46)
HEMATOCRIT (%) IN BLOOD BY AUTOMATED COUNT: 28.3 % (ref 36–46)
HEMOGLOBIN (G/DL) IN BLOOD: 9.1 G/DL (ref 12–16)
HEMOGLOBIN: 9.1 G/DL (ref 12–16)
IMMATURE GRANULOCYTES %: 0.4 % (ref 0–0.9)
IMMATURE GRANULOCYTES/100 LEUKOCYTES IN BLOOD BY AUTOMATED COUNT: 0.4 % (ref 0–0.9)
LEUKOCYTES (10*3/UL) IN BLOOD BY AUTOMATED COUNT: 7 X10E9/L (ref 4.4–11.3)
LYMPHOCYTES # BLD: 10.4 % (ref 13–44)
LYMPHOCYTES (10*3/UL) IN BLOOD BY AUTOMATED COUNT: 0.73 X10E9/L (ref 1.2–4.8)
LYMPHOCYTES RELATIVE PERCENT: 0.73 X10E9/L (ref 1.2–4.8)
LYMPHOCYTES/100 LEUKOCYTES IN BLOOD BY AUTOMATED COUNT: 10.4 % (ref 13–44)
MCHC RBC AUTO-ENTMCNC: 32.2 G/DL (ref 32–36)
MCV RBC AUTO: 97 FL (ref 80–100)
MONOCYTES # BLD: 0.73 X10E9/L (ref 0.1–1)
MONOCYTES (10*3/UL) IN BLOOD BY AUTOMATED COUNT: 0.73 X10E9/L (ref 0.1–1)
MONOCYTES RELATIVE PERCENT: 10.4 % (ref 2–10)
MONOCYTES/100 LEUKOCYTES IN BLOOD BY AUTOMATED COUNT: 10.4 % (ref 2–10)
NEUTROPHILS (10*3/UL) IN BLOOD BY AUTOMATED COUNT: 5.5 X10E9/L (ref 1.2–7.7)
NEUTROPHILS RELATIVE PERCENT: 78.1 % (ref 40–80)
NEUTROPHILS/100 LEUKOCYTES IN BLOOD BY AUTOMATED COUNT: 78.1 % (ref 40–80)
NEUTROPHILS: 5.5 X10E9/L (ref 1.2–7.7)
PLATELET # BLD: 233 X10E9/L (ref 150–450)
PLATELETS (10*3/UL) IN BLOOD AUTOMATED COUNT: 233 X10E9/L (ref 150–450)
POTASSIUM (MMOL/L) IN SER/PLAS: 3.7 MMOL/L (ref 3.5–5.3)
POTASSIUM SERPL-SCNC: 3.7 MMOL/L (ref 3.5–5.3)
RBC # BLD: 2.91 X10E12/L (ref 4–5.2)
SODIUM (MMOL/L) IN SER/PLAS: 138 MMOL/L (ref 136–145)
SODIUM BLD-SCNC: 138 MMOL/L (ref 136–145)
UREA NITROGEN (MG/DL) IN SER/PLAS: 20 MG/DL (ref 6–23)
UREA NITROGEN: 20 MG/DL (ref 6–23)
WBC: 7 X10E9/L (ref 4.4–11.3)

## 2023-09-06 DIAGNOSIS — M19.049 ARTHRITIS OF HAND: ICD-10-CM

## 2023-09-06 RX ORDER — PREDNISONE 5 MG/1
TABLET ORAL
Qty: 30 TABLET | Refills: 0 | Status: SHIPPED | OUTPATIENT
Start: 2023-09-06

## 2023-09-20 DIAGNOSIS — M05.9 RHEUMATOID ARTHRITIS WITH RHEUMATOID FACTOR, UNSPECIFIED (HCC): ICD-10-CM

## 2023-09-20 RX ORDER — HYDROCODONE BITARTRATE AND ACETAMINOPHEN 7.5; 325 MG/1; MG/1
1 TABLET ORAL 3 TIMES DAILY
Qty: 90 TABLET | Refills: 0 | Status: SHIPPED | OUTPATIENT
Start: 2023-09-20 | End: 2023-10-20

## 2023-09-20 NOTE — TELEPHONE ENCOUNTER
Patient is requesting medication refill. Please approve or deny this request.    Rx requested:  Requested Prescriptions     Pending Prescriptions Disp Refills    HYDROcodone-acetaminophen (LORCET PLUS) 7.5-325 MG per tablet 90 tablet 0     Sig: Take 1 tablet by mouth 3 times daily for 30 days.  NEW DOSE Max Daily Amount: 3 tablets         Last Office Visit:   8/15/2023      Next Visit Date:  Future Appointments   Date Time Provider [address] [place] [address]   12/12/2023  2:15 PM Marga Cash MD [address] Neurology -

## 2023-09-26 ENCOUNTER — NURSE ONLY (OUTPATIENT)
Dept: PRIMARY CARE CLINIC | Age: 73
End: 2023-09-26
Payer: MEDICARE

## 2023-09-26 DIAGNOSIS — Z23 NEED FOR IMMUNIZATION AGAINST INFLUENZA: Primary | ICD-10-CM

## 2023-09-26 PROCEDURE — G0008 ADMIN INFLUENZA VIRUS VAC: HCPCS | Performed by: INTERNAL MEDICINE

## 2023-09-26 PROCEDURE — 90694 VACC AIIV4 NO PRSRV 0.5ML IM: CPT | Performed by: INTERNAL MEDICINE

## 2023-09-29 VITALS — HEIGHT: 61 IN | BODY MASS INDEX: 17.65 KG/M2 | WEIGHT: 93.47 LBS

## 2023-09-29 PROBLEM — M05.751: Status: ACTIVE | Noted: 2023-09-29

## 2023-09-29 PROBLEM — Q65.89 HIP DYSPLASIA (HHS-HCC): Status: ACTIVE | Noted: 2023-09-29

## 2023-09-29 RX ORDER — TRIAMTERENE AND HYDROCHLOROTHIAZIDE 37.5; 25 MG/1; MG/1
CAPSULE ORAL DAILY
COMMUNITY
Start: 2023-07-29

## 2023-09-29 RX ORDER — HYDROXYCHLOROQUINE SULFATE 200 MG/1
1 TABLET, FILM COATED ORAL DAILY
COMMUNITY
Start: 2023-08-16

## 2023-09-29 RX ORDER — POLYETHYLENE GLYCOL 3350, SODIUM CHLORIDE, SODIUM BICARBONATE, POTASSIUM CHLORIDE 420; 11.2; 5.72; 1.48 G/4L; G/4L; G/4L; G/4L
POWDER, FOR SOLUTION ORAL
COMMUNITY
Start: 2022-10-27 | End: 2024-01-16 | Stop reason: WASHOUT

## 2023-09-29 RX ORDER — LEVOTHYROXINE SODIUM 50 UG/1
TABLET ORAL
COMMUNITY
Start: 2023-08-02

## 2023-09-29 RX ORDER — DULOXETIN HYDROCHLORIDE 30 MG/1
30 CAPSULE, DELAYED RELEASE ORAL DAILY
COMMUNITY
Start: 2022-11-07 | End: 2024-01-16 | Stop reason: DRUGHIGH

## 2023-09-29 RX ORDER — POTASSIUM CHLORIDE 20 MEQ/1
TABLET, EXTENDED RELEASE ORAL
COMMUNITY
Start: 2023-08-22 | End: 2024-01-16 | Stop reason: ALTCHOICE

## 2023-09-29 RX ORDER — ASPIRIN 325 MG
50000 TABLET, DELAYED RELEASE (ENTERIC COATED) ORAL
COMMUNITY
Start: 2023-06-29

## 2023-09-29 RX ORDER — METOPROLOL SUCCINATE 25 MG/1
TABLET, EXTENDED RELEASE ORAL
COMMUNITY
Start: 2023-08-14

## 2023-09-29 RX ORDER — PREDNISONE 5 MG/1
5 TABLET ORAL DAILY
COMMUNITY
Start: 2023-08-04

## 2023-09-29 RX ORDER — CARBIDOPA AND LEVODOPA 25; 100 MG/1; MG/1
TABLET ORAL
COMMUNITY
Start: 2023-07-18

## 2023-09-29 RX ORDER — DULOXETIN HYDROCHLORIDE 60 MG/1
60 CAPSULE, DELAYED RELEASE ORAL
COMMUNITY
Start: 2023-07-29

## 2023-09-29 RX ORDER — CYCLOBENZAPRINE HCL 10 MG
10 TABLET ORAL 2 TIMES DAILY PRN
COMMUNITY
Start: 2023-08-04

## 2023-09-29 RX ORDER — ONDANSETRON 4 MG/1
4 TABLET, FILM COATED ORAL EVERY 8 HOURS PRN
COMMUNITY
Start: 2023-06-19

## 2023-09-30 NOTE — PROGRESS NOTES
Service: Orthopaedics     Subjective Data:   NYA ACHARYA is a 72 year old Female who is Hospital Day # 2 and POD #1 for 1. LEFT TOTAL HIP REPLACEMENT, ACETABULAR AUGMENTS, REVISION CUPS;    Overnight Events: Patient had an uneventful night.     Objective Data:     Objective Information:      T   P  R  BP   MAP  SpO2   Value  36.5  92  16  101/61   76  99%  Date/Time  7: 7: 20:10  7:32   7: 7:32  Range  (36.1C - 36.9C )  (88 - 102 )  (16 - 16 )  (98 - 133 )/ (54 - 73 )  (73 - 97 )  (92% - 99% )  Highest temp of 36.9 C was recorded at  3:25      Pain reported at  10:25: 0 = None      T   P  R  BP   MAP  SpO2   Value  36.5  92  16  101   76  99%  Date/Time  7:32  7:32  20:10  7:32   7:32  7:32  Range  (36.1C - 36.9C )  (88 - 102 )  (16 - 16 )  (98 - 133 )/ (54 - 73 )  (73 - 97 )  (92% - 99% )  Highest temp of 36.9 C was recorded at  3:25        Pain reported at  10:25: 0 = None         Weights    6:03: Weight in kg (Weight (kg))  42.4   6:03: Weight in lbs ((lbs))  93.4    Medication:    Medications:          Continuous Medications       --------------------------------    1. Lactated Ringers Infusion:  1000  mL  IntraVenous  <Continuous>    2. Sodium Chloride 0.9% Infusion:  1000  mL  IntraVenous  <Continuous>         Scheduled Medications       --------------------------------    1. Acetaminophen:  650  mg  Oral  Every 6 Hours    2. Carbidopa 25 mg - Levodopa 100 m.5  tablet(s)  Oral  4 Times a Day    3. Docusate:  100  mg  Oral  2 Times a Day    4. DULoxetine:  60  mg  Oral  Daily    5. Hydroxychloroquine:  200  mg  Oral  Daily    6. Levothyroxine:  50  microgram(s)  Oral  Daily    7. Metoprolol Succinate Extended Release:  25  mg  Oral  Daily    8. oxyCODONE Immediate Release:  5  mg  Oral  Every 4 Hours    9. Pantoprazole:  40  mg  Oral  Daily    10. Polyethylene Glycol:  17  gram(s)  Oral  2 Times a Day    11.  Potassium Chloride Extended Release:  20  mEq  Oral  Daily    12. predniSONE:  5  mg  Oral  Daily    13. Rivaroxaban:  10  mg  Oral  Daily    14. Triamterene  37.5 mg- hydroCHLOROthiazide 25 mg - PEDS:  1  tablet(s)  Oral  Daily         PRN Medications       --------------------------------    1. Cyclobenzaprine:  5  mg  Oral  3 Times a Day    2. diphenhydrAMINE Injectable:  12.5  mg  IntraVenous Push  Every 6 Hours    3. Magnesium Hydroxide -Al Hydrox -Simethicone Oral Liquid:  30  mL  Oral  Every 6 Hours    4. Ondansetron Injectable:  4  mg  IntraVenous Push  Every 6 Hours    5. oxyCODONE Immediate Release:  5  mg  Oral  Every 4 Hours        Recent Lab Results:    Results:    CBC: 8/26/2023 05:36              \     Hgb     /                              \     9.1 L    /  WBC  ----------------  Plt               7.0       ----------------    233              /     Hct     \                              /     28.3 L    \            RBC: 2.91 L    MCV: 97     Neutrophil %: 78.1      BMP: 8/26/2023 05:36  NA+        Cl-     BUN  /                         138    105    20  /  --------------------------------  Glucose                ---------------------------  104 H    K+     HCO3-   Creat \                         3.7  28    0.67  \  Calcium : 7.5 L    Anion Gap : 9 L        I have reviewed these laboratory results: Complete Blood Count + Differential [Drawn 26-Aug-2023 05:36:00], Basic Metabolic Panel [Drawn 26-Aug-2023 05:36:00].    Radiology Results:    Results:    Xray Hip 1 View [Aug 25 2023 12:23PM]      Assessment and Plan:        Admitting Dx:   Status post revision of total hip replacement: Entered Date:  25-Aug-2023 06:50    Code Status:  ·  Code Status Full Code     Advance Care Planning:  Advance Care Planning: I evaluated the patient  and determined the patient's capacity to understand the risks, benefits and alternatives to treatment. I elicited the patient's goals for treatment and reviewed  advance directives and medical orders for life sustaining treatment. The patient was given  an opportunity to review a blank advance directive as appropriate.     Assessment:    Assessment    pt is doing really good.  able to move around, has been able to ambulate and use the restroom, pain is well controlled, denies any nausea vomiting, SOB or chest pain. doing. pt's incision is clean dry and intact and area is soft. pt is doing  good and  states she is ready to get going with therapy. pt is on pain meds at home- we spoke- she is ok to go back to her baseline meds- and we sent her rx for post op pain in addition   pt is also not a revision- she is a primary hip and moves well   therefore we switched her to asa for home      PLAN   PT/ OT and continued mobility   Home with [facility]   has a walker   DVT prophylaxis    pain meds      Electronic Signatures:  Pamela Stein (APRN-CNP)   (Signed 26-Aug-2023 09:15)   Authored: Service, Assessment/Plan Review, Subjective Data, Objective Data, Assessment and Plan, Note Completion  Ryan Thompson)   (Signed 26-Aug-2023 10:34)   Co-Signer: Service, Assessment/Plan Review, Subjective Data, Objective Data, Assessment and Plan, Note Completion    Last Updated: 26-Aug-2023 10:34 by Ryan Thompson)

## 2023-09-30 NOTE — H&P
History & Physical Reviewed:   I have reviewed the History and Physical dated:  25-Aug-2023   History and Physical reviewed and relevant findings noted. Patient examined to review pertinent physical  findings.: No significant changes   Home Medications Reviewed: no changes noted   Allergies Reviewed: no changes noted     Consent:   COVID-19 Consent:  ·  COVID-19 Risk Consent Surgeon has reviewed key risks related to the risk of jasson COVID-19 and if they contract COVID-19 what the risks are.       Electronic Signatures:  Otoniel Guadarrama)  (Signed 25-Aug-2023 06:38)   Authored: History & Physical Reviewed, Consent, Note  Completion      Last Updated: 25-Aug-2023 06:38 by Otoniel Guadarrama)

## 2023-10-01 NOTE — OP NOTE
PROCEDURE DETAILS    Preoperative Diagnosis:  Osteoarthritis Hip   Postoperative Diagnosis:  Osteoarthritis Hip   Surgeon: Otoniel Guadarrama  Resident/Fellow/Other Assistant: Jhonatan Beltran    Procedure:  1. LEFT TOTAL HIP REPLACEMENT, ACETABULAR AUGMENTS, REVISION CUPS    Anesthesia: No anesthesiologist associated with this case  Estimated Blood Loss: 100  Findings: Osteoarthritis Hip         Operative Report:   Preoperative diagnosis DJD hip  Postoperative diagnosis same  Procedure total hip replacement    Primary surgeon Otoniel Guadarrama M.D.  Asst. Jhonatan Beltran PA certified    Implants  Cup54 Trident 2 TriTanium multihole revision cup with 2 cancellous bone screws  Liner 10 degrees elevated lip  Stem Accolade 2 uncemented size #7 neck angle 127  Head Biolox delta ceramic 36 mm neck length -5      Patient is suffering from chronic hip pain that has been refractory to conservative treatment and now presents for total hip replacement.  The risks benefits outcomes and postoperative course were fully explained to the patient.  We discussed where loosening  infection blood clots loss of life loss of limb nerve damage numbness failure of the procedure progressive arthritis and the potential need for revision surgery.  The patient understands these risks and consents to the surgical intervention.    The patient has pain in the hip that is increased with activity and weightbearing, and walks with an antalgic gait.  The pain is interfering with activities of daily living.  Patient has limited range of motion and pain with passive range of motion.   X-rays demonstrate joint space narrowing, subchondral sclerosis and osteophyte formation.  Symptoms are not improving with medication, physical therapy or supportive device for a period of at least 3 months.    The physician assistant was present through the entire case.  Given the nature of the disease process and the procedure to be performed skilled surgical first  assistant was necessary during the case.  The assistant was necessary to hold retractors and  manipulate the extremity during the procedure.  A certified scrub tech was at the back table managing the instruments and supplies for the surgical case.      Patient was brought to the operating room and placed on the surgical table in the supine position where adequate anesthesia was then obtained.  A surgical timeout was then performed.  The patient was positioned in the lateral decubitus position with the  affected hip up being careful to pad all pressure points.  The patient was then prepped and draped in usual sterile manner.  A standard posterolateral approach to the hip was made and electrocauterization was used for hemostasis.  The IT band was split  in line with its fibers and anterior and posterior retractors were then inserted.  The hip was maximally internally rotated and the short external rotators and piriformis tendon were taken down in 1 layer along with the joint capsule using the Bovie electrocautery  device.  The capsule was teed in line with the piriformis tendon.  The hip was then dislocated and the femoral neck resection was made 1 cm above the lesser trochanter.  The femoral head showed signs of severe arthritic changes with joint calcification  and loss of articular cartilage.    Anterior and posterior acetabular retractors were then inserted and the acetabular labrum was sharply excised using a knife.  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  Next acetabulum was curetted to  remove any remaining soft tissues and sequential reaming was performed.  Once final reaming was complete this had excellent circumferential fit and good bleeding bone porous acetabular shell was then inserted in approximately 45ï¿½ of abduction  and 10-15ï¿½ of anteversion.  2 cancellous bone screws were placed for supplemental fixation.  The acetabular shell was probed and  stable and was completely seated.  Reamings from the acetabulum were used as bone graft in the superior posterior  corner the acetabular liner was then inserted and completely seated as well.  The liner was probed and stable.    At this point femoral preparation was begun.  A femoral neck retractor was inserted and anterior and posterior femoral retractors were inserted as well.  A box osteotome was used to start the femoral canal and a Charnley awl was inserted down the canal.   Sequential broaching was then performed being careful to keep the broaches in 10-15ï¿½ of anteversion.  With the final broach in position and completely seated calcar planing was performed.  A trial reduction was then performed and the hip was  stable to an anatomic range of motion.  Trial components were then removed the final femoral stem was inserted and completely seated.  Femoral head and liner were applied to reduced.  Range of motion was again assessed and felt to be satisfactory leg  lengths were assessed as well.  The joint was then irrigated with a copious amount of pulsatile irrigation.    Closure was begun using #2 Ethibond sutures through the joint capsule short external rotators and piriformis tendon that were tied through drill holes in the piriformis fossa of the femur.  The IT band was closed using a running locked #1 Vicryl suture.   3-0 Monocryl suture was used for the underlying subcutaneous tissues and 4-0 Monocryl suture was used for subcuticular stitch.  Skin glue was used for the skin and a dry sterile Aquacell dressing was used for bandage.    The patient tolerated the procedure well and was taken to the postanesthesia care unit in satisfactory condition.  Postoperative x-rays were reviewed and surgical findings were discussed with the patient's family at the conclusion of the procedure.    This note was prepared using voice recognition software.  The details of this note are correct and have been reviewed, and  corrected to the best of my ability.  Some grammatical areas may persist related to the Dragon software    Otoniel Guadarrama MD    [phone]                        Attestation:   Note Completion:  Attending Attestation I performed the procedure without a resident         Electronic Signatures:  Otoniel Guadarrama)  (Signed 25-Aug-2023 08:55)   Authored: Post-Operative Note, Chart Review, Note Completion      Last Updated: 25-Aug-2023 08:55 by Otoniel Guadarrama)

## 2023-10-03 ENCOUNTER — TREATMENT (OUTPATIENT)
Dept: PHYSICAL THERAPY | Facility: CLINIC | Age: 73
End: 2023-10-03
Payer: MEDICARE

## 2023-10-03 DIAGNOSIS — M16.0 PRIMARY OSTEOARTHRITIS OF BOTH HIPS: Primary | ICD-10-CM

## 2023-10-03 PROBLEM — M16.9 OA (OSTEOARTHRITIS) OF HIP: Status: ACTIVE | Noted: 2023-10-03

## 2023-10-03 PROCEDURE — 97110 THERAPEUTIC EXERCISES: CPT | Mod: GP

## 2023-10-03 ASSESSMENT — PAIN SCALES - GENERAL: PAINLEVEL_OUTOF10: 5 - MODERATE PAIN

## 2023-10-03 ASSESSMENT — PAIN - FUNCTIONAL ASSESSMENT: PAIN_FUNCTIONAL_ASSESSMENT: 0-10

## 2023-10-03 NOTE — PROGRESS NOTES
"Physical Therapy Treatment    Patient Name: Mitzi Lawson  MRN: [id number]  Today's Date: 10/4/2023  Time Calculation  Start Time: 1434  Stop Time: 1515  Time Calculation (min): 41 min      Assessment:  Pt tolerated today's session well with no increase in familiar symptoms. Pt was able to progress program with the addition of step ups with no adverse reaction other than expected fatigue. Pt was educated to put cane on L side now as her R hip is currently bothering her more and her surgical hip is now the \"good\" leg pt was instructed to switch back if the L hip starts to hurt or if she develops a limp Pt will continue to benefit from skilled physical therapy interventions to improve Lower extremity ROM, strength, endurance, and balance.      Plan:   Focus on LE strength and endurance as tolerated    Current Problem  1. Primary osteoarthritis of both hips            [facility]  Visits Approved: BMN  Visit Number: 2 of 8  Copay: $20 copay     Subjective   General  S/P L AKIKO 8/25/23    Pt reports taht her L hip has been feeling really good her R hip has bee bothering her more so.    Precautions  Precautions  LE Weight Bearing Status: Weight Bearing as Tolerated  Precautions Comment: L AKIKO    Pain  Pain Assessment: 0-10  Pain Score: 5 - Moderate pain  Pain Location: Hip  Pain Orientation: Right (L surgical hip is doing well)    Objective     Treatments:    Therapeutic Exercise (54736):   MARCELA; 9'  *Bridges: 3x10  SLR: 10x B/L  *standing hip ABD/EXT: 10x B/L  *sink squats: 10x  *standing HR/TR: 20x ea  Step ups: 2\" x10, 4\" x10    (*denotes HEP)  Skilled intervention utilized in the appropriate selection & application of above exercises. Verbal and tactile cues provided for proper form and technique. Pt. demonstrated appropriate form & verbalized understanding of optimal technique for above exercises.     Pt. Education:  The patient was educated on: the importance of positioning, proper posture, and body " mechanics, joint mechanics and pathology, general tissue healing time, the appropriate use of heat and cold to control pain and inflammation, the importance of general therapeutic exercise, especially to stay within pain-free ROM, specific anatomy, function, & regional interdependence of involved areas, & likely cause of impairments & POC. Pt's questions were answered to their satisfaction, & pt. verbalized understanding & agreement with POC.

## 2023-10-04 ENCOUNTER — TELEPHONE (OUTPATIENT)
Dept: PRIMARY CARE CLINIC | Age: 73
End: 2023-10-04

## 2023-10-04 DIAGNOSIS — M79.604 LOW BACK PAIN RADIATING TO RIGHT LEG: ICD-10-CM

## 2023-10-04 DIAGNOSIS — M54.50 LOW BACK PAIN RADIATING TO RIGHT LEG: ICD-10-CM

## 2023-10-04 PROBLEM — M16.0 PRIMARY OSTEOARTHRITIS OF BOTH HIPS: Status: ACTIVE | Noted: 2023-10-04

## 2023-10-04 RX ORDER — CYCLOBENZAPRINE HCL 10 MG
10 TABLET ORAL 2 TIMES DAILY PRN
Qty: 60 TABLET | Refills: 0 | Status: SHIPPED | OUTPATIENT
Start: 2023-10-04 | End: 2023-10-05 | Stop reason: SDUPTHER

## 2023-10-04 NOTE — TELEPHONE ENCOUNTER
Okay I talked to her just now and she wants it sent to the one in [facility] on [address]

## 2023-10-05 DIAGNOSIS — M54.50 LOW BACK PAIN RADIATING TO RIGHT LEG: ICD-10-CM

## 2023-10-05 DIAGNOSIS — M79.604 LOW BACK PAIN RADIATING TO RIGHT LEG: ICD-10-CM

## 2023-10-05 RX ORDER — CYCLOBENZAPRINE HCL 10 MG
10 TABLET ORAL 2 TIMES DAILY PRN
Qty: 60 TABLET | Refills: 0 | Status: SHIPPED | OUTPATIENT
Start: 2023-10-05

## 2023-10-06 ENCOUNTER — OFFICE VISIT (OUTPATIENT)
Dept: ORTHOPEDIC SURGERY | Facility: CLINIC | Age: 73
End: 2023-10-06
Payer: MEDICARE

## 2023-10-06 DIAGNOSIS — Z96.642 STATUS POST TOTAL HIP REPLACEMENT, LEFT: Primary | ICD-10-CM

## 2023-10-06 DIAGNOSIS — M16.11 PRIMARY OSTEOARTHRITIS OF RIGHT HIP: ICD-10-CM

## 2023-10-06 PROBLEM — Z96.652 STATUS POST TOTAL KNEE REPLACEMENT, LEFT: Status: ACTIVE | Noted: 2023-08-27

## 2023-10-06 PROCEDURE — 1159F MED LIST DOCD IN RCRD: CPT | Performed by: PHYSICIAN ASSISTANT

## 2023-10-06 PROCEDURE — 99214 OFFICE O/P EST MOD 30 MIN: CPT | Performed by: PHYSICIAN ASSISTANT

## 2023-10-06 PROCEDURE — 1125F AMNT PAIN NOTED PAIN PRSNT: CPT | Performed by: PHYSICIAN ASSISTANT

## 2023-10-06 RX ORDER — HYDROCODONE BITARTRATE AND ACETAMINOPHEN 5; 325 MG/1; MG/1
1 TABLET ORAL EVERY 6 HOURS PRN
Qty: 28 TABLET | Refills: 0 | Status: SHIPPED | OUTPATIENT
Start: 2023-10-06 | End: 2023-10-13

## 2023-10-06 NOTE — PROGRESS NOTES
Subjective    Patient ID: Mitzi    Chief Complaint:   Chief Complaint   Patient presents with    Left Hip - Follow-up     LT THR 8/25/23     History of present illness    73-year-old female presented clinic today for her 6-week postop visit status post left total hip replacement.  She is doing very well in regards to left hip.  She is ambulating with the assistance of a cane but she is using it in her left hand due to her right hip being so bad.  She is having extreme pain over the right groin region with weightbearing activity.  She is having major hindrance of her daily activity due to her right hip.  She is continued with outpatient physical therapy but has limitations.  She is wanting to go on a recumbent bike we discussed this today at length.  She is still continuing with her posterior hip precautions but starting to work with her range of motion.      Past medical , Surgical, Family and social history reviewed.      Physical exam  General: No acute distress and breathing comfortably.  Patient is pleasant and cooperative with the examination.    Extremity  Left hip neurovascular intact.  Good range of motion.  No signs of infection.  Incision is well-healed.  No signs of DVT.  No erythema ecchymosis.  Improved strength left lower extremity.  No calf swelling or tenderness.  Compartments soft.    Right hip is neurovascular intact.  The right hip was examined and inspected and was tender to touch along the groin and lateral bursal area.  The hip joint occasionally displayed catching, locking, and mechanical symptoms.  The skin was intact without breakdown or open wounds.  There was some mild crepitus seen with hip internal and external range of motion without evidence of instability.  Inspection of the low back showed normal curvature and integrity of the skin.  Strength and stability of the low back muscles and ligaments were within normal limits.  There was a negative straight leg raise test with no foot  drop, numbness, or tingling in both lower extremities.  Sensation, reflexes, and pulses in the foot and ankle are preserved.  There was no obvious effusion.  Range of motion showed flexion and internal and external rotation were all limited with pain.  The patient had the ability to bear weight, but with discomfort.  The patient's gait was antalgic secondary to the discomfort.    Diagnostics  [ none]  XR hip w pelvis    Result Date: 9/13/2023  Interpreted By:  EULOGIO PETERS MD MRN: [id number] Patient Name: NYA ACHARYA  STUDY: HIP, UNILATERAL W/PELVIS WHEN PERFORMED 2-3 VIEWS;  Left;  9/13/2023 2:39 pm  INDICATION: pain  Z96.642: Status post total replacement of left hip.  ACCESSION NUMBER(S): [id number]  ORDERING CLINICIAN: EULOGIO PETERS  FINDINGS: AP pelvis and lateral view of the left hip. Status post left total hip replacement components in good position no signs of fracture dislocation or other bony abnormality         Procedure  [ none]    Assessment  Status post left total hip replacement  Right hip osteoarthritis    Treatment plan  1.  At this time we had a discussion in regards to wound care for the left hip.  2.  We also had a discussion regarding the risks and benefits of another hip replacement this time on the right side she wishes to proceed with surgical intervention since her right hip seems to be hindering the progress on the left.  3.  She will continue with weightbearing activity as tolerated.  Continue with outpatient physical therapy.  Prescription refill for hydrocodone sent to the pharmacy today mostly for the right hip.  She will follow-up with us in 6 weeks with new x-rays for the left hip.  4.  All of the patient's questions were answered.    Patient has failed all forms of conservative treatment. The joint pain is significantly affecting quality of life and activities of daily living.   The patient has pain in the joint that is increased with activity and weightbearing, and walks  with an antalgic gait. The pain is interfering with activities of daily living. Patient has limited range of motion and pain with passive range of motion. X-rays demonstrate joint space narrowing, subchondral sclerosis and osteophyte formation. Symptoms are not improving with medication, physical therapy or supportive device for a period of at least 3 months.    Patient would like to go and schedule joint replacement surgery. The procedure its risks, benefits, and treatment alternatives were discussed at length and patient would like to proceed. Patient is going to schedule the procedure at their earliest convenience and see me back for a preop visit just prior. Preadmission testing, medical checkup, and insurance authorization will be performed in the meantime. Patient understands a joint replacement surgery is a last resort, although a very successful operation results are not guaranteed.      This note was prepared using voice recognition software.  The details of this note are correct and have been reviewed, and corrected to the best of my ability.  Some grammatical areas may persist related to the Dragon software    Jhonatan Beltran PA-C, [facility]  [facility]  Orthopedic [place]    [phone]

## 2023-10-10 ENCOUNTER — APPOINTMENT (OUTPATIENT)
Dept: PHYSICAL THERAPY | Facility: CLINIC | Age: 73
End: 2023-10-10
Payer: MEDICARE

## 2023-10-10 DIAGNOSIS — M19.049 ARTHRITIS OF HAND: ICD-10-CM

## 2023-10-11 RX ORDER — PREDNISONE 5 MG/1
TABLET ORAL
Qty: 30 TABLET | Refills: 0 | Status: SHIPPED | OUTPATIENT
Start: 2023-10-11

## 2023-10-17 ENCOUNTER — APPOINTMENT (OUTPATIENT)
Dept: PHYSICAL THERAPY | Facility: CLINIC | Age: 73
End: 2023-10-17
Payer: MEDICARE

## 2023-10-19 DIAGNOSIS — M05.9 RHEUMATOID ARTHRITIS WITH RHEUMATOID FACTOR, UNSPECIFIED (HCC): ICD-10-CM

## 2023-10-20 RX ORDER — HYDROCODONE BITARTRATE AND ACETAMINOPHEN 7.5; 325 MG/1; MG/1
1 TABLET ORAL 3 TIMES DAILY
Qty: 90 TABLET | Refills: 0 | Status: SHIPPED | OUTPATIENT
Start: 2023-10-20 | End: 2023-11-19

## 2023-10-24 ENCOUNTER — APPOINTMENT (OUTPATIENT)
Dept: PHYSICAL THERAPY | Facility: CLINIC | Age: 73
End: 2023-10-24
Payer: MEDICARE

## 2023-10-27 DIAGNOSIS — R60.0 EDEMA OF LEFT LOWER LEG: ICD-10-CM

## 2023-10-27 RX ORDER — TRIAMTERENE AND HYDROCHLOROTHIAZIDE 37.5; 25 MG/1; MG/1
CAPSULE ORAL DAILY
Qty: 30 CAPSULE | Refills: 5 | Status: SHIPPED | OUTPATIENT
Start: 2023-10-27

## 2023-10-31 ENCOUNTER — APPOINTMENT (OUTPATIENT)
Dept: PHYSICAL THERAPY | Facility: CLINIC | Age: 73
End: 2023-10-31
Payer: MEDICARE

## 2023-11-07 ENCOUNTER — APPOINTMENT (OUTPATIENT)
Dept: PHYSICAL THERAPY | Facility: CLINIC | Age: 73
End: 2023-11-07
Payer: MEDICARE

## 2023-11-13 DIAGNOSIS — M19.049 ARTHRITIS OF HAND: ICD-10-CM

## 2023-11-14 ENCOUNTER — APPOINTMENT (OUTPATIENT)
Dept: PHYSICAL THERAPY | Facility: CLINIC | Age: 73
End: 2023-11-14
Payer: MEDICARE

## 2023-11-14 RX ORDER — PREDNISONE 5 MG/1
TABLET ORAL
Qty: 30 TABLET | Refills: 0 | Status: SHIPPED | OUTPATIENT
Start: 2023-11-14

## 2023-11-15 ENCOUNTER — ANCILLARY PROCEDURE (OUTPATIENT)
Dept: RADIOLOGY | Facility: CLINIC | Age: 73
End: 2023-11-15
Payer: MEDICARE

## 2023-11-15 ENCOUNTER — OFFICE VISIT (OUTPATIENT)
Dept: ORTHOPEDIC SURGERY | Facility: CLINIC | Age: 73
End: 2023-11-15
Payer: MEDICARE

## 2023-11-15 DIAGNOSIS — M16.11 PRIMARY OSTEOARTHRITIS OF RIGHT HIP: ICD-10-CM

## 2023-11-15 DIAGNOSIS — Z96.642 AFTERCARE FOLLOWING LEFT HIP JOINT REPLACEMENT SURGERY: ICD-10-CM

## 2023-11-15 DIAGNOSIS — Z47.1 AFTERCARE FOLLOWING LEFT HIP JOINT REPLACEMENT SURGERY: ICD-10-CM

## 2023-11-15 DIAGNOSIS — Z47.1 AFTERCARE FOLLOWING LEFT HIP JOINT REPLACEMENT SURGERY: Primary | ICD-10-CM

## 2023-11-15 DIAGNOSIS — Z96.642 AFTERCARE FOLLOWING LEFT HIP JOINT REPLACEMENT SURGERY: Primary | ICD-10-CM

## 2023-11-15 PROCEDURE — 99024 POSTOP FOLLOW-UP VISIT: CPT | Performed by: PHYSICIAN ASSISTANT

## 2023-11-15 PROCEDURE — 73502 X-RAY EXAM HIP UNI 2-3 VIEWS: CPT | Mod: LEFT SIDE | Performed by: ORTHOPAEDIC SURGERY

## 2023-11-15 PROCEDURE — 1159F MED LIST DOCD IN RCRD: CPT | Performed by: PHYSICIAN ASSISTANT

## 2023-11-15 PROCEDURE — 73502 X-RAY EXAM HIP UNI 2-3 VIEWS: CPT | Mod: LT,FY

## 2023-11-15 PROCEDURE — 1125F AMNT PAIN NOTED PAIN PRSNT: CPT | Performed by: PHYSICIAN ASSISTANT

## 2023-11-16 DIAGNOSIS — M05.9 RHEUMATOID ARTHRITIS WITH RHEUMATOID FACTOR, UNSPECIFIED (HCC): ICD-10-CM

## 2023-11-16 RX ORDER — HYDROCODONE BITARTRATE AND ACETAMINOPHEN 7.5; 325 MG/1; MG/1
1 TABLET ORAL 3 TIMES DAILY
Qty: 90 TABLET | Refills: 0 | Status: SHIPPED | OUTPATIENT
Start: 2023-11-16 | End: 2023-12-16

## 2023-11-16 NOTE — TELEPHONE ENCOUNTER
Patient is requesting medication refill. Please approve or deny this request.    Rx requested:  Requested Prescriptions     Pending Prescriptions Disp Refills    HYDROcodone-acetaminophen (LORCET PLUS) 7.5-325 MG per tablet 90 tablet 0     Sig: Take 1 tablet by mouth 3 times daily for 30 days.  Max Daily Amount: 3 tablets         Last Office Visit:   8/15/2023      Next Visit Date:  Future Appointments   Date Time Provider [address] [place] [address]   12/12/2023  2:15 PM Balbina Perez MD [address] Neurology -

## 2023-11-16 NOTE — PROGRESS NOTES
Subjective    Patient ID: Mitzi    Chief Complaint:   Chief Complaint   Patient presents with    Left Hip - Follow-up     S/p LT THR sx 8/25/2023, xrays today      History of present illness    73-year-old female presenting to the clinic today for her 3-month postop visit status post left total hip replacement she states she is doing great.  She has no pain in regards to the left hip.  She is very pleased with the outcome of her total hip replacement.  She states that all of her symptoms seem to be coming from her right hip.  At the time of her left total hip replacement her right hip was equally as bad in regards to symptoms.  She starting to have difficulty with going up and down stairs due to the right hip.  Continued groin pain.      Past medical , Surgical, Family and social history reviewed.      Physical exam  General: No acute distress and breathing comfortably.  Patient is pleasant and cooperative with the examination.    Extremity  Left hip is neurovascular intact.  Good range of motion.  No signs of infection.  Incision is well-healed.  No calf swelling or tenderness.  Pulses are 2+ bilateral lower extremity.  No instability.    Right hip is neurovascular intact.  The affected hip was examined and inspected and was tender to touch along the groin and lateral bursal area.  The hip joint occasionally displayed catching, locking, and mechanical symptoms.  The skin was intact without breakdown or open wounds.  There was some mild crepitus seen with hip internal and external range of motion without evidence of instability.  Inspection of the low back showed normal curvature and integrity of the skin.  Strength and stability of the low back muscles and ligaments were within normal limits.  There was a negative straight leg raise test with no foot drop, numbness, or tingling in both lower extremities.  Sensation, reflexes, and pulses in the foot and ankle are preserved.  There was no obvious effusion.  Range  of motion showed flexion and internal and external rotation were all limited with pain.  The patient had the ability to bear weight, but with discomfort.  The patient's gait was antalgic secondary to the discomfort.    Diagnostics  [ none]  XR hip left 2 or 3 views    Result Date: 11/15/2023  Interpreted By:  Eulogio Guadarrama, STUDY: XR HIP LEFT 2 OR 3 VIEWS;  ;  11/15/2023 11:15 am   INDICATION: Signs/Symptoms:pain.   ACCESSION NUMBER(S): [id number]   ORDERING CLINICIAN: EULOGIO GUADARRAMA   FINDINGS: Two views show patient status post total hip replacement in good alignment.  No signs of fracture dislocation or other bony abnormality       Signed by: Eulogio Guadarrama 11/15/2023 11:45 AM Dictation workstation:   [id number]       Procedure      Assessment  Status post left total hip replacement  Right hip osteoarthritis    Treatment plan  1.  At this time we had a discussion in regards to continued physical therapy for the left keeping in mind her right hip may flare.  2.  She is scheduled for right total hip replacement already in January but she is asking if there is any way that we can move her up due to the fact that her right hip pain is getting significantly worse.  3.  She will follow with us in approximately 3 months with new x-rays of the left hip at that time sooner if she has any problems.  4.  All of the patient's questions were answered.    This note was prepared using voice recognition software.  The details of this note are correct and have been reviewed, and corrected to the best of my ability.  Some grammatical areas may persist related to the Dragon software    Jhonatan Beltran PA-C, [facility]  [facility]  [facility] [place]    [phone]

## 2023-11-21 DIAGNOSIS — F32.A DEPRESSION, UNSPECIFIED DEPRESSION TYPE: ICD-10-CM

## 2023-11-21 RX ORDER — DULOXETIN HYDROCHLORIDE 60 MG/1
60 CAPSULE, DELAYED RELEASE ORAL EVERY MORNING
Qty: 30 CAPSULE | Refills: 5 | Status: SHIPPED | OUTPATIENT
Start: 2023-11-21

## 2023-11-30 PROBLEM — Z96.642 STATUS POST TOTAL REPLACEMENT OF LEFT HIP: Status: ACTIVE | Noted: 2023-11-30

## 2023-11-30 PROBLEM — G89.18 POST-OP PAIN: Status: ACTIVE | Noted: 2023-11-30

## 2023-11-30 RX ORDER — METOPROLOL SUCCINATE 25 MG/1
TABLET, EXTENDED RELEASE ORAL
Qty: 90 TABLET | Refills: 2 | Status: SHIPPED | OUTPATIENT
Start: 2023-11-30

## 2023-12-01 DIAGNOSIS — M54.50 LOW BACK PAIN RADIATING TO RIGHT LEG: ICD-10-CM

## 2023-12-01 DIAGNOSIS — M79.604 LOW BACK PAIN RADIATING TO RIGHT LEG: ICD-10-CM

## 2023-12-01 RX ORDER — CYCLOBENZAPRINE HCL 10 MG
TABLET ORAL
Qty: 60 TABLET | Refills: 0 | OUTPATIENT
Start: 2023-12-01

## 2023-12-05 ENCOUNTER — OFFICE VISIT (OUTPATIENT)
Dept: PRIMARY CARE CLINIC | Age: 73
End: 2023-12-05
Payer: MEDICARE

## 2023-12-05 VITALS
DIASTOLIC BLOOD PRESSURE: 60 MMHG | OXYGEN SATURATION: 97 % | BODY MASS INDEX: 19.65 KG/M2 | HEART RATE: 84 BPM | WEIGHT: 104 LBS | SYSTOLIC BLOOD PRESSURE: 112 MMHG

## 2023-12-05 DIAGNOSIS — Z00.00 MEDICARE ANNUAL WELLNESS VISIT, SUBSEQUENT: Primary | ICD-10-CM

## 2023-12-05 DIAGNOSIS — K21.9 GASTROESOPHAGEAL REFLUX DISEASE WITHOUT ESOPHAGITIS: Primary | ICD-10-CM

## 2023-12-05 DIAGNOSIS — M79.604 LOW BACK PAIN RADIATING TO RIGHT LEG: ICD-10-CM

## 2023-12-05 DIAGNOSIS — M54.50 LOW BACK PAIN RADIATING TO RIGHT LEG: ICD-10-CM

## 2023-12-05 DIAGNOSIS — M25.551 HIP PAIN, RIGHT: ICD-10-CM

## 2023-12-05 PROCEDURE — 1123F ACP DISCUSS/DSCN MKR DOCD: CPT | Performed by: INTERNAL MEDICINE

## 2023-12-05 PROCEDURE — G0439 PPPS, SUBSEQ VISIT: HCPCS | Performed by: INTERNAL MEDICINE

## 2023-12-05 PROCEDURE — 99213 OFFICE O/P EST LOW 20 MIN: CPT | Performed by: INTERNAL MEDICINE

## 2023-12-05 RX ORDER — CYCLOBENZAPRINE HCL 10 MG
10 TABLET ORAL 2 TIMES DAILY PRN
Qty: 60 TABLET | Refills: 5 | Status: SHIPPED | OUTPATIENT
Start: 2023-12-05

## 2023-12-05 RX ORDER — PANTOPRAZOLE SODIUM 40 MG/1
40 TABLET, DELAYED RELEASE ORAL DAILY
Qty: 30 TABLET | Refills: 11 | Status: SHIPPED | OUTPATIENT
Start: 2023-12-05 | End: 2024-12-04

## 2023-12-05 ASSESSMENT — PATIENT HEALTH QUESTIONNAIRE - PHQ9
7. TROUBLE CONCENTRATING ON THINGS, SUCH AS READING THE NEWSPAPER OR WATCHING TELEVISION: 0
4. FEELING TIRED OR HAVING LITTLE ENERGY: 0
1. LITTLE INTEREST OR PLEASURE IN DOING THINGS: 0
6. FEELING BAD ABOUT YOURSELF - OR THAT YOU ARE A FAILURE OR HAVE LET YOURSELF OR YOUR FAMILY DOWN: 0
5. POOR APPETITE OR OVEREATING: 0
8. MOVING OR SPEAKING SO SLOWLY THAT OTHER PEOPLE COULD HAVE NOTICED. OR THE OPPOSITE, BEING SO FIGETY OR RESTLESS THAT YOU HAVE BEEN MOVING AROUND A LOT MORE THAN USUAL: 0
2. FEELING DOWN, DEPRESSED OR HOPELESS: 0
SUM OF ALL RESPONSES TO PHQ9 QUESTIONS 1 & 2: 0
SUM OF ALL RESPONSES TO PHQ QUESTIONS 1-9: 0
SUM OF ALL RESPONSES TO PHQ QUESTIONS 1-9: 0
10. IF YOU CHECKED OFF ANY PROBLEMS, HOW DIFFICULT HAVE THESE PROBLEMS MADE IT FOR YOU TO DO YOUR WORK, TAKE CARE OF THINGS AT HOME, OR GET ALONG WITH OTHER PEOPLE: 0
SUM OF ALL RESPONSES TO PHQ QUESTIONS 1-9: 0
3. TROUBLE FALLING OR STAYING ASLEEP: 0
9. THOUGHTS THAT YOU WOULD BE BETTER OFF DEAD, OR OF HURTING YOURSELF: 0
SUM OF ALL RESPONSES TO PHQ QUESTIONS 1-9: 0

## 2023-12-05 ASSESSMENT — ENCOUNTER SYMPTOMS
ABDOMINAL DISTENTION: 0
CHOKING: 0
FACIAL SWELLING: 0
BACK PAIN: 1
BLOOD IN STOOL: 0
PHOTOPHOBIA: 0
APNEA: 0
HEARTBURN: 1

## 2023-12-05 ASSESSMENT — COLUMBIA-SUICIDE SEVERITY RATING SCALE - C-SSRS
5. HAVE YOU STARTED TO WORK OUT OR WORKED OUT THE DETAILS OF HOW TO KILL YOURSELF? DO YOU INTEND TO CARRY OUT THIS PLAN?: NO
4. HAVE YOU HAD THESE THOUGHTS AND HAD SOME INTENTION OF ACTING ON THEM?: NO
3. HAVE YOU BEEN THINKING ABOUT HOW YOU MIGHT KILL YOURSELF?: NO
7. DID THIS OCCUR IN THE LAST THREE MONTHS: NO

## 2023-12-05 NOTE — PROGRESS NOTES
Jess Marin 72 y.o. female presents today with   Chief Complaint   Patient presents with    Follow-up    Hip Pain           Medication Refill       Hip Pain   Incident onset: years. The incident occurred at home. The pain is present in the right hip. The pain is at a severity of 7/10. The pain is severe. The pain has been Worsening since onset. Medication Refill  Associated symptoms include arthralgias and myalgias. Pertinent negatives include no chest pain, chills, fever, joint swelling or rash. Back Pain  This is a chronic problem. The current episode started more than 1 year ago. The problem occurs daily. The problem has been waxing and waning since onset. The pain is present in the lumbar spine and gluteal. The quality of the pain is described as aching. The pain radiates to the right knee. The pain is severe. The symptoms are aggravated by bending and twisting. Pertinent negatives include no chest pain or fever. Gastroesophageal Reflux  She complains of heartburn. She reports no chest pain or no choking. This is a recurrent problem. The current episode started more than 1 year ago. The problem occurs frequently. The problem has been waxing and waning.        Past Medical History:   Diagnosis Date    Arthritis     seen dr Glaser Aus    Chronic back pain     seen dr Radha Montano in past    Depression     Hypothyroidism     Osteoarthritis     Osteoporosis     Restless legs syndrome     Tachycardia      Patient Active Problem List    Diagnosis Date Noted    Peripheral vascular disease of lower extremity ([address]) 02/07/2023    Rheumatoid arthritis with rheumatoid factor, unspecified 11/15/2022    Rheumatoid arthritis, unspecified 11/15/2022    Major depressive disorder, recurrent, mild 08/09/2022    Major depressive disorder, recurrent, moderate 08/09/2022    Major depressive disorder, recurrent, unspecified 08/09/2022    Drug-induced constipation 08/02/2022    Post-op pain 11/30/2023    Status post total replacement of

## 2023-12-12 ASSESSMENT — ENCOUNTER SYMPTOMS
FACIAL SWELLING: 0
CHOKING: 0
BLOOD IN STOOL: 0
PHOTOPHOBIA: 0
APNEA: 0
BACK PAIN: 1
ABDOMINAL DISTENTION: 0

## 2023-12-12 NOTE — PROGRESS NOTES
Josue Marin 72 y.o. female presents today with No chief complaint on file.       HPIannual wellness    Past Medical History:   Diagnosis Date    Arthritis     seen dr Fontaine Solid    Chronic back pain     seen dr Latasha Crawford in past    Depression     Hypothyroidism     Osteoarthritis     Osteoporosis     Restless legs syndrome     Tachycardia      Patient Active Problem List    Diagnosis Date Noted    Peripheral vascular disease of lower extremity ([address]) 02/07/2023    Rheumatoid arthritis with rheumatoid factor, unspecified 11/15/2022    Rheumatoid arthritis, unspecified 11/15/2022    Major depressive disorder, recurrent, mild 08/09/2022    Major depressive disorder, recurrent, moderate 08/09/2022    Major depressive disorder, recurrent, unspecified 08/09/2022    Drug-induced constipation 08/02/2022    Post-op pain 11/30/2023    Status post total replacement of left hip 11/30/2023    Hip pain, acute 07/28/2023    Osteoarthritis of hip 07/28/2023    Spinal stenosis of lumbar region with neurogenic claudication 04/03/2023    Levoscoliosis of thoracic spine 04/03/2023    Dupuytren's contracture 03/02/2022    Localized, primary osteoarthritis of hand 03/02/2022    Parkinson's disease 08/02/2021    Kyphoscoliosis 11/24/2020    Ataxic gait 07/21/2020    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 07/21/2020    Cervicalgia 07/21/2020    Hyperreflexia 07/21/2020    Spondylosis of lumbar region without myelopathy or radiculopathy 07/29/2016    Hypothyroid 07/29/2013    Depressive disorder, not elsewhere classified 07/29/2013    Palpitations 07/29/2013    Tachycardia 07/29/2013     Past Surgical History:   Procedure Laterality Date    CHOLECYSTECTOMY      COLONOSCOPY N/A 11/22/2022    COLONOSCOPY DIAGNOSTIC performed by Leary Peabody, MD at [address] [place] [address] (CERVIX STATUS UNKNOWN)      Total age 52    KNEE ARTHROSCOPY Right     OVARY REMOVAL      TONSILLECTOMY       Family History   Problem Relation Age of Onset

## 2023-12-18 RX ORDER — ONDANSETRON 4 MG/1
4 TABLET, FILM COATED ORAL EVERY 8 HOURS PRN
Qty: 90 TABLET | Refills: 4 | OUTPATIENT
Start: 2023-12-18

## 2023-12-29 PROBLEM — M16.11 UNILATERAL PRIMARY OSTEOARTHRITIS, RIGHT HIP: Status: ACTIVE | Noted: 2023-12-28

## 2024-01-02 ENCOUNTER — HOSPITAL ENCOUNTER (OUTPATIENT)
Dept: CARDIOLOGY | Facility: HOSPITAL | Age: 74
Discharge: HOME | End: 2024-01-02
Payer: MEDICARE

## 2024-01-02 DIAGNOSIS — M16.11 UNILATERAL PRIMARY OSTEOARTHRITIS, RIGHT HIP: ICD-10-CM

## 2024-01-02 PROCEDURE — 93010 ELECTROCARDIOGRAM REPORT: CPT | Performed by: INTERNAL MEDICINE

## 2024-01-02 PROCEDURE — 93005 ELECTROCARDIOGRAM TRACING: CPT

## 2024-01-05 LAB
ATRIAL RATE: 96 BPM
P AXIS: 58 DEGREES
P OFFSET: 191 MS
P ONSET: 117 MS
PR INTERVAL: 204 MS
Q ONSET: 219 MS
QRS COUNT: 16 BEATS
QRS DURATION: 136 MS
QT INTERVAL: 382 MS
QTC CALCULATION(BAZETT): 482 MS
QTC FREDERICIA: 446 MS
R AXIS: 81 DEGREES
T AXIS: 36 DEGREES
T OFFSET: 410 MS
VENTRICULAR RATE: 96 BPM

## 2024-01-08 ENCOUNTER — OFFICE VISIT (OUTPATIENT)
Dept: PRIMARY CARE CLINIC | Age: 74
End: 2024-01-08
Payer: MEDICARE

## 2024-01-08 VITALS
DIASTOLIC BLOOD PRESSURE: 68 MMHG | HEART RATE: 84 BPM | BODY MASS INDEX: 20.03 KG/M2 | SYSTOLIC BLOOD PRESSURE: 120 MMHG | WEIGHT: 106 LBS | OXYGEN SATURATION: 98 %

## 2024-01-08 DIAGNOSIS — Z01.818 PRE-OP EXAM: ICD-10-CM

## 2024-01-08 DIAGNOSIS — R30.0 DYSURIA: ICD-10-CM

## 2024-01-08 DIAGNOSIS — M05.9 RHEUMATOID ARTHRITIS WITH RHEUMATOID FACTOR, UNSPECIFIED (HCC): ICD-10-CM

## 2024-01-08 DIAGNOSIS — Z00.00 MEDICARE ANNUAL WELLNESS VISIT, SUBSEQUENT: Primary | ICD-10-CM

## 2024-01-08 DIAGNOSIS — M25.551 HIP PAIN, RIGHT: Primary | ICD-10-CM

## 2024-01-08 PROCEDURE — G0439 PPPS, SUBSEQ VISIT: HCPCS | Performed by: INTERNAL MEDICINE

## 2024-01-08 PROCEDURE — 99213 OFFICE O/P EST LOW 20 MIN: CPT | Performed by: INTERNAL MEDICINE

## 2024-01-08 PROCEDURE — 1123F ACP DISCUSS/DSCN MKR DOCD: CPT | Performed by: INTERNAL MEDICINE

## 2024-01-08 RX ORDER — PREDNISONE 5 MG/1
5 TABLET ORAL DAILY
Qty: 30 TABLET | Refills: 1 | Status: SHIPPED | OUTPATIENT
Start: 2024-01-08

## 2024-01-08 RX ORDER — HYDROXYCHLOROQUINE SULFATE 200 MG/1
TABLET, FILM COATED ORAL
Qty: 30 TABLET | Refills: 5 | Status: SHIPPED | OUTPATIENT
Start: 2024-01-08

## 2024-01-08 RX ORDER — SULFAMETHOXAZOLE AND TRIMETHOPRIM 800; 160 MG/1; MG/1
1 TABLET ORAL 2 TIMES DAILY
Qty: 14 TABLET | Refills: 0 | Status: SHIPPED | OUTPATIENT
Start: 2024-01-08 | End: 2024-01-15

## 2024-01-08 ASSESSMENT — ENCOUNTER SYMPTOMS
PHOTOPHOBIA: 0
APNEA: 0
FACIAL SWELLING: 0
ABDOMINAL DISTENTION: 0
BLOOD IN STOOL: 0
CHOKING: 0

## 2024-01-08 ASSESSMENT — PATIENT HEALTH QUESTIONNAIRE - PHQ9
4. FEELING TIRED OR HAVING LITTLE ENERGY: 0
SUM OF ALL RESPONSES TO PHQ QUESTIONS 1-9: 0
6. FEELING BAD ABOUT YOURSELF - OR THAT YOU ARE A FAILURE OR HAVE LET YOURSELF OR YOUR FAMILY DOWN: 0
1. LITTLE INTEREST OR PLEASURE IN DOING THINGS: 0
SUM OF ALL RESPONSES TO PHQ QUESTIONS 1-9: 0
SUM OF ALL RESPONSES TO PHQ QUESTIONS 1-9: 0
3. TROUBLE FALLING OR STAYING ASLEEP: 0
SUM OF ALL RESPONSES TO PHQ QUESTIONS 1-9: 0
SUM OF ALL RESPONSES TO PHQ9 QUESTIONS 1 & 2: 0
7. TROUBLE CONCENTRATING ON THINGS, SUCH AS READING THE NEWSPAPER OR WATCHING TELEVISION: 0
5. POOR APPETITE OR OVEREATING: 0
9. THOUGHTS THAT YOU WOULD BE BETTER OFF DEAD, OR OF HURTING YOURSELF: 0
2. FEELING DOWN, DEPRESSED OR HOPELESS: 0
8. MOVING OR SPEAKING SO SLOWLY THAT OTHER PEOPLE COULD HAVE NOTICED. OR THE OPPOSITE, BEING SO FIGETY OR RESTLESS THAT YOU HAVE BEEN MOVING AROUND A LOT MORE THAN USUAL: 0
10. IF YOU CHECKED OFF ANY PROBLEMS, HOW DIFFICULT HAVE THESE PROBLEMS MADE IT FOR YOU TO DO YOUR WORK, TAKE CARE OF THINGS AT HOME, OR GET ALONG WITH OTHER PEOPLE: 0

## 2024-01-08 ASSESSMENT — LIFESTYLE VARIABLES: HOW OFTEN DO YOU HAVE A DRINK CONTAINING ALCOHOL: NEVER

## 2024-01-08 NOTE — PROGRESS NOTES
Krystle Marin 73 y.o. female presents today with   Chief Complaint   Patient presents with    Pre-op Exam     Total right hip replacement Dr Martin 1/29    Urinary Tract Infection     For right hip sx. S/p left hip replacement.     Urinary Tract Infection  This is a new problem. The current episode started in the past 7 days. The problem has been waxing and waning since onset. Pertinent negatives include no hematuria.       Past Medical History:   Diagnosis Date    Arthritis     seen dr carmichael    Chronic back pain     seen dr matos in past    Depression     Hypothyroidism     Osteoarthritis     Osteoporosis     Restless legs syndrome     Tachycardia      Patient Active Problem List    Diagnosis Date Noted    Rheumatoid arthritis with rheumatoid factor, unspecified 11/15/2022    Rheumatoid arthritis, unspecified 11/15/2022    Major depressive disorder, recurrent, mild 08/09/2022    Major depressive disorder, recurrent, moderate 08/09/2022    Major depressive disorder, recurrent, unspecified 08/09/2022    Drug-induced constipation 08/02/2022    Post-op pain 11/30/2023    Status post total replacement of left hip 11/30/2023    Hip pain, acute 07/28/2023    Osteoarthritis of hip 07/28/2023    Spinal stenosis of lumbar region with neurogenic claudication 04/03/2023    Levoscoliosis of thoracic spine 04/03/2023    Dupuytren's contracture 03/02/2022    Localized, primary osteoarthritis of hand 03/02/2022    Parkinson's disease 08/02/2021    Kyphoscoliosis 11/24/2020    Ataxic gait 07/21/2020    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 07/21/2020    Cervicalgia 07/21/2020    Hyperreflexia 07/21/2020    Spondylosis of lumbar region without myelopathy or radiculopathy 07/29/2016    Hypothyroid 07/29/2013    Depressive disorder, not elsewhere classified 07/29/2013    Palpitations 07/29/2013    Tachycardia 07/29/2013     Past Surgical History:   Procedure Laterality Date    CHOLECYSTECTOMY      COLONOSCOPY N/A 
Improved

## 2024-01-16 ENCOUNTER — PRE-ADMISSION TESTING (OUTPATIENT)
Dept: PREADMISSION TESTING | Facility: HOSPITAL | Age: 74
End: 2024-01-16
Payer: MEDICARE

## 2024-01-16 VITALS
OXYGEN SATURATION: 98 % | HEART RATE: 92 BPM | WEIGHT: 106.26 LBS | HEIGHT: 61 IN | DIASTOLIC BLOOD PRESSURE: 73 MMHG | SYSTOLIC BLOOD PRESSURE: 117 MMHG | RESPIRATION RATE: 18 BRPM | BODY MASS INDEX: 20.06 KG/M2

## 2024-01-16 DIAGNOSIS — M16.11 UNILATERAL PRIMARY OSTEOARTHRITIS, RIGHT HIP: ICD-10-CM

## 2024-01-16 LAB
ALBUMIN SERPL BCP-MCNC: 4.4 G/DL (ref 3.4–5)
ALP SERPL-CCNC: 88 U/L (ref 33–136)
ALT SERPL W P-5'-P-CCNC: 10 U/L (ref 7–45)
ANION GAP SERPL CALC-SCNC: 12 MMOL/L (ref 10–20)
APPEARANCE UR: CLEAR
AST SERPL W P-5'-P-CCNC: 21 U/L (ref 9–39)
BACTERIA #/AREA URNS AUTO: ABNORMAL /HPF
BASOPHILS # BLD AUTO: 0.05 X10*3/UL (ref 0–0.1)
BASOPHILS NFR BLD AUTO: 0.8 %
BILIRUB SERPL-MCNC: 0.5 MG/DL (ref 0–1.2)
BILIRUB UR STRIP.AUTO-MCNC: NEGATIVE MG/DL
BUN SERPL-MCNC: 25 MG/DL (ref 6–23)
CALCIUM SERPL-MCNC: 9.6 MG/DL (ref 8.6–10.3)
CHLORIDE SERPL-SCNC: 98 MMOL/L (ref 98–107)
CO2 SERPL-SCNC: 32 MMOL/L (ref 21–32)
COLOR UR: YELLOW
CREAT SERPL-MCNC: 0.95 MG/DL (ref 0.5–1.05)
EGFRCR SERPLBLD CKD-EPI 2021: 64 ML/MIN/1.73M*2
EOSINOPHIL # BLD AUTO: 0.12 X10*3/UL (ref 0–0.4)
EOSINOPHIL NFR BLD AUTO: 1.9 %
ERYTHROCYTE [DISTWIDTH] IN BLOOD BY AUTOMATED COUNT: 13.7 % (ref 11.5–14.5)
GLUCOSE SERPL-MCNC: 54 MG/DL (ref 74–99)
GLUCOSE UR STRIP.AUTO-MCNC: NEGATIVE MG/DL
HCT VFR BLD AUTO: 41.7 % (ref 36–46)
HGB BLD-MCNC: 13.2 G/DL (ref 12–16)
IMM GRANULOCYTES # BLD AUTO: 0.04 X10*3/UL (ref 0–0.5)
IMM GRANULOCYTES NFR BLD AUTO: 0.6 % (ref 0–0.9)
INR PPP: 1 (ref 0.9–1.1)
KETONES UR STRIP.AUTO-MCNC: ABNORMAL MG/DL
LEUKOCYTE ESTERASE UR QL STRIP.AUTO: ABNORMAL
LYMPHOCYTES # BLD AUTO: 1.14 X10*3/UL (ref 0.8–3)
LYMPHOCYTES NFR BLD AUTO: 17.9 %
MCH RBC QN AUTO: 30.3 PG (ref 26–34)
MCHC RBC AUTO-ENTMCNC: 31.7 G/DL (ref 32–36)
MCV RBC AUTO: 96 FL (ref 80–100)
MONOCYTES # BLD AUTO: 0.74 X10*3/UL (ref 0.05–0.8)
MONOCYTES NFR BLD AUTO: 11.6 %
NEUTROPHILS # BLD AUTO: 4.27 X10*3/UL (ref 1.6–5.5)
NEUTROPHILS NFR BLD AUTO: 67.2 %
NITRITE UR QL STRIP.AUTO: NEGATIVE
NRBC BLD-RTO: 0 /100 WBCS (ref 0–0)
PH UR STRIP.AUTO: 7 [PH]
PLATELET # BLD AUTO: 306 X10*3/UL (ref 150–450)
POTASSIUM SERPL-SCNC: 3.2 MMOL/L (ref 3.5–5.3)
PROT SERPL-MCNC: 6.6 G/DL (ref 6.4–8.2)
PROT UR STRIP.AUTO-MCNC: NEGATIVE MG/DL
PROTHROMBIN TIME: 11.2 SECONDS (ref 9.8–12.8)
RBC # BLD AUTO: 4.36 X10*6/UL (ref 4–5.2)
RBC # UR STRIP.AUTO: ABNORMAL /UL
RBC #/AREA URNS AUTO: >20 /HPF
SODIUM SERPL-SCNC: 139 MMOL/L (ref 136–145)
SP GR UR STRIP.AUTO: 1.02
UROBILINOGEN UR STRIP.AUTO-MCNC: <2 MG/DL
WBC # BLD AUTO: 6.4 X10*3/UL (ref 4.4–11.3)
WBC #/AREA URNS AUTO: ABNORMAL /HPF

## 2024-01-16 PROCEDURE — 81001 URINALYSIS AUTO W/SCOPE: CPT

## 2024-01-16 PROCEDURE — 85025 COMPLETE CBC W/AUTO DIFF WBC: CPT

## 2024-01-16 PROCEDURE — 80053 COMPREHEN METABOLIC PANEL: CPT

## 2024-01-16 PROCEDURE — 85610 PROTHROMBIN TIME: CPT

## 2024-01-16 PROCEDURE — 36415 COLL VENOUS BLD VENIPUNCTURE: CPT

## 2024-01-16 PROCEDURE — 87086 URINE CULTURE/COLONY COUNT: CPT | Mod: ELYLAB

## 2024-01-16 PROCEDURE — 87081 CULTURE SCREEN ONLY: CPT | Mod: 59,ELYLAB

## 2024-01-16 RX ORDER — HYDROCODONE BITARTRATE AND ACETAMINOPHEN 7.5; 325 MG/1; MG/1
1 TABLET ORAL EVERY 8 HOURS PRN
COMMUNITY
Start: 2023-12-17

## 2024-01-16 ASSESSMENT — ENCOUNTER SYMPTOMS
ABDOMINAL DISTENTION: 0
BACK PAIN: 1
FACIAL SWELLING: 0
APNEA: 0
BLOOD IN STOOL: 0
PHOTOPHOBIA: 0
CHOKING: 0

## 2024-01-16 NOTE — PREPROCEDURE INSTRUCTIONS
Pre-op instructions reviewed with patient including NPO status and where to check in for procedure. Patient provided with and Discussed/Instructed on [facility] Info Book.

## 2024-01-17 LAB
BACTERIA UR CULT: NORMAL
HOLD SPECIMEN: NORMAL

## 2024-01-17 NOTE — PROGRESS NOTES
Krystle Marin 73 y.o. female presents today with   Chief Complaint   Patient presents with    Medicare AW       HPIannual wellness visit    Past Medical History:   Diagnosis Date    Arthritis     seen dr carmichael    Chronic back pain     seen dr matos in past    Depression     Hypothyroidism     Osteoarthritis     Osteoporosis     Restless legs syndrome     Tachycardia      Patient Active Problem List    Diagnosis Date Noted    Rheumatoid arthritis with rheumatoid factor, unspecified 11/15/2022    Rheumatoid arthritis, unspecified 11/15/2022    Major depressive disorder, recurrent, mild 08/09/2022    Major depressive disorder, recurrent, moderate 08/09/2022    Major depressive disorder, recurrent, unspecified 08/09/2022    Drug-induced constipation 08/02/2022    Post-op pain 11/30/2023    Status post total replacement of left hip 11/30/2023    Hip pain, acute 07/28/2023    Osteoarthritis of hip 07/28/2023    Spinal stenosis of lumbar region with neurogenic claudication 04/03/2023    Levoscoliosis of thoracic spine 04/03/2023    Dupuytren's contracture 03/02/2022    Localized, primary osteoarthritis of hand 03/02/2022    Parkinson's disease 08/02/2021    Kyphoscoliosis 11/24/2020    Ataxic gait 07/21/2020    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 07/21/2020    Cervicalgia 07/21/2020    Hyperreflexia 07/21/2020    Spondylosis of lumbar region without myelopathy or radiculopathy 07/29/2016    Hypothyroid 07/29/2013    Depressive disorder, not elsewhere classified 07/29/2013    Palpitations 07/29/2013    Tachycardia 07/29/2013     Past Surgical History:   Procedure Laterality Date    CHOLECYSTECTOMY      COLONOSCOPY N/A 11/22/2022    COLONOSCOPY DIAGNOSTIC performed by Klapesh Mariscal MD at [facility]    HYSTERECTOMY (CERVIX STATUS UNKNOWN)      Total age 50    KNEE ARTHROSCOPY Right     OVARY REMOVAL      TONSILLECTOMY       Family History   Problem Relation Age of Onset    Heart Disease Mother

## 2024-01-18 DIAGNOSIS — M05.9 RHEUMATOID ARTHRITIS WITH RHEUMATOID FACTOR, UNSPECIFIED (HCC): ICD-10-CM

## 2024-01-18 LAB — STAPHYLOCOCCUS SPEC CULT: NORMAL

## 2024-01-18 RX ORDER — HYDROCODONE BITARTRATE AND ACETAMINOPHEN 7.5; 325 MG/1; MG/1
1 TABLET ORAL 3 TIMES DAILY
Qty: 90 TABLET | Refills: 0 | Status: SHIPPED | OUTPATIENT
Start: 2024-01-18 | End: 2024-03-04 | Stop reason: SDUPTHER

## 2024-01-18 NOTE — TELEPHONE ENCOUNTER
Patient is  requesting medication refill. Please approve or deny this request.    Rx requested:  Requested Prescriptions     Pending Prescriptions Disp Refills    HYDROcodone-acetaminophen (LORCET PLUS) 7.5-325 MG per tablet 90 tablet 0     Sig: Take 1 tablet by mouth 3 times daily for 30 days. Max Daily Amount: 3 tablets         Last Office Visit:   12/12/2023      Next Visit Date:  Future Appointments   Date Time Provider Department Center   4/9/2024  2:30 PM [facility]

## 2024-01-24 ENCOUNTER — OFFICE VISIT (OUTPATIENT)
Dept: ORTHOPEDIC SURGERY | Facility: CLINIC | Age: 74
End: 2024-01-24
Payer: MEDICARE

## 2024-01-24 DIAGNOSIS — M16.11 PRIMARY OSTEOARTHRITIS OF RIGHT HIP: Primary | ICD-10-CM

## 2024-01-24 PROCEDURE — 1159F MED LIST DOCD IN RCRD: CPT | Performed by: ORTHOPAEDIC SURGERY

## 2024-01-24 PROCEDURE — 99024 POSTOP FOLLOW-UP VISIT: CPT | Performed by: ORTHOPAEDIC SURGERY

## 2024-01-24 PROCEDURE — 1036F TOBACCO NON-USER: CPT | Performed by: ORTHOPAEDIC SURGERY

## 2024-01-24 PROCEDURE — 1125F AMNT PAIN NOTED PAIN PRSNT: CPT | Performed by: ORTHOPAEDIC SURGERY

## 2024-01-24 NOTE — PROGRESS NOTES
Subjective    Patient ID: Mitzi    Chief Complaint:   Chief Complaint   Patient presents with    Right Hip - Pre-op Exam     Rt THR 1/29 @ [facility]     This patient has pain in the hip that is increased with activity and weightbearing, the patient walks with an antalgic gait at this time.  The pain is interfering with activities of daily living.  The patient has limited range of motion of the hip and pain with passive range of motion.  This x-ray demonstrates joint space narrowing, subchondral sclerosis, and osteophyte formation.  The patient has failed to respond to conservative treatment with medication therapy and supportive devices for period of at least 3 months.     This is the preop visit to discuss the risks and benefits of the total hip replacement surgery.  These risks were fully explained to the patient.  With this, and as any surgery, infection is a risk.  The risk for infection is usually between 1 and 2%.  This risk is even higher in diabetics, persons with rheumatoid arthritis, patients with previous surgery, patients on oral steroid medication, and obesity.  All of these issues were properly discussed.  We always assure all sterile techniques will be followed during the procedure.  Patient is also need to be on antibiotics for the next 2 years for any minor surgical procedure, even dental work.  For high risk patients this will be for lifetime.  Severe infections may require removal of the prosthesis.     It was also explained to the patient that there will be some blood loss during the procedure.  Transfusions although rare will only be given when absolutely medically necessary.     Pulmonary embolism and blood clots were also discussed with the patient.  We discussed that these can be fatal complications of the procedure.  Is explained to the patient that the use of thromboembolic stockings, foot pumps, incentive spirometer, early mobility, and the use of blood thinners for a period of time is the  standard of care.     Dislocations are discussed with the patient.  It is explained that the highest risk for dislocating the hip happens during the first 3 months after surgery.  These risks tend to decrease with time.  This risk is higher and revisions.  It is explained to the patient that hip precautions are very important and that these will be carried out throughout the lifetime with her prosthesis.  Intraoperatively, we will adjust the length of the leg to tighten the joint to help prevent dislocation.  This leg lengthening to stabilize the joint is usually no more than 1/4 inch but may be more or less to improve stability.     Loosening and wear of the prosthesis is also discussed.  The prosthesis normally lasts between 12 and 15 years on the average.  Revisions may be more complicated.     Fractures, though rare, they also occur intraoperatively.  These fractures may occur in the pelvis or the femur.  There may be nerve or arterial injuries as well and these are discussed in detail.  Lastly the benefits of spinal anesthesia were explained to the patient.     All of the patient's questions and concerns were answered in detail.     This note was prepared using voice recognition software.  The details of this note are correct and have been reviewed, and corrected to the best of my ability.  Some grammatical areas may persist related to the Dragon software     Jhonatan Belrtan PA-C     [phone]

## 2024-01-28 ENCOUNTER — ANESTHESIA EVENT (OUTPATIENT)
Dept: OPERATING ROOM | Facility: HOSPITAL | Age: 74
End: 2024-01-28
Payer: MEDICARE

## 2024-01-28 SDOH — HEALTH STABILITY: MENTAL HEALTH: CURRENT SMOKER: 0

## 2024-01-29 ENCOUNTER — HOSPITAL ENCOUNTER (OUTPATIENT)
Facility: HOSPITAL | Age: 74
Discharge: HOME | End: 2024-01-30
Attending: ORTHOPAEDIC SURGERY | Admitting: ORTHOPAEDIC SURGERY
Payer: MEDICARE

## 2024-01-29 ENCOUNTER — APPOINTMENT (OUTPATIENT)
Dept: RADIOLOGY | Facility: HOSPITAL | Age: 74
End: 2024-01-29
Payer: MEDICARE

## 2024-01-29 ENCOUNTER — ANESTHESIA (OUTPATIENT)
Dept: OPERATING ROOM | Facility: HOSPITAL | Age: 74
End: 2024-01-29
Payer: MEDICARE

## 2024-01-29 DIAGNOSIS — Z96.641 S/P TOTAL RIGHT HIP ARTHROPLASTY: ICD-10-CM

## 2024-01-29 DIAGNOSIS — M16.11 UNILATERAL PRIMARY OSTEOARTHRITIS, RIGHT HIP: Primary | ICD-10-CM

## 2024-01-29 PROCEDURE — 27130 TOTAL HIP ARTHROPLASTY: CPT | Performed by: PHYSICIAN ASSISTANT

## 2024-01-29 PROCEDURE — 2500000004 HC RX 250 GENERAL PHARMACY W/ HCPCS (ALT 636 FOR OP/ED): Performed by: STUDENT IN AN ORGANIZED HEALTH CARE EDUCATION/TRAINING PROGRAM

## 2024-01-29 PROCEDURE — 3700000002 HC GENERAL ANESTHESIA TIME - EACH INCREMENTAL 1 MINUTE: Performed by: ORTHOPAEDIC SURGERY

## 2024-01-29 PROCEDURE — 3600000018 HC OR TIME - INITIAL BASE CHARGE - PROCEDURE LEVEL SIX: Performed by: ORTHOPAEDIC SURGERY

## 2024-01-29 PROCEDURE — 2500000001 HC RX 250 WO HCPCS SELF ADMINISTERED DRUGS (ALT 637 FOR MEDICARE OP): Performed by: REGISTERED NURSE

## 2024-01-29 PROCEDURE — 7100000002 HC RECOVERY ROOM TIME - EACH INCREMENTAL 1 MINUTE: Performed by: ORTHOPAEDIC SURGERY

## 2024-01-29 PROCEDURE — 2720000007 HC OR 272 NO HCPCS: Performed by: ORTHOPAEDIC SURGERY

## 2024-01-29 PROCEDURE — 7100000011 HC EXTENDED STAY RECOVERY HOURLY - NURSING UNIT

## 2024-01-29 PROCEDURE — 2500000004 HC RX 250 GENERAL PHARMACY W/ HCPCS (ALT 636 FOR OP/ED): Performed by: REGISTERED NURSE

## 2024-01-29 PROCEDURE — 97530 THERAPEUTIC ACTIVITIES: CPT | Mod: GP

## 2024-01-29 PROCEDURE — 2500000004 HC RX 250 GENERAL PHARMACY W/ HCPCS (ALT 636 FOR OP/ED): Performed by: ORTHOPAEDIC SURGERY

## 2024-01-29 PROCEDURE — 99222 1ST HOSP IP/OBS MODERATE 55: CPT | Performed by: REGISTERED NURSE

## 2024-01-29 PROCEDURE — 2780000003 HC OR 278 NO HCPCS: Performed by: ORTHOPAEDIC SURGERY

## 2024-01-29 PROCEDURE — 3600000017 HC OR TIME - EACH INCREMENTAL 1 MINUTE - PROCEDURE LEVEL SIX: Performed by: ORTHOPAEDIC SURGERY

## 2024-01-29 PROCEDURE — A4217 STERILE WATER/SALINE, 500 ML: HCPCS | Performed by: ORTHOPAEDIC SURGERY

## 2024-01-29 PROCEDURE — 27130 TOTAL HIP ARTHROPLASTY: CPT | Performed by: ORTHOPAEDIC SURGERY

## 2024-01-29 PROCEDURE — C1713 ANCHOR/SCREW BN/BN,TIS/BN: HCPCS | Performed by: ORTHOPAEDIC SURGERY

## 2024-01-29 PROCEDURE — C1776 JOINT DEVICE (IMPLANTABLE): HCPCS | Performed by: ORTHOPAEDIC SURGERY

## 2024-01-29 PROCEDURE — 73501 X-RAY EXAM HIP UNI 1 VIEW: CPT | Mod: RT

## 2024-01-29 PROCEDURE — A6213 FOAM DRG >16<=48 SQ IN W/BDR: HCPCS | Performed by: ORTHOPAEDIC SURGERY

## 2024-01-29 PROCEDURE — 97116 GAIT TRAINING THERAPY: CPT | Mod: GP

## 2024-01-29 PROCEDURE — 73502 X-RAY EXAM HIP UNI 2-3 VIEWS: CPT | Mod: RIGHT SIDE | Performed by: RADIOLOGY

## 2024-01-29 PROCEDURE — 3700000001 HC GENERAL ANESTHESIA TIME - INITIAL BASE CHARGE: Performed by: ORTHOPAEDIC SURGERY

## 2024-01-29 PROCEDURE — 2500000005 HC RX 250 GENERAL PHARMACY W/O HCPCS: Performed by: ORTHOPAEDIC SURGERY

## 2024-01-29 PROCEDURE — 2500000001 HC RX 250 WO HCPCS SELF ADMINISTERED DRUGS (ALT 637 FOR MEDICARE OP): Performed by: ORTHOPAEDIC SURGERY

## 2024-01-29 PROCEDURE — 2500000005 HC RX 250 GENERAL PHARMACY W/O HCPCS: Performed by: STUDENT IN AN ORGANIZED HEALTH CARE EDUCATION/TRAINING PROGRAM

## 2024-01-29 PROCEDURE — 7100000001 HC RECOVERY ROOM TIME - INITIAL BASE CHARGE: Performed by: ORTHOPAEDIC SURGERY

## 2024-01-29 PROCEDURE — 97161 PT EVAL LOW COMPLEX 20 MIN: CPT | Mod: GP

## 2024-01-29 DEVICE — INSERT, TRIDENT X3 POLYETHYLENE, 10 DEG, 36MM E: Type: IMPLANTABLE DEVICE | Site: HIP | Status: FUNCTIONAL

## 2024-01-29 DEVICE — CERAMIC V40 FEMORAL HEAD
Type: IMPLANTABLE DEVICE | Site: HIP | Status: FUNCTIONAL
Brand: BIOLOX

## 2024-01-29 DEVICE — 6.5MM LOW PROFILE HEX SCREW 20MM
Type: IMPLANTABLE DEVICE | Site: HIP | Status: FUNCTIONAL
Brand: TRIDENT II

## 2024-01-29 DEVICE — TRIDENT II CLUSTERHOLE HA ACETABULAR SHELL 52E
Type: IMPLANTABLE DEVICE | Site: HIP | Status: FUNCTIONAL
Brand: TRIDENT II

## 2024-01-29 DEVICE — 127 DEGREE NECK ANGLE HIP STEM
Type: IMPLANTABLE DEVICE | Site: HIP | Status: FUNCTIONAL
Brand: ACCOLADE

## 2024-01-29 RX ORDER — NALOXONE HYDROCHLORIDE 1 MG/ML
0.2 INJECTION INTRAMUSCULAR; INTRAVENOUS; SUBCUTANEOUS EVERY 5 MIN PRN
Status: DISCONTINUED | OUTPATIENT
Start: 2024-01-29 | End: 2024-01-30 | Stop reason: HOSPADM

## 2024-01-29 RX ORDER — PROCHLORPERAZINE EDISYLATE 5 MG/ML
10 INJECTION INTRAMUSCULAR; INTRAVENOUS EVERY 6 HOURS PRN
Status: DISCONTINUED | OUTPATIENT
Start: 2024-01-29 | End: 2024-01-30 | Stop reason: HOSPADM

## 2024-01-29 RX ORDER — METOPROLOL SUCCINATE 25 MG/1
25 TABLET, EXTENDED RELEASE ORAL NIGHTLY
Status: DISCONTINUED | OUTPATIENT
Start: 2024-01-29 | End: 2024-01-30 | Stop reason: HOSPADM

## 2024-01-29 RX ORDER — MEPERIDINE HYDROCHLORIDE 25 MG/ML
12.5 INJECTION INTRAMUSCULAR; INTRAVENOUS; SUBCUTANEOUS EVERY 10 MIN PRN
Status: DISCONTINUED | OUTPATIENT
Start: 2024-01-29 | End: 2024-01-29 | Stop reason: HOSPADM

## 2024-01-29 RX ORDER — CARBIDOPA AND LEVODOPA 25; 100 MG/1; MG/1
0.5 TABLET ORAL 4 TIMES DAILY
Status: DISCONTINUED | OUTPATIENT
Start: 2024-01-29 | End: 2024-01-30 | Stop reason: HOSPADM

## 2024-01-29 RX ORDER — HYDRALAZINE HYDROCHLORIDE 20 MG/ML
5 INJECTION INTRAMUSCULAR; INTRAVENOUS EVERY 30 MIN PRN
Status: DISCONTINUED | OUTPATIENT
Start: 2024-01-29 | End: 2024-01-29 | Stop reason: HOSPADM

## 2024-01-29 RX ORDER — NALOXONE HYDROCHLORIDE 1 MG/ML
0.2 INJECTION INTRAMUSCULAR; INTRAVENOUS; SUBCUTANEOUS EVERY 5 MIN PRN
Status: DISCONTINUED | OUTPATIENT
Start: 2024-01-29 | End: 2024-01-29

## 2024-01-29 RX ORDER — CEFAZOLIN SODIUM 2 G/100ML
2 INJECTION, SOLUTION INTRAVENOUS EVERY 8 HOURS
Status: COMPLETED | OUTPATIENT
Start: 2024-01-29 | End: 2024-01-29

## 2024-01-29 RX ORDER — ALBUTEROL SULFATE 0.83 MG/ML
2.5 SOLUTION RESPIRATORY (INHALATION) ONCE AS NEEDED
Status: DISCONTINUED | OUTPATIENT
Start: 2024-01-29 | End: 2024-01-29 | Stop reason: HOSPADM

## 2024-01-29 RX ORDER — HYDROMORPHONE HYDROCHLORIDE 1 MG/ML
INJECTION, SOLUTION INTRAMUSCULAR; INTRAVENOUS; SUBCUTANEOUS AS NEEDED
Status: DISCONTINUED | OUTPATIENT
Start: 2024-01-29 | End: 2024-01-29

## 2024-01-29 RX ORDER — ONDANSETRON HYDROCHLORIDE 2 MG/ML
4 INJECTION, SOLUTION INTRAVENOUS EVERY 8 HOURS PRN
Status: DISCONTINUED | OUTPATIENT
Start: 2024-01-29 | End: 2024-01-30 | Stop reason: HOSPADM

## 2024-01-29 RX ORDER — FENTANYL CITRATE 50 UG/ML
25 INJECTION, SOLUTION INTRAMUSCULAR; INTRAVENOUS EVERY 5 MIN PRN
Status: DISCONTINUED | OUTPATIENT
Start: 2024-01-29 | End: 2024-01-29 | Stop reason: HOSPADM

## 2024-01-29 RX ORDER — MIDAZOLAM HYDROCHLORIDE 1 MG/ML
INJECTION, SOLUTION INTRAMUSCULAR; INTRAVENOUS AS NEEDED
Status: DISCONTINUED | OUTPATIENT
Start: 2024-01-29 | End: 2024-01-29

## 2024-01-29 RX ORDER — SODIUM CHLORIDE, SODIUM LACTATE, POTASSIUM CHLORIDE, CALCIUM CHLORIDE 600; 310; 30; 20 MG/100ML; MG/100ML; MG/100ML; MG/100ML
100 INJECTION, SOLUTION INTRAVENOUS CONTINUOUS
Status: DISCONTINUED | OUTPATIENT
Start: 2024-01-29 | End: 2024-01-29 | Stop reason: HOSPADM

## 2024-01-29 RX ORDER — LABETALOL HYDROCHLORIDE 5 MG/ML
5 INJECTION, SOLUTION INTRAVENOUS ONCE AS NEEDED
Status: DISCONTINUED | OUTPATIENT
Start: 2024-01-29 | End: 2024-01-29 | Stop reason: HOSPADM

## 2024-01-29 RX ORDER — BISACODYL 5 MG
10 TABLET, DELAYED RELEASE (ENTERIC COATED) ORAL DAILY PRN
Status: DISCONTINUED | OUTPATIENT
Start: 2024-01-29 | End: 2024-01-30 | Stop reason: HOSPADM

## 2024-01-29 RX ORDER — SODIUM CHLORIDE, SODIUM LACTATE, POTASSIUM CHLORIDE, CALCIUM CHLORIDE 600; 310; 30; 20 MG/100ML; MG/100ML; MG/100ML; MG/100ML
50 INJECTION, SOLUTION INTRAVENOUS CONTINUOUS
Status: ACTIVE | OUTPATIENT
Start: 2024-01-29 | End: 2024-01-30

## 2024-01-29 RX ORDER — ROCURONIUM BROMIDE 10 MG/ML
INJECTION, SOLUTION INTRAVENOUS AS NEEDED
Status: DISCONTINUED | OUTPATIENT
Start: 2024-01-29 | End: 2024-01-29

## 2024-01-29 RX ORDER — OXYCODONE HYDROCHLORIDE 5 MG/1
5 TABLET ORAL EVERY 4 HOURS PRN
Status: DISCONTINUED | OUTPATIENT
Start: 2024-01-29 | End: 2024-01-30 | Stop reason: HOSPADM

## 2024-01-29 RX ORDER — ONDANSETRON 4 MG/1
4 TABLET, ORALLY DISINTEGRATING ORAL EVERY 8 HOURS PRN
Status: DISCONTINUED | OUTPATIENT
Start: 2024-01-29 | End: 2024-01-30 | Stop reason: HOSPADM

## 2024-01-29 RX ORDER — MORPHINE SULFATE 2 MG/ML
2 INJECTION, SOLUTION INTRAMUSCULAR; INTRAVENOUS EVERY 2 HOUR PRN
Status: DISCONTINUED | OUTPATIENT
Start: 2024-01-29 | End: 2024-01-30 | Stop reason: HOSPADM

## 2024-01-29 RX ORDER — DOCUSATE SODIUM 100 MG/1
100 CAPSULE, LIQUID FILLED ORAL 2 TIMES DAILY
Status: DISCONTINUED | OUTPATIENT
Start: 2024-01-29 | End: 2024-01-30 | Stop reason: HOSPADM

## 2024-01-29 RX ORDER — ONDANSETRON HYDROCHLORIDE 2 MG/ML
INJECTION, SOLUTION INTRAVENOUS AS NEEDED
Status: DISCONTINUED | OUTPATIENT
Start: 2024-01-29 | End: 2024-01-29

## 2024-01-29 RX ORDER — PROPOFOL 10 MG/ML
INJECTION, EMULSION INTRAVENOUS AS NEEDED
Status: DISCONTINUED | OUTPATIENT
Start: 2024-01-29 | End: 2024-01-29

## 2024-01-29 RX ORDER — TRANEXAMIC ACID 650 MG/1
1950 TABLET ORAL ONCE
Status: COMPLETED | OUTPATIENT
Start: 2024-01-29 | End: 2024-01-29

## 2024-01-29 RX ORDER — CELECOXIB 200 MG/1
200 CAPSULE ORAL ONCE
Status: COMPLETED | OUTPATIENT
Start: 2024-01-29 | End: 2024-01-29

## 2024-01-29 RX ORDER — LEVOTHYROXINE SODIUM 50 UG/1
50 TABLET ORAL DAILY
Status: DISCONTINUED | OUTPATIENT
Start: 2024-01-30 | End: 2024-01-30 | Stop reason: HOSPADM

## 2024-01-29 RX ORDER — PROCHLORPERAZINE 25 MG/1
25 SUPPOSITORY RECTAL EVERY 12 HOURS PRN
Status: DISCONTINUED | OUTPATIENT
Start: 2024-01-29 | End: 2024-01-30 | Stop reason: HOSPADM

## 2024-01-29 RX ORDER — FENTANYL CITRATE 50 UG/ML
INJECTION, SOLUTION INTRAMUSCULAR; INTRAVENOUS AS NEEDED
Status: DISCONTINUED | OUTPATIENT
Start: 2024-01-29 | End: 2024-01-29

## 2024-01-29 RX ORDER — ASPIRIN 81 MG/1
81 TABLET ORAL 2 TIMES DAILY
Status: DISCONTINUED | OUTPATIENT
Start: 2024-01-29 | End: 2024-01-30 | Stop reason: HOSPADM

## 2024-01-29 RX ORDER — PROCHLORPERAZINE MALEATE 5 MG
10 TABLET ORAL EVERY 6 HOURS PRN
Status: DISCONTINUED | OUTPATIENT
Start: 2024-01-29 | End: 2024-01-30 | Stop reason: HOSPADM

## 2024-01-29 RX ORDER — ONDANSETRON HYDROCHLORIDE 2 MG/ML
4 INJECTION, SOLUTION INTRAVENOUS ONCE AS NEEDED
Status: DISCONTINUED | OUTPATIENT
Start: 2024-01-29 | End: 2024-01-29 | Stop reason: HOSPADM

## 2024-01-29 RX ORDER — TRANEXAMIC ACID 650 MG/1
1950 TABLET ORAL ONCE
Status: COMPLETED | OUTPATIENT
Start: 2024-01-30 | End: 2024-01-30

## 2024-01-29 RX ORDER — DULOXETIN HYDROCHLORIDE 30 MG/1
60 CAPSULE, DELAYED RELEASE ORAL
Status: DISCONTINUED | OUTPATIENT
Start: 2024-01-30 | End: 2024-01-30 | Stop reason: HOSPADM

## 2024-01-29 RX ORDER — LIDOCAINE HYDROCHLORIDE 20 MG/ML
INJECTION, SOLUTION INFILTRATION; PERINEURAL AS NEEDED
Status: DISCONTINUED | OUTPATIENT
Start: 2024-01-29 | End: 2024-01-29

## 2024-01-29 RX ORDER — DIPHENHYDRAMINE HCL 12.5MG/5ML
12.5 LIQUID (ML) ORAL EVERY 6 HOURS PRN
Status: DISCONTINUED | OUTPATIENT
Start: 2024-01-29 | End: 2024-01-30 | Stop reason: HOSPADM

## 2024-01-29 RX ORDER — WATER 1 ML/ML
IRRIGANT IRRIGATION AS NEEDED
Status: DISCONTINUED | OUTPATIENT
Start: 2024-01-29 | End: 2024-01-29 | Stop reason: HOSPADM

## 2024-01-29 RX ORDER — KETOROLAC TROMETHAMINE 30 MG/ML
15 INJECTION, SOLUTION INTRAMUSCULAR; INTRAVENOUS EVERY 6 HOURS
Status: COMPLETED | OUTPATIENT
Start: 2024-01-29 | End: 2024-01-30

## 2024-01-29 RX ORDER — OXYCODONE HYDROCHLORIDE 5 MG/1
10 TABLET ORAL EVERY 4 HOURS PRN
Status: DISCONTINUED | OUTPATIENT
Start: 2024-01-29 | End: 2024-01-30 | Stop reason: HOSPADM

## 2024-01-29 RX ORDER — CEFAZOLIN SODIUM 2 G/100ML
2 INJECTION, SOLUTION INTRAVENOUS ONCE
Status: COMPLETED | OUTPATIENT
Start: 2024-01-29 | End: 2024-01-29

## 2024-01-29 RX ORDER — ACETAMINOPHEN 325 MG/1
975 TABLET ORAL ONCE
Status: COMPLETED | OUTPATIENT
Start: 2024-01-29 | End: 2024-01-29

## 2024-01-29 RX ORDER — DEXAMETHASONE SODIUM PHOSPHATE 4 MG/ML
INJECTION, SOLUTION INTRA-ARTICULAR; INTRALESIONAL; INTRAMUSCULAR; INTRAVENOUS; SOFT TISSUE AS NEEDED
Status: DISCONTINUED | OUTPATIENT
Start: 2024-01-29 | End: 2024-01-29

## 2024-01-29 RX ORDER — PANTOPRAZOLE SODIUM 40 MG/1
40 TABLET, DELAYED RELEASE ORAL DAILY
Status: DISCONTINUED | OUTPATIENT
Start: 2024-01-29 | End: 2024-01-30 | Stop reason: HOSPADM

## 2024-01-29 RX ORDER — ROPIVACAINE/EPI/CLONIDINE/KET 2.46-0.005
SYRINGE (ML) INJECTION AS NEEDED
Status: DISCONTINUED | OUTPATIENT
Start: 2024-01-29 | End: 2024-01-29 | Stop reason: HOSPADM

## 2024-01-29 RX ORDER — DIPHENHYDRAMINE HYDROCHLORIDE 50 MG/ML
12.5 INJECTION INTRAMUSCULAR; INTRAVENOUS ONCE AS NEEDED
Status: DISCONTINUED | OUTPATIENT
Start: 2024-01-29 | End: 2024-01-29 | Stop reason: HOSPADM

## 2024-01-29 RX ORDER — SODIUM CHLORIDE 0.9 G/100ML
IRRIGANT IRRIGATION AS NEEDED
Status: DISCONTINUED | OUTPATIENT
Start: 2024-01-29 | End: 2024-01-29 | Stop reason: HOSPADM

## 2024-01-29 RX ORDER — SODIUM CHLORIDE, SODIUM LACTATE, POTASSIUM CHLORIDE, CALCIUM CHLORIDE 600; 310; 30; 20 MG/100ML; MG/100ML; MG/100ML; MG/100ML
50 INJECTION, SOLUTION INTRAVENOUS CONTINUOUS
Status: DISCONTINUED | OUTPATIENT
Start: 2024-01-29 | End: 2024-01-30 | Stop reason: HOSPADM

## 2024-01-29 RX ORDER — HYDROXYCHLOROQUINE SULFATE 200 MG/1
200 TABLET, FILM COATED ORAL DAILY
Status: DISCONTINUED | OUTPATIENT
Start: 2024-01-29 | End: 2024-01-30 | Stop reason: HOSPADM

## 2024-01-29 RX ORDER — ACETAMINOPHEN 325 MG/1
650 TABLET ORAL EVERY 6 HOURS SCHEDULED
Status: DISCONTINUED | OUTPATIENT
Start: 2024-01-29 | End: 2024-01-30 | Stop reason: HOSPADM

## 2024-01-29 RX ORDER — CYCLOBENZAPRINE HCL 10 MG
10 TABLET ORAL 3 TIMES DAILY PRN
Status: DISCONTINUED | OUTPATIENT
Start: 2024-01-29 | End: 2024-01-30 | Stop reason: HOSPADM

## 2024-01-29 RX ADMIN — CARBIDOPA AND LEVODOPA 0.5 TABLET: 25; 100 TABLET ORAL at 16:00

## 2024-01-29 RX ADMIN — CEFAZOLIN SODIUM 2 G: 2 INJECTION, SOLUTION INTRAVENOUS at 15:52

## 2024-01-29 RX ADMIN — DEXAMETHASONE SODIUM PHOSPHATE 4 MG: 4 INJECTION, SOLUTION INTRAMUSCULAR; INTRAVENOUS at 07:58

## 2024-01-29 RX ADMIN — SUGAMMADEX 100 MG: 100 INJECTION, SOLUTION INTRAVENOUS at 08:34

## 2024-01-29 RX ADMIN — CELECOXIB 200 MG: 200 CAPSULE ORAL at 06:24

## 2024-01-29 RX ADMIN — LIDOCAINE HYDROCHLORIDE 50 MG: 20 INJECTION, SOLUTION INFILTRATION; PERINEURAL at 07:35

## 2024-01-29 RX ADMIN — CARBIDOPA AND LEVODOPA 0.5 TABLET: 25; 100 TABLET ORAL at 20:56

## 2024-01-29 RX ADMIN — CYCLOBENZAPRINE HYDROCHLORIDE 10 MG: 10 TABLET, FILM COATED ORAL at 16:00

## 2024-01-29 RX ADMIN — ROCURONIUM BROMIDE 10 MG: 10 SOLUTION INTRAVENOUS at 08:05

## 2024-01-29 RX ADMIN — ACETAMINOPHEN 650 MG: 325 TABLET ORAL at 11:58

## 2024-01-29 RX ADMIN — PROPOFOL 30 MG: 10 INJECTION, EMULSION INTRAVENOUS at 08:00

## 2024-01-29 RX ADMIN — KETOROLAC TROMETHAMINE 15 MG: 30 INJECTION, SOLUTION INTRAMUSCULAR; INTRAVENOUS at 22:59

## 2024-01-29 RX ADMIN — ASPIRIN 81 MG: 81 TABLET, COATED ORAL at 20:58

## 2024-01-29 RX ADMIN — FENTANYL CITRATE 50 MCG: 50 INJECTION, SOLUTION INTRAMUSCULAR; INTRAVENOUS at 07:34

## 2024-01-29 RX ADMIN — POVIDONE-IODINE 1 APPLICATION: 5 SOLUTION TOPICAL at 06:27

## 2024-01-29 RX ADMIN — HYDROMORPHONE HYDROCHLORIDE 0.5 MG: 1 INJECTION, SOLUTION INTRAMUSCULAR; INTRAVENOUS; SUBCUTANEOUS at 08:10

## 2024-01-29 RX ADMIN — PROPOFOL 30 MG: 10 INJECTION, EMULSION INTRAVENOUS at 08:09

## 2024-01-29 RX ADMIN — OXYCODONE HYDROCHLORIDE 10 MG: 5 TABLET ORAL at 12:00

## 2024-01-29 RX ADMIN — SUGAMMADEX 100 MG: 100 INJECTION, SOLUTION INTRAVENOUS at 08:33

## 2024-01-29 RX ADMIN — KETOROLAC TROMETHAMINE 15 MG: 30 INJECTION, SOLUTION INTRAMUSCULAR; INTRAVENOUS at 10:37

## 2024-01-29 RX ADMIN — MIDAZOLAM 2 MG: 1 INJECTION INTRAMUSCULAR; INTRAVENOUS at 07:28

## 2024-01-29 RX ADMIN — HYDROMORPHONE HYDROCHLORIDE 0.5 MG: 1 INJECTION, SOLUTION INTRAMUSCULAR; INTRAVENOUS; SUBCUTANEOUS at 09:35

## 2024-01-29 RX ADMIN — SODIUM CHLORIDE, POTASSIUM CHLORIDE, SODIUM LACTATE AND CALCIUM CHLORIDE 50 ML/HR: 600; 310; 30; 20 INJECTION, SOLUTION INTRAVENOUS at 06:23

## 2024-01-29 RX ADMIN — ONDANSETRON 4 MG: 2 INJECTION, SOLUTION INTRAMUSCULAR; INTRAVENOUS at 07:59

## 2024-01-29 RX ADMIN — CARBIDOPA AND LEVODOPA 0.5 TABLET: 25; 100 TABLET ORAL at 12:01

## 2024-01-29 RX ADMIN — FENTANYL CITRATE 50 MCG: 50 INJECTION, SOLUTION INTRAMUSCULAR; INTRAVENOUS at 07:53

## 2024-01-29 RX ADMIN — HYDROMORPHONE HYDROCHLORIDE 0.5 MG: 1 INJECTION, SOLUTION INTRAMUSCULAR; INTRAVENOUS; SUBCUTANEOUS at 08:00

## 2024-01-29 RX ADMIN — ACETAMINOPHEN 975 MG: 325 TABLET ORAL at 06:24

## 2024-01-29 RX ADMIN — ROCURONIUM BROMIDE 40 MG: 10 SOLUTION INTRAVENOUS at 07:38

## 2024-01-29 RX ADMIN — SODIUM CHLORIDE, POTASSIUM CHLORIDE, SODIUM LACTATE AND CALCIUM CHLORIDE 50 ML/HR: 600; 310; 30; 20 INJECTION, SOLUTION INTRAVENOUS at 10:37

## 2024-01-29 RX ADMIN — KETOROLAC TROMETHAMINE 15 MG: 30 INJECTION, SOLUTION INTRAMUSCULAR; INTRAVENOUS at 15:52

## 2024-01-29 RX ADMIN — TRANEXAMIC ACID 1950 MG: 650 TABLET ORAL at 13:49

## 2024-01-29 RX ADMIN — HYDROXYCHLOROQUINE SULFATE 200 MG: 200 TABLET, FILM COATED ORAL at 12:00

## 2024-01-29 RX ADMIN — PROPOFOL 120 MG: 10 INJECTION, EMULSION INTRAVENOUS at 07:36

## 2024-01-29 RX ADMIN — CEFAZOLIN SODIUM 2 G: 2 INJECTION, SOLUTION INTRAVENOUS at 07:40

## 2024-01-29 RX ADMIN — OXYCODONE HYDROCHLORIDE 10 MG: 5 TABLET ORAL at 20:58

## 2024-01-29 RX ADMIN — METOPROLOL SUCCINATE 25 MG: 25 TABLET, EXTENDED RELEASE ORAL at 20:57

## 2024-01-29 RX ADMIN — CEFAZOLIN SODIUM 2 G: 2 INJECTION, SOLUTION INTRAVENOUS at 23:02

## 2024-01-29 RX ADMIN — PROPOFOL 20 MG: 10 INJECTION, EMULSION INTRAVENOUS at 08:22

## 2024-01-29 RX ADMIN — PANTOPRAZOLE SODIUM 40 MG: 40 TABLET, DELAYED RELEASE ORAL at 11:58

## 2024-01-29 RX ADMIN — ACETAMINOPHEN 650 MG: 325 TABLET ORAL at 17:27

## 2024-01-29 RX ADMIN — TRANEXAMIC ACID 1950 MG: 650 TABLET ORAL at 06:24

## 2024-01-29 SDOH — SOCIAL STABILITY: SOCIAL INSECURITY: DOES ANYONE TRY TO KEEP YOU FROM HAVING/CONTACTING OTHER FRIENDS OR DOING THINGS OUTSIDE YOUR HOME?: NO

## 2024-01-29 SDOH — SOCIAL STABILITY: SOCIAL INSECURITY: ARE THERE ANY APPARENT SIGNS OF INJURIES/BEHAVIORS THAT COULD BE RELATED TO ABUSE/NEGLECT?: NO

## 2024-01-29 SDOH — SOCIAL STABILITY: SOCIAL INSECURITY: HAVE YOU HAD THOUGHTS OF HARMING ANYONE ELSE?: NO

## 2024-01-29 SDOH — SOCIAL STABILITY: SOCIAL INSECURITY: HAS ANYONE EVER THREATENED TO HURT YOUR FAMILY OR YOUR PETS?: NO

## 2024-01-29 SDOH — SOCIAL STABILITY: SOCIAL INSECURITY: ARE YOU OR HAVE YOU BEEN THREATENED OR ABUSED PHYSICALLY, EMOTIONALLY, OR SEXUALLY BY ANYONE?: NO

## 2024-01-29 SDOH — SOCIAL STABILITY: SOCIAL INSECURITY: DO YOU FEEL ANYONE HAS EXPLOITED OR TAKEN ADVANTAGE OF YOU FINANCIALLY OR OF YOUR PERSONAL PROPERTY?: NO

## 2024-01-29 SDOH — SOCIAL STABILITY: SOCIAL INSECURITY: DO YOU FEEL UNSAFE GOING BACK TO THE PLACE WHERE YOU ARE LIVING?: NO

## 2024-01-29 SDOH — SOCIAL STABILITY: SOCIAL INSECURITY: ABUSE: ADULT

## 2024-01-29 SDOH — SOCIAL STABILITY: SOCIAL INSECURITY: WERE YOU ABLE TO COMPLETE ALL THE BEHAVIORAL HEALTH SCREENINGS?: YES

## 2024-01-29 ASSESSMENT — PAIN SCALES - GENERAL
PAINLEVEL_OUTOF10: 3
PAINLEVEL_OUTOF10: 2
PAINLEVEL_OUTOF10: 6
PAINLEVEL_OUTOF10: 0 - NO PAIN
PAINLEVEL_OUTOF10: 0 - NO PAIN
PAINLEVEL_OUTOF10: 3
PAINLEVEL_OUTOF10: 2
PAINLEVEL_OUTOF10: 0 - NO PAIN
PAINLEVEL_OUTOF10: 7
PAINLEVEL_OUTOF10: 3
PAINLEVEL_OUTOF10: 6
PAINLEVEL_OUTOF10: 3
PAINLEVEL_OUTOF10: 0 - NO PAIN

## 2024-01-29 ASSESSMENT — COGNITIVE AND FUNCTIONAL STATUS - GENERAL
CLIMB 3 TO 5 STEPS WITH RAILING: A LOT
MOVING TO AND FROM BED TO CHAIR: A LITTLE
WALKING IN HOSPITAL ROOM: A LITTLE
STANDING UP FROM CHAIR USING ARMS: A LITTLE
DRESSING REGULAR LOWER BODY CLOTHING: A LITTLE
TURNING FROM BACK TO SIDE WHILE IN FLAT BAD: A LITTLE
PATIENT BASELINE BEDBOUND: NO
WALKING IN HOSPITAL ROOM: A LOT
CLIMB 3 TO 5 STEPS WITH RAILING: TOTAL
STANDING UP FROM CHAIR USING ARMS: A LITTLE
MOBILITY SCORE: 18
DAILY ACTIVITIY SCORE: 23
MOVING TO AND FROM BED TO CHAIR: A LITTLE
MOVING FROM LYING ON BACK TO SITTING ON SIDE OF FLAT BED WITH BEDRAILS: A LITTLE
MOBILITY SCORE: 16

## 2024-01-29 ASSESSMENT — LIFESTYLE VARIABLES
HOW MANY STANDARD DRINKS CONTAINING ALCOHOL DO YOU HAVE ON A TYPICAL DAY: 1 OR 2
PRESCIPTION_ABUSE_PAST_12_MONTHS: NO
HOW OFTEN DO YOU HAVE A DRINK CONTAINING ALCOHOL: 4 OR MORE TIMES A WEEK
AUDIT-C TOTAL SCORE: 4
HOW OFTEN DO YOU HAVE 6 OR MORE DRINKS ON ONE OCCASION: NEVER
AUDIT-C TOTAL SCORE: 4
SUBSTANCE_ABUSE_PAST_12_MONTHS: NO
SKIP TO QUESTIONS 9-10: 1

## 2024-01-29 ASSESSMENT — ACTIVITIES OF DAILY LIVING (ADL)
ADL_ASSISTANCE: INDEPENDENT
LACK_OF_TRANSPORTATION: NO
FEEDING YOURSELF: INDEPENDENT
JUDGMENT_ADEQUATE_SAFELY_COMPLETE_DAILY_ACTIVITIES: YES
LACK_OF_TRANSPORTATION: PATIENT DECLINED
HEARING - LEFT EAR: FUNCTIONAL
PATIENT'S MEMORY ADEQUATE TO SAFELY COMPLETE DAILY ACTIVITIES?: YES
HEARING - RIGHT EAR: FUNCTIONAL
WALKS IN HOME: NEEDS ASSISTANCE
GROOMING: INDEPENDENT
ASSISTIVE_DEVICE: WALKER;EYEGLASSES
DRESSING YOURSELF: INDEPENDENT
BATHING: INDEPENDENT
TOILETING: INDEPENDENT
ADEQUATE_TO_COMPLETE_ADL: YES

## 2024-01-29 ASSESSMENT — PAIN - FUNCTIONAL ASSESSMENT
PAIN_FUNCTIONAL_ASSESSMENT: 0-10

## 2024-01-29 ASSESSMENT — PATIENT HEALTH QUESTIONNAIRE - PHQ9
SUM OF ALL RESPONSES TO PHQ9 QUESTIONS 1 & 2: 0
1. LITTLE INTEREST OR PLEASURE IN DOING THINGS: NOT AT ALL
2. FEELING DOWN, DEPRESSED OR HOPELESS: NOT AT ALL

## 2024-01-29 ASSESSMENT — COLUMBIA-SUICIDE SEVERITY RATING SCALE - C-SSRS
2. HAVE YOU ACTUALLY HAD ANY THOUGHTS OF KILLING YOURSELF?: NO
1. IN THE PAST MONTH, HAVE YOU WISHED YOU WERE DEAD OR WISHED YOU COULD GO TO SLEEP AND NOT WAKE UP?: NO
6. HAVE YOU EVER DONE ANYTHING, STARTED TO DO ANYTHING, OR PREPARED TO DO ANYTHING TO END YOUR LIFE?: NO

## 2024-01-29 ASSESSMENT — PAIN DESCRIPTION - DESCRIPTORS: DESCRIPTORS: ACHING

## 2024-01-29 ASSESSMENT — PAIN DESCRIPTION - LOCATION: LOCATION: HIP

## 2024-01-29 ASSESSMENT — PAIN DESCRIPTION - ORIENTATION: ORIENTATION: RIGHT

## 2024-01-29 NOTE — CARE PLAN
Problem: Pain  Goal: Free from opioid side effects throughout the shift  Outcome: Progressing     Problem: Fall/Injury  Goal: Use assistive devices by end of the shift  Outcome: Progressing

## 2024-01-29 NOTE — ANESTHESIA POSTPROCEDURE EVALUATION
Patient: Mitzi Lawson    Procedure Summary       Date: 01/29/24 Room / Location: [facility] / [facility]    Anesthesia Start: 0729 Anesthesia Stop: 0850    Procedure: Arthroplasty Total Hip Posterior Approach DARCIE 1/10 (Right: Hip) Diagnosis:       Unilateral primary osteoarthritis, right hip      (Unilateral primary osteoarthritis, right hip [M16.11])    Surgeons: Otoniel Guadarrama MD Responsible Provider: Osbaldo Gann DO    Anesthesia Type: spinal, MAC ASA Status: 2            Anesthesia Type: spinal, MAC    Vitals Value Taken Time   /83 01/29/24 0845   Temp 36.5 °C (97.7 °F) 01/29/24 0845   Pulse 104 01/29/24 0909   Resp 23 01/29/24 0909   SpO2 99 % 01/29/24 0909   Vitals shown include unvalidated device data.    Anesthesia Post Evaluation    Patient location during evaluation: PACU  Patient participation: complete - patient participated  Level of consciousness: awake  Pain management: adequate  Airway patency: patent  Cardiovascular status: acceptable, blood pressure returned to baseline and hemodynamically stable  Respiratory status: acceptable, nonlabored ventilation, spontaneous ventilation and face mask  Hydration status: acceptable  Postoperative Nausea and Vomiting: none        No notable events documented.

## 2024-01-29 NOTE — ANESTHESIA PREPROCEDURE EVALUATION
Mitzi Lawson is a 73 y.o. female here for:    Arthroplasty Total Hip Posterior Approach DARCIE 1/10  With Otoniel Guadarrama MD  Pre-Op Diagnosis Codes:     * Unilateral primary osteoarthritis, right hip [M16.11]    Relevant Problems   Cardiovascular  RBBB      Musculoskeletal  Parkinson   (+) OA (osteoarthritis) of hip   (+) Primary osteoarthritis of both hips   (+) Rheumatoid arthritis involving right hip with positive rheumatoid factor (CMS/HCC)   (+) Unilateral primary osteoarthritis, right hip      Other   (+) Rheumatoid arthritis involving right hip with positive rheumatoid factor (CMS/HCC)       Lab Results   Component Value Date    HGB 13.2 01/16/2024    HCT 41.7 01/16/2024    WBC 6.4 01/16/2024     01/16/2024     01/16/2024    K 3.2 (L) 01/16/2024    CL 98 01/16/2024    CREATININE 0.95 01/16/2024    BUN 25 (H) 01/16/2024       Social History     Tobacco Use   Smoking Status Never   Smokeless Tobacco Never       No Known Allergies    Current Outpatient Medications   Medication Instructions    carbidopa-levodopa (Sinemet)  mg tablet TAKE 1/2 (ONE-HALF) OF A TABLET BY MOUTH FOUR TIMES DAILY    cholecalciferol (VITAMIN D-3) 50,000 Units, oral    cyclobenzaprine (FLEXERIL) 10 mg, oral, 2 times daily PRN    docusate sodium (Colace) 100 mg capsule TAKE 1 CAPSULE BY MOUTH TWO TIMES A DAY    DULoxetine (CYMBALTA) 60 mg, oral, Daily before breakfast    HYDROcodone-acetaminophen (Norco) 7.5-325 mg tablet 1 tablet, oral, Every 8 hours PRN    hydroxychloroquine (Plaquenil) 200 mg tablet 1 tablet, oral, Daily    levothyroxine (Synthroid, Levoxyl) 50 mcg tablet     metoprolol succinate XL (Toprol-XL) 25 mg 24 hr tablet     naloxone (Narcan) 4 mg/0.1 mL nasal spray INSTILL 1 SPRAY IN ONE NOSTRIL AS NEEDED FOR ACCIDENTAL OPIOID OVERDOSE. REPEAT WITH SECOND DOSE IN 5 MINUTES IF NO RESPONSE    ondansetron (ZOFRAN) 4 mg, oral, Every 8 hours PRN    pantoprazole (ProtoNix) 40 mg EC tablet TAKE 1 TABLET  BY MOUTH ONCE DAILY    predniSONE (DELTASONE) 5 mg, oral, Daily    triamterene-hydrochlorothiazid (Dyazide) 37.5-25 mg capsule oral, Daily       Past Surgical History:   Procedure Laterality Date    APPENDECTOMY      CARDIAC CATHETERIZATION      CATARACT EXTRACTION      CHOLECYSTECTOMY      COLONOSCOPY      HYSTERECTOMY      OTHER SURGICAL HISTORY Left     Bursectomy Left Knee    TONSILLECTOMY      TOTAL HIP ARTHROPLASTY Left     THR    WISDOM TOOTH EXTRACTION         Family History   Family history unknown: Yes       NPO Details:  No data recorded    Physical Exam    Airway  Mallampati: I     Cardiovascular - normal exam     Dental   (+) upper dentures, lower dentures     Pulmonary - normal exam     Abdominal            Anesthesia Plan    History of general anesthesia?: yes  History of complications of general anesthesia?: no    ASA 2     general   There is reasoning for not using neuraxial anesthesia or a peripheral nerve block.  (History of failed spinal with last AKIKO, converted to GA partway through procedure. Spoke with patient regarding risks, benefits, and alternatives to spinal anesthesia. Patient agrees to proceed with GA from the beginning of the case.)  The patient is not a current smoker.    intravenous induction   Postoperative administration of opioids is intended.  Anesthetic plan and risks discussed with patient.

## 2024-01-29 NOTE — ANESTHESIA PROCEDURE NOTES
Airway  Date/Time: 1/29/2024 7:39 AM  Urgency: elective    Airway not difficult    Staffing  Performed: attending   Authorized by: Osbaldo Gann DO    Performed by: Osbaldo Gann DO  Patient location during procedure: OR    Indications and Patient Condition  Indications for airway management: anesthesia  Spontaneous Ventilation: absent  Sedation level: deep  Preoxygenated: yes  Patient position: sniffing  Mask difficulty assessment: 1 - vent by mask  Planned trial extubation    Final Airway Details  Final airway type: endotracheal airway      Successful airway: ETT  Cuffed: yes   Successful intubation technique: direct laryngoscopy  Facilitating devices/methods: intubating stylet  Endotracheal tube insertion site: oral  Blade: Maribel  Blade size: #3  ETT size (mm): 7.0  Cormack-Lehane Classification: grade I - full view of glottis  Placement verified by: capnometry   Measured from: lips  ETT to lips (cm): 21  Number of attempts at approach: 1  Number of other approaches attempted: 0    Additional Comments  Atraumatic, 1 attempt.

## 2024-01-29 NOTE — PROGRESS NOTES
01/29/24 1341   Discharge Planning   Living Arrangements Spouse/significant other;Children   Support Systems Spouse/significant other;Children;Family members;Friends/neighbors   Assistance Needed PTA ind ADLS and IADLS no AD, doesn't drive- spouse, no falls, owns 2 WW, cane, shower chair, high toilet, grab bars, tub shower   Type of Residence Private residence  (1 level home)   Number of Stairs to Enter Residence 1   Number of Stairs Within Residence 0   Do you have animals or pets at home? Yes   Type of Animals or Pets 1 cat   Home or Post Acute Services In home services   Type of Home Care Services Home OT;Home PT   Patient expects to be discharged to: Home with [facility]   Does the patient need discharge transport arranged? No  (spouse to transport pt home)   Financial Resource Strain   How hard is it for you to pay for the very basics like food, housing, medical care, and heating? Not hard   Housing Stability   In the last 12 months, was there a time when you were not able to pay the mortgage or rent on time? N   In the last 12 months, how many places have you lived? 1   In the last 12 months, was there a time when you did not have a steady place to sleep or slept in a shelter (including now)? N   Transportation Needs   In the past 12 months, has lack of transportation kept you from meetings, work, or from getting things needed for daily living? No   Patient Choice   Provider Choice list and CMS website (https://medicare.gov/care-compare#search) for post-acute Quality and Resource Measure Data were provided and reviewed with: Patient   Patient / Family choosing to utilize agency / facility established prior to hospitalization No  (provided list and chose [facility])     Pt is s/p Elective right total hip replacement today 1/29/24 with Dr. Otoniel Guadarrama. PT/OT sadie pending. Pt DC preference is home with HHC and agency of [facility] as had in past. CNP notified of pt HHC preference. CT team will continue monitoring  case for progression and DC planning.

## 2024-01-29 NOTE — CONSULTS
Consults    Reason For Consult      History Of Present Illness  Mitzi Lawson is a 73 y.o. female presents to the orthopedics team with increased pain and decreased ability to perform ADLS due to right hip. After failed conservative treatment, patient underwent surgery with no immediate post op complications, reports minimal pain to site described as dull in nature, mild in severity, responding well to pain meds. No other complaints. Hospitalist services were consulted for management of the patient's medical conditions post/op.  Patient examined and seen, resting comfortably. Denies chest pain, shortness of breath, abdominal pain, dizziness, fever or chills. Currently patient vital signs are stable. Plan of care was discussed with patient, verbalized understanding through teach back method. Hospitalist team will continue to follow until discharge.    Review of systems: 10 system were reviewed and were negative except what was mentioned in history of present illness    .     Past Medical History  She has a past medical history of Arthritis, GERD (gastroesophageal reflux disease), Hypothyroidism, Rheumatoid arthritis (CMS/[facility]), Use of cane as ambulatory aid, Wears dentures, and Wears glasses.  Tachycardia  Denies significant cardiac history or events     Surgical History  She has a past surgical history that includes Cholecystectomy; Appendectomy; Hysterectomy; Colonoscopy; Tonsillectomy; Cardiac catheterization; Total hip arthroplasty (Left); [place] tooth extraction; Cataract extraction; and Other surgical history (Left).     Social History  She reports that she has never smoked. She has never used smokeless tobacco. She reports current alcohol use of about 7.0 standard drinks of alcohol per week. She reports that she does not use drugs.    Family History  Family History   Family history unknown: Yes        Allergies  Patient has no known allergies.       Physical Exam  Constitutional: Well developed,  awake/alert/oriented x3, cooperative  Eyes: PERRL,  clear sclera  ENMT: mucous membranes moist, no apparent injury, no lesions seen  Head/Neck: Neck supple, no apparent injury,  Respiratory/Thorax: Patent airways,  normal breath sounds with good chest expansion, thorax symmetric  Cardiovascular: Regular, rate and rhythm, no murmurs, 2+ equal pulses of the extremities, normal S 1and S 2  Gastrointestinal: Nondistended, soft, non-tender,   Musculoskeletal: mild decrease range of motion to right hip s/p sx intervention, good capillary refills bilaterally, +2 pulses, good sensation   Extremities: normal extremities,  incision covered with Aquacel, CDI   Skin: warm, dry, intact  Neurological: alert/oriented x 3, speech clear  Psychiatric: appropriate mood and behavior         Last Recorded Vitals  /66   Pulse 95   Temp 35 °C (95 °F)   Resp 18   Wt 47.9 kg (105 lb 9.6 oz)   SpO2 96%     Relevant Results  No results found for this or any previous visit (from the past 24 hour(s)).  Scheduled medications  acetaminophen, 650 mg, oral, q6h SHELLEY  aspirin, 81 mg, oral, BID  ceFAZolin, 2 g, intravenous, q8h  docusate sodium, 100 mg, oral, BID  ketorolac, 15 mg, intravenous, q6h  tranexamic acid, 1,950 mg, oral, Once  [START ON 1/30/2024] tranexamic acid, 1,950 mg, oral, Once      Continuous medications  lactated Ringer's, 50 mL/hr, Last Rate: Stopped (01/29/24 0815)  lactated Ringer's, 50 mL/hr, Last Rate: 50 mL/hr (01/29/24 1037)  oxygen, 2 L/min, Last Rate: Stopped (01/29/24 1030)      PRN medications  PRN medications: benzocaine-menthol, bisacodyl, cyclobenzaprine, diphenhydrAMINE, HYDROmorphone, morphine, naloxone, ondansetron ODT **OR** ondansetron, oxyCODONE, oxyCODONE, oxyCODONE, oxyCODONE, oxyCODONE, prochlorperazine **OR** prochlorperazine **OR** prochlorperazine, promethazine (Phenergan) 6.25 mg in sodium chloride 0.9% 50 mL IV, sodium phosphates       Assessment/Plan     # Right Hip Arthoplasty  Right Hip  Pain  Admit to Orthopedics Team   Hospitalist team Consulted   DVTp and Pain management per Ortho   PT/OT evaluation  and treatment   CBC/BMP as appropriate, review results  Pulmonary Toileting   Follow up as needed outpatient with Orthopedics     # Parkinson's   Continue home medication     #GERD  Continue PPI  Stay upright for 30minutes  Take pain medications with food    # Hypothyroidism  Continue home medications     # Tachycardia / HTN  Continue Lopressor   Hold Ace/hydrochlorothiazide  until discharge  Hemodynamically stable  Denies significant cardiac history or event  No indication for telemetry at this time     Time spent  56 minutes obtaining labs, imaging, recommendations, interview, assessment, examination, medication review/ordering, and EMR review.    Plan of care was discussed extensively with patient, RN, and . Patient verbalized understanding through teach back method. All questions and concerns addressed upon examination.     Of note, this documentation is completed using the Dragon Dictation system (voice recognition software). There may be spelling and/or grammatical errors that were not corrected prior to final submission.      Ericka Boyd, APRN-CNP

## 2024-01-29 NOTE — H&P
History and physical is reviewed, patient re evaluated, no changes.  Procedure, risks, benefits, and postoperative course were discussed with the patient and consent is reviewed.    Otoniel Guadarrama MD

## 2024-01-29 NOTE — PROGRESS NOTES
Physical Therapy    Physical Therapy Evaluation    Patient Name: Mitzi Lawson  MRN: [id number]  Today's Date: 1/29/2024   Time Calculation  Start Time: 1140  Stop Time: 1220  Time Calculation (min): 40 min    Assessment/Plan   PT Assessment  PT Assessment Results: Decreased strength, Decreased endurance, Impaired balance, Decreased mobility, Decreased coordination, Orthopedic restrictions, Pain  Rehab Prognosis: Good  Evaluation/Treatment Tolerance: Patient limited by fatigue, Patient limited by pain  End of Session Communication: Bedside nurse  Assessment Comment: pt with decreased mobility/ gait, strength balance and endurance pt to benefit from skilled PT to address deficits and improve functional mobility .  End of Session Patient Position: Up in chair, Alarm on (BLE elevated ice on R hip)  IP OR SWING BED PT PLAN  Inpatient or Swing Bed: Inpatient  PT Plan  Treatment/Interventions: Transfer training, Bed mobility, Gait training, Stair training, Balance training, Strengthening, Endurance training, Range of motion, Therapeutic exercise, Therapeutic activity, Home exercise program  PT Plan: Skilled PT  PT Frequency: BID  PT Discharge Recommendations: Low intensity level of continued care  PT Recommended Transfer Status: Assist x1, Contact guard  PT - OK to Discharge: Yes (once medically ready for discharge to next level of care.)    Subjective     Current Problem:  Patient Active Problem List   Diagnosis    Hip dysplasia    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  Prior to Session  Communication: Bedside nurse (cleared to see for therapy eval.)  Patient Position Received: Bed, 3 rail up, Alarm on (pt with abd wedge and IV.)  General Comment: pt is a 72 yo female came in today for an elective R AKIKO with Dr. Guadarrama. pt currently WBAT R LE  with post hip precuations.    Home Living:  Home Living  Type of Home: House  Lives With: Spouse  Home Adaptive Equipment: None  Home Layout: One level  Home Access: Stairs to enter without rails  Entrance Stairs-Number of Steps: 1 threshold step  Bathroom Shower/Tub: Tub/shower unit  Bathroom Equipment: Grab bars in shower, Shower chair with back    Prior Level of Function:  Prior Function Per Pt/Caregiver Report  Level of [place]: Independent with ADLs and functional transfers, Independent with homemaking with ambulation  ADL Assistance: Independent  Homemaking Assistance: Independent  Ambulatory Assistance: Independent  Prior Function Comments: IND with all mobility, adls and iadls. no falls doesnt drive .    Precautions:  Precautions  LE Weight Bearing Status: Weight Bearing as Tolerated  Medical Precautions: Fall precautions  Post-Surgical Precautions: Right hip precautions (post hip)  Objective     Pain:  Pain Assessment  Pain Assessment: 0-10  Pain Score: 6  Pain Type: Surgical pain  Pain Location: Hip  Pain Orientation: Right    Cognition:  Cognition  Overall Cognitive Status: Within Functional Limits  Orientation Level: Oriented X4    General Assessments:    Strength  Strength Comments: L LE 5/5 R LE hip 3+/5  knee 3+/5 ankle 4/5  Dynamic Sitting Balance  Dynamic Sitting-Comments: fair +  Dynamic Standing Balance  Dynamic Standing-Comments: fair    Functional Assessments:     Bed Mobility  Bed Mobility: Yes  Bed Mobility 1  Bed Mobility 1: Supine to sitting, Scooting  Level of Assistance 1: Contact guard  Transfers  Transfer: Yes  Transfer 1  Technique 1: Sit to stand, Stand to sit  Transfer Device 1: Walker (FWW)  Transfer Level of Assistance  1: Contact guard  Trials/Comments 1: CGA with FWW cues to push up from bed .  Ambulation/Gait Training  Ambulation/Gait Training Performed: Yes  Ambulation/Gait Training 1  Surface 1: Level tile  Device 1: Rolling walker  Assistance 1: Contact guard  Quality of Gait 1: Inconsistent stride length, Decreased step length  Comments/Distance (ft) 1: 100ftx 2 with FWW with cga .          Extremity/Trunk Assessments:  RLE   RLE :  (S/P R AKIKO limited ROM)  LLE   LLE : Within Functional Limits    Outcome Measures:  [facility] Basic Mobility  Turning from your back to your side while in a flat bed without using bedrails: A little  Moving from lying on your back to sitting on the side of a flat bed without using bedrails: A little  Moving to and from bed to chair (including a wheelchair): A little  Standing up from a chair using your arms (e.g. wheelchair or bedside chair): A little  To walk in hospital room: A little  Climbing 3-5 steps with railing: Total  Basic Mobility - Total Score: 16  Goals:  Encounter Problems       Encounter Problems (Active)       PT Problem       Pt will demonstrate mod I  with bed mobility to edge of bed.         Start:  01/29/24    Expected End:  02/05/24            Pt will demonstrate mod I  with sit to stand/chair transfers with FWW.         Start:  01/29/24    Expected End:  02/05/24            Pt will ambulate 150 feet with FWW mod I .         Start:  01/29/24    Expected End:  02/05/24            Pt to demo 1 step with Sup         Start:  01/29/24    Expected End:  02/05/24            Patient will demonstrate independence in home program for support of progression       Start:  01/29/24    Expected End:  02/05/24                 Education Documentation  Precautions, taught by Freddy Randolph, PT at 1/29/2024  3:40 PM.  Learner: Patient  Readiness: Acceptance  Method: Explanation  Response: Verbalizes Understanding  Comment: post. hip precautions    Mobility Training, taught by Freddy Randolph, PT  at 1/29/2024  3:40 PM.  Learner: Patient  Readiness: Acceptance  Method: Explanation  Response: Verbalizes Understanding  Comment: post. hip precautions    Education Comments  No comments found.

## 2024-01-29 NOTE — OP NOTE
Arthroplasty Total Hip Posterior Approach DARCIE 1/10 (R) Operative Note     Date: 2024  OR Location: [facility]    Name: Mitzi Lawson, : 1950, Age: 73 y.o., MRN: [id number], Sex: female    Diagnosis  Pre-op Diagnosis     * Unilateral primary osteoarthritis, right hip [M16.11] Post-op Diagnosis     * Unilateral primary osteoarthritis, right hip [M16.11]     Procedures  Arthroplasty Total Hip Posterior Approach DARCIE 1/10  65997 - MA ARTHRP ACETBLR/PROX FEM PROSTC AGRFT/ALGRFT      Surgeons      * Otoniel Guadarrama - Primary    Resident/Fellow/Other Assistant:  Surgeon(s) and Role:     * Jhonatan Beltran PA-C - Assisting    Procedure Summary  Anesthesia: Spinal  ASA: II  Anesthesia Staff: Anesthesiologist: Osbaldo Gann DO  Estimated Blood Loss: 100mL  Intra-op Medications:   Administrations occurring from 0730 to 0830 on 24:   Medication Name Total Dose   ropivacaine-epinephrine-clonidine-ketorolac 2.46-0.005- 0.0008-0.3mg/mL periarticular syringe 100 mL   sodium chloride 0.9 % irrigation solution 1,000 mL   sterile water irrigation solution 1,000 mL   lactated Ringer's infusion Cannot be calculated   ceFAZolin in dextrose (iso-os) (Ancef) IVPB 2 g 2 g              Anesthesia Record               Intraprocedure I/O Totals          Intake    lactated Ringer's infusion 1000.00 mL    ceFAZolin in dextrose (iso-os) (Ancef) IVPB 2 g 100.00 mL    Total Intake 1100 mL          Specimen: No specimens collected     Staff:   Circulator: Luis Mejia RN  Scrub Person: Karen Nash         Drains and/or Catheters: * None in log *    Tourniquet Times:         Implants:  Implants       Type Name Action Serial No.      Joint SHELL, TRIDENT II, CLUSTERHOLE, HA 52E - [id number] Implanted      Screw SCREW, LOW PROFILE HEX, 6.5 X 20 MM - [id number] Implanted      Joint INSERT, TRIDENT X3 POLYETHYLENE, 10 DEG, 36MM E - [id number] Implanted      Joint STEM, ACCOLADE II, 127DEG,SZ 6 - [id number] Implanted       Joint HEAD, FEMUR V40 36MM 0MM BIOLOX DELTA - [id number] Implanted               Findings: see procedure details    Indications: Mitzi Lawson is an 73 y.o. female who is having surgery for Unilateral primary osteoarthritis, right hip [M16.11].     The patient was seen in the preoperative area. The risks, benefits, complications, treatment options, non-operative alternatives, expected recovery and outcomes were discussed with the patient. The possibilities of reaction to medication, pulmonary aspiration, injury to surrounding structures, bleeding, recurrent infection, the need for additional procedures, failure to diagnose a condition, and creating a complication requiring transfusion or operation were discussed with the patient. The patient concurred with the proposed plan, giving informed consent.  The site of surgery was properly noted/marked if necessary per policy. The patient has been actively warmed in preoperative area. Preoperative antibiotics have been ordered and given within 1 hours of incision. Venous thrombosis prophylaxis have been ordered.    Procedure Details:   Preoperative diagnosis DJD hip  Postoperative diagnosis same  Procedure total hip replacement    Primary surgeon Otoniel Mcdonald. Jhonatan HEWITT certified    Implants  [id number] with 1 cancellous bone screw  Liner 10 degree elevated lip  Stem Accolade 2 uncemented size #6 neck angle 127  Head Biolox delta ceramic 36 mm neck length +0    Anesthesia General  Blood loss 100    Patient is suffering from chronic hip pain that has been refractory to conservative treatment and now presents for total hip replacement.  The risks benefits outcomes and postoperative course were fully explained to the patient.  We discussed where loosening infection blood clots loss of life loss of limb nerve damage numbness failure of the procedure progressive arthritis and the potential need for revision surgery.  The patient understands these risks and  consents to the surgical intervention.    The patient has pain in the hip that is increased with activity and weightbearing, and walks with an antalgic gait.  The pain is interfering with activities of daily living.  Patient has limited range of motion and pain with passive range of motion.  X-rays demonstrate joint space narrowing, subchondral sclerosis and osteophyte formation.  Symptoms are not improving with medication, physical therapy or supportive device for a period of at least 3 months.    The physician assistant was present through the entire case.  Given the nature of the disease process and the procedure to be performed skilled surgical first assistant was necessary during the case.  The assistant was necessary to hold retractors and manipulate the extremity during the procedure.  A certified scrub tech was at the back table managing the instruments and supplies for the surgical case.      Patient was brought to the operating room and placed on the surgical table in the supine position where adequate anesthesia was then obtained.  A surgical timeout was then performed.  The patient was positioned in the lateral decubitus position with the affected hip up being careful to pad all pressure points.  The patient was then prepped and draped in usual sterile manner.  A standard posterolateral approach to the hip was made and electrocauterization was used for hemostasis.  The IT band was split in line with its fibers and anterior and posterior retractors were then inserted.  The hip was maximally internally rotated and the short external rotators and piriformis tendon were taken down in 1 layer along with the joint capsule using the Bovie electrocautery device.  The capsule was teed in line with the piriformis tendon.  The hip was then dislocated and the femoral neck resection was made 1 cm above the lesser trochanter.  The femoral head showed signs of severe arthritic changes with joint calcification and loss  of articular cartilage.    Anterior and posterior acetabular retractors were then inserted and the acetabular labrum was sharply excised using a knife.  Next acetabulum was curetted to remove any remaining soft tissues and sequential reaming was performed.  Once final reaming was complete this had excellent circumferential fit and good bleeding bone porous acetabular shell was then inserted in approximately 45ï¿½ of abduction and 10-15ï¿½ of anteversion.  The acetabular shell was probed and stable and was completely seated.  The acetabular liner was then inserted and completely seated as well.  The liner was probed and stable.    At this point femoral preparation was begun.  A femoral neck retractor was inserted and anterior and posterior femoral retractors were inserted as well.  A box osteotome was used to start the femoral canal and a Charnley awl was inserted down the canal.  Sequential broaching was then performed being careful to keep the broaches in 10-15ï¿½ of anteversion.  With the final broach in position and completely seated calcar planing was performed.  A trial reduction was then performed and the hip was stable to an anatomic range of motion.  Trial components were then removed the final femoral stem was inserted and completely seated.  Femoral head and liner were applied to reduced.  Range of motion was again assessed and felt to be satisfactory leg lengths were assessed as well.  The joint was then irrigated with a copious amount of pulsatile irrigation.    Closure was begun using #2 Ethibond sutures through the joint capsule short external rotators and piriformis tendon that were tied through drill holes in the piriformis fossa of the femur.  The IT band was closed using a running locked #1 Vicryl suture.  3-0 Monocryl suture was used for the underlying subcutaneous tissues and 4-0 Monocryl suture was used for subcuticular stitch.  Skin glue was used for the skin and a dry sterile Aquacell dressing  was used for bandage.    The patient tolerated the procedure well and was taken to the postanesthesia care unit in satisfactory condition.  Postoperative x-rays were reviewed and surgical findings were discussed with the patient's family at the conclusion of the procedure.    This note was prepared using voice recognition software.  The details of this note are correct and have been reviewed, and corrected to the best of my ability.  Some grammatical areas may persist related to the Dragon software    Otoniel Guadarrama MD    [phone]    Complications:  None; patient tolerated the procedure well.    Disposition: PACU - hemodynamically stable.  Condition: stable         Otoniel Guadarrama  Phone Number: [phone]

## 2024-01-30 ENCOUNTER — HOME HEALTH ADMISSION (OUTPATIENT)
Dept: HOME HEALTH SERVICES | Facility: HOME HEALTH | Age: 74
End: 2024-01-30
Payer: MEDICARE

## 2024-01-30 ENCOUNTER — DOCUMENTATION (OUTPATIENT)
Dept: HOME HEALTH SERVICES | Facility: HOME HEALTH | Age: 74
End: 2024-01-30
Payer: MEDICARE

## 2024-01-30 ENCOUNTER — PHARMACY VISIT (OUTPATIENT)
Dept: PHARMACY | Facility: CLINIC | Age: 74
End: 2024-01-30
Payer: MEDICARE

## 2024-01-30 VITALS
WEIGHT: 105.6 LBS | SYSTOLIC BLOOD PRESSURE: 101 MMHG | RESPIRATION RATE: 18 BRPM | HEIGHT: 61 IN | TEMPERATURE: 98.8 F | OXYGEN SATURATION: 98 % | HEART RATE: 94 BPM | DIASTOLIC BLOOD PRESSURE: 60 MMHG | BODY MASS INDEX: 19.94 KG/M2

## 2024-01-30 LAB
ANION GAP SERPL CALC-SCNC: 8 MMOL/L (ref 10–20)
BUN SERPL-MCNC: 22 MG/DL (ref 6–23)
CALCIUM SERPL-MCNC: 8.2 MG/DL (ref 8.6–10.3)
CHLORIDE SERPL-SCNC: 98 MMOL/L (ref 98–107)
CO2 SERPL-SCNC: 32 MMOL/L (ref 21–32)
CREAT SERPL-MCNC: 0.82 MG/DL (ref 0.5–1.05)
EGFRCR SERPLBLD CKD-EPI 2021: 77 ML/MIN/1.73M*2
ERYTHROCYTE [DISTWIDTH] IN BLOOD BY AUTOMATED COUNT: 13.4 % (ref 11.5–14.5)
GLUCOSE SERPL-MCNC: 96 MG/DL (ref 74–99)
HCT VFR BLD AUTO: 30.4 % (ref 36–46)
HGB BLD-MCNC: 9.7 G/DL (ref 12–16)
HOLD SPECIMEN: NORMAL
MCH RBC QN AUTO: 30.6 PG (ref 26–34)
MCHC RBC AUTO-ENTMCNC: 31.9 G/DL (ref 32–36)
MCV RBC AUTO: 96 FL (ref 80–100)
NRBC BLD-RTO: 0 /100 WBCS (ref 0–0)
PLATELET # BLD AUTO: 224 X10*3/UL (ref 150–450)
POTASSIUM SERPL-SCNC: 3 MMOL/L (ref 3.5–5.3)
RBC # BLD AUTO: 3.17 X10*6/UL (ref 4–5.2)
SODIUM SERPL-SCNC: 135 MMOL/L (ref 136–145)
WBC # BLD AUTO: 6.8 X10*3/UL (ref 4.4–11.3)

## 2024-01-30 PROCEDURE — 97110 THERAPEUTIC EXERCISES: CPT | Mod: GP,CQ

## 2024-01-30 PROCEDURE — 7100000011 HC EXTENDED STAY RECOVERY HOURLY - NURSING UNIT

## 2024-01-30 PROCEDURE — 85027 COMPLETE CBC AUTOMATED: CPT | Performed by: ORTHOPAEDIC SURGERY

## 2024-01-30 PROCEDURE — 2500000004 HC RX 250 GENERAL PHARMACY W/ HCPCS (ALT 636 FOR OP/ED): Performed by: REGISTERED NURSE

## 2024-01-30 PROCEDURE — 2500000001 HC RX 250 WO HCPCS SELF ADMINISTERED DRUGS (ALT 637 FOR MEDICARE OP): Performed by: ORTHOPAEDIC SURGERY

## 2024-01-30 PROCEDURE — 97116 GAIT TRAINING THERAPY: CPT | Mod: GP,CQ

## 2024-01-30 PROCEDURE — 99232 SBSQ HOSP IP/OBS MODERATE 35: CPT | Performed by: REGISTERED NURSE

## 2024-01-30 PROCEDURE — 2500000001 HC RX 250 WO HCPCS SELF ADMINISTERED DRUGS (ALT 637 FOR MEDICARE OP): Performed by: REGISTERED NURSE

## 2024-01-30 PROCEDURE — RXMED WILLOW AMBULATORY MEDICATION CHARGE

## 2024-01-30 PROCEDURE — 36415 COLL VENOUS BLD VENIPUNCTURE: CPT | Performed by: ORTHOPAEDIC SURGERY

## 2024-01-30 PROCEDURE — 97165 OT EVAL LOW COMPLEX 30 MIN: CPT | Mod: GO

## 2024-01-30 PROCEDURE — 80048 BASIC METABOLIC PNL TOTAL CA: CPT | Performed by: ORTHOPAEDIC SURGERY

## 2024-01-30 PROCEDURE — 2500000004 HC RX 250 GENERAL PHARMACY W/ HCPCS (ALT 636 FOR OP/ED): Performed by: ORTHOPAEDIC SURGERY

## 2024-01-30 RX ORDER — ASPIRIN 81 MG/1
81 TABLET ORAL 2 TIMES DAILY
Qty: 60 TABLET | Refills: 0 | Status: SHIPPED | OUTPATIENT
Start: 2024-01-30 | End: 2024-02-29

## 2024-01-30 RX ORDER — OXYCODONE HYDROCHLORIDE 5 MG/1
5 TABLET ORAL EVERY 6 HOURS PRN
Qty: 28 TABLET | Refills: 0 | Status: SHIPPED | OUTPATIENT
Start: 2024-01-30 | End: 2024-02-05 | Stop reason: SDUPTHER

## 2024-01-30 RX ORDER — POTASSIUM CHLORIDE 20 MEQ/1
40 TABLET, EXTENDED RELEASE ORAL ONCE
Status: COMPLETED | OUTPATIENT
Start: 2024-01-30 | End: 2024-01-30

## 2024-01-30 RX ADMIN — CARBIDOPA AND LEVODOPA 0.5 TABLET: 25; 100 TABLET ORAL at 10:59

## 2024-01-30 RX ADMIN — DOCUSATE SODIUM 100 MG: 100 CAPSULE, LIQUID FILLED ORAL at 08:28

## 2024-01-30 RX ADMIN — OXYCODONE HYDROCHLORIDE 10 MG: 5 TABLET ORAL at 06:09

## 2024-01-30 RX ADMIN — CARBIDOPA AND LEVODOPA 0.5 TABLET: 25; 100 TABLET ORAL at 06:11

## 2024-01-30 RX ADMIN — DULOXETINE HYDROCHLORIDE 60 MG: 30 CAPSULE, DELAYED RELEASE ORAL at 06:09

## 2024-01-30 RX ADMIN — ACETAMINOPHEN 650 MG: 325 TABLET ORAL at 06:10

## 2024-01-30 RX ADMIN — KETOROLAC TROMETHAMINE 15 MG: 30 INJECTION, SOLUTION INTRAMUSCULAR; INTRAVENOUS at 06:12

## 2024-01-30 RX ADMIN — LEVOTHYROXINE SODIUM 50 MCG: 50 TABLET ORAL at 06:09

## 2024-01-30 RX ADMIN — TRANEXAMIC ACID 1950 MG: 650 TABLET ORAL at 06:09

## 2024-01-30 RX ADMIN — OXYCODONE HYDROCHLORIDE 10 MG: 5 TABLET ORAL at 10:57

## 2024-01-30 RX ADMIN — HYDROXYCHLOROQUINE SULFATE 200 MG: 200 TABLET, FILM COATED ORAL at 08:28

## 2024-01-30 RX ADMIN — POTASSIUM CHLORIDE 40 MEQ: 1500 TABLET, EXTENDED RELEASE ORAL at 10:56

## 2024-01-30 RX ADMIN — PANTOPRAZOLE SODIUM 40 MG: 40 TABLET, DELAYED RELEASE ORAL at 08:28

## 2024-01-30 RX ADMIN — ASPIRIN 81 MG: 81 TABLET, COATED ORAL at 08:28

## 2024-01-30 ASSESSMENT — PAIN - FUNCTIONAL ASSESSMENT
PAIN_FUNCTIONAL_ASSESSMENT: 0-10

## 2024-01-30 ASSESSMENT — ACTIVITIES OF DAILY LIVING (ADL): BATHING_ASSISTANCE: STAND BY

## 2024-01-30 ASSESSMENT — COGNITIVE AND FUNCTIONAL STATUS - GENERAL
MOVING TO AND FROM BED TO CHAIR: A LITTLE
TURNING FROM BACK TO SIDE WHILE IN FLAT BAD: A LITTLE
WALKING IN HOSPITAL ROOM: A LITTLE
MOBILITY SCORE: 18
WALKING IN HOSPITAL ROOM: A LITTLE
TOILETING: A LITTLE
HELP NEEDED FOR BATHING: A LITTLE
MOVING TO AND FROM BED TO CHAIR: A LITTLE
MOVING FROM LYING ON BACK TO SITTING ON SIDE OF FLAT BED WITH BEDRAILS: A LITTLE
CLIMB 3 TO 5 STEPS WITH RAILING: A LOT
TURNING FROM BACK TO SIDE WHILE IN FLAT BAD: A LITTLE
MOBILITY SCORE: 17
STANDING UP FROM CHAIR USING ARMS: A LITTLE
DRESSING REGULAR LOWER BODY CLOTHING: A LITTLE
DRESSING REGULAR LOWER BODY CLOTHING: A LITTLE
TOILETING: A LITTLE
DAILY ACTIVITIY SCORE: 21
CLIMB 3 TO 5 STEPS WITH RAILING: A LITTLE
DAILY ACTIVITIY SCORE: 22
MOVING FROM LYING ON BACK TO SITTING ON SIDE OF FLAT BED WITH BEDRAILS: A LITTLE
STANDING UP FROM CHAIR USING ARMS: A LITTLE

## 2024-01-30 ASSESSMENT — PAIN SCALES - GENERAL
PAINLEVEL_OUTOF10: 5 - MODERATE PAIN
PAINLEVEL_OUTOF10: 7
PAINLEVEL_OUTOF10: 3

## 2024-01-30 ASSESSMENT — PAIN DESCRIPTION - ORIENTATION: ORIENTATION: RIGHT

## 2024-01-30 ASSESSMENT — PAIN DESCRIPTION - DESCRIPTORS
DESCRIPTORS: ACHING

## 2024-01-30 ASSESSMENT — PAIN DESCRIPTION - LOCATION: LOCATION: HIP

## 2024-01-30 NOTE — DISCHARGE INSTRUCTIONS
Total Hip Replacement   Discharge Instructions    To prevent Clot formation, you have been placed on the following medication:  ASA 81 mg twice a day for 30 days started on 1/29/24  Surgical Site Care:  .Change dressing once a day and PRN (as needed). Apply 4 x 4 sponge and light tape. If glue present, leave open to air.  You may leave wound open to air after initial dressing removal, if wound is clean, dry and intact  If Aquacel Ag dressing is present, do not remove dressing for 7 days, unless heavily saturated. If heavily saturated, remove dressing and start using instructions above  Staples will be removed on post-operative day 14 and steri-strips applied  Showering is permitted starting POD1 if waterproof Aquacel dressing is present or when the incision is covered with 4 x 4 and Tegaderm waterproof dressing  Until all areas of incision are healed.    Physical Therapy:  Weight Bearing Status:  WBAT  Posterior Hip precautions, per therapy handout   Pain Medications  You were given  oxycodone  Wean off pain medications as you deem appropriate as long as pain is under control  Cold packs/Ice packs/Machine  May be used 3 times daily for 15-30 minutes as necessary  Be sure to have a barrier (cloth, clothing, towel) between the site and the ice pack to prevent frostbite  Contact [facility] office if  Increased redness, swelling, drainage of any kind, and/or pain to surgery site.  As well as new onset fevers and or chills.  These could signify an infection.  Calf or thigh tenderness to touch as well as increased swelling or redness.  This could signify a clot formation.  Numbness or tingling to an area around the incision site or below the incision site (toes).  Any rash appears, increased  or new onset nausea/vomiting occur.  This may indicate a reaction to a medication.  Phone # [phone].  Follow up with Surgeon   I acknowledge that I have received yang hose and understand the instructions on how and  when to wear them (on during the day, off at night)   Discharging RN who has gone over instructions and acknowledges yang hose have been received

## 2024-01-30 NOTE — PROGRESS NOTES
Mitzi Lawson is a 73 y.o. female on day 0 of admission presenting with Unilateral primary osteoarthritis, right hip.      Subjective   Patient examined and seen. Alert and oriented x3, resting comfortably.  Patient denies chest pain, shortness of breath, palpitations, abdominal pain, fever or chills.  Patient is agreeable to go home when cleared.  Reports support system is intact.'       Objective     Last Recorded Vitals  /60 (Patient Position: Sitting)   Pulse 94   Temp 37.1 °C (98.8 °F)   Resp 18   Wt 47.9 kg (105 lb 9.6 oz)   SpO2 98%   Intake/Output last 3 Shifts:    Intake/Output Summary (Last 24 hours) at 1/30/2024 1320  Last data filed at 1/30/2024 0700  Gross per 24 hour   Intake 979.17 ml   Output 1050 ml   Net -70.83 ml       Admission Weight  Weight: 47.9 kg (105 lb 9.6 oz) (01/29/24 0614)    Daily Weight  01/29/24 : 47.9 kg (105 lb 9.6 oz)    Image Results  XR hip right with pelvis when performed 1 view  Narrative: STUDY:  Hip Radiographs; 1/29/2024 at 9:41 AM.  INDICATION:  Postop hip.  COMPARISON:  None available.  ACCESSION NUMBER(S):  [id number]  ORDERING CLINICIAN:  EULOGIO PETERS  TECHNIQUE:  One view(s) of the right hip.  FINDINGS:    The total hip arthroplasty is in anatomic alignment.  There is no  displaced fracture.  There is no evidence of orthopedic hardware  complications.  No soft tissue abnormality is seen.  Impression: Satisfactory postop appearance.  Signed by Denny Jacob MD      Physical Exam  Constitutional: Well developed, awake/alert/oriented x3, cooperative  Respiratory/Thorax: Patent airways,  normal breath sounds   Cardiovascular: Regular, rate and rhythm, no murmurs, 2+ equal pulses of the extremities, normal S 1and S 2  Gastrointestinal: Nondistended, soft, non-tender,   Musculoskeletal: mild decrease range of motion to right hip s/p sx intervention, good capillary refills bilaterally, +2 pulses, good sensation   Extremities: normal extremities,   incision covered with Aquacel, CDI   Skin: warm, dry, intact     Relevant Results  Scheduled medications  acetaminophen, 650 mg, oral, q6h SHELLEY  aspirin, 81 mg, oral, BID  carbidopa-levodopa, 0.5 tablet, oral, 4x daily  docusate sodium, 100 mg, oral, BID  DULoxetine, 60 mg, oral, Daily before breakfast  hydroxychloroquine, 200 mg, oral, Daily  levothyroxine, 50 mcg, oral, Daily  metoprolol succinate XL, 25 mg, oral, Nightly  pantoprazole, 40 mg, oral, Daily      Continuous medications  lactated Ringer's, 50 mL/hr, Last Rate: Stopped (01/29/24 0815)  oxygen, 2 L/min, Last Rate: Stopped (01/29/24 1030)      PRN medications  PRN medications: benzocaine-menthol, bisacodyl, cyclobenzaprine, diphenhydrAMINE, HYDROmorphone, morphine, naloxone, ondansetron ODT **OR** ondansetron, oxyCODONE, oxyCODONE, oxyCODONE, oxyCODONE, oxyCODONE, prochlorperazine **OR** prochlorperazine **OR** prochlorperazine, promethazine (Phenergan) 6.25 mg in sodium chloride 0.9% 50 mL IV, sodium phosphates    Results for orders placed or performed during the hospital encounter of 01/29/24 (from the past 24 hour(s))   CBC   Result Value Ref Range    WBC 6.8 4.4 - 11.3 x10*3/uL    nRBC 0.0 0.0 - 0.0 /100 WBCs    RBC 3.17 (L) 4.00 - 5.20 x10*6/uL    Hemoglobin 9.7 (L) 12.0 - 16.0 g/dL    Hematocrit 30.4 (L) 36.0 - 46.0 %    MCV 96 80 - 100 fL    MCH 30.6 26.0 - 34.0 pg    MCHC 31.9 (L) 32.0 - 36.0 g/dL    RDW 13.4 11.5 - 14.5 %    Platelets 224 150 - 450 x10*3/uL   Basic metabolic panel   Result Value Ref Range    Glucose 96 74 - 99 mg/dL    Sodium 135 (L) 136 - 145 mmol/L    Potassium 3.0 (L) 3.5 - 5.3 mmol/L    Chloride 98 98 - 107 mmol/L    Bicarbonate 32 21 - 32 mmol/L    Anion Gap 8 (L) 10 - 20 mmol/L    Urea Nitrogen 22 6 - 23 mg/dL    Creatinine 0.82 0.50 - 1.05 mg/dL    eGFR 77 >60 mL/min/1.73m*2    Calcium 8.2 (L) 8.6 - 10.3 mg/dL   SST TOP   Result Value Ref Range    Extra Tube Hold for add-ons.           Assessment/Plan          # Right Hip  Arthoplasty  Right Hip Pain  Admit to Orthopedics Team   Hospitalist team Consulted   DVTp and Pain management per Ortho   PT/OT evaluation  and treatment   CBC/BMP as appropriate, review results  Pulmonary Toileting   Follow up as needed outpatient with Orthopedics      # Parkinson's   Continue home medication      #GERD  Continue PPI  Stay upright for 30minutes  Take pain medications with food     # Hypothyroidism  Continue home medications      # Tachycardia / HTN  Continue Lopressor   Hold Ace/hydrochlorothiazide  until discharge  Hemodynamically stable  Denies significant cardiac history or event  No indication for telemetry at this time     # Hypokalemia  Replete      Time spent 36  minutes obtaining labs, imaging, recommendations, interview, assessment, examination, medication review/ordering, and EMR review.     Plan of care was discussed extensively with patient, RN, and . Patient verbalized understanding through teach back method. All questions and concerns addressed upon examination.      Of note, this documentation is completed using the Dragon Dictation system (voice recognition software). There may be spelling and/or grammatical errors that were not corrected prior to final submission.        HOWARD Hdz-CNP        Principal Problem:    Unilateral primary osteoarthritis, right hip  Active Problems:    S/P total right hip arthroplasty         Ericka Boyd, APRN-CNP

## 2024-01-30 NOTE — PROGRESS NOTES
Hip surgery    Progress Note    Subjective:     Post-Operative Day: 1 Status Post right Hip Arthroplasty  Systemic or Specific Complaints:No Complaints    Objective:     Visit Vitals  /74 (BP Location: Right arm, Patient Position: Lying)   Pulse 87   Temp 36.7 °C (98.1 °F) (Temporal)   Resp 16       General: alert and oriented, in no acute distress, appears stated age, and cooperative   Wound: no erythema, no edema, no drainage, and dressing CDI   Motion: Painful range of Motion   DVT Exam: No evidence of DVT seen on physical exam.  Negative Bridget's sign.  No significant calf/ankle edema.       NVI in lower extremity. Thigh swollen but soft. Moving foot and ankle.      Data Review  No results found for this or any previous visit (from the past 24 hour(s)).  XR hip right with pelvis when performed 1 view    Result Date: 1/29/2024  STUDY: Hip Radiographs; 1/29/2024 at 9:41 AM. INDICATION: Postop hip. COMPARISON: None available. ACCESSION NUMBER(S): [id number] ORDERING CLINICIAN: EULOGIO PETERS TECHNIQUE:  One view(s) of the right hip. FINDINGS:  The total hip arthroplasty is in anatomic alignment.  There is no displaced fracture.  There is no evidence of orthopedic hardware complications.  No soft tissue abnormality is seen.    Satisfactory postop appearance. Signed by Denny Jacob MD    ECG 12 Lead    Result Date: 1/5/2024  Normal sinus rhythm Right bundle branch block Minimal voltage criteria for LVH, may be normal variant T wave abnormality, consider lateral ischemia Abnormal ECG When compared with ECG of 02-JAN-2024 14:32, (unconfirmed) No significant change was found Confirmed by Darlene Kee ([id number]) on 1/5/2024 5:23:59 PM      Assessment:     Status Post right Hip Arthroplasty. Doing well postoperatively.     -Acute postoperative pain: reports mild pain to operative extremity that is exacerbated by movement, relieved by rest, ice and pain  medication. Continue current pain regimen.     Plan:       1: Discharge today, Return to Clinic: 2 weeks  :  2:  Continue Deep venous thrombosis prophylaxis: ASA 81 mg BID for 30 days   3:  Continue physical therapy: WBAT with posterior hip precautions to operative extremity   4:  Continue Pain Control  5.  Discharge planning: patient plans to return to home with  home care, orders placed. SW and TCC following for discharge planning.   .

## 2024-01-30 NOTE — CARE PLAN
The clinical goals for the shift include pain control    Problem: Pain  Goal: Free from opioid side effects throughout the shift  Outcome: Adequate for Discharge     Problem: Fall/Injury  Goal: Use assistive devices by end of the shift  Outcome: Adequate for Discharge  Goal: Not fall by end of shift  Outcome: Adequate for Discharge  Goal: Be free from injury by end of the shift  Outcome: Adequate for Discharge  Goal: Verbalize understanding of personal risk factors for fall in the hospital  Outcome: Adequate for Discharge  Goal: Verbalize understanding of risk factor reduction measures to prevent injury from fall in the home  Outcome: Adequate for Discharge  Goal: Pace activities to prevent fatigue by end of the shift  Outcome: Adequate for Discharge     Problem: PT Problem  Goal: Pt will demonstrate mod I  with bed mobility to edge of bed.    Outcome: Adequate for Discharge  Goal: Pt will demonstrate mod I  with sit to stand/chair transfers with FWW.    Outcome: Adequate for Discharge  Goal: Pt will ambulate 150 feet with FWW mod I .    Outcome: Adequate for Discharge  Goal: Pt to demo 1 step with Sup    Outcome: Adequate for Discharge  Goal: Patient will demonstrate independence in home program for support of progression  Outcome: Adequate for Discharge     Problem: Pain - Adult  Goal: Verbalizes/displays adequate comfort level or baseline comfort level  Outcome: Adequate for Discharge     Problem: Safety - Adult  Goal: Free from fall injury  Outcome: Adequate for Discharge     Problem: Discharge Planning  Goal: Discharge to home or other facility with appropriate resources  Outcome: Adequate for Discharge     Problem: Chronic Conditions and Co-morbidities  Goal: Patient's chronic conditions and co-morbidity symptoms are monitored and maintained or improved  Outcome: Adequate for Discharge     Problem: Skin  Goal: Decreased wound size/increased tissue granulation at next dressing change  Outcome: Adequate for  Discharge  Goal: Participates in plan/prevention/treatment measures  Outcome: Adequate for Discharge  Goal: Prevent/manage excess moisture  Outcome: Adequate for Discharge  Goal: Prevent/minimize sheer/friction injuries  1/30/2024 1241 by Anila Belcher RN  Outcome: Adequate for Discharge  1/30/2024 1241 by Anila Belcher RN  Flowsheets (Taken 1/30/2024 1241)  Prevent/minimize sheer/friction injuries: Use pull sheet  Goal: Promote/optimize nutrition  Outcome: Adequate for Discharge  Goal: Promote skin healing  Outcome: Adequate for Discharge

## 2024-01-30 NOTE — HH CARE COORDINATION
Home Care received a Referral for Physical Therapy and Occupational Therapy. We have processed the referral for a Start of Care on 01/31/2024.     If you have any questions or concerns, please feel free to contact us at [phone]. Follow the prompts, enter your five digit zip code, and you will be directed to your care team on [facility].

## 2024-01-30 NOTE — NURSING NOTE
Patient given AVS and reviewed. Patient verbalized understanding.  All belongings sent with patient.

## 2024-01-30 NOTE — PROGRESS NOTES
01/30/24 0916   Discharge Planning   Home or Post Acute Services In home services   Type of Home Care Services Home PT;Home RT   Patient expects to be discharged to: Home with [facility]     Pt is s/p POD#1 right total hip replacement 1/29/24 with Dr. Otoniel Guadarrama. Pt has posterior hip precautions, WBAT. PT AMPAC (16) and recommends continued therapy at low level/intensity. Pt DC preference is home with [facility]. Demographics verified. [facility]  notified of [facility] referral/HCO in [facility].

## 2024-01-30 NOTE — PROGRESS NOTES
Occupational Therapy    Evaluation    Patient Name: Mitzi Lawson  MRN: [id number]  Today's Date: 1/30/2024  Time Calculation  Start Time: 0833  Stop Time: 0848  Time Calculation (min): 15 min      Assessment:  End of Session Communication: Bedside nurse  End of Session Patient Position: Up in chair, Alarm on     Plan:  No Skilled OT: No acute OT goals identified  OT Frequency: OT eval only  OT Discharge Recommendations: No further acute OT  OT Recommended Transfer Status: Stand by assist (with FWW)  OT - OK to Discharge: Yes (Once medically appropriate.)       Subjective   Current Problem:  1. Unilateral primary osteoarthritis, right hip        2. S/P total right hip arthroplasty  Referral to [facility]    aspirin 81 mg EC tablet    oxyCODONE (Roxicodone) 5 mg immediate release tablet        General:  General  Reason for Referral: Recent surgery  Referred By: Dr. Guadarrama  Past Medical History Relevant to Rehab: Hypothyroidism, GERD,  RA, OA  L AKIKO .  Family/Caregiver Present: Yes (Spouse present for therapy evaluation - very supportive.)  Prior to Session Communication: Bedside nurse  Patient Position Received: Up in chair, Alarm on  General Comment: Patient s/p elective R AKIKO 1/29/24 (Dr. Guadarrama).  Precautions:  Precautions Comment: WBAT. Posterior hip precautions.     Pain:  Pain Assessment  Pain Score: 3  Pain Location: Hip  Pain Orientation: Right    Objective   Cognition:  Overall Cognitive Status: Within Functional Limits  Orientation Level: Oriented X4           Home Living:  Home Living Comments: Patient lives with  who is available to assist in a 1 story house with 1 small step to enter. No basement, laundry on the main level. Tub shower with a tub transfer bench and grab bar. Raised toilet height. Owns a reacher and sock aid.  Prior Function:  Prior Function Comments: Patient ambulates independently without a device, owns a FWW and cane. Independent with ADLs, shares IADLs. Reports spouse  occasionally assists with tub transfers. Denies falls in the past 3 months. Patient does not drive.     ADL:  Eating Assistance: Independent  Grooming Assistance: Stand by (SBA for standing balance.)  Bathing Assistance: Stand by (Setup/S)  UE Dressing Assistance: Independent  LE Dressing Assistance: Minimal (Patient able to thread LEs with increased time, aware to thread the R LE first. S for standing balance without UE support to pull clothing up over hips. Assist to don shoes, aware on the use of a reacher; spouse available to assist.)  Toileting Assistance with Device: Stand by  Activity Tolerance:  Endurance: Endurance does not limit participation in activity  Bed Mobility/Transfers:      Transfer 1  Technique 1: Sit to stand, Stand to sit  Transfer Device 1: Walker  Transfer Level of Assistance 1:  (SBA level.)  Trials/Comments 1: Patient completed sit <> stands from the recliner and completed functional mobility throughout the room with a FWW at SBA level.     Standing Balance:  Dynamic Standing Balance  Dynamic Standing-Comments: Fair/fair +      Strength:  Strength Comments: B/L UE MMT WFL throughout.     Extremities: RUE   RUE : Within Functional Limits and LUE   LUE: Within Functional Limits    Outcome Measures:[facility] Daily Activity  Putting on and taking off regular lower body clothing: A little  Bathing (including washing, rinsing, drying): A little  Putting on and taking off regular upper body clothing: None  Toileting, which includes using toilet, bedpan or urinal: A little  Taking care of personal grooming such as brushing teeth: None  Eating Meals: None  Daily Activity - Total Score: 21    Education Documentation  ADL Training, taught by Nubia Schaeffer OT at 1/30/2024 12:27 PM.  Learner: Patient  Readiness: Acceptance  Method: Explanation  Response: Verbalizes Understanding  Comment: Educated on compensatory ADL techniques, walker management during ADLs/IADLs, tub transfers.    Education  Individual(s)  Educated: Patient  Education Provided: Fall precautons, Risk and benefits of OT discussed with patient or other, POC discussed and agreed upon  Patient Response to Education: Patient/Caregiver Verbalized Understanding of Information

## 2024-01-30 NOTE — DISCHARGE SUMMARY
Discharge summary  This patient Mitzi Lawson was admitted to the hospital on 1/29/2024  after undergoing Procedure(s) (LRB):  Arthroplasty Total Hip Posterior Approach DARCIE 1/10 (Right) without complications that morning.    During the postoperative period,while in hospital, patient was medically managed by the hospitalist. Please see medial notes and H&P for patients additional diagnoses.  Ortho agrees with all medical diagnoses and treatments while patient in hospital.  No significant or unexpected findings or abnormal ortho imaging were noted during the hospital stay    Hospital course      Patient tolerated surgical procedure well and there was no complications. Patient progressed adequately through their recovery during hospital stay including PT and rehabilitation.    Patient was then D/C on  to home  in stable condition.  Patient was instructed on the use of pain medications, the signs and symptoms of infection, and was given our number to call should they have any questions or concerns following discharge.    Based on my clinical judgment, the patient was provided with a 7-day prescription for opioid medication at 30 MED, indicated for treatment of acute pain in the setting of recent surgery. OARRS report was run and has demonstrated an appropriate time course.  The patient has been provided with counseling pertaining to safe use of opioid medication.      Patient may weightbearing as tolerated with no hip precautions to operative extremity with use of walker for assistance with ambulation   Aquacel dressing to be removed pod7 and incision left open to air  Aspirin for DVT prophylaxis started on 1/29/24  and to be taken for 30 days  Follow up with surgeon in 2 weeks

## 2024-01-30 NOTE — PROGRESS NOTES
Physical Therapy    Physical Therapy Treatment    Patient Name: Mitzi Lawson  MRN: [id number]  Today's Date: 1/30/2024  Time Calculation  Time on flowsheet is not accurate  9:14 to 10:02 is accurate   Start Time: 0914  Stop Time: 1002  Time Calculation (min): 48 min       Assessment/Plan   PT Assessment  PT Assessment Results: Decreased strength, Decreased mobility, Decreased endurance, Orthopedic restrictions  Rehab Prognosis: Good  Evaluation/Treatment Tolerance: Patient tolerated treatment well  End of Session Communication: Bedside nurse  Assessment Comment: pt with decreased mobility/ gait, strength balance and endurance pt to benefit from skilled PT to address deficits and improve functional mobility .  End of Session Patient Position: Up in chair, Alarm on     Treatment/Interventions: Therapeutic activity, Transfer training, Gait training, Stair training, Therapeutic exercise, Home exercise program  PT Plan: Skilled PT  PT Frequency: BID  PT Discharge Recommendations: Low intensity level of continued care    PT Recommended Transfer Status: Assist x1, Assistive device    General Visit Information:   PT  Visit  PT Received On: 01/30/24  General  Reason for Referral: R THR  Family/Caregiver Present: Yes  Caregiver Feedback: spouse present and supportive  Prior to Session Communication: Bedside nurse (cleared by nursing to participate)  Patient Position Received: Up in chair, Alarm on (blanket roll between LE's)  General Comment: pt is agreeable to participate , states that shehad other hip done and is happy with the outcome , pt and spouse deny any home going concerns    General Observations:   General Observation: vc to avoid ER of L LE , R LE is surgical side        Precautions:  Precautions  LE Weight Bearing Status: Weight Bearing as Tolerated  Medical Precautions: Fall precautions  Post-Surgical Precautions: Right hip precautions (posterior hip precautions)  Precautions Comment: pt able to recall and  maintain precautions, spouse also demnstrates good knowledge of precatuions       Objective     Pain:  Pain Assessment  Pain Assessment: 0-10  Pain Score: 5 - Moderate pain (decreased to 3/10 at end of session)  Pain Type: Acute pain, Surgical pain  Pain Location: Hip  Pain Orientation: Right  Pain Descriptors: Aching  Pain Interventions: Cold applied    Cognition:  Cognition  Overall Cognitive Status: Within Functional Limits  Orientation Level: Oriented X4        Activity Tolerance:  Activity Tolerance  Endurance: Endurance does not limit participation in activity    Treatments:  Therapeutic Exercise  Therapeutic Exercise Performed: Yes  Therapeutic Exercise Activity 1: hip precautions maintained while Pt performed supine ther ex including ankle pumps, quad sets, glut sets, heel slides, AAROM hip ABD, SAQ 10-20 reps.  Therapeutic Exercise Activity 2: hip precautions maintained while Pt performed seated ther ex  including LAQ, ABD/ADD pillow squeeze and Heel/toe raises 10-20 reps. Issued HEP for discharge. Verbal and tactile cues for technique and breathing.           Ambulation/Gait Training  Ambulation/Gait Training Performed: Yes  Ambulation/Gait Training 1  Surface 1: Level tile  Device 1: Rolling walker  Assistance 1: Contact guard  Comments/Distance (ft) 1: ambualting 100 ft x 2 with WW and CGA using step through gait pattern, vc to increase SHER and upright posture  Transfers  Transfer: Yes  Transfer 1  Technique 1: Sit to stand, Stand to sit  Transfer Device 1: Walker  Transfer Level of Assistance 1: Contact guard  Trials/Comments 1: with WW, cues for hand placement (transfers sit to stand with CGA and good safety awareness , infequent cues to aadvance R LE FW in order to maintain hip precautions)  Stairs  Stairs: Yes (up and down curb step 3 times with use of WW and CGA using step to gait pattern , infrequent vc for sequencing , spouse instructing pt in correct sequencing , pt and spouse deny concerns)        Outcome Measures:  [facility] Basic Mobility  Turning from your back to your side while in a flat bed without using bedrails: A little  Moving from lying on your back to sitting on the side of a flat bed without using bedrails: A little  Moving to and from bed to chair (including a wheelchair): A little  Standing up from a chair using your arms (e.g. wheelchair or bedside chair): A little  To walk in hospital room: A little  Climbing 3-5 steps with railing: A little  Basic Mobility - Total Score: 18  Education Documentation  No documentation found.  Education Comments  No comments found.        EDUCATION:  Individual(s) Educated: Patient  Education Provided: Body Mechanics, Fall Risk, Home Exercise Program, Home Safety, Post-Op Precautions  Patient Response to Education: Patient/Caregiver Verbalized Understanding of Information, Patient/Caregiver Performed Return Demonstration of Exercises/Activities, Patient/Caregiver Asked Appropriate Questions    Encounter Problems       Encounter Problems (Active)       PT Problem       Pt will demonstrate mod I  with bed mobility to edge of bed.   (Progressing)       Start:  01/29/24    Expected End:  02/05/24            Pt will demonstrate mod I  with sit to stand/chair transfers with FWW.   (Progressing)       Start:  01/29/24    Expected End:  02/05/24            Pt will ambulate 150 feet with FWW mod I .   (Progressing)       Start:  01/29/24    Expected End:  02/05/24            Pt to demo 1 step with Sup   (Progressing)       Start:  01/29/24    Expected End:  02/05/24            Patient will demonstrate independence in home program for support of progression (Progressing)       Start:  01/29/24    Expected End:  02/05/24         Goal Note       Patient will demonstrate independence in home program for support of progression                 Pain - Adult

## 2024-01-31 ENCOUNTER — HOME CARE VISIT (OUTPATIENT)
Dept: HOME HEALTH SERVICES | Facility: HOME HEALTH | Age: 74
End: 2024-01-31
Payer: MEDICARE

## 2024-01-31 ENCOUNTER — TELEPHONE (OUTPATIENT)
Dept: ORTHOPEDIC SURGERY | Facility: CLINIC | Age: 74
End: 2024-01-31
Payer: MEDICARE

## 2024-01-31 VITALS — TEMPERATURE: 98 F | OXYGEN SATURATION: 98 % | WEIGHT: 106 LBS | HEIGHT: 61 IN | BODY MASS INDEX: 20.01 KG/M2

## 2024-01-31 PROCEDURE — 1090000002 HH PPS REVENUE DEBIT

## 2024-01-31 PROCEDURE — 1090000001 HH PPS REVENUE CREDIT

## 2024-01-31 PROCEDURE — 0023 HH SOC

## 2024-01-31 PROCEDURE — 169592 NO-PAY CLAIM PROCEDURE

## 2024-01-31 PROCEDURE — G0152 HHCP-SERV OF OT,EA 15 MIN: HCPCS | Mod: HHH

## 2024-01-31 SDOH — ECONOMIC STABILITY: HOUSING INSECURITY
HOME SAFETY: 1 STORY HOME WITH FULL TUB SHOWER, SWIVEL SEAT AND TUB-SIDE GRAB BAR. CLIENT AND SPOUSE ARE RETIRED AND SPOUSE CAN DRIVE.

## 2024-01-31 ASSESSMENT — ENCOUNTER SYMPTOMS
PAIN LOCATION: RIGHT LEG
LOSS OF SENSATION IN FEET: 0
PAIN: 1
HIGHEST PAIN SEVERITY IN PAST 24 HOURS: 10/10
SUBJECTIVE PAIN PROGRESSION: UNCHANGED
PAIN LOCATION - PAIN SEVERITY: 4/10
PERSON REPORTING PAIN: PATIENT
OCCASIONAL FEELINGS OF UNSTEADINESS: 1
DEPRESSION: 0

## 2024-01-31 ASSESSMENT — ACTIVITIES OF DAILY LIVING (ADL)
FEEDING_WITHIN_DEFINED_LIMITS: 1
TOILETING: 1
PHYSICAL TRANSFERS ASSESSED: 1
ENTERING_EXITING_HOME: MINIMUM ASSIST
CURRENT_FUNCTION: STAND BY ASSIST
CURRENT_FUNCTION: MINIMUM ASSIST
DRESSING_LB_CURRENT_FUNCTION: MODERATE ASSIST
OASIS_M1830: 05

## 2024-02-01 ENCOUNTER — HOME CARE VISIT (OUTPATIENT)
Dept: HOME HEALTH SERVICES | Facility: HOME HEALTH | Age: 74
End: 2024-02-01
Payer: MEDICARE

## 2024-02-01 PROCEDURE — G0151 HHCP-SERV OF PT,EA 15 MIN: HCPCS | Mod: HHH

## 2024-02-01 PROCEDURE — 1090000001 HH PPS REVENUE CREDIT

## 2024-02-01 PROCEDURE — 1090000002 HH PPS REVENUE DEBIT

## 2024-02-01 SDOH — HEALTH STABILITY: PHYSICAL HEALTH: EXERCISE TYPE: BLE PER HEP

## 2024-02-01 ASSESSMENT — ACTIVITIES OF DAILY LIVING (ADL)
AMBULATION ASSISTANCE: 1
AMBULATION ASSISTANCE: STAND BY ASSIST
PHYSICAL TRANSFERS ASSESSED: 1
AMBULATION_DISTANCE/DURATION_TOLERATED: 50'
AMBULATION ASSISTANCE ON FLAT SURFACES: 1
CURRENT_FUNCTION: STAND BY ASSIST

## 2024-02-01 ASSESSMENT — ENCOUNTER SYMPTOMS
PAIN: 1
HIGHEST PAIN SEVERITY IN PAST 24 HOURS: 8/10
PAIN SEVERITY GOAL: 0/10
LOWEST PAIN SEVERITY IN PAST 24 HOURS: 3/10
PERSON REPORTING PAIN: PATIENT

## 2024-02-01 NOTE — TELEPHONE ENCOUNTER
Called and spoke with patient's , Ирина, after he left message yesterday the home health nurse came over to re-assure her that everything looks ok as for swelling, incision, etc. We also discussed that her pelvis is most likely trying to re-balance itself. We discussed looking for signs of leg buckling, increased pain and swelling outside of normal pain and swelling. I also advised them to reach out if they had any other questions or concerns.

## 2024-02-02 PROCEDURE — 1090000001 HH PPS REVENUE CREDIT

## 2024-02-02 PROCEDURE — 1090000002 HH PPS REVENUE DEBIT

## 2024-02-03 PROCEDURE — 1090000002 HH PPS REVENUE DEBIT

## 2024-02-03 PROCEDURE — 1090000001 HH PPS REVENUE CREDIT

## 2024-02-04 PROCEDURE — 1090000001 HH PPS REVENUE CREDIT

## 2024-02-04 PROCEDURE — 1090000002 HH PPS REVENUE DEBIT

## 2024-02-05 ENCOUNTER — HOME CARE VISIT (OUTPATIENT)
Dept: HOME HEALTH SERVICES | Facility: HOME HEALTH | Age: 74
End: 2024-02-05
Payer: MEDICARE

## 2024-02-05 DIAGNOSIS — Z96.641 S/P TOTAL RIGHT HIP ARTHROPLASTY: Primary | ICD-10-CM

## 2024-02-05 PROCEDURE — G0151 HHCP-SERV OF PT,EA 15 MIN: HCPCS | Mod: HHH

## 2024-02-05 PROCEDURE — 1090000002 HH PPS REVENUE DEBIT

## 2024-02-05 PROCEDURE — 1090000001 HH PPS REVENUE CREDIT

## 2024-02-05 SDOH — HEALTH STABILITY: PHYSICAL HEALTH: EXERCISE TYPE: BLE PER HEP

## 2024-02-05 ASSESSMENT — ENCOUNTER SYMPTOMS
PAIN: 1
PERSON REPORTING PAIN: PATIENT
LOWEST PAIN SEVERITY IN PAST 24 HOURS: 4/10
HIGHEST PAIN SEVERITY IN PAST 24 HOURS: 10/10
PAIN SEVERITY GOAL: 0/10

## 2024-02-05 ASSESSMENT — ACTIVITIES OF DAILY LIVING (ADL)
PHYSICAL TRANSFERS ASSESSED: 1
AMBULATION ASSISTANCE: 1
CURRENT_FUNCTION: SUPERVISION
AMBULATION ASSISTANCE: SUPERVISION

## 2024-02-06 ENCOUNTER — HOME CARE VISIT (OUTPATIENT)
Dept: HOME HEALTH SERVICES | Facility: HOME HEALTH | Age: 74
End: 2024-02-06
Payer: MEDICARE

## 2024-02-06 PROCEDURE — 1090000002 HH PPS REVENUE DEBIT

## 2024-02-06 PROCEDURE — G0152 HHCP-SERV OF OT,EA 15 MIN: HCPCS | Mod: HHH

## 2024-02-06 PROCEDURE — 1090000001 HH PPS REVENUE CREDIT

## 2024-02-06 RX ORDER — OXYCODONE HYDROCHLORIDE 5 MG/1
5 TABLET ORAL EVERY 6 HOURS PRN
Qty: 28 TABLET | Refills: 0 | Status: SHIPPED | OUTPATIENT
Start: 2024-02-06 | End: 2024-02-13

## 2024-02-06 ASSESSMENT — ENCOUNTER SYMPTOMS
PERSON REPORTING PAIN: PATIENT
PAIN LOCATION: RIGHT HIP
SUBJECTIVE PAIN PROGRESSION: GRADUALLY IMPROVING
PAIN LOCATION - PAIN SEVERITY: 3/10
PAIN: 1

## 2024-02-06 ASSESSMENT — ACTIVITIES OF DAILY LIVING (ADL)
BATHING ASSESSED: 1
BATHING_CURRENT_FUNCTION: STAND BY ASSIST

## 2024-02-07 ENCOUNTER — HOME CARE VISIT (OUTPATIENT)
Dept: HOME HEALTH SERVICES | Facility: HOME HEALTH | Age: 74
End: 2024-02-07
Payer: MEDICARE

## 2024-02-07 PROCEDURE — G0151 HHCP-SERV OF PT,EA 15 MIN: HCPCS | Mod: HHH

## 2024-02-07 PROCEDURE — 1090000002 HH PPS REVENUE DEBIT

## 2024-02-07 PROCEDURE — 1090000001 HH PPS REVENUE CREDIT

## 2024-02-07 ASSESSMENT — ACTIVITIES OF DAILY LIVING (ADL)
AMBULATION_DISTANCE/DURATION_TOLERATED: 125'
AMBULATION ASSISTANCE ON FLAT SURFACES: 1

## 2024-02-07 ASSESSMENT — ENCOUNTER SYMPTOMS
PERSON REPORTING PAIN: PATIENT
LOWEST PAIN SEVERITY IN PAST 24 HOURS: 2/10
HIGHEST PAIN SEVERITY IN PAST 24 HOURS: 6/10
PAIN SEVERITY GOAL: 0/10
PAIN LOCATION: RIGHT HIP
PAIN: 1

## 2024-02-08 PROCEDURE — 1090000002 HH PPS REVENUE DEBIT

## 2024-02-08 PROCEDURE — 1090000001 HH PPS REVENUE CREDIT

## 2024-02-08 PROCEDURE — G0180 MD CERTIFICATION HHA PATIENT: HCPCS | Performed by: ORTHOPAEDIC SURGERY

## 2024-02-08 SDOH — HEALTH STABILITY: PHYSICAL HEALTH: EXERCISE TYPE: BLE PER HEP

## 2024-02-08 ASSESSMENT — ACTIVITIES OF DAILY LIVING (ADL)
AMBULATION ASSISTANCE: 1
PHYSICAL TRANSFERS ASSESSED: 1
CURRENT_FUNCTION: SUPERVISION
AMBULATION ASSISTANCE: SUPERVISION

## 2024-02-08 ASSESSMENT — ENCOUNTER SYMPTOMS: MUSCLE WEAKNESS: 1

## 2024-02-09 ENCOUNTER — HOME CARE VISIT (OUTPATIENT)
Dept: HOME HEALTH SERVICES | Facility: HOME HEALTH | Age: 74
End: 2024-02-09
Payer: MEDICARE

## 2024-02-09 PROCEDURE — 1090000002 HH PPS REVENUE DEBIT

## 2024-02-09 PROCEDURE — 1090000001 HH PPS REVENUE CREDIT

## 2024-02-10 PROCEDURE — 1090000001 HH PPS REVENUE CREDIT

## 2024-02-10 PROCEDURE — 1090000002 HH PPS REVENUE DEBIT

## 2024-02-11 PROCEDURE — 1090000002 HH PPS REVENUE DEBIT

## 2024-02-11 PROCEDURE — 1090000001 HH PPS REVENUE CREDIT

## 2024-02-12 PROCEDURE — 1090000001 HH PPS REVENUE CREDIT

## 2024-02-12 PROCEDURE — 1090000002 HH PPS REVENUE DEBIT

## 2024-02-13 ENCOUNTER — HOME CARE VISIT (OUTPATIENT)
Dept: HOME HEALTH SERVICES | Facility: HOME HEALTH | Age: 74
End: 2024-02-13
Payer: MEDICARE

## 2024-02-13 PROCEDURE — G0151 HHCP-SERV OF PT,EA 15 MIN: HCPCS | Mod: HHH

## 2024-02-13 PROCEDURE — 1090000002 HH PPS REVENUE DEBIT

## 2024-02-13 PROCEDURE — 1090000001 HH PPS REVENUE CREDIT

## 2024-02-13 PROCEDURE — G0152 HHCP-SERV OF OT,EA 15 MIN: HCPCS | Mod: HHH

## 2024-02-13 SDOH — HEALTH STABILITY: PHYSICAL HEALTH: EXERCISE TYPE: BLE

## 2024-02-13 ASSESSMENT — ACTIVITIES OF DAILY LIVING (ADL)
DRESSING_LB_CURRENT_FUNCTION: INDEPENDENT
AMBULATION ASSISTANCE ON FLAT SURFACES: 1
AMBULATION ASSISTANCE: SUPERVISION
CURRENT_FUNCTION: SUPERVISION
BATHING ASSESSED: 1
PHYSICAL TRANSFERS ASSESSED: 1
TOILETING: INDEPENDENT
AMBULATION_DISTANCE/DURATION_TOLERATED: 125'
AMBULATION ASSISTANCE: 1
TOILETING: 1
BATHING_CURRENT_FUNCTION: SUPERVISION

## 2024-02-13 ASSESSMENT — ENCOUNTER SYMPTOMS
PAIN LOCATION - PAIN SEVERITY: 3/10
PAIN: 1
SUBJECTIVE PAIN PROGRESSION: GRADUALLY IMPROVING
HIGHEST PAIN SEVERITY IN PAST 24 HOURS: 4/10
PAIN SEVERITY GOAL: 0/10
PERSON REPORTING PAIN: PATIENT
HIGHEST PAIN SEVERITY IN PAST 24 HOURS: 5/10
LOWEST PAIN SEVERITY IN PAST 24 HOURS: 2/10
PAIN LOCATION: RIGHT HIP
PAIN LOCATION: RIGHT HIP
PAIN: 1
PERSON REPORTING PAIN: PATIENT

## 2024-02-14 PROCEDURE — 1090000002 HH PPS REVENUE DEBIT

## 2024-02-14 PROCEDURE — 1090000001 HH PPS REVENUE CREDIT

## 2024-02-15 ENCOUNTER — HOME CARE VISIT (OUTPATIENT)
Dept: HOME HEALTH SERVICES | Facility: HOME HEALTH | Age: 74
End: 2024-02-15
Payer: MEDICARE

## 2024-02-15 PROCEDURE — 1090000001 HH PPS REVENUE CREDIT

## 2024-02-15 PROCEDURE — 1090000002 HH PPS REVENUE DEBIT

## 2024-02-15 PROCEDURE — G0151 HHCP-SERV OF PT,EA 15 MIN: HCPCS | Mod: HHH

## 2024-02-15 SDOH — HEALTH STABILITY: PHYSICAL HEALTH: EXERCISE TYPE: BLE PER HEP

## 2024-02-15 ASSESSMENT — ENCOUNTER SYMPTOMS
ARTHRALGIAS: 1
LOWEST PAIN SEVERITY IN PAST 24 HOURS: 2/10
HIGHEST PAIN SEVERITY IN PAST 24 HOURS: 4/10
PAIN: 1
PERSON REPORTING PAIN: PATIENT
SUBJECTIVE PAIN PROGRESSION: GRADUALLY IMPROVING
PAIN SEVERITY GOAL: 0/10

## 2024-02-15 ASSESSMENT — ACTIVITIES OF DAILY LIVING (ADL)
AMBULATION ASSISTANCE: 1
HOME_HEALTH_OASIS: 00
PHYSICAL TRANSFERS ASSESSED: 1
AMBULATION ASSISTANCE: INDEPENDENT
OASIS_M1830: 00
CURRENT_FUNCTION: INDEPENDENT
AMBULATION_DISTANCE/DURATION_TOLERATED: 150'

## 2024-02-16 ENCOUNTER — HOSPITAL ENCOUNTER (OUTPATIENT)
Dept: RADIOLOGY | Facility: CLINIC | Age: 74
Discharge: HOME | End: 2024-02-16
Payer: MEDICARE

## 2024-02-16 ENCOUNTER — OFFICE VISIT (OUTPATIENT)
Dept: ORTHOPEDIC SURGERY | Facility: CLINIC | Age: 74
End: 2024-02-16
Payer: MEDICARE

## 2024-02-16 DIAGNOSIS — Z47.1 AFTERCARE FOLLOWING RIGHT HIP JOINT REPLACEMENT SURGERY: ICD-10-CM

## 2024-02-16 DIAGNOSIS — Z96.641 AFTERCARE FOLLOWING RIGHT HIP JOINT REPLACEMENT SURGERY: ICD-10-CM

## 2024-02-16 PROCEDURE — 1125F AMNT PAIN NOTED PAIN PRSNT: CPT | Performed by: PHYSICIAN ASSISTANT

## 2024-02-16 PROCEDURE — 1159F MED LIST DOCD IN RCRD: CPT | Performed by: PHYSICIAN ASSISTANT

## 2024-02-16 PROCEDURE — 99024 POSTOP FOLLOW-UP VISIT: CPT | Performed by: PHYSICIAN ASSISTANT

## 2024-02-16 PROCEDURE — 1090000001 HH PPS REVENUE CREDIT

## 2024-02-16 PROCEDURE — 73522 X-RAY EXAM HIPS BI 3-4 VIEWS: CPT | Mod: BILATERAL PROCEDURE | Performed by: ORTHOPAEDIC SURGERY

## 2024-02-16 PROCEDURE — 1090000002 HH PPS REVENUE DEBIT

## 2024-02-16 PROCEDURE — 1036F TOBACCO NON-USER: CPT | Performed by: PHYSICIAN ASSISTANT

## 2024-02-16 PROCEDURE — 73522 X-RAY EXAM HIPS BI 3-4 VIEWS: CPT

## 2024-02-16 RX ORDER — POTASSIUM CHLORIDE 20 MEQ/1
20 TABLET, EXTENDED RELEASE ORAL DAILY
COMMUNITY
Start: 2023-08-22

## 2024-02-16 ASSESSMENT — PAIN SCALES - GENERAL: PAINLEVEL_OUTOF10: 3

## 2024-02-16 ASSESSMENT — PAIN - FUNCTIONAL ASSESSMENT: PAIN_FUNCTIONAL_ASSESSMENT: 0-10

## 2024-02-16 NOTE — PROGRESS NOTES
Subjective    Patient ID: Mitzi    Chief Complaint:   Chief Complaint   Patient presents with    Right Hip - Post-op Visit     RT THR 1/29/24    Left Hip - Follow-up     LT THR 8/25/23     History of present illness    73-year-old female presented clinic today for first postop follow-up status post right total hip replacement performed 1/29/2024.  Overall doing very well.  She is ambulating with assistance of a cane today.  She is looking forward to transitioning to outpatient physical therapy next week which she is doing upstairs in [place].  She has mild pain present but she is off of her oxycodone now.  She is back to taking her normal hydrocodone with Flexeril which seems to be helping.  Over the last 4 nights she has had better results with sleeping.  No numbness tingling no fevers chills.      Past medical , Surgical, Family and social history reviewed.      Physical exam  General: No acute distress and breathing comfortably.  Patient is pleasant and cooperative with the examination.    Extremity  Right hip is neurovascular intact.  Good range of motion.  No infection.  Incision is well-healed well-approximated.  Compartments soft.  Wound is clean dry and intact.  No calf swelling or tenderness.  No instability.  No ecchymosis or erythema present.  No DVT.    Diagnostics  Please see dictated x-ray report dated 2/16/2024  XR hip right with pelvis when performed 1 view    Result Date: 1/29/2024  STUDY: Hip Radiographs; 1/29/2024 at 9:41 AM. INDICATION: Postop hip. COMPARISON: None available. ACCESSION NUMBER(S): [id number] ORDERING CLINICIAN: EULOGIO PETERS TECHNIQUE:  One view(s) of the right hip. FINDINGS:  The total hip arthroplasty is in anatomic alignment.  There is no displaced fracture.  There is no evidence of orthopedic hardware complications.  No soft tissue abnormality is seen.    Satisfactory postop appearance. Signed by Denny Jacob MD       Procedure  [ none]    Assessment  Status post  right total hip replacement    Treatment plan  1.  Wound instructions were discussed today.  2.  She will continue weightbearing activity as tolerated with a cane continue weaning herself off  3.  E-prescription for outpatient physical therapy put in her chart.  She will follow with us in 3 weeks for reevaluation without x-ray.  She will continue hip precautions.  4.  All of the patient's questions were answered.    This note was prepared using voice recognition software.  The details of this note are correct and have been reviewed, and corrected to the best of my ability.  Some grammatical areas may persist related to the Dragon software    Jhonatan Beltran PA-C, [facility]  [facility]  [facility] [place]    [phone]

## 2024-02-17 PROCEDURE — 1090000001 HH PPS REVENUE CREDIT

## 2024-02-17 PROCEDURE — 1090000002 HH PPS REVENUE DEBIT

## 2024-02-18 PROCEDURE — 1090000001 HH PPS REVENUE CREDIT

## 2024-02-18 PROCEDURE — 1090000002 HH PPS REVENUE DEBIT

## 2024-02-20 ENCOUNTER — APPOINTMENT (OUTPATIENT)
Dept: ORTHOPEDIC SURGERY | Facility: CLINIC | Age: 74
End: 2024-02-20
Payer: MEDICARE

## 2024-02-21 ENCOUNTER — EVALUATION (OUTPATIENT)
Dept: PHYSICAL THERAPY | Facility: CLINIC | Age: 74
End: 2024-02-21
Payer: MEDICARE

## 2024-02-21 DIAGNOSIS — M25.551 ACUTE PAIN OF RIGHT HIP: ICD-10-CM

## 2024-02-21 DIAGNOSIS — M16.0 PRIMARY OSTEOARTHRITIS OF BOTH HIPS: Primary | ICD-10-CM

## 2024-02-21 PROCEDURE — 97110 THERAPEUTIC EXERCISES: CPT | Mod: GP | Performed by: PHYSICAL THERAPIST

## 2024-02-21 PROCEDURE — 97162 PT EVAL MOD COMPLEX 30 MIN: CPT | Mod: GP | Performed by: PHYSICAL THERAPIST

## 2024-02-21 NOTE — PROGRESS NOTES
Physical Therapy Evaluation and Treatment      Patient Name: Mitzi Lawson  MRN: [id number]  Today's Date: 2/21/2024  Time Calculation  Start Time: 0318  Stop Time: 0405  Time Calculation (min): 47 min      PT Evaluation Time Entry  PT Evaluation (Moderate) Time Entry: 25  PT Therapeutic Procedures Time Entry  Therapeutic Exercise Time Entry: 15    INSURANCE:  Visit number: 1/10  [employer] MDCR - BMN  Co-Pay $20     Surgery: R AKIKO on 1/29/24 (posterior approach with hip precautions)   Referring Provider: Dr. Otoniel Guadarrama  Hx: Parkinson's, OA, Osteoporosis, Scoliosis, Neuropathy, RA, Hypothyroidism    ASSESSMENT:  PT Assessment Results: decreased knowledge of HEP, activity limitations, ADLs/IADLs/self care skills, flexibility, motor function/control/tone, pain, participation restrictions, range of motion/joint mobility, strength, posture, transfers, coordination, balance, fall risk, gait/locomotion, decreased knowledge of assisted device use, integumentary, decreased knowledge of precautions.  Rehab Prognosis: Good  Evaluation/Treatment Tolerance: Patient tolerated treatment well    Mitzi Lawson is a 73 y.o. female presenting to the clinic s/p a R AKIKO on 1/29/24 by Dr. Otoniel Guadarrama. Pt demonstrates decreased ROM and strength of the R hip and knee causing pain and dysfunction with dressing, bending, squatting, walking, standing, and stairs. Pt was given and reviewed HEP including stretching and strengthening. Reviewed posterior hip precautions. Pt will benefit from skilled PT in order to increase ROM and strength of the R hip and knee so that the pt can perform ADLs without pain and return to PLOF.     PLAN:  Treatments/Interventions: gait training, traction, dry needling, edema control, education/instruction, home program, self care/home management, manual therapy, neuromuscular re-education, therapeutic activities, therapeutic exercises, modalities, therapeutic elastic taping.   PT Plan: Skilled PT  PT  Frequency: 2 times per week  Duration: 10 visits  Certification Period Start Date: 02/21/24  Certification Period End Date: 05/21/24  Rehab Potential: Good  Plan of Care Agreement: Patient    Goals -    Pt will report less than a 2/10 pain at the worst.  Pt will report a significant improvement wit  LEFS score.  Pt will have at least 4+/5 strength for the right hip and knee.  Pt will have AROM to WFL for the right hip.  Pt will be independent with HEP.    CURRENT PROBLEM:   1. Primary osteoarthritis of both hips        2. Acute pain of right hip            Subjective    General -    Pt had a R AKIKO (posterior approach) on 1/29/24 by Dr. Otoniel Guadarrama. Pt states that she also had her L AKIKO done last fall 2023. Pt reports that they originally thought her pain was coming from her scoliosis, but her pain management doctor thought her hips might be the problem and they were bone on bone. Pt states that she already feels better than before the surgery.    Mechanism of Injury -    Insidious Onset    Pain -    Pain Location: R hip  Pain Best: 0/10  Pain Today: 1/10  Pain Worst: 5/10   Pain Type: Intermittent, Achy/Dull, sometimes Sharp, Stiffness/Tightness, Burning  Pain Exacerbating Factors: dressing, bending, squatting, walking, standing, and stairs.  Pain Relieving Factors: Rest, Ice, Hydrocodone, Tylenol     Difficulty Sleeping: Yes, but improving  Night Sweats, Loss of Appetite, Unexpected Weight-loss: No    Home Living -    Stairs into Home: 1 steps without railing  Pt lives with her .  DME: walker and cane    Patient Stated Goals -  able to walk and ride a bike    PRECAUTIONS -    Posterior Hip Precautions    Prior Level of Function -    I with ADLs/IADLs     Objective     Hip AROM (degrees) -  R hip Flexion: 87  R hip ER: 10  R hip Extension: lacking 4    L hip Flexion: 93   L hip ER: 20  L hip Extension: 0    Hip MMT (/5) -  R hip Flexion: 4   R hip Adduction: 4+   R hip Abduction: 4-  R hip ER: 3-   L  hip Flexion: 4+   L hip Adduction: 4+   L hip Abduction: 4   L hip ER: 3+     Knee MMT (/5) -   R knee Extension: 4-  R knee Flexion: 4+  L knee Extension: 4+  L knee Flexion: 4+    Integumentary -   Well healing incision with some scabbing and some stitches poking through, no redness or exudate    Flexibility -   Decreased Tissue Length of calf/HSs, quad/ITB/iliopsoas     Gait -  Pt is ambulating with an assistive device (SC). Pattern: 3-point step through pattern.   Noted: antalgic, decreased heel strike, decreased toe push off, Trendelenburg, and trunk lean.     Posture -   Scoliosis  Elevated Scapula  Rounded Shoulders  Forward Head  Increased Thoracic Kyphosis   Forward Trunk Lean    Outcome Measures -   Other Measures  Lower Extremity Funtional Score (LEFS): 32/80     Treatment -   Evaluation -   Moderate (95188)  Therapeutic Exercise (75820) -     SAQ: 5 sec x10  Modified Carlton Stretch: 30 sec  Bridge on Heels: x10  TA Hook-lying B hip ABD: GTB x15  TA Hook-lying B hip ADD with ball: 5 sec x10  Supine Hip Adductor Stretch: 30 sec ea B  Seated HS/Calf Stretch with Strap: 30 sec x2  LAQ: 5 sec x10  Mini Squat: x10  HR/TR: x10 ea  Seated Correct Posture: Reviewed    OP Patient Education -   Access Code: [email or web address]  URL: [email or web address]  Date: 02/21/2024  Prepared by: Dotty Preston    Exercises  - Seated Hamstring Stretch  - 1-2 x daily - 3 sets - 30 seconds hold  - Seated Calf Stretch with Strap  - 1-2 x daily - 3 sets - 30 seconds hold  - Seated Long Arc Quad  - 1-2 x daily - 3 sets - 10 reps - 2-3 seconds hold  - Modified Carlton Stretch  - 1-2 x daily - 3 sets - 30 seconds hold  - Bridge on Heels  - 1-2 x daily - 2-3 sets - 10 reps - 2 sec hold  - Supine Knee Extension Strengthening  - 1-2 x daily - 2-3 sets - 10 reps - 5 sec hold  - Hooklying Clamshell with Resistance  - 1-2 x daily - 3 sets - 10 reps  - Supine Hip Adduction Isometric with Ball  - 1-2 x daily - 3 sets - 10 reps  -  Supine Hip Adductor Stretch  - 1-2 x daily - 3 sets - 30 sec hold  - Mini Squat with Counter Support  - 1-2 x daily - 2-3 sets - 10 reps  - Heel Raises with Counter Support  - 1-2 x daily - 3 sets - 10 reps  - Toe Raises with Counter Support  - 1-2 x daily - 3 sets - 10 reps  - Seated Correct Posture

## 2024-03-04 ENCOUNTER — TREATMENT (OUTPATIENT)
Dept: PHYSICAL THERAPY | Facility: CLINIC | Age: 74
End: 2024-03-04
Payer: MEDICARE

## 2024-03-04 DIAGNOSIS — M05.9 RHEUMATOID ARTHRITIS WITH RHEUMATOID FACTOR, UNSPECIFIED (HCC): ICD-10-CM

## 2024-03-04 DIAGNOSIS — M25.551 ACUTE PAIN OF RIGHT HIP: ICD-10-CM

## 2024-03-04 DIAGNOSIS — M16.0 PRIMARY OSTEOARTHRITIS OF BOTH HIPS: Primary | ICD-10-CM

## 2024-03-04 PROCEDURE — 97110 THERAPEUTIC EXERCISES: CPT | Mod: GP,CQ

## 2024-03-04 RX ORDER — HYDROCODONE BITARTRATE AND ACETAMINOPHEN 7.5; 325 MG/1; MG/1
1 TABLET ORAL 3 TIMES DAILY
Qty: 90 TABLET | Refills: 0 | Status: SHIPPED | OUTPATIENT
Start: 2024-03-04 | End: 2024-04-03

## 2024-03-04 ASSESSMENT — PAIN - FUNCTIONAL ASSESSMENT: PAIN_FUNCTIONAL_ASSESSMENT: 0-10

## 2024-03-04 ASSESSMENT — PAIN SCALES - GENERAL: PAINLEVEL_OUTOF10: 1

## 2024-03-04 NOTE — PROGRESS NOTES
"Physical Therapy Treatment    Patient Name: Mitzi Lawson  MRN: [id number]  Today's Date: 3/4/2024  Time Calculation  Start Time: 1136  Stop Time: 1216  Time Calculation (min): 40 min    Insurance  Visit number: 2/10  [employer] MDCR - BMN  Co-Pay $20      Current Problem  1. Primary osteoarthritis of both hips        2. Acute pain of right hip            Subjective    General   Pt reports she felt pretty good following initial visit.  \"I was surprised.\"  However, states she has not been able to do HEP given as much as she would like secondary to getting \"a stomach bug\" for a few days.  When she has been able to do ex's, she states they are helping to reduce sx's.  \"I feel good.\"  Doing well today.     Precautions  Precautions  LE Weight Bearing Status: Weight Bearing as Tolerated  Precautions Comment: L AKIKO    Pain  Pain Assessment  Pain Assessment: 0-10  Pain Score: 1  Pain Location: Hip  Pain Orientation: Right  Pain Frequency: Intermittent  Clinical Progression: Gradually improving  Effect of Pain on Daily Activities: Walking, Bending over, Instability    Objective   Pt ambulates with SPC in R hand, which she states is \"more comfortable.\"    Treatments:  Therapeutic Exercises (27383): 38 Minutes, 3 Units    Activities:  MARCELA: 6 minutes  Modified Carlton Stretch: 30 sec x2  Supine Hip Adductor Stretch: 30 sec x2  Seated HS/Calf Stretch w/Strap: 30 sec x2 each  Bridge on Heels: 2x10  TA Hook-lying B hip ABD: GTB 2x10  TA Hook-lying B hip ADD with ball: 5 sec x20  SAQ/LAQ: 5 sec 2x10 each (LAQ only this visit)  Mini Squat: x10  HR/TR: x10 ea  Stdg Hip PRE's : --> Add NV  Seated Correct Posture: Reviewed       Assessment:   Reviewed activities initiated previous visit, which pt completes with minimal difficulty & minimal vc'ing for improved technique.  She has recently been in PT for L hip & exhibits good recall of the activities.  When cues are provided, good follow through is observed.  No increased c/o pain with " activities.  Initiated MARCELA this visit to increase overall mobility & endurance, which she states she was glad to be able to do.  Also reports having recumbent bike at home & would like to be able to resume using, but it sits lower to ground.  Overall, tolerated session well & demonstrates good ability to continue progressing per protocol.    Plan:   Continue to progress with rehab POC as tolerated, per protocol.

## 2024-03-04 NOTE — TELEPHONE ENCOUNTER
Patient is requesting medication refill. Please approve or deny this request.    Rx requested:  Requested Prescriptions     Pending Prescriptions Disp Refills    HYDROcodone-acetaminophen (LORCET PLUS) 7.5-325 MG per tablet 90 tablet 0     Sig: Take 1 tablet by mouth 3 times daily for 30 days. Max Daily Amount: 3 tablets         Last Office Visit:   12/12/2023  Next Appt 4/9/2024      Next Visit Date:  Future Appointments   Date Time Provider Department Center   4/9/2024  2:30 PM Kb Banda MD [facility]UR Neurology -

## 2024-03-06 ENCOUNTER — TREATMENT (OUTPATIENT)
Dept: PHYSICAL THERAPY | Facility: CLINIC | Age: 74
End: 2024-03-06
Payer: MEDICARE

## 2024-03-06 DIAGNOSIS — M05.9 RHEUMATOID ARTHRITIS WITH RHEUMATOID FACTOR, UNSPECIFIED (HCC): ICD-10-CM

## 2024-03-06 DIAGNOSIS — M16.0 PRIMARY OSTEOARTHRITIS OF BOTH HIPS: Primary | ICD-10-CM

## 2024-03-06 DIAGNOSIS — M25.551 ACUTE PAIN OF RIGHT HIP: ICD-10-CM

## 2024-03-06 PROCEDURE — 97110 THERAPEUTIC EXERCISES: CPT | Mod: GP,CQ

## 2024-03-06 RX ORDER — PREDNISONE 5 MG/1
5 TABLET ORAL DAILY
Qty: 30 TABLET | Refills: 1 | Status: SHIPPED | OUTPATIENT
Start: 2024-03-06

## 2024-03-06 ASSESSMENT — PAIN - FUNCTIONAL ASSESSMENT: PAIN_FUNCTIONAL_ASSESSMENT: 0-10

## 2024-03-06 ASSESSMENT — PAIN SCALES - GENERAL: PAINLEVEL_OUTOF10: 1

## 2024-03-06 NOTE — PROGRESS NOTES
"Physical Therapy Treatment    Patient Name: Mitzi Lawson  MRN: [id number]  Today's Date: 3/6/2024  Time Calculation  Start Time: 0204  Stop Time: 0245  Time Calculation (min): 41 min     PT Therapeutic Procedures Time Entry  Therapeutic Exercise Time Entry: 41                 Insurance:   Visit number: 3/10  [employer] MDCR - BMN  Co-Pay $20   Assessment:   Pt demo's good dung to given ex's. Appropriate muscle fatigue w/ ex's. Cues w/ mini squats for form as pt tends to IR (bilat hips) w/ ex. Progressed her program w/ addition of stdg hip ex's (abd, ext). Emphasis on postural awareness w/ ex's w/ overall fair f/t.   She will cont to benefit from skilled PT to address her deficits and improve her overall functional ability.     Plan:   Continue to progress with rehab POC as tolerated, per protocol. MD carvalho next Tue 3/12/24.     Current Problem  1. Primary osteoarthritis of both hips        2. Acute pain of right hip            Subjective   General  Referred By: Dr. Otoniel GuadarramaPt states she still feels \"unsteady\" at times so using her cane when out in community is a must. States she goes shorter distances at home w/o it and \"is fine.\"   Precautions  Precautions  LE Weight Bearing Status: Weight Bearing as Tolerated  Precautions Comment: L AKIKO    Pain  Pain Assessment: 0-10  Pain Score: 1  Pain Location: Hip  Pain Orientation: Right  Pain Frequency: Intermittent  Clinical Progression: Gradually improving    Objective   Pt ambulates with SPC in R hand, which she states is \"more comfortable.\"     Treatments:  Therapeutic Exercise (75384):  MARCELA: 6 minutes  Modified Carlton Stretch: 30 sec x2  Supine Hip Adductor Stretch: 30 sec x2  Seated HS/Calf Stretch w/Strap: 30 sec x2 each  Bridge on Heels: 2x10  TA Hook-lying B hip ABD: GTB 2x10  TA Hook-lying B hip ADD with ball: 5 sec x20  SAQ/LAQ: 5 sec 2x10 each (LAQ only this visit)  Mini Squat: x15  HR/TR: x15 ea  Stdg Hip PRE's : x10 BL   Seated Correct Posture: Reviewed "     EDUCATION:

## 2024-03-06 NOTE — CONSULTS
Service:   Service: Medicine     Consult:  Consult requested by (Attending Name): Otoniel Guadarrama   Reason: Hospitalist/Teaching Service or PCP for Medical  Management     History of Present Illness:   Admission Reason: Left hip RA   HPI:    NYA ACHARYA is a 72 year old Female admitted to [facility] under the primary services orthopedic surgery due to left hip RA.  Patient is status  post left total hip replacement on 8/25/2023.  Patient tolerated procedure well with no significant blood loss or intraoperative complications.  Hospital medicine has been consulted for management postoperatively of chronic conditions including HTN.     Patient labs reviewed.  CBC grossly unremarkable with a white blood cell count of 8.1.  Hemoglobin of 13 and hematocrit of 40.5.  BMP showing a hypokalemia with potassium of 3.0 which will be repleted per protocol.  Sodium level 140.  Blood glucose 89.   Creatinine 0.71 with a BUN of 28 and GFR greater than 90.  Anion gap at 12 and serum bicarbonate at 32.  Urinalysis done showing 4+ bacteria, 20 red blood cells, trace leukocyte esterase without nitrates and small hematuria. Urine cultures showing greater  than 100,000 of Enterococcus faecalis.  EKG with normal sinus rhythm, LVH with a QTc of 475 ms heart rate 85 bpm.    PMHx: HTN, hypothyroidism, RA, scoliosis, depression, vitamin D deficiency, cx. low back pain, Dupuytren's contracture, Parkinson's disease,    PSHx: left hip replacement     SocHx: Denies any tobacco, alcohol or drug use    Fam Hx: Viewed not pertinent to patient presentation    Review of systems: 10 system were reviewed and were negative except what was mentioned in history of present illness            Allergies:  ·  No Known Allergies :     Objective:     Objective Information:        T   P  R  BP   MAP  SpO2   Value  36.1  102     133/73   97  99%  Date/Time 8/25 10:24 8/25 10:24   8/25 10:24  8/25 10:24 8/25 10:24  Range  (36.1C - 36.1C  )  (102 - 102 )    (133 - 133 )/ (73 - 73 )  (97 - 97 )  (99% - 99% )        Pain reported at 8/25 10:25: 0 = None         Weights   8/25 6:03: Weight in kg (Weight (kg))  42.4  8/25 6:03: Weight in lbs ((lbs))  93.4    Physical Exam by System:    Constitutional: Well developed, awake/alert/oriented  x3, no distress, alert and cooperative   Eyes: PERRL, EOMI, clear sclera   ENMT: mucous membranes moist, no apparent injury,  no lesions seen   Head/Neck: Neck supple, no apparent injury, thyroid  without mass or tenderness, No JVD, trachea midline, no bruits   Respiratory/Thorax: Patent airways, CTAB, normal  breath sounds with good chest expansion, thorax symmetric   Cardiovascular: Regular, rate and rhythm, no murmurs,  2+ equal pulses of the extremities, normal S 1and S 2   Gastrointestinal: Nondistended, soft, non-tender,  no rebound tenderness or guarding, no masses palpable, no organomegaly, +BS, no bruits   Musculoskeletal: Lower extremity range of motion  limited secondary to postop status   Extremities: normal extremities, no cyanosis edema,  contusions or wounds, no clubbing   Neurological: alert and oriented x3, intact senses,  motor, response and reflexes, normal strength   Psychological: Appropriate mood and behavior   Skin: Warm and dry, no lesions, no rashes     Medications:    Medications:      ANTI-INFECTIVES:    1. ceFAZolin 2 gram/ D5W 100 mL Premix IVPB:  100  mL  IntraVenous Piggyback  Every 6 Hours      CARDIOVASCULAR AGENTS:    1. Triamterene  37.5 mg- hydroCHLOROthiazide 25 mg - PEDS:  1  tablet(s)  Oral  Daily    2. Metoprolol Succinate Extended Release:  25  mg  Oral  Daily      CENTRAL NERVOUS SYSTEM AGENTS:    1. Acetaminophen:  650  mg  Oral  Every 6 Hours    2. oxyCODONE Immediate Release:  5  mg  Oral  Every 4 Hours    3. oxyCODONE Immediate Release:  5  mg  Oral  Every 4 Hours   PRN       4. oxyCODONE Immediate Release:  5  mg  Oral  Once    5. Ondansetron Injectable:  4  mg  IntraVenous  Push  Every 6 Hours   PRN       6. Carbidopa 25 mg - Levodopa 100 m.5  tablet(s)  Oral  4 Times a Day    7. Cyclobenzaprine:  5  mg  Oral  3 Times a Day   PRN         COAGULATION MODIFIERS:    1. Tranexamic Acid:  1950  mg  Oral  Once      GASTROINTESTINAL AGENTS:    1. Magnesium Hydroxide -Al Hydrox -Simethicone Oral Liquid:  30  mL  Oral  Every 6 Hours   PRN       2. Docusate:  100  mg  Oral  2 Times a Day    3. Polyethylene Glycol:  17  gram(s)  Oral  2 Times a Day    4. Pantoprazole:  40  mg  Oral  Daily      HORMONES/HORMONE MODIFIERS:    1. predniSONE:  5  mg  Oral  Daily    2. Levothyroxine:  50  microgram(s)  Oral  Daily      MISCELLANEOUS AGENTS:    1. Hydroxychloroquine:  200  mg  Oral  Daily      NUTRITIONAL PRODUCTS:    1. Lactated Ringers Infusion:  1000  mL  IntraVenous  <Continuous>    2. Sodium Chloride 0.9% Infusion:  1000  mL  IntraVenous  <Continuous>      PSYCHOTHERAPEUTIC AGENTS:    1. DULoxetine:  60  mg  Oral  Daily      RESPIRATORY AGENTS:    1. diphenhydrAMINE Injectable:  12.5  mg  IntraVenous Push  Every 6 Hours   PRN         Recent Lab Results:    Results:        I have reviewed these laboratory results:    Basic Metabolic Panel  25-Aug-2023 06:38:00      Result Value    Glucose, Serum  89    NA  140    K  3.0   L   CL  99    Bicarbonate, Serum  32    Anion Gap, Serum  12    BUN  28   H   CREAT  0.71    GFR Female  >90    Calcium, Serum  8.9      Urinalysis with Culture if Indicated  23-Aug-2023 09:49:00      Result Value    Color, Urine  YELLOW  Reference Range: STRAW,YELLOW    Appearance, Urine  CLEAR    Specific Gravity, Urine  1.015    pH, Urine  7.0    Protein, Urine  NEGATIVE    Glucose, Urine  NEGATIVE    Blood, Urine  SMALL (1+)   A   Ketones, Urine  NEGATIVE    Bilirubin, Urine  NEGATIVE    Urobilinogen, Urine  <2.0    Nitrite, Urine  NEGATIVE    Leukocyte Esterase, Urine  TRACE   A     Urinalysis, Microscopic  23-Aug-2023 09:49:00      Result Value    White Cells  2    Red  Blood Cells  20   A   Epithelial Cells, Squamous  <1    Bacteria, Urine  4+   A   Mucous  1+      Culture, Urine  23-Aug-2023 09:49:00      Result Value    Organism  Enterococcus faecalis  >100,000 CFU/MLIDENTIFICATION AND/ORANTIBIOTIC SUSCEPTIBILITYIN PROGRESS.    A     MRSA Screen  22-Aug-2023 11:52:00      Result Value    MRSA Screen  CULTURE IN PROGRESS.      Comprehensive Metabolic Panel  22-Aug-2023 11:44:00      Result Value    Lab Comment:  ANKUR WYNNE CALLED RB TO ON CALL DR. SWEENEY, 08/22/2023 14:58    Glucose, Serum  67   L   NA  140    K  2.9   LL   CL  98    Bicarbonate, Serum  33   H   Anion Gap, Serum  12    BUN  29   H   CREAT  0.76    GFR Female  84    Calcium, Serum  9.3    ALB  3.9    ALKP  92    T Pro  6.6    T Bili  0.4    Alanine Aminotransferase, Serum  4   L   Aspartate Transaminase, Serum  19      PT + INR, Plasma  22-Aug-2023 11:44:00      Result Value    Prothrombin Time, Plasma  11.5    International Normalized Ratio, Plasma  1.0      Complete Blood Count + Differential  22-Aug-2023 11:44:00      Result Value    White Blood Cell Count  8.1    Red Blood Cell Count  4.18    HGB  13.0    HCT  40.5    MCV  97    MCHC  32.1    PLT  379    RDW-CV  12.1    Neutrophil %  72.0    Immature Granulocytes %  0.4    Lymphocyte %  14.8    Monocyte %  11.3    Eosinophil %  1.0    Basophil %  0.5    Neutrophil Count  5.81    Lymphocyte Count  1.19   L   Monocyte Count  0.91    Eosinophil Count  0.08    Basophil Count  0.04      Activated Partial Thromboplastin Time  22-Aug-2023 11:44:00      Result Value    Activated Partial Thromboplastin Time  32        Radiology Results:    Results:        Impression:  Xray Hip 2 View [Aug 16 2023  1:55PM]      Impression:    A fluoroscopic guided left hip aspiration is attempted at multiple  sites. No significant fluid is aspirated from the left hip.     These findings are discussed with Dr. SWEENEY at the conclusion of  the procedure.     Xray Hip Arthrogram without  JESSICA Foster [Jun 8 2023  1:17PM]      Impression:    Severe changes of osteoarthritis and/or rheumatoid arthritis with  narrowing of the superior joint spaces and subchondral cyst  formation, greater on the right. There are small to moderate-sized  bilateral pleural effusions or synovitis. There is a large  multilobular fluid collection in the soft tissues lateral to the  right hip measuring 5.5 x 2.5 x 8.5 cm. This could reflect ganglion  or synovial cyst.     Degenerative changes of the lower lumbar spine and sacroiliac joints.     Otherwise no sign of acute osseous abnormality.              MRI Hips without Contrast [Jun 8 2023  1:14PM]        Assessment:      Left hip RA  Status post total left hip replacement  Hypokalemia  Essential hypertension  Hypothyroidism  RA  Scoliosis  Parkinson's disease  Vitamin D deficiency  Chronic low back pain  History of Dupuytren's contracture  Depression    PLAN  Admit to primary services of orthopedic surgery  Labs, EKG, prior records and radiological studies reviewed and noted  Continue patient home medications as appropriate  CBC and BMP daily while admitted  Monitor and replace electrolytes per protocol  DVT prophylaxis per primary team  Pain management per primary team  Activity per primary team  PT and OT evaluation  Discharge planning per primary team  Patient remains hemodynamically stable postoperatively.  No complications.    At this time no further recommendations Hospital medicine will sign off    A total of 69 minutes was spent on this visit     Plan of care was discussed extensively with patient. Patient verbalized understanding through teach back method. All questions and concerns addressed upon examination.     ***Of note, this documentation is completed using the Dragon Dictation system (voice recognition software). There may be spelling and/or grammatical errors that were not corrected prior to final submission.***           Plan of Care Reviewed With:  Plan of  Care Reviewed With: patient     Consult Status:  Consult Status    (select all that apply): initial  consult complete, will not follow, call as needed   Consult Order ID: [id number]       Electronic Signatures:  Kristin Obando ([facility]-CNP)  (Signed 25-Aug-2023 11:15)   Authored: Service, History of Present Illness, Allergies,  Objective, Assessment/Recommendations, Note Completion      Last Updated: 25-Aug-2023 11:15 by Kristin Obando ([facility]-CNP)

## 2024-03-11 ENCOUNTER — TREATMENT (OUTPATIENT)
Dept: PHYSICAL THERAPY | Facility: CLINIC | Age: 74
End: 2024-03-11
Payer: MEDICARE

## 2024-03-11 DIAGNOSIS — M16.0 PRIMARY OSTEOARTHRITIS OF BOTH HIPS: Primary | ICD-10-CM

## 2024-03-11 DIAGNOSIS — M25.551 ACUTE PAIN OF RIGHT HIP: ICD-10-CM

## 2024-03-11 PROCEDURE — 97110 THERAPEUTIC EXERCISES: CPT | Mod: GP,CQ

## 2024-03-11 ASSESSMENT — PAIN - FUNCTIONAL ASSESSMENT: PAIN_FUNCTIONAL_ASSESSMENT: 0-10

## 2024-03-11 ASSESSMENT — PAIN SCALES - GENERAL: PAINLEVEL_OUTOF10: 1

## 2024-03-11 NOTE — PROGRESS NOTES
"Physical Therapy Treatment    Patient Name: Mitzi Lawson  MRN: [id number]  Today's Date: 3/11/2024  Time Calculation  Start Time: 1135  Stop Time: 1215  Time Calculation (min): 40 min    Insurance  Visit number: 4/10  [employer] MDCR - BMN  Co-Pay $20     Current Problem  1. Primary osteoarthritis of both hips        2. Acute pain of right hip        General  Referred By: Dr. Otoniel Guadarrama    Subjective    General   Pt reports she is doing well today.  States she is improving & is able to get up/down from a chair with less difficulty.  Also reports she is walking better & faster.  She also states, \"I think I have more stamina.\"  She reports she will be returning to [place] tomorrow, 3/12/24, for recheck.    Precautions  Precautions  LE Weight Bearing Status: Weight Bearing as Tolerated  Precautions Comment: L AKIKO    Pain  Pain Assessment  Pain Assessment: 0-10  Pain Score: 1  Pain Location: Hip  Pain Orientation: Right  Pain Frequency: Intermittent  Clinical Progression: Gradually improving  Effect of Pain on Daily Activities: Bending over to put on socks/shoes, Walking    Objective   Pt ambulates with SPC for support.  She is using on R, which she states is \"more comfortable\".  She does report she is feeling more steady during ambulation.  She is no longer using at home.    Treatments:  Therapeutic Exercises (28891): 38 Minutes, 3 Units    Activities:  MARCELA: 6 minutes  Modified Carlton Stretch: 30 sec x2  Supine Hip Adductor Stretch/BKFO: 30 sec x2  Seated HS/Calf Stretch w/Strap: 30 sec x2 each  Bridge on Heels: 2x10  TA Hook-lying B hip ABD: GTB 2x10  TA Hook-lying B hip ADD with ball: 5 sec x20  LAQ: 5 sec 2x10   Mini Squat: x15  HR/TR: x15 ea  Stdg Hip PRE's : x10 BL   Seated Correct Posture: Reviewed        Assessment:   Pt exhibits good ability to complete current activities & has good recall of the ex's.  She has good follow through to occasional vc'ing for technique, posture during session.  Normal muscle " soreness, fatigue post activity.  Demonstrates good ability to continue progressing with POC as tolerated to increase LE strength, stability.    Plan:   Continue to progress with rehab POC as tolerated to increase LE strength, stability & improve overall functional mobility, capacity for daily activities.

## 2024-03-12 ENCOUNTER — OFFICE VISIT (OUTPATIENT)
Dept: ORTHOPEDIC SURGERY | Facility: CLINIC | Age: 74
End: 2024-03-12
Payer: MEDICARE

## 2024-03-12 DIAGNOSIS — Z47.1 AFTERCARE FOLLOWING RIGHT HIP JOINT REPLACEMENT SURGERY: Primary | ICD-10-CM

## 2024-03-12 DIAGNOSIS — Z96.641 AFTERCARE FOLLOWING RIGHT HIP JOINT REPLACEMENT SURGERY: Primary | ICD-10-CM

## 2024-03-12 PROCEDURE — 1125F AMNT PAIN NOTED PAIN PRSNT: CPT | Performed by: ORTHOPAEDIC SURGERY

## 2024-03-12 PROCEDURE — 1036F TOBACCO NON-USER: CPT | Performed by: ORTHOPAEDIC SURGERY

## 2024-03-12 PROCEDURE — 1159F MED LIST DOCD IN RCRD: CPT | Performed by: ORTHOPAEDIC SURGERY

## 2024-03-12 PROCEDURE — 99024 POSTOP FOLLOW-UP VISIT: CPT | Performed by: ORTHOPAEDIC SURGERY

## 2024-03-13 ENCOUNTER — APPOINTMENT (OUTPATIENT)
Dept: PHYSICAL THERAPY | Facility: CLINIC | Age: 74
End: 2024-03-13
Payer: MEDICARE

## 2024-03-13 NOTE — PROGRESS NOTES
History of present illness    73-year-old female here for follow-up right total hip replacement surgery January 29 she is 5 and half weeks postop states doing very well she is ambulate with a cane today but states she does not use it all the time no numbness or tingling no fevers or chills pain is a 0-1 out of 10      Past medical , Surgical, Family and social history reviewed.      Physical exam  General: No acute distress and breathing comfortably.  Patient is pleasant and cooperative with the examination.    Extremity  Incision well-approximated no signs of infection neurologically intact distally brisk cap refill compartments are soft calf is nontender    Diagnostics      XR hips bilateral 3 or 4 VW w pelvis when performed    Result Date: 2/20/2024  Interpreted By:  Eulogio Guadarrama, STUDY: XR HIPS BILATERAL 3 OR 4 VW WITH PELVIS WHEN PERFORMED;  ;  2/16/2024 1:25 pm   INDICATION: Signs/Symptoms:pain.   ACCESSION NUMBER(S): [id number]   ORDERING CLINICIAN: EULOGIO GUADARRAMA   FINDINGS: AP pelvis and lateral view both hips patient status post bilateral total hip replacement components in good position no signs of fracture dislocation or other bony abnormality     Signed by: Eulogio Guadarrama 2/20/2024 7:50 AM Dictation workstation:   [id number]       Procedure  [ none]    Assessment  Status post total hip replacement right    Treatment plan  1.  Continue with the physical therapy continue working on range of motion strengthening continue to wean from the cane as tolerated follow-up with me in 6 weeks with x-rays sooner if she has increased pain or discomfort.  2. [   ]  3. [   ]  4.  All of the patient's questions were answered.    No orders of the defined types were placed in this encounter.      This note was prepared using voice recognition software.  The details of this note are correct and have been reviewed, and corrected to the best of my ability.  Some grammatical areas may  persist related to the Dragon software    Otoniel Guadarrama MD  Senior Attending Physician  [facility]  [facility] [place]    [phone]

## 2024-03-18 ENCOUNTER — APPOINTMENT (OUTPATIENT)
Dept: PHYSICAL THERAPY | Facility: CLINIC | Age: 74
End: 2024-03-18
Payer: MEDICARE

## 2024-03-18 PROBLEM — Z96.652 STATUS POST TOTAL KNEE REPLACEMENT, LEFT: Status: ACTIVE | Noted: 2023-08-27

## 2024-03-18 PROBLEM — Z96.659 HISTORY OF TOTAL KNEE REPLACEMENT: Status: ACTIVE | Noted: 2023-08-27

## 2024-03-20 ENCOUNTER — APPOINTMENT (OUTPATIENT)
Dept: PHYSICAL THERAPY | Facility: CLINIC | Age: 74
End: 2024-03-20
Payer: MEDICARE

## 2024-03-25 ENCOUNTER — APPOINTMENT (OUTPATIENT)
Dept: PHYSICAL THERAPY | Facility: CLINIC | Age: 74
End: 2024-03-25
Payer: MEDICARE

## 2024-03-27 ENCOUNTER — APPOINTMENT (OUTPATIENT)
Dept: PHYSICAL THERAPY | Facility: CLINIC | Age: 74
End: 2024-03-27
Payer: MEDICARE

## 2024-04-01 ENCOUNTER — APPOINTMENT (OUTPATIENT)
Dept: PHYSICAL THERAPY | Facility: CLINIC | Age: 74
End: 2024-04-01
Payer: MEDICARE

## 2024-04-04 DIAGNOSIS — M05.9 RHEUMATOID ARTHRITIS WITH RHEUMATOID FACTOR, UNSPECIFIED (HCC): ICD-10-CM

## 2024-04-04 RX ORDER — HYDROCODONE BITARTRATE AND ACETAMINOPHEN 7.5; 325 MG/1; MG/1
1 TABLET ORAL 3 TIMES DAILY
Qty: 90 TABLET | Refills: 0 | Status: SHIPPED | OUTPATIENT
Start: 2024-04-04 | End: 2024-05-04

## 2024-04-04 NOTE — TELEPHONE ENCOUNTER
Patient is requesting medication refill. Please approve or deny this request.    Rx requested:  Requested Prescriptions     Pending Prescriptions Disp Refills    HYDROcodone-acetaminophen (LORCET PLUS) 7.5-325 MG per tablet 90 tablet 0     Sig: Take 1 tablet by mouth 3 times daily for 30 days. Max Daily Amount: 3 tablets         Last Office Visit:   12/12/2023      Next Visit Date:  Future Appointments   Date Time Provider Department Center   4/9/2024  2:30 PM [facility]

## 2024-04-09 ENCOUNTER — OFFICE VISIT (OUTPATIENT)
Dept: NEUROLOGY | Age: 74
End: 2024-04-09
Payer: MEDICARE

## 2024-04-09 VITALS
SYSTOLIC BLOOD PRESSURE: 118 MMHG | WEIGHT: 115 LBS | DIASTOLIC BLOOD PRESSURE: 60 MMHG | HEART RATE: 94 BPM | BODY MASS INDEX: 21.73 KG/M2

## 2024-04-09 DIAGNOSIS — M41.9 KYPHOSCOLIOSIS: ICD-10-CM

## 2024-04-09 DIAGNOSIS — M47.816 SPONDYLOSIS OF LUMBAR REGION WITHOUT MYELOPATHY OR RADICULOPATHY: ICD-10-CM

## 2024-04-09 DIAGNOSIS — R26.0 ATAXIC GAIT: ICD-10-CM

## 2024-04-09 DIAGNOSIS — R29.2 HYPERREFLEXIA: ICD-10-CM

## 2024-04-09 DIAGNOSIS — M05.9 RHEUMATOID ARTHRITIS WITH RHEUMATOID FACTOR, UNSPECIFIED (HCC): ICD-10-CM

## 2024-04-09 DIAGNOSIS — G20.A1 PARKINSON'S DISEASE WITHOUT DYSKINESIA OR FLUCTUATING MANIFESTATIONS (HCC): ICD-10-CM

## 2024-04-09 DIAGNOSIS — Z79.899 ENCOUNTER FOR LONG-TERM (CURRENT) USE OF MEDICATIONS: Primary | ICD-10-CM

## 2024-04-09 DIAGNOSIS — Z79.899 ENCOUNTER FOR LONG-TERM (CURRENT) USE OF MEDICATIONS: ICD-10-CM

## 2024-04-09 PROCEDURE — 99214 OFFICE O/P EST MOD 30 MIN: CPT | Performed by: PSYCHIATRY & NEUROLOGY

## 2024-04-09 PROCEDURE — 1123F ACP DISCUSS/DSCN MKR DOCD: CPT | Performed by: PSYCHIATRY & NEUROLOGY

## 2024-04-09 NOTE — PROGRESS NOTES
Subjective:      Patient ID: Krystle Marin is a 73 y.o. female who presents today for:  Chief Complaint   Patient presents with    Follow-up     Pt states things are the same. Pt wanted to know can she receive a handicap placard. Pt states she bar weight. No questions or concerns at this time        HPI 73-year-old handed female with a known history of Parkinson's disease with dyskinesias and fluctuating course.  She has rheumatoid arthritis with gait ataxia tremors hyperreflexia kyphoscoliosis.  Patient is on half a tablet 3-4 times a day as he is not able to tolerate higher doses as she has hypertension and dizzy spells.  She is not any hallucinations.  When last seen her tremors are getting worse and her blood pressure appear to be adequate.  She is on hydrocodone for pain.  Patient has been stable since and continues on half a tablet 4 times a day.  Patient actually had bilateral hip surgery and recovering.  She is still ambulating quite well she is on Plaquenil.  Patient has not any major other changes except for the weight gain she was concerned about though we did not appreciate it.  That she has not any choking    Past Medical History:   Diagnosis Date    Arthritis     seen dr carmichael    Chronic back pain     seen dr matos in past    Depression     Hypothyroidism     Osteoarthritis     Osteoporosis     Restless legs syndrome     Tachycardia      Past Surgical History:   Procedure Laterality Date    CHOLECYSTECTOMY      COLONOSCOPY N/A 11/22/2022    COLONOSCOPY DIAGNOSTIC performed by Kalpesh Mariscal MD at [facility]    HYSTERECTOMY (CERVIX STATUS UNKNOWN)      Total age 50    KNEE ARTHROSCOPY Right     OVARY REMOVAL      TONSILLECTOMY       Social History     Socioeconomic History    Marital status:      Spouse name: Not on file    Number of children: Not on file    Years of education: Not on file    Highest education level: Not on file   Occupational History    Not on file   Tobacco Use

## 2024-04-16 LAB
6MAM SERPL-MCNC: <2 NG/ML
CODEINE SERPL-MCNC: <2 NG/ML
HYDROCODONE SERPL-MCNC: 10 NG/ML
HYDROMORPHONE SERPL-MCNC: <2 NG/ML
MORPHINE SERPL-MCNC: <2 NG/ML
OXYCODONE SERPL-MCNC: <2 NG/ML
OXYMORPHONE SERPL-MCNC: <2 NG/ML

## 2024-04-23 ENCOUNTER — HOSPITAL ENCOUNTER (OUTPATIENT)
Dept: RADIOLOGY | Facility: CLINIC | Age: 74
Discharge: HOME | End: 2024-04-23
Payer: MEDICARE

## 2024-04-23 ENCOUNTER — OFFICE VISIT (OUTPATIENT)
Dept: ORTHOPEDIC SURGERY | Facility: CLINIC | Age: 74
End: 2024-04-23
Payer: MEDICARE

## 2024-04-23 DIAGNOSIS — R60.0 EDEMA OF LEFT LOWER LEG: ICD-10-CM

## 2024-04-23 DIAGNOSIS — Z47.1 AFTERCARE FOLLOWING RIGHT HIP JOINT REPLACEMENT SURGERY: Primary | ICD-10-CM

## 2024-04-23 DIAGNOSIS — M16.11 PRIMARY OSTEOARTHRITIS OF RIGHT HIP: ICD-10-CM

## 2024-04-23 DIAGNOSIS — Z96.641 AFTERCARE FOLLOWING RIGHT HIP JOINT REPLACEMENT SURGERY: Primary | ICD-10-CM

## 2024-04-23 PROCEDURE — 99024 POSTOP FOLLOW-UP VISIT: CPT | Performed by: ORTHOPAEDIC SURGERY

## 2024-04-23 PROCEDURE — 1159F MED LIST DOCD IN RCRD: CPT | Performed by: ORTHOPAEDIC SURGERY

## 2024-04-23 PROCEDURE — 73502 X-RAY EXAM HIP UNI 2-3 VIEWS: CPT | Mod: RIGHT SIDE | Performed by: ORTHOPAEDIC SURGERY

## 2024-04-23 PROCEDURE — 73502 X-RAY EXAM HIP UNI 2-3 VIEWS: CPT | Mod: RT

## 2024-04-23 RX ORDER — TRIAMTERENE AND HYDROCHLOROTHIAZIDE 37.5; 25 MG/1; MG/1
CAPSULE ORAL DAILY
Qty: 30 CAPSULE | Refills: 5 | Status: SHIPPED | OUTPATIENT
Start: 2024-04-23

## 2024-04-24 NOTE — PROGRESS NOTES
History of present illness    73-year-old female here for follow-up right total hip replacement surgery January 29 she is ambulating with a cane but she states that is mostly due to her back denies any hip pain denies any groin pain very happy with her hip replacement.      Past medical , Surgical, Family and social history reviewed.      Physical exam  General: No acute distress and breathing comfortably.  Patient is pleasant and cooperative with the examination.    Extremity  Incision well-healed no sign infection good range of motion neurologically intact distally no pain with internal ex rotation brisk cap refill compartments are soft calf is nontender    Diagnostics      XR hip right with pelvis when performed 2 or 3 views    Result Date: 4/23/2024  Interpreted By:  Eulogio Guadarrama, STUDY: XR HIP RIGHT WITH PELVIS WHEN PERFORMED 2 OR 3 VIEWS; 4/23/2024 1:41 pm   INDICATION: Signs/Symptoms:s/p THR.   ACCESSION NUMBER(S): [id number]   ORDERING CLINICIAN: EULOGIO GUADARRAMA   FINDINGS: Two views show patient status post total hip replacement in good alignment.  No signs of fracture dislocation or other bony abnormality       Signed by: Eulogio Guadarrama 4/23/2024 3:57 PM Dictation workstation:   [id number]       Procedure  [ none]    Assessment  Status post total hip replacement right    Treatment plan  1.  Continue working on range of motion strengthening continue to weight-bear as tolerated follow-up with me 3 months with x-rays sooner if she has increased pain or discomfort.  2. [   ]  3. [   ]  4.  All of the patient's questions were answered.    Orders Placed This Encounter    XR hip right with pelvis when performed 2 or 3 views       This note was prepared using voice recognition software.  The details of this note are correct and have been reviewed, and corrected to the best of my ability.  Some grammatical areas may persist related to the Dragon software    Eulogio Guadarrama,  MD  Senior Attending Physician  [facility]  [facility] [place]    [phone]

## 2024-05-02 DIAGNOSIS — M05.9 RHEUMATOID ARTHRITIS WITH RHEUMATOID FACTOR, UNSPECIFIED (HCC): ICD-10-CM

## 2024-05-03 RX ORDER — HYDROCODONE BITARTRATE AND ACETAMINOPHEN 7.5; 325 MG/1; MG/1
1 TABLET ORAL 3 TIMES DAILY
Qty: 90 TABLET | Refills: 0 | Status: SHIPPED | OUTPATIENT
Start: 2024-05-03 | End: 2024-06-02

## 2024-05-10 DIAGNOSIS — M05.9 RHEUMATOID ARTHRITIS WITH RHEUMATOID FACTOR, UNSPECIFIED (HCC): ICD-10-CM

## 2024-05-10 RX ORDER — PREDNISONE 5 MG/1
5 TABLET ORAL DAILY
Qty: 30 TABLET | Refills: 1 | Status: SHIPPED | OUTPATIENT
Start: 2024-05-10

## 2024-05-28 DIAGNOSIS — F32.A DEPRESSION, UNSPECIFIED DEPRESSION TYPE: ICD-10-CM

## 2024-05-29 RX ORDER — DULOXETIN HYDROCHLORIDE 60 MG/1
CAPSULE, DELAYED RELEASE ORAL
Qty: 30 CAPSULE | Refills: 5 | Status: SHIPPED | OUTPATIENT
Start: 2024-05-29

## 2024-05-31 DIAGNOSIS — M79.604 LOW BACK PAIN RADIATING TO RIGHT LEG: ICD-10-CM

## 2024-05-31 DIAGNOSIS — M54.50 LOW BACK PAIN RADIATING TO RIGHT LEG: ICD-10-CM

## 2024-05-31 RX ORDER — CYCLOBENZAPRINE HCL 10 MG
TABLET ORAL
Qty: 60 TABLET | Refills: 5 | Status: SHIPPED | OUTPATIENT
Start: 2024-05-31

## 2024-06-03 DIAGNOSIS — M05.9 RHEUMATOID ARTHRITIS WITH RHEUMATOID FACTOR, UNSPECIFIED (HCC): ICD-10-CM

## 2024-06-03 RX ORDER — HYDROCODONE BITARTRATE AND ACETAMINOPHEN 7.5; 325 MG/1; MG/1
1 TABLET ORAL 3 TIMES DAILY
Qty: 90 TABLET | Refills: 0 | Status: SHIPPED | OUTPATIENT
Start: 2024-06-03 | End: 2024-07-03

## 2024-06-03 NOTE — TELEPHONE ENCOUNTER
Patient is  requesting medication refill. Please approve or deny this request.    Rx requested:  Requested Prescriptions     Pending Prescriptions Disp Refills    HYDROcodone-acetaminophen (LORCET PLUS) 7.5-325 MG per tablet 90 tablet 0     Sig: Take 1 tablet by mouth 3 times daily for 30 days. Max Daily Amount: 3 tablets         Last Office Visit:   4/9/2024      Next Visit Date:  Future Appointments   Date Time Provider Department Center   8/6/2024  2:45 PM [facility]

## 2024-07-01 ENCOUNTER — OFFICE VISIT (OUTPATIENT)
Dept: PRIMARY CARE CLINIC | Age: 74
End: 2024-07-01

## 2024-07-01 VITALS
SYSTOLIC BLOOD PRESSURE: 130 MMHG | HEART RATE: 78 BPM | BODY MASS INDEX: 22.11 KG/M2 | DIASTOLIC BLOOD PRESSURE: 64 MMHG | WEIGHT: 117 LBS | OXYGEN SATURATION: 97 %

## 2024-07-01 DIAGNOSIS — F33.1 MAJOR DEPRESSIVE DISORDER, RECURRENT, MODERATE (HCC): ICD-10-CM

## 2024-07-01 DIAGNOSIS — E03.9 HYPOTHYROIDISM, UNSPECIFIED TYPE: ICD-10-CM

## 2024-07-01 DIAGNOSIS — K29.00 ACUTE SUPERFICIAL GASTRITIS WITHOUT HEMORRHAGE: Primary | ICD-10-CM

## 2024-07-01 DIAGNOSIS — K29.00 ACUTE SUPERFICIAL GASTRITIS WITHOUT HEMORRHAGE: ICD-10-CM

## 2024-07-01 DIAGNOSIS — F33.0 MILD EPISODE OF RECURRENT MAJOR DEPRESSIVE DISORDER (HCC): ICD-10-CM

## 2024-07-01 DIAGNOSIS — M05.9 RHEUMATOID ARTHRITIS WITH RHEUMATOID FACTOR, UNSPECIFIED (HCC): ICD-10-CM

## 2024-07-01 LAB
ALBUMIN SERPL-MCNC: 4.2 G/DL (ref 3.5–4.6)
ALP SERPL-CCNC: 72 U/L (ref 40–130)
ALT SERPL-CCNC: 10 U/L (ref 0–33)
ANION GAP SERPL CALCULATED.3IONS-SCNC: 11 MEQ/L (ref 9–15)
AST SERPL-CCNC: 47 U/L (ref 0–35)
BILIRUB SERPL-MCNC: 0.3 MG/DL (ref 0.2–0.7)
BUN SERPL-MCNC: 23 MG/DL (ref 8–23)
CALCIUM SERPL-MCNC: 9.1 MG/DL (ref 8.5–9.9)
CHLORIDE SERPL-SCNC: 100 MEQ/L (ref 95–107)
CO2 SERPL-SCNC: 31 MEQ/L (ref 20–31)
CREAT SERPL-MCNC: 0.91 MG/DL (ref 0.5–0.9)
GLOBULIN SER CALC-MCNC: 1.9 G/DL (ref 2.3–3.5)
GLUCOSE SERPL-MCNC: 89 MG/DL (ref 70–99)
POTASSIUM SERPL-SCNC: 3.2 MEQ/L (ref 3.4–4.9)
PROT SERPL-MCNC: 6.1 G/DL (ref 6.3–8)
SODIUM SERPL-SCNC: 142 MEQ/L (ref 135–144)
TSH REFLEX: 0.83 UIU/ML (ref 0.44–3.86)

## 2024-07-01 RX ORDER — SUCRALFATE 1 G/1
1 TABLET ORAL 4 TIMES DAILY
Qty: 120 TABLET | Refills: 5 | Status: SHIPPED | OUTPATIENT
Start: 2024-07-01

## 2024-07-01 RX ORDER — HYDROCODONE BITARTRATE AND ACETAMINOPHEN 7.5; 325 MG/1; MG/1
1 TABLET ORAL 3 TIMES DAILY
Qty: 90 TABLET | Refills: 0 | Status: SHIPPED | OUTPATIENT
Start: 2024-07-01 | End: 2024-07-31

## 2024-07-01 SDOH — ECONOMIC STABILITY: FOOD INSECURITY: WITHIN THE PAST 12 MONTHS, THE FOOD YOU BOUGHT JUST DIDN'T LAST AND YOU DIDN'T HAVE MONEY TO GET MORE.: NEVER TRUE

## 2024-07-01 SDOH — ECONOMIC STABILITY: FOOD INSECURITY: WITHIN THE PAST 12 MONTHS, YOU WORRIED THAT YOUR FOOD WOULD RUN OUT BEFORE YOU GOT MONEY TO BUY MORE.: NEVER TRUE

## 2024-07-01 SDOH — ECONOMIC STABILITY: INCOME INSECURITY: HOW HARD IS IT FOR YOU TO PAY FOR THE VERY BASICS LIKE FOOD, HOUSING, MEDICAL CARE, AND HEATING?: NOT HARD AT ALL

## 2024-07-01 NOTE — PROGRESS NOTES
Krystle Marin 73 y.o. female presents today with   Chief Complaint   Patient presents with    Heartburn     Pt would like to be put on a different mediatation. Currently still taking the mediation she has now.        Gastroesophageal Reflux  She complains of abdominal pain and heartburn. She reports no chest pain or no choking. This is a recurrent problem. The current episode started more than 1 year ago. The problem occurs frequently. The problem has been waxing and waning.   Mental Health Problem  The primary symptoms include dysphoric mood and somatic symptoms. The primary symptoms do not include hallucinations. The current episode started more than 1 month ago. This is a recurrent problem.   Somatic symptoms include abdominal pain and heartburn.   The onset of the illness is precipitated by emotional stress and a stressful event. The degree of incapacity that she is experiencing as a consequence of her illness is mild. Additional symptoms of the illness include anhedonia, insomnia, agitation and abdominal pain. She does not admit to suicidal ideas. She has not already injured self. Risk factors that are present for mental illness include a history of mental illness.   Check thyroid    Past Medical History:   Diagnosis Date    Arthritis     seen dr carmichael    Chronic back pain     seen dr matos in past    Depression     Hypothyroidism     Osteoarthritis     Osteoporosis     Restless legs syndrome     Tachycardia      Patient Active Problem List    Diagnosis Date Noted    Rheumatoid arthritis with rheumatoid factor, unspecified 11/15/2022    Rheumatoid arthritis, unspecified 11/15/2022    Major depressive disorder, recurrent, mild 08/09/2022    Major depressive disorder, recurrent, moderate 08/09/2022    Major depressive disorder, recurrent, unspecified 08/09/2022    Drug-induced constipation 08/02/2022    Post-op pain 11/30/2023    Status post total replacement of left hip 11/30/2023    History of total knee

## 2024-07-01 NOTE — TELEPHONE ENCOUNTER
Patient is  requesting medication refill. Please approve or deny this request.    Rx requested:  Requested Prescriptions     Pending Prescriptions Disp Refills    HYDROcodone-acetaminophen (LORCET PLUS) 7.5-325 MG per tablet 90 tablet 0     Sig: Take 1 tablet by mouth 3 times daily for 30 days. Max Daily Amount: 3 tablets         Last Office Visit:   4/9/2024      Next Visit Date:  Future Appointments   Date Time Provider Department Center   8/6/2024  2:45 PM [facility]

## 2024-07-08 DIAGNOSIS — E03.9 HYPOTHYROIDISM, UNSPECIFIED TYPE: ICD-10-CM

## 2024-07-08 RX ORDER — LEVOTHYROXINE SODIUM 0.05 MG/1
50 TABLET ORAL DAILY
Qty: 90 TABLET | Refills: 2 | Status: SHIPPED | OUTPATIENT
Start: 2024-07-08

## 2024-07-08 NOTE — TELEPHONE ENCOUNTER
Patient's spouse requesting medication refill. Please approve or deny this request.    Rx requested:  Requested Prescriptions     Pending Prescriptions Disp Refills    levothyroxine (SYNTHROID) 50 MCG tablet 90 tablet 2     Sig: Take 1 tablet by mouth daily         Last Office Visit:   7/1/2024      Next Visit Date:  Future Appointments   Date Time Provider Department Center   8/5/2024  2:00 PM Sen, Bulmaro K, MD [facility]   8/6/2024  2:45 PM Kb Banda MD [place] NEURO Neurology -

## 2024-07-10 DIAGNOSIS — M05.9 RHEUMATOID ARTHRITIS WITH RHEUMATOID FACTOR, UNSPECIFIED (HCC): ICD-10-CM

## 2024-07-10 RX ORDER — PREDNISONE 5 MG/1
5 TABLET ORAL DAILY
Qty: 30 TABLET | Refills: 1 | Status: SHIPPED | OUTPATIENT
Start: 2024-07-10

## 2024-07-16 DIAGNOSIS — M05.9 RHEUMATOID ARTHRITIS WITH RHEUMATOID FACTOR, UNSPECIFIED (HCC): ICD-10-CM

## 2024-07-16 RX ORDER — HYDROXYCHLOROQUINE SULFATE 200 MG/1
TABLET, FILM COATED ORAL
Qty: 30 TABLET | Refills: 5 | Status: SHIPPED | OUTPATIENT
Start: 2024-07-16

## 2024-07-23 ENCOUNTER — APPOINTMENT (OUTPATIENT)
Dept: ORTHOPEDIC SURGERY | Facility: CLINIC | Age: 74
End: 2024-07-23
Payer: MEDICARE

## 2024-08-01 DIAGNOSIS — M05.9 RHEUMATOID ARTHRITIS WITH RHEUMATOID FACTOR, UNSPECIFIED (HCC): ICD-10-CM

## 2024-08-01 NOTE — TELEPHONE ENCOUNTER
Patient is requesting medication refill. Please approve or deny this request.    Rx requested:  Requested Prescriptions     Pending Prescriptions Disp Refills    HYDROcodone-acetaminophen (LORCET PLUS) 7.5-325 MG per tablet 90 tablet 0     Sig: Take 1 tablet by mouth 3 times daily for 30 days. Max Daily Amount: 3 tablets         Last Office Visit:   4/9/2024      Next Visit Date:  Future Appointments   Date Time Provider Department Center   8/5/2024  2:00 PM Sen, Bulmaro K, MD [facility]   8/6/2024  2:45 PM Kb Banda MD [place] NEURO Neurology -

## 2024-08-02 RX ORDER — HYDROCODONE BITARTRATE AND ACETAMINOPHEN 7.5; 325 MG/1; MG/1
1 TABLET ORAL 3 TIMES DAILY
Qty: 90 TABLET | Refills: 0 | Status: SHIPPED | OUTPATIENT
Start: 2024-08-02 | End: 2024-09-01

## 2024-08-05 ENCOUNTER — OFFICE VISIT (OUTPATIENT)
Dept: GASTROENTEROLOGY | Age: 74
End: 2024-08-05
Payer: MEDICARE

## 2024-08-05 ENCOUNTER — PREP FOR PROCEDURE (OUTPATIENT)
Dept: GASTROENTEROLOGY | Age: 74
End: 2024-08-05

## 2024-08-05 VITALS — HEART RATE: 81 BPM | HEIGHT: 61 IN | BODY MASS INDEX: 21.9 KG/M2 | OXYGEN SATURATION: 99 % | WEIGHT: 116 LBS

## 2024-08-05 DIAGNOSIS — R10.9 CENTRAL ABDOMINAL PAIN: ICD-10-CM

## 2024-08-05 DIAGNOSIS — K59.09 OTHER CONSTIPATION: ICD-10-CM

## 2024-08-05 DIAGNOSIS — K21.9 GASTROESOPHAGEAL REFLUX DISEASE, UNSPECIFIED WHETHER ESOPHAGITIS PRESENT: ICD-10-CM

## 2024-08-05 DIAGNOSIS — R07.2 RETROSTERNAL CHEST PAIN: ICD-10-CM

## 2024-08-05 DIAGNOSIS — R07.2 RETROSTERNAL CHEST PAIN: Primary | ICD-10-CM

## 2024-08-05 LAB
ERYTHROCYTE [DISTWIDTH] IN BLOOD BY AUTOMATED COUNT: 14.1 % (ref 11.5–14.5)
HCT VFR BLD AUTO: 38.9 % (ref 37–47)
HGB BLD-MCNC: 12.8 G/DL (ref 12–16)
MCH RBC QN AUTO: 30.1 PG (ref 27–31.3)
MCHC RBC AUTO-ENTMCNC: 32.9 % (ref 33–37)
MCV RBC AUTO: 91.5 FL (ref 79.4–94.8)
PLATELET # BLD AUTO: 292 K/UL (ref 130–400)
RBC # BLD AUTO: 4.25 M/UL (ref 4.2–5.4)
WBC # BLD AUTO: 6.3 K/UL (ref 4.8–10.8)

## 2024-08-05 PROCEDURE — 99204 OFFICE O/P NEW MOD 45 MIN: CPT | Performed by: INTERNAL MEDICINE

## 2024-08-05 PROCEDURE — 1123F ACP DISCUSS/DSCN MKR DOCD: CPT | Performed by: INTERNAL MEDICINE

## 2024-08-05 RX ORDER — OMEPRAZOLE 40 MG/1
40 CAPSULE, DELAYED RELEASE ORAL DAILY
Qty: 30 CAPSULE | Refills: 3 | Status: SHIPPED | OUTPATIENT
Start: 2024-08-05

## 2024-08-05 NOTE — PROGRESS NOTES
UNKNOWN)      Total age 50    KNEE ARTHROSCOPY Right     OVARY REMOVAL      TONSILLECTOMY       Current Outpatient Medications on File Prior to Visit   Medication Sig Dispense Refill    HYDROcodone-acetaminophen (LORCET PLUS) 7.5-325 MG per tablet Take 1 tablet by mouth 3 times daily for 30 days. Max Daily Amount: 3 tablets 90 tablet 0    hydroxychloroquine (PLAQUENIL) 200 MG tablet TAKE 1 TABLET BY MOUTH ONCE DAILY 30 tablet 5    predniSONE (DELTASONE) 5 MG tablet TAKE 1 TABLET BY MOUTH ONCE DAILY 30 tablet 1    levothyroxine (SYNTHROID) 50 MCG tablet Take 1 tablet by mouth daily 90 tablet 2    sucralfate (CARAFATE) 1 GM tablet Take 1 tablet by mouth 4 times daily 120 tablet 5    cyclobenzaprine (FLEXERIL) 10 MG tablet TAKE 1 TABLET BY MOUTH TWICE DAILY AS NEEDED FOR MUSCLE SPASMS 60 tablet 5    DULoxetine (CYMBALTA) 60 MG extended release capsule TAKE 1 CAPSULE BY MOUTH ONCE DAILY IN THE MORNING 30 capsule 5    triamterene-hydroCHLOROthiazide (DYAZIDE) 37.5-25 MG per capsule TAKE 1 TABLET BY MOUTH ONCE DAILY 30 capsule 5    Handicap Placard MISC by Does not apply route 5 years 1 each 0    carbidopa-levodopa (SINEMET)  MG per tablet Half qid 180 tablet 3    pantoprazole (PROTONIX) 40 MG tablet Take 1 tablet by mouth daily 30 tablet 11    metoprolol succinate (TOPROL XL) 25 MG extended release tablet TAKE 1 TABLET DAILY 90 tablet 2    potassium chloride (KLOR-CON M) 20 MEQ extended release tablet 1 pill po qd x 2 days then 1 pills once a week. 10 tablet 1    Cholecalciferol (VITAMIN D3) 1.25 MG (04543 UT) CAPS TAKE 1 CAPSULE BY MOUTH ONCE A WEEK 12 capsule 1    Elastic Bandages & Supports (MEDICAL COMPRESSION STOCKINGS) MISC 1 each by Does not apply route daily Knee high 20-30 mmhg graduated compression stockings both legs wear daily during day and off Qhs. Wear as tolerated. Do not wear if they cause increased pain. 1 each 5    ondansetron (ZOFRAN) 4 MG tablet Take 1 tablet by mouth 3 times daily as needed

## 2024-08-06 ENCOUNTER — OFFICE VISIT (OUTPATIENT)
Dept: NEUROLOGY | Age: 74
End: 2024-08-06
Payer: MEDICARE

## 2024-08-06 VITALS
WEIGHT: 117.2 LBS | HEART RATE: 88 BPM | BODY MASS INDEX: 22.14 KG/M2 | SYSTOLIC BLOOD PRESSURE: 103 MMHG | DIASTOLIC BLOOD PRESSURE: 88 MMHG

## 2024-08-06 DIAGNOSIS — M48.062 SPINAL STENOSIS OF LUMBAR REGION WITH NEUROGENIC CLAUDICATION: ICD-10-CM

## 2024-08-06 DIAGNOSIS — R29.2 HYPERREFLEXIA: ICD-10-CM

## 2024-08-06 DIAGNOSIS — R26.0 ATAXIC GAIT: ICD-10-CM

## 2024-08-06 DIAGNOSIS — G20.A1 PARKINSON'S DISEASE WITHOUT DYSKINESIA OR FLUCTUATING MANIFESTATIONS (HCC): Primary | ICD-10-CM

## 2024-08-06 DIAGNOSIS — M05.9 RHEUMATOID ARTHRITIS WITH RHEUMATOID FACTOR, UNSPECIFIED (HCC): ICD-10-CM

## 2024-08-06 DIAGNOSIS — M41.9 KYPHOSCOLIOSIS: ICD-10-CM

## 2024-08-06 PROCEDURE — 99214 OFFICE O/P EST MOD 30 MIN: CPT | Performed by: PSYCHIATRY & NEUROLOGY

## 2024-08-06 PROCEDURE — 1123F ACP DISCUSS/DSCN MKR DOCD: CPT | Performed by: PSYCHIATRY & NEUROLOGY

## 2024-08-06 RX ORDER — SODIUM CHLORIDE 9 MG/ML
INJECTION, SOLUTION INTRAVENOUS CONTINUOUS
Status: CANCELLED | OUTPATIENT
Start: 2024-08-06

## 2024-08-06 RX ORDER — SODIUM CHLORIDE 0.9 % (FLUSH) 0.9 %
5-40 SYRINGE (ML) INJECTION PRN
Status: CANCELLED | OUTPATIENT
Start: 2024-08-06

## 2024-08-06 RX ORDER — SODIUM CHLORIDE 0.9 % (FLUSH) 0.9 %
5-40 SYRINGE (ML) INJECTION EVERY 12 HOURS SCHEDULED
Status: CANCELLED | OUTPATIENT
Start: 2024-08-06

## 2024-08-06 RX ORDER — SODIUM CHLORIDE 9 MG/ML
INJECTION, SOLUTION INTRAVENOUS PRN
Status: CANCELLED | OUTPATIENT
Start: 2024-08-06

## 2024-08-06 ASSESSMENT — ENCOUNTER SYMPTOMS
CHOKING: 0
COLOR CHANGE: 0
NAUSEA: 0
TROUBLE SWALLOWING: 0
SHORTNESS OF BREATH: 0
VOMITING: 0
BACK PAIN: 1
PHOTOPHOBIA: 0

## 2024-08-06 NOTE — PROGRESS NOTES
Subjective:      Patient ID: Krystle Marin is a 73 y.o. female who presents today for:  Chief Complaint   Patient presents with    Follow-up     Patient states no new falls. Gait a little abnormal if she turns to fast.        HPI 73-year-old right-handed female with a known history of Parkinson's disease.  Patient has gait ataxia.  Patient is hyperreflexic with kyphoscoliosis without cervical myelopathy.  Patient continues on carbidopa levodopa for half a tablet 4 times a day she is not able to tolerate higher doses due to hypotension and side effects.  She though does very well on this.  She is also on hydrocodone for cervical myelopathy which we continue to treat.  This is with her activity of daily living and quality of life as she has not responded to other medications.  Past Medical History:   Diagnosis Date    Arthritis     seen dr carmichael    Chronic back pain     seen dr matos in past    Depression     Hypothyroidism     Osteoarthritis     Osteoporosis     Restless legs syndrome     Tachycardia      Past Surgical History:   Procedure Laterality Date    CHOLECYSTECTOMY      COLONOSCOPY N/A 11/22/2022    COLONOSCOPY DIAGNOSTIC performed by Kalpesh Mariscal MD at [facility]    HYSTERECTOMY (CERVIX STATUS UNKNOWN)      Total age 50    KNEE ARTHROSCOPY Right     OVARY REMOVAL      TONSILLECTOMY       Social History     Socioeconomic History    Marital status:      Spouse name: Not on file    Number of children: Not on file    Years of education: Not on file    Highest education level: Not on file   Occupational History    Not on file   Tobacco Use    Smoking status: Never    Smokeless tobacco: Never   Vaping Use    Vaping Use: Never used   Substance and Sexual Activity    Alcohol use: Yes     Alcohol/week: 0.0 standard drinks of alcohol     Comment: social    Drug use: No    Sexual activity: Not on file   Other Topics Concern    Not on file   Social History Narrative    Not on file     Social

## 2024-08-12 RX ORDER — METOPROLOL SUCCINATE 25 MG/1
TABLET, EXTENDED RELEASE ORAL
Qty: 90 TABLET | Refills: 2 | Status: SHIPPED | OUTPATIENT
Start: 2024-08-12

## 2024-08-14 ENCOUNTER — HOSPITAL ENCOUNTER (OUTPATIENT)
Dept: RADIOLOGY | Facility: CLINIC | Age: 74
Discharge: HOME | End: 2024-08-14
Payer: MEDICARE

## 2024-08-14 ENCOUNTER — OFFICE VISIT (OUTPATIENT)
Dept: ORTHOPEDIC SURGERY | Facility: CLINIC | Age: 74
End: 2024-08-14
Payer: MEDICARE

## 2024-08-14 DIAGNOSIS — M70.62 TROCHANTERIC BURSITIS OF LEFT HIP: Primary | ICD-10-CM

## 2024-08-14 DIAGNOSIS — M16.12 PRIMARY OSTEOARTHRITIS OF LEFT HIP: ICD-10-CM

## 2024-08-14 DIAGNOSIS — M16.11 PRIMARY OSTEOARTHRITIS OF RIGHT HIP: ICD-10-CM

## 2024-08-14 PROCEDURE — 73522 X-RAY EXAM HIPS BI 3-4 VIEWS: CPT | Mod: BILATERAL PROCEDURE | Performed by: ORTHOPAEDIC SURGERY

## 2024-08-14 PROCEDURE — 1036F TOBACCO NON-USER: CPT | Performed by: ORTHOPAEDIC SURGERY

## 2024-08-14 PROCEDURE — 99214 OFFICE O/P EST MOD 30 MIN: CPT | Performed by: ORTHOPAEDIC SURGERY

## 2024-08-14 PROCEDURE — 73522 X-RAY EXAM HIPS BI 3-4 VIEWS: CPT

## 2024-08-14 PROCEDURE — 1159F MED LIST DOCD IN RCRD: CPT | Performed by: ORTHOPAEDIC SURGERY

## 2024-08-14 PROCEDURE — 99213 OFFICE O/P EST LOW 20 MIN: CPT | Performed by: ORTHOPAEDIC SURGERY

## 2024-08-14 PROCEDURE — 1160F RVW MEDS BY RX/DR IN RCRD: CPT | Performed by: ORTHOPAEDIC SURGERY

## 2024-08-14 RX ORDER — METHYLPREDNISOLONE 4 MG/1
TABLET ORAL
Qty: 21 TABLET | Refills: 0 | Status: SHIPPED | OUTPATIENT
Start: 2024-08-14

## 2024-08-15 NOTE — PROGRESS NOTES
History of present illness    73-year-old female follow-up she has had both hips replaced she has been having some soreness in her left hip no injuries been ongoing for just about the past week is mostly along the lateral side of her hip worse when she lays on that side no numbness or tingling no fevers or chills no groin pain      Past medical , Surgical, Family and social history reviewed.      Physical exam  General: No acute distress and breathing comfortably.  Patient is pleasant and cooperative with the examination.    Extremity  Good range of motion of the hip.  Flexion abduction external rotation maneuver is negative.  Moderate tenderness to palpation along the lateral side of the hip.  Pain with cross leg abduction.  Neurologically intact distally with full range of motion of the knee and ankle.  Good muscle strength distally with good sensation.  Compartments are soft calf is nontender.    Diagnostics      XR hips bilateral 3 or 4 VW w pelvis when performed    Result Date: 8/14/2024  Interpreted By:  uElogio Guadarrama, STUDY: XR HIPS BILATERAL 3 OR 4 VW WITH PELVIS WHEN PERFORMED; 8/14/2024 11:33 am   INDICATION: Signs/Symptoms:s/p THR.   ACCESSION NUMBER(S): [id number]   ORDERING CLINICIAN: EULOGIO GUADARRAMA   FINDINGS: AP pelvis and lateral view both hips. Status post bilateral total hip replacement components in good position no signs of fracture dislocation or other bony abnormality     Signed by: Eulogio Guadarrama 8/14/2024 12:49 PM Dictation workstation:   [id number]           Assessment  Left hip bursitis    Treatment plan  1.  The natural history of the condition and its associated treatment alternatives including surgical and nonsurgical options were discussed with the patient at length.  2.  Reassured her structurally everything looks okay as far as her hip replacements go I think her issues more trochanteric bursitis on his left side no signs of infection I recommended  physical therapy and Medrol Dosepak she can follow-up with me in a month if symptoms persist we will consider cortisone injection.  3. [   ]  4.  All of the patient's questions were answered.    Orders Placed This Encounter    XR hips bilateral 3 or 4 VW w pelvis when performed    Referral to Physical Therapy    methylPREDNISolone (Medrol Dospak) 4 mg tablets       This note was prepared using voice recognition software.  The details of this note are correct and have been reviewed, and corrected to the best of my ability.  Some grammatical areas may persist related to the Dragon software    Otoniel Guadarrama MD  Senior Attending Physician  [facility]  Orthopedic [place]    [phone]

## 2024-08-20 DIAGNOSIS — E87.6 HYPOKALEMIA: ICD-10-CM

## 2024-08-21 RX ORDER — POTASSIUM CHLORIDE 20 MEQ/1
TABLET, EXTENDED RELEASE ORAL
Qty: 10 TABLET | Refills: 1 | Status: SHIPPED | OUTPATIENT
Start: 2024-08-21

## 2024-08-26 ENCOUNTER — ANESTHESIA EVENT (OUTPATIENT)
Dept: ENDOSCOPY | Age: 74
End: 2024-08-26
Payer: MEDICARE

## 2024-08-26 ENCOUNTER — HOSPITAL ENCOUNTER (OUTPATIENT)
Age: 74
Setting detail: OUTPATIENT SURGERY
Discharge: HOME | End: 2024-08-26
Attending: INTERNAL MEDICINE | Admitting: INTERNAL MEDICINE
Payer: MEDICARE

## 2024-08-26 ENCOUNTER — ANESTHESIA (OUTPATIENT)
Dept: ENDOSCOPY | Age: 74
End: 2024-08-26
Payer: MEDICARE

## 2024-08-26 VITALS
WEIGHT: 112 LBS | HEIGHT: 61 IN | TEMPERATURE: 96.7 F | BODY MASS INDEX: 21.14 KG/M2 | SYSTOLIC BLOOD PRESSURE: 104 MMHG | DIASTOLIC BLOOD PRESSURE: 62 MMHG | OXYGEN SATURATION: 99 % | RESPIRATION RATE: 16 BRPM | HEART RATE: 59 BPM

## 2024-08-26 DIAGNOSIS — R10.9 CENTRAL ABDOMINAL PAIN: ICD-10-CM

## 2024-08-26 DIAGNOSIS — K21.9 GERD (GASTROESOPHAGEAL REFLUX DISEASE): ICD-10-CM

## 2024-08-26 DIAGNOSIS — R07.2 RETROSTERNAL CHEST PAIN: ICD-10-CM

## 2024-08-26 PROCEDURE — 6360000002 HC RX W HCPCS: Performed by: NURSE ANESTHETIST, CERTIFIED REGISTERED

## 2024-08-26 PROCEDURE — 7100000011 HC PHASE II RECOVERY - ADDTL 15 MIN: Performed by: INTERNAL MEDICINE

## 2024-08-26 PROCEDURE — 88305 TISSUE EXAM BY PATHOLOGIST: CPT

## 2024-08-26 PROCEDURE — 2580000003 HC RX 258: Performed by: INTERNAL MEDICINE

## 2024-08-26 PROCEDURE — 7100000010 HC PHASE II RECOVERY - FIRST 15 MIN: Performed by: INTERNAL MEDICINE

## 2024-08-26 PROCEDURE — 3609017100 HC EGD: Performed by: INTERNAL MEDICINE

## 2024-08-26 PROCEDURE — 2580000003 HC RX 258

## 2024-08-26 PROCEDURE — 3700000000 HC ANESTHESIA ATTENDED CARE: Performed by: INTERNAL MEDICINE

## 2024-08-26 PROCEDURE — 43239 EGD BIOPSY SINGLE/MULTIPLE: CPT | Performed by: INTERNAL MEDICINE

## 2024-08-26 PROCEDURE — 2709999900 HC NON-CHARGEABLE SUPPLY: Performed by: INTERNAL MEDICINE

## 2024-08-26 PROCEDURE — 2500000003 HC RX 250 WO HCPCS: Performed by: NURSE ANESTHETIST, CERTIFIED REGISTERED

## 2024-08-26 RX ORDER — SODIUM CHLORIDE 0.9 % (FLUSH) 0.9 %
5-40 SYRINGE (ML) INJECTION EVERY 12 HOURS SCHEDULED
Status: DISCONTINUED | OUTPATIENT
Start: 2024-08-26 | End: 2024-08-26 | Stop reason: HOSPADM

## 2024-08-26 RX ORDER — LIDOCAINE HYDROCHLORIDE 20 MG/ML
INJECTION, SOLUTION EPIDURAL; INFILTRATION; INTRACAUDAL; PERINEURAL PRN
Status: DISCONTINUED | OUTPATIENT
Start: 2024-08-26 | End: 2024-08-26 | Stop reason: SDUPTHER

## 2024-08-26 RX ORDER — SODIUM CHLORIDE 0.9 % (FLUSH) 0.9 %
5-40 SYRINGE (ML) INJECTION PRN
Status: DISCONTINUED | OUTPATIENT
Start: 2024-08-26 | End: 2024-08-26 | Stop reason: HOSPADM

## 2024-08-26 RX ORDER — PROPOFOL 10 MG/ML
INJECTION, EMULSION INTRAVENOUS PRN
Status: DISCONTINUED | OUTPATIENT
Start: 2024-08-26 | End: 2024-08-26 | Stop reason: SDUPTHER

## 2024-08-26 RX ORDER — SODIUM CHLORIDE 9 MG/ML
INJECTION, SOLUTION INTRAVENOUS
Status: COMPLETED
Start: 2024-08-26 | End: 2024-08-26

## 2024-08-26 RX ORDER — SODIUM CHLORIDE 9 MG/ML
INJECTION, SOLUTION INTRAVENOUS PRN
Status: DISCONTINUED | OUTPATIENT
Start: 2024-08-26 | End: 2024-08-26 | Stop reason: HOSPADM

## 2024-08-26 RX ORDER — SODIUM CHLORIDE 9 MG/ML
INJECTION, SOLUTION INTRAVENOUS CONTINUOUS
Status: DISCONTINUED | OUTPATIENT
Start: 2024-08-26 | End: 2024-08-26 | Stop reason: HOSPADM

## 2024-08-26 RX ADMIN — SODIUM CHLORIDE: 9 INJECTION, SOLUTION INTRAVENOUS at 06:52

## 2024-08-26 RX ADMIN — LIDOCAINE HYDROCHLORIDE 50 MG: 20 INJECTION, SOLUTION EPIDURAL; INFILTRATION; INTRACAUDAL; PERINEURAL at 07:42

## 2024-08-26 RX ADMIN — PROPOFOL 100 MG: 10 INJECTION, EMULSION INTRAVENOUS at 07:42

## 2024-08-26 RX ADMIN — PROPOFOL 50 MG: 10 INJECTION, EMULSION INTRAVENOUS at 07:47

## 2024-08-26 ASSESSMENT — PAIN - FUNCTIONAL ASSESSMENT: PAIN_FUNCTIONAL_ASSESSMENT: 0-10

## 2024-08-26 NOTE — ANESTHESIA POSTPROCEDURE EVALUATION
Department of Anesthesiology  Postprocedure Note    Patient: Krystle Marin  MRN: [id number]  YOB: 1950  Date of evaluation: 8/26/2024    Procedure Summary       Date: 08/26/24 Room / Location: [facility] OR 02 / [facility]    Anesthesia Start: 0740 Anesthesia Stop: 0751    Procedure: ESOPHAGOGASTRODUODENOSCOPY with biopsy Diagnosis:       Retrosternal chest pain      GERD (gastroesophageal reflux disease)      Central abdominal pain      (Retrosternal chest pain [R07.2])      (GERD (gastroesophageal reflux disease) [K21.9])      (Central abdominal pain [R10.9])    Surgeons: Bulmaro Sen MD Responsible Provider: Claude Fagan APRN - CRNA    Anesthesia Type: MAC ASA Status: 2            Anesthesia Type: No value filed.    Darline Phase I: Darline Score: 10    Darline Phase II:      Anesthesia Post Evaluation    Patient location during evaluation: bedside  Patient participation: complete - patient cannot participate  Level of consciousness: awake  Airway patency: patent  Nausea & Vomiting: no nausea and no vomiting  Cardiovascular status: hemodynamically stable  Respiratory status: acceptable  Hydration status: euvolemic  Pain management: adequate        No notable events documented.

## 2024-08-26 NOTE — ANESTHESIA PRE PROCEDURE
Department of Anesthesiology  Preprocedure Note       Name:  Krystle Marin   Age:  73 y.o.  :  1950                                          MRN:  [id number]         Date:  2024      Surgeon: Surgeon(s):  Bulmaor Sen MD    Procedure: Procedure(s):  ESOPHAGOGASTRODUODENOSCOPY    Medications prior to admission:   Prior to Admission medications    Medication Sig Start Date End Date Taking? Authorizing Provider   potassium chloride (KLOR-CON M) 20 MEQ extended release tablet TAKE 1 TABLET BY MOUTH DAILY FOR 2 DAYS then TAKE 1 TABLET BY MOUTH ONCE A WEEK 24  Yes Arpan Chery MD   metoprolol succinate (TOPROL XL) 25 MG extended release tablet TAKE 1 TABLET DAILY 24  Yes Arpan Chery MD   omeprazole (PRILOSEC) 40 MG delayed release capsule Take 1 capsule by mouth daily 24  Yes Bulmaro Sen MD   HYDROcodone-acetaminophen (LORCET PLUS) 7.5-325 MG per tablet Take 1 tablet by mouth 3 times daily for 30 days. Max Daily Amount: 3 tablets 24 Yes Kb Banda MD   hydroxychloroquine (PLAQUENIL) 200 MG tablet TAKE 1 TABLET BY MOUTH ONCE DAILY 24  Yes Arpan Chery MD   predniSONE (DELTASONE) 5 MG tablet TAKE 1 TABLET BY MOUTH ONCE DAILY 7/10/24  Yes Arpan Chery MD   levothyroxine (SYNTHROID) 50 MCG tablet Take 1 tablet by mouth daily 24  Yes Arpan Chery MD   cyclobenzaprine (FLEXERIL) 10 MG tablet TAKE 1 TABLET BY MOUTH TWICE DAILY AS NEEDED FOR MUSCLE SPASMS 24  Yes Arpan Chery MD   DULoxetine (CYMBALTA) 60 MG extended release capsule TAKE 1 CAPSULE BY MOUTH ONCE DAILY IN THE MORNING 24  Yes Arpan Chery MD   triamterene-hydroCHLOROthiazide (DYAZIDE) 37.5-25 MG per capsule TAKE 1 TABLET BY MOUTH ONCE DAILY 24  Yes Arpan Chery MD   carbidopa-levodopa (SINEMET)  MG per tablet Half qid 23  Yes Kb Banda MD   Cholecalciferol (VITAMIN D3) 1.25 MG (18407 UT) CAPS TAKE 1 CAPSULE BY MOUTH ONCE A WEEK 23  Yes Arpan Chery MD  kg/m².    CBC:   Lab Results   Component Value Date/Time    WBC 6.3 08/05/2024 03:22 PM    RBC 4.25 08/05/2024 03:22 PM    HGB 12.8 08/05/2024 03:22 PM    HCT 38.9 08/05/2024 03:22 PM    MCV 91.5 08/05/2024 03:22 PM    RDW 14.1 08/05/2024 03:22 PM     08/05/2024 03:22 PM       CMP:   Lab Results   Component Value Date/Time     07/01/2024 02:52 PM    K 3.2 07/01/2024 02:52 PM     07/01/2024 02:52 PM    CO2 31 07/01/2024 02:52 PM    BUN 23 07/01/2024 02:52 PM    CREATININE 0.91 07/01/2024 02:52 PM    GFRAA >60.0 08/09/2022 03:28 PM    LABGLOM 67.1 07/01/2024 02:52 PM    GLUCOSE 89 07/01/2024 02:52 PM    GLUCOSE 104 08/26/2023 05:36 AM    CALCIUM 9.1 07/01/2024 02:52 PM    BILITOT 0.3 07/01/2024 02:52 PM    ALKPHOS 72 07/01/2024 02:52 PM    AST 47 07/01/2024 02:52 PM    ALT 10 07/01/2024 02:52 PM       POC Tests: No results for input(s): \"POCGLU\", \"POCNA\", \"POCK\", \"POCCL\", \"POCBUN\", \"POCHEMO\", \"POCHCT\" in the last 72 hours.    Coags:   Lab Results   Component Value Date/Time    PROTIME 11.5 08/22/2023 11:44 AM    INR 1.0 08/22/2023 11:44 AM    APTT 32 08/22/2023 11:44 AM       HCG (If Applicable): No results found for: \"PREGTESTUR\", \"PREGSERUM\", \"HCG\", \"HCGQUANT\"     ABGs: No results found for: \"PHART\", \"PO2ART\", \"OYT2VKB\", \"UHN3CIA\", \"BEART\", \"I0VKGFYN\"     Type & Screen (If Applicable):  No results found for: \"LABABO\"    Drug/Infectious Status (If Applicable):  No results found for: \"HIV\", \"HEPCAB\"    COVID-19 Screening (If Applicable): No results found for: \"COVID19\"        Anesthesia Evaluation  Patient summary reviewed and Nursing notes reviewed  Airway: Mallampati: III  TM distance: >3 FB   Neck ROM: full  Mouth opening: > = 3 FB   Dental:          Pulmonary:Negative Pulmonary ROS and normal exam                               Cardiovascular:Negative CV ROS                      Neuro/Psych:   (+) neuromuscular disease: Parkinson's diseasedepression/anxiety             GI/Hepatic/Renal:

## 2024-08-26 NOTE — H&P
Patient Name: Krystle Marin  : 1950  MRN: [id number]  DATE: 24      ENDOSCOPY  History and Physical    Procedure:    [] Diagnostic Colonoscopy       [] Screening Colonoscopy  [x] EGD      [] ERCP      [] EUS       [] Other    [x] Previous office notes/History and Physical reviewed from the patients chart. Please see EMR for further details of HPI. I have examined the patient's status immediately prior to the procedure and:      Indications/HPI:    [x]Abdominal Pain   []Cancer- GI/Lung  []Fhx of colon CA  []History of Polyps   []Dawson’s   []Melena  []Abnormal Imaging   []Dysphagia    []Persistent Pneumonia  []Anemia   []Food Impaction  []History of Polyps  []GI Bleed   []Pulmonary nodule/Mass  []Change in bowel habits  [x]Heartburn/Reflux  []Rectal Bleed (BRBPR)  []Chest Pain - Non Cardiac  []Heme (+) Stool  []Ulcers  []Constipation   []Hemoptysis   []Varices  []Diarrhea   []Hypoxemia  []Nausea/Vomiting   []Screening   []Crohns/Colitis  [x]Other: 73-year-old female with persistent worsening retrosternal burning chest pain, not responsive to full dose of Protonix and full dose of Carafate therapy.  Clinical history suggestive of gastroesophageal reflux symptoms, however no response to therapy is concerning.  Additionally reports central abdominal pain.  No alarm symptoms i.e. hematemesis hematochezia or melena or weight loss.  EGD to evaluate further.    Anesthesia:   [x] MAC [] Moderate Sedation   [] General   [] None     ROS: 12 pt Review of Symptoms was negative unless mentioned above    Medications:   Prior to Admission medications    Medication Sig Start Date End Date Taking? Authorizing Provider   potassium chloride (KLOR-CON M) 20 MEQ extended release tablet TAKE 1 TABLET BY MOUTH DAILY FOR 2 DAYS then TAKE 1 TABLET BY MOUTH ONCE A WEEK 24   Arpan Chery MD   metoprolol succinate (TOPROL XL) 25 MG extended release tablet TAKE 1 TABLET DAILY 24   Arpan Chery MD   omeprazole (PRILOSEC)

## 2024-08-29 DIAGNOSIS — M05.9 RHEUMATOID ARTHRITIS WITH RHEUMATOID FACTOR, UNSPECIFIED (HCC): ICD-10-CM

## 2024-08-29 RX ORDER — PREDNISONE 5 MG/1
5 TABLET ORAL DAILY
Qty: 30 TABLET | Refills: 1 | Status: SHIPPED | OUTPATIENT
Start: 2024-08-29

## 2024-08-30 DIAGNOSIS — M05.9 RHEUMATOID ARTHRITIS WITH RHEUMATOID FACTOR, UNSPECIFIED (HCC): ICD-10-CM

## 2024-08-30 RX ORDER — HYDROCODONE BITARTRATE AND ACETAMINOPHEN 7.5; 325 MG/1; MG/1
1 TABLET ORAL 3 TIMES DAILY
Qty: 90 TABLET | Refills: 0 | Status: SHIPPED | OUTPATIENT
Start: 2024-08-30 | End: 2024-09-29

## 2024-08-30 NOTE — TELEPHONE ENCOUNTER
Requesting medication refill. Please approve or deny this request.    Rx requested:  Requested Prescriptions     Pending Prescriptions Disp Refills    HYDROcodone-acetaminophen (LORCET PLUS) 7.5-325 MG per tablet 90 tablet 0     Sig: Take 1 tablet by mouth 3 times daily for 30 days. Max Daily Amount: 3 tablets         Last Office Visit:   8/6/2024      Next Visit Date:  Future Appointments   Date Time Provider Department Center   9/12/2024  2:30 PM Slavik-Bosworth, Kelly J, APRN - CNP [facility]   12/3/2024  2:00 PM Kb Banda MD [facility] -               Last refill 8/2/24. Please approve or deny.

## 2024-09-03 ENCOUNTER — TELEPHONE (OUTPATIENT)
Dept: ORTHOPEDIC SURGERY | Facility: CLINIC | Age: 74
End: 2024-09-03
Payer: MEDICARE

## 2024-09-03 DIAGNOSIS — M70.62 TROCHANTERIC BURSITIS OF LEFT HIP: ICD-10-CM

## 2024-09-03 RX ORDER — METHYLPREDNISOLONE 4 MG/1
TABLET ORAL
Qty: 21 TABLET | Refills: 0 | Status: SHIPPED | OUTPATIENT
Start: 2024-09-03 | End: 2024-09-06 | Stop reason: SDUPTHER

## 2024-09-06 ENCOUNTER — OFFICE VISIT (OUTPATIENT)
Dept: ORTHOPEDIC SURGERY | Facility: CLINIC | Age: 74
End: 2024-09-06
Payer: MEDICARE

## 2024-09-06 ENCOUNTER — HOSPITAL ENCOUNTER (OUTPATIENT)
Dept: RADIOLOGY | Facility: CLINIC | Age: 74
Discharge: HOME | End: 2024-09-06
Payer: MEDICARE

## 2024-09-06 DIAGNOSIS — M54.30 SCIATICA, UNSPECIFIED LATERALITY: Primary | ICD-10-CM

## 2024-09-06 DIAGNOSIS — M16.12 PRIMARY OSTEOARTHRITIS OF LEFT HIP: ICD-10-CM

## 2024-09-06 DIAGNOSIS — M70.62 TROCHANTERIC BURSITIS OF LEFT HIP: ICD-10-CM

## 2024-09-06 DIAGNOSIS — M16.11 PRIMARY OSTEOARTHRITIS OF RIGHT HIP: ICD-10-CM

## 2024-09-06 DIAGNOSIS — M54.50 LUMBAR PAIN: ICD-10-CM

## 2024-09-06 PROCEDURE — 99213 OFFICE O/P EST LOW 20 MIN: CPT | Performed by: ORTHOPAEDIC SURGERY

## 2024-09-06 PROCEDURE — 73522 X-RAY EXAM HIPS BI 3-4 VIEWS: CPT

## 2024-09-06 RX ORDER — METHYLPREDNISOLONE 4 MG/1
TABLET ORAL
Qty: 21 TABLET | Refills: 0 | Status: SHIPPED | OUTPATIENT
Start: 2024-09-06

## 2024-09-07 NOTE — PROGRESS NOTES
History of present illness    74-year-old female here for follow-up of her hip she was last seen for some pain on her lateral side of her left hip we prescribed a Medrol Dosepak and some exercises for some hip bursitis and she states her left hip is feeling fine but now her symptoms are more on her right and its more down her right leg all the way to her foot she does have some chronic low back pain as well.  Denies any groin pain.      Past medical , Surgical, Family and social history reviewed.      Physical exam  General: No acute distress and breathing comfortably.  Patient is pleasant and cooperative with the examination.    Extremity  Examination of the right hip shows most of her tenderness along the SI joint posteriorly in the lumbosacral junction.  Minimal pain with internal ex rotation minimal tenderness in the bursal area straight leg raise testing is negative brisk cap refill compartments soft calf is nontender    Diagnostics      XR hips bilateral 3 or 4 VW w pelvis when performed    Result Date: 9/6/2024  Interpreted By:  Eulogio Guadarrama, STUDY: XR HIPS BILATERAL 3 OR 4 VW WITH PELVIS WHEN PERFORMED; 9/6/2024 11:26 am   INDICATION: Signs/Symptoms:pain.   ACCESSION NUMBER(S): [id number]   ORDERING CLINICIAN: EULOGIO GUADARRAMA   FINDINGS: AP pelvis and lateral view both hips. Status post bilateral total hip replacement components in good position no signs of fracture dislocation or other bony abnormalities moderate pelvic obliquity     Signed by: Eulogio Guadarrama 9/6/2024 12:02 PM Dictation workstation:   [id number]    XR hips bilateral 3 or 4 VW w pelvis when performed    Result Date: 8/14/2024  Interpreted By:  Eulogio Guadarrama, STUDY: XR HIPS BILATERAL 3 OR 4 VW WITH PELVIS WHEN PERFORMED; 8/14/2024 11:33 am   INDICATION: Signs/Symptoms:s/p THR.   ACCESSION NUMBER(S): [id number]   ORDERING CLINICIAN: EULOGIO GUADARRAMA   FINDINGS: AP pelvis and lateral view both hips.  Status post bilateral total hip replacement components in good position no signs of fracture dislocation or other bony abnormality     Signed by: Otoniel Guadarrama 8/14/2024 12:49 PM Dictation workstation:   [id number]       Procedure  [ none]    Assessment  Right-sided lumbar radiculitis    Treatment plan  1.  The natural history of the condition and its associated treatment alternatives including surgical and nonsurgical options were discussed with the patient at length.  2.  Prescription for outpatient physical therapy provided today.  She is given do 1 more round of the prednisone I told her we cannot do this on an ongoing basis but it definitely helped with her left hip symptoms.  She also would like a referral to the back specialist.  This is provided today as well  3. [   ]  4.  All of the patient's questions were answered.    Orders Placed This Encounter    XR hips bilateral 3 or 4 VW w pelvis when performed    Referral to Physical Therapy    methylPREDNISolone (Medrol Dospak) 4 mg tablets       This note was prepared using voice recognition software.  The details of this note are correct and have been reviewed, and corrected to the best of my ability.  Some grammatical areas may persist related to the Dragon software    Otoniel Guadarrama MD  Senior Attending Physician  [facility]  Orthopedic [place]    [phone]

## 2024-09-12 ENCOUNTER — OFFICE VISIT (OUTPATIENT)
Dept: GASTROENTEROLOGY | Age: 74
End: 2024-09-12
Payer: MEDICARE

## 2024-09-12 VITALS — HEIGHT: 61 IN | BODY MASS INDEX: 22.09 KG/M2 | OXYGEN SATURATION: 99 % | HEART RATE: 89 BPM | WEIGHT: 117 LBS

## 2024-09-12 DIAGNOSIS — K21.9 GASTROESOPHAGEAL REFLUX DISEASE, UNSPECIFIED WHETHER ESOPHAGITIS PRESENT: Primary | ICD-10-CM

## 2024-09-12 DIAGNOSIS — K22.70 BARRETT'S ESOPHAGUS WITHOUT DYSPLASIA: ICD-10-CM

## 2024-09-12 PROCEDURE — 1123F ACP DISCUSS/DSCN MKR DOCD: CPT | Performed by: NURSE PRACTITIONER

## 2024-09-12 PROCEDURE — 99213 OFFICE O/P EST LOW 20 MIN: CPT | Performed by: NURSE PRACTITIONER

## 2024-09-13 ASSESSMENT — ENCOUNTER SYMPTOMS
GASTROINTESTINAL NEGATIVE: 1
TROUBLE SWALLOWING: 0

## 2024-09-25 ENCOUNTER — APPOINTMENT (OUTPATIENT)
Dept: ORTHOPEDIC SURGERY | Facility: CLINIC | Age: 74
End: 2024-09-25
Payer: MEDICARE

## 2024-09-25 ENCOUNTER — HOSPITAL ENCOUNTER (OUTPATIENT)
Dept: RADIOLOGY | Facility: HOSPITAL | Age: 74
Discharge: HOME | End: 2024-09-25
Payer: MEDICARE

## 2024-09-25 DIAGNOSIS — M54.16 LUMBAR RADICULITIS: ICD-10-CM

## 2024-09-25 DIAGNOSIS — M54.50 LUMBAR PAIN: ICD-10-CM

## 2024-09-25 DIAGNOSIS — M51.36 DDD (DEGENERATIVE DISC DISEASE), LUMBAR: ICD-10-CM

## 2024-09-25 DIAGNOSIS — M41.9 SCOLIOSIS OF LUMBAR SPINE, UNSPECIFIED SCOLIOSIS TYPE: ICD-10-CM

## 2024-09-25 PROCEDURE — 1159F MED LIST DOCD IN RCRD: CPT | Performed by: PHYSICIAN ASSISTANT

## 2024-09-25 PROCEDURE — 72110 X-RAY EXAM L-2 SPINE 4/>VWS: CPT | Performed by: RADIOLOGY

## 2024-09-25 PROCEDURE — 72120 X-RAY BEND ONLY L-S SPINE: CPT

## 2024-09-25 PROCEDURE — 1036F TOBACCO NON-USER: CPT | Performed by: PHYSICIAN ASSISTANT

## 2024-09-25 PROCEDURE — 99214 OFFICE O/P EST MOD 30 MIN: CPT | Performed by: PHYSICIAN ASSISTANT

## 2024-09-25 RX ORDER — CELECOXIB 200 MG/1
200 CAPSULE ORAL DAILY
Qty: 30 CAPSULE | Refills: 0 | Status: SHIPPED | OUTPATIENT
Start: 2024-09-25 | End: 2024-10-25

## 2024-09-25 NOTE — PROGRESS NOTES
New patient just established to the practice comes the office with her  is a historian.  Complaining of low back pain.  Right leg symptoms.  Numbness and tingling.  She has had back pain for years.  She had injections in the past that they never helped.  She had both her hips done by Dr. Guadarrama.  She denies bowel or bladder complaints saddle anesthesia gait or balance changes.  Denies any recent trauma.  Denies any spine surgeries.    Looked at patient's recent blood work.  We checked a metabolic panel sodium 142 potassium 3.2 glucose was 89.  Looked at a hemoglobin A1c which was 5.0 we checked primary doctors note we checked medication list see if she is on any type of statin medication to cause muscular aches and pains I am not seeing that she is.    Physical exam :well-nourished, well kept.  No lymphangitis or lymphadenopathy in the examined extremities.  Good perfusion to the extremities ×4.  Radial and dorsalis pedis pulses 2+.  Capillary refill to all 4 digits brisk.  No distal edema x 4.  Patient ambulates with a 1 point cane.  She has good range of motion to her cervical spine I did not notice any issues with flexion extension or rotation.  Decreased range of motion to lumbar spine she does have a scoliosis deformity look on standing.  Moving all upper extremities without any difficulty motor or sensory intact.  Same with the lower extremities..  No redness, abrasions, or lesions on all 4 extremities, head and neck, or trunk.  Gross sensation intact in the extremities ×4.  Deep tendon reflexes 2+ and symmetric bilaterally.  Lul, clonus, neg.  Affect normal.  Alert and oriented ×3.  Coordination normal.    X-ray: X-ray lumbar spine taken today and reviewed shows no severe scoliosis about 55 to 60 degrees.  Severe degenerative changes multiple levels of the lumbar spine osteopenic or parotic look to the lumbar spine on x-ray.  No obvious fractures dislocations.  Bilateral hip replacements  noted.    Assessment: I talked to the patient about treatment options if she is thinking about any type of surgical intervention Dr. Brandt would not be the specialist to handle that.  She is not sure she wants to have a surgery.    Plan: I am in order a new MRI at [facility] and only repeat her I will have her see one of our deformity specialist neurosurgeons at [place] to see what they would recommend from surgery then she can make a decision from there

## 2024-09-30 DIAGNOSIS — M05.9 RHEUMATOID ARTHRITIS WITH RHEUMATOID FACTOR, UNSPECIFIED (HCC): ICD-10-CM

## 2024-09-30 RX ORDER — HYDROCODONE BITARTRATE AND ACETAMINOPHEN 7.5; 325 MG/1; MG/1
1 TABLET ORAL 3 TIMES DAILY
Qty: 90 TABLET | Refills: 0 | Status: SHIPPED | OUTPATIENT
Start: 2024-09-30 | End: 2024-10-30

## 2024-09-30 NOTE — TELEPHONE ENCOUNTER
Requesting medication refill. Please approve or deny this request.    Rx requested:  Requested Prescriptions     Pending Prescriptions Disp Refills    HYDROcodone-acetaminophen (LORCET PLUS) 7.5-325 MG per tablet 90 tablet 0     Sig: Take 1 tablet by mouth 3 times daily for 30 days. Max Daily Amount: 3 tablets         Last Office Visit:   8/6/2024      Next Visit Date:  Future Appointments   Date Time Provider Department Center   12/3/2024  2:00 PM Kb Banda MD [facility] -   9/12/2025 11:30 AM Slavik-Bosworth, Kelly J, APRN - CNP [facility]               Last refill 8/30/24. Please approve or deny.

## 2024-10-29 DIAGNOSIS — K21.9 GASTROESOPHAGEAL REFLUX DISEASE, UNSPECIFIED WHETHER ESOPHAGITIS PRESENT: ICD-10-CM

## 2024-10-29 DIAGNOSIS — R07.2 RETROSTERNAL CHEST PAIN: ICD-10-CM

## 2024-10-29 DIAGNOSIS — M05.9 RHEUMATOID ARTHRITIS WITH RHEUMATOID FACTOR, UNSPECIFIED (HCC): ICD-10-CM

## 2024-10-29 DIAGNOSIS — R10.9 CENTRAL ABDOMINAL PAIN: ICD-10-CM

## 2024-10-29 RX ORDER — HYDROCODONE BITARTRATE AND ACETAMINOPHEN 7.5; 325 MG/1; MG/1
1 TABLET ORAL 3 TIMES DAILY
Qty: 90 TABLET | Refills: 0 | Status: SHIPPED | OUTPATIENT
Start: 2024-10-29 | End: 2024-11-28

## 2024-10-29 NOTE — TELEPHONE ENCOUNTER
Requesting medication refill. Please approve or deny this request.    Rx requested:  Requested Prescriptions     Pending Prescriptions Disp Refills    HYDROcodone-acetaminophen (LORCET PLUS) 7.5-325 MG per tablet 90 tablet 0     Sig: Take 1 tablet by mouth 3 times daily for 30 days. Max Daily Amount: 3 tablets         Last Office Visit:   8/6/2024      Next Visit Date:  Future Appointments   Date Time Provider Department Center   11/11/2024  2:00 PM [facility] ROOM 1 [facility]   12/3/2024  2:00 PM Kb Banda MD [facility] -   9/12/2025 11:30 AM Slavik-Bosworth, Kelly J, APRN - CNP [facility]               Last refill 9/30/24. Please approve or deny.

## 2024-10-30 RX ORDER — OMEPRAZOLE 40 MG/1
40 CAPSULE, DELAYED RELEASE ORAL DAILY
Qty: 30 CAPSULE | Refills: 3 | Status: SHIPPED | OUTPATIENT
Start: 2024-10-30

## 2024-11-05 DIAGNOSIS — M05.9 RHEUMATOID ARTHRITIS WITH RHEUMATOID FACTOR, UNSPECIFIED (HCC): ICD-10-CM

## 2024-11-05 RX ORDER — PREDNISONE 5 MG/1
5 TABLET ORAL DAILY
Qty: 30 TABLET | Refills: 1 | Status: SHIPPED | OUTPATIENT
Start: 2024-11-05

## 2024-11-12 DIAGNOSIS — R60.0 EDEMA OF LEFT LOWER LEG: ICD-10-CM

## 2024-11-12 RX ORDER — TRIAMTERENE AND HYDROCHLOROTHIAZIDE 37.5; 25 MG/1; MG/1
1 CAPSULE ORAL DAILY
Qty: 30 CAPSULE | Refills: 6 | Status: SHIPPED | OUTPATIENT
Start: 2024-11-12

## 2024-11-17 DIAGNOSIS — M79.604 LOW BACK PAIN RADIATING TO RIGHT LEG: ICD-10-CM

## 2024-11-17 DIAGNOSIS — M54.50 LOW BACK PAIN RADIATING TO RIGHT LEG: ICD-10-CM

## 2024-11-18 RX ORDER — CYCLOBENZAPRINE HCL 10 MG
TABLET ORAL
Qty: 60 TABLET | Refills: 5 | Status: SHIPPED | OUTPATIENT
Start: 2024-11-18

## 2024-11-25 DIAGNOSIS — M05.9 RHEUMATOID ARTHRITIS WITH RHEUMATOID FACTOR, UNSPECIFIED (HCC): ICD-10-CM

## 2024-11-25 NOTE — TELEPHONE ENCOUNTER
Requesting medication refill. Please approve or deny this request.    Rx requested:  Requested Prescriptions     Pending Prescriptions Disp Refills    HYDROcodone-acetaminophen (LORCET PLUS) 7.5-325 MG per tablet 90 tablet 0     Sig: Take 1 tablet by mouth 3 times daily for 30 days. Max Daily Amount: 3 tablets         Last Office Visit:   8/6/2024      Next Visit Date:  Future Appointments   Date Time Provider Department Center   12/3/2024  2:00 PM Kb Banda MD [facility] -   12/16/2024  2:00 PM [facility] ROOM 1 [facility]   9/12/2025 11:30 AM Slavik-Bosworth, Kelly J, APRN - CNP [facility]               Last refill 10/29/24. Please approve or deny.

## 2024-11-26 RX ORDER — HYDROCODONE BITARTRATE AND ACETAMINOPHEN 7.5; 325 MG/1; MG/1
1 TABLET ORAL 3 TIMES DAILY
Qty: 90 TABLET | Refills: 0 | Status: SHIPPED | OUTPATIENT
Start: 2024-11-26 | End: 2024-12-26

## 2024-12-03 ENCOUNTER — OFFICE VISIT (OUTPATIENT)
Dept: NEUROLOGY | Age: 74
End: 2024-12-03
Payer: MEDICARE

## 2024-12-03 VITALS
HEART RATE: 88 BPM | SYSTOLIC BLOOD PRESSURE: 102 MMHG | WEIGHT: 119 LBS | DIASTOLIC BLOOD PRESSURE: 66 MMHG | BODY MASS INDEX: 22.48 KG/M2

## 2024-12-03 DIAGNOSIS — G47.52 REM SLEEP BEHAVIOR DISORDER: ICD-10-CM

## 2024-12-03 DIAGNOSIS — E87.6 HYPOKALEMIA: ICD-10-CM

## 2024-12-03 DIAGNOSIS — R26.0 ATAXIC GAIT: ICD-10-CM

## 2024-12-03 DIAGNOSIS — G20.A1 PARKINSON'S DISEASE WITHOUT DYSKINESIA OR FLUCTUATING MANIFESTATIONS (HCC): Primary | ICD-10-CM

## 2024-12-03 DIAGNOSIS — G89.4 CHRONIC PAIN SYNDROME: ICD-10-CM

## 2024-12-03 DIAGNOSIS — M05.79 RHEUMATOID ARTHRITIS INVOLVING MULTIPLE SITES WITH POSITIVE RHEUMATOID FACTOR (HCC): ICD-10-CM

## 2024-12-03 LAB
ANION GAP SERPL CALCULATED.3IONS-SCNC: 10 MEQ/L (ref 9–15)
BUN SERPL-MCNC: 23 MG/DL (ref 8–23)
CALCIUM SERPL-MCNC: 9.3 MG/DL (ref 8.5–9.9)
CHLORIDE SERPL-SCNC: 100 MEQ/L (ref 95–107)
CO2 SERPL-SCNC: 33 MEQ/L (ref 20–31)
CREAT SERPL-MCNC: 0.78 MG/DL (ref 0.5–0.9)
GLUCOSE SERPL-MCNC: 88 MG/DL (ref 70–99)
POTASSIUM SERPL-SCNC: 3.7 MEQ/L (ref 3.4–4.9)
SODIUM SERPL-SCNC: 143 MEQ/L (ref 135–144)

## 2024-12-03 PROCEDURE — 1159F MED LIST DOCD IN RCRD: CPT | Performed by: PSYCHIATRY & NEUROLOGY

## 2024-12-03 PROCEDURE — 99214 OFFICE O/P EST MOD 30 MIN: CPT | Performed by: PSYCHIATRY & NEUROLOGY

## 2024-12-03 PROCEDURE — 1123F ACP DISCUSS/DSCN MKR DOCD: CPT | Performed by: PSYCHIATRY & NEUROLOGY

## 2024-12-03 RX ORDER — CELECOXIB 200 MG/1
200 CAPSULE ORAL DAILY
COMMUNITY
Start: 2024-09-25

## 2024-12-03 ASSESSMENT — ENCOUNTER SYMPTOMS
CHOKING: 0
PHOTOPHOBIA: 0
SHORTNESS OF BREATH: 0
NAUSEA: 0
COLOR CHANGE: 0
TROUBLE SWALLOWING: 0
VOMITING: 0
BACK PAIN: 1

## 2024-12-03 NOTE — PROGRESS NOTES
PM    MCV 91.5 08/05/2024 03:22 PM    MCH 30.1 08/05/2024 03:22 PM    MCHC 32.9 08/05/2024 03:22 PM    RDW 14.1 08/05/2024 03:22 PM     08/05/2024 03:22 PM    MPV 8.4 08/14/2013 08:26 AM     Lab Results   Component Value Date/Time     07/01/2024 02:52 PM    K 3.2 07/01/2024 02:52 PM     07/01/2024 02:52 PM    CO2 31 07/01/2024 02:52 PM    BUN 23 07/01/2024 02:52 PM    CREATININE 0.91 07/01/2024 02:52 PM    GFRAA >60.0 08/09/2022 03:28 PM    LABGLOM 67.1 07/01/2024 02:52 PM    GLUCOSE 89 07/01/2024 02:52 PM    GLUCOSE 104 08/26/2023 05:36 AM    CALCIUM 9.1 07/01/2024 02:52 PM    BILITOT 0.3 07/01/2024 02:52 PM    ALKPHOS 72 07/01/2024 02:52 PM    AST 47 07/01/2024 02:52 PM    ALT 10 07/01/2024 02:52 PM     Lab Results   Component Value Date/Time    PROTIME 11.5 08/22/2023 11:44 AM    INR 1.0 08/22/2023 11:44 AM     Lab Results   Component Value Date/Time    TSH 0.826 07/01/2024 02:52 PM    KSFZUPWZ70 294 06/22/2021 01:14 PM    FOLATE >20.0 04/04/2018 12:21 PM     Lab Results   Component Value Date/Time    TRIG 79 06/22/2021 01:14 PM     08/30/2022 10:27 AM     Lab Results   Component Value Date/Time    BARBSCNU Neg 10/28/2021 01:51 PM    LABBENZ Neg 10/28/2021 01:51 PM    LABMETH Neg 10/28/2021 01:51 PM     No results found for: \"LITHIUM\", \"DILFRTOT\", \"VALPROATE\"    Assessment:       Diagnosis Orders   1. Parkinson's disease without dyskinesia or fluctuating manifestations (HCC)  Handicap Placard MISC      2. Rheumatoid arthritis involving multiple sites with positive rheumatoid factor (HCC)  Frankie Willard DO, Rheumatology - [place]      3. Hypokalemia  Frankie Willard DO, Rheumatology - [place]    Basic Metabolic Panel      4. Ataxic gait        Parkinson's disease optimally treated.  Patient is on carbidopa levodopa half a tablet 4 times a day.  We are not able to escalate the dose and she does not require higher dosing at and she does not side effects.    Patient is

## 2024-12-09 ENCOUNTER — OFFICE VISIT (OUTPATIENT)
Age: 74
End: 2024-12-09
Payer: MEDICARE

## 2024-12-09 VITALS
HEIGHT: 61 IN | BODY MASS INDEX: 22.16 KG/M2 | OXYGEN SATURATION: 98 % | HEART RATE: 78 BPM | DIASTOLIC BLOOD PRESSURE: 68 MMHG | SYSTOLIC BLOOD PRESSURE: 116 MMHG | WEIGHT: 117.4 LBS

## 2024-12-09 DIAGNOSIS — M06.9 RHEUMATOID ARTHRITIS, INVOLVING UNSPECIFIED SITE, UNSPECIFIED WHETHER RHEUMATOID FACTOR PRESENT (HCC): Primary | ICD-10-CM

## 2024-12-09 DIAGNOSIS — M06.9 RHEUMATOID ARTHRITIS, INVOLVING UNSPECIFIED SITE, UNSPECIFIED WHETHER RHEUMATOID FACTOR PRESENT (HCC): ICD-10-CM

## 2024-12-09 DIAGNOSIS — Z51.81 ENCOUNTER FOR MEDICATION MONITORING: ICD-10-CM

## 2024-12-09 DIAGNOSIS — E55.9 VITAMIN D DEFICIENCY: ICD-10-CM

## 2024-12-09 DIAGNOSIS — M81.0 OSTEOPOROSIS WITHOUT CURRENT PATHOLOGICAL FRACTURE, UNSPECIFIED OSTEOPOROSIS TYPE: ICD-10-CM

## 2024-12-09 DIAGNOSIS — Z79.52 CURRENT CHRONIC USE OF SYSTEMIC STEROIDS: ICD-10-CM

## 2024-12-09 LAB
ALBUMIN SERPL-MCNC: 4.3 G/DL (ref 3.5–4.6)
ALP SERPL-CCNC: 143 U/L (ref 40–130)
ALT SERPL-CCNC: 7 U/L (ref 0–33)
ANION GAP SERPL CALCULATED.3IONS-SCNC: 14 MEQ/L (ref 9–15)
AST SERPL-CCNC: 28 U/L (ref 0–35)
BASOPHILS # BLD: 0 K/UL (ref 0–0.2)
BASOPHILS NFR BLD: 0.6 %
BILIRUB SERPL-MCNC: 0.3 MG/DL (ref 0.2–0.7)
BUN SERPL-MCNC: 23 MG/DL (ref 8–23)
CALCIUM SERPL-MCNC: 9.4 MG/DL (ref 8.5–9.9)
CHLORIDE SERPL-SCNC: 98 MEQ/L (ref 95–107)
CO2 SERPL-SCNC: 31 MEQ/L (ref 20–31)
CREAT SERPL-MCNC: 0.76 MG/DL (ref 0.5–0.9)
CRP SERPL HS-MCNC: <3 MG/L (ref 0–5)
EOSINOPHIL # BLD: 0.1 K/UL (ref 0–0.7)
EOSINOPHIL NFR BLD: 2.5 %
ERYTHROCYTE [DISTWIDTH] IN BLOOD BY AUTOMATED COUNT: 13.3 % (ref 11.5–14.5)
ERYTHROCYTE [SEDIMENTATION RATE] IN BLOOD BY WESTERGREN METHOD: 10 MM (ref 0–30)
GLOBULIN SER CALC-MCNC: 2.2 G/DL (ref 2.3–3.5)
GLUCOSE SERPL-MCNC: 73 MG/DL (ref 70–99)
HCT VFR BLD AUTO: 41.5 % (ref 37–47)
HGB BLD-MCNC: 13.2 G/DL (ref 12–16)
LYMPHOCYTES # BLD: 1.1 K/UL (ref 1–4.8)
LYMPHOCYTES NFR BLD: 22.9 %
MCH RBC QN AUTO: 29.9 PG (ref 27–31.3)
MCHC RBC AUTO-ENTMCNC: 31.8 % (ref 33–37)
MCV RBC AUTO: 94.1 FL (ref 79.4–94.8)
MONOCYTES # BLD: 0.5 K/UL (ref 0.2–0.8)
MONOCYTES NFR BLD: 10.6 %
NEUTROPHILS # BLD: 3 K/UL (ref 1.4–6.5)
NEUTS SEG NFR BLD: 63 %
PLATELET # BLD AUTO: 305 K/UL (ref 130–400)
POTASSIUM SERPL-SCNC: 2.9 MEQ/L (ref 3.4–4.9)
PROT SERPL-MCNC: 6.5 G/DL (ref 6.3–8)
RBC # BLD AUTO: 4.41 M/UL (ref 4.2–5.4)
SODIUM SERPL-SCNC: 143 MEQ/L (ref 135–144)
WBC # BLD AUTO: 4.8 K/UL (ref 4.8–10.8)

## 2024-12-09 PROCEDURE — 99204 OFFICE O/P NEW MOD 45 MIN: CPT | Performed by: INTERNAL MEDICINE

## 2024-12-09 PROCEDURE — 1123F ACP DISCUSS/DSCN MKR DOCD: CPT | Performed by: INTERNAL MEDICINE

## 2024-12-09 PROCEDURE — 1159F MED LIST DOCD IN RCRD: CPT | Performed by: INTERNAL MEDICINE

## 2024-12-09 RX ORDER — PREDNISONE 1 MG/1
TABLET ORAL
Qty: 120 TABLET | Refills: 3 | Status: SHIPPED | OUTPATIENT
Start: 2024-12-09

## 2024-12-09 ASSESSMENT — ENCOUNTER SYMPTOMS
ABDOMINAL PAIN: 0
SHORTNESS OF BREATH: 0
BACK PAIN: 1

## 2024-12-09 NOTE — PROGRESS NOTES
Dawson's esophagitis and therefore I will consider starting her on Prolia once I see the results.  I talked to her about trying to get calcium from her diet.  She will need 1200 mg of elemental calcium but if she cannot tolerate foods such as dairy products, I will make recommendations regarding supplements.    -     Comprehensive Metabolic Panel; Future  -     DEXA BONE DENSITY AXIAL SKELETON; Future  3. Encounter for medication monitoring  Hydroxychloroquine.  Continue regular yearly eye exams  -     Comprehensive Metabolic Panel; Future  -     CBC with Auto Differential; Future  4. Vitamin D deficiency  Patient was on supplements previously but has not been taking them regularly.  Update vitamin D level today and will make recommendations  -     DEXA BONE DENSITY AXIAL SKELETON; Future  -     Vitamin D 25 Hydroxy; Future  5. Current chronic use of systemic steroids  Will attempt taper as above.  Explained to the patient that this is contributing to possible worsening of her osteoporosis.  -     DEXA BONE DENSITY AXIAL SKELETON; Future  -     predniSONE (DELTASONE) 1 MG tablet; Take 4 tablets (4 mg) each morning with food for 4 weeks then decrease to 3 tablets each morning with food thereafter, Disp-120 tablet, R-3Normal            Thank you for allowing me to participate in the care of Ms. Marin. For any further questions please do not hesitate to contact me.      I have reviewed and agree with the MA/LPN ROS please refer to their documentation from today's encounter on a separate note.          Frankie Godoy,      12/9/2024       this note is created with the assistance of a speech recognition program.  While intending to generate a document that actually reflects the content of the visit, the document can still have some errors including those of syntax and sound a like substitutions which may escape proof reading.  It such instances, actual meaning can be extrapolated by contextual diversion.

## 2024-12-09 NOTE — PATIENT INSTRUCTIONS
Please complete labs today and I will contact you with these results    I am going to schedule you for another bone density study to compare to the study done in 2018.  This looks for osteoporosis    I want you to try to get your calcium through your diet.  If you are unable to take in foods such as dairy products, please let me know and I will recommend a calcium supplement    I am going to check your vitamin D level and I will make recommendations regarding supplementation    Continue hydroxychloroquine for now with regular follow-ups with your eye doctor every year.    In regards to your prednisone decreased to 4 mg a day for 4 weeks and then try going to 3 mg a day until I see you back in about 8 weeks.  I have sent in some 1 mg tablets to your local pharmacy.

## 2024-12-10 ENCOUNTER — TELEPHONE (OUTPATIENT)
Dept: PRIMARY CARE CLINIC | Age: 74
End: 2024-12-10

## 2024-12-10 LAB
CCP AB SER IA-ACNC: 0.6 U/ML (ref 0–7)
RHEUMATOID FACTOR: <10 IU/ML (ref 0–13)
VITAMIN D 25-HYDROXY: 29.8 NG/ML (ref 30–100)

## 2024-12-10 NOTE — TELEPHONE ENCOUNTER
is calling, states his wife had potassium drawn yesterday and they told her that potassium was low at 2.9 (Rheumatoid arthritis doctor) and was told to check with her PCP to see what to do  Please advise

## 2024-12-11 DIAGNOSIS — E87.6 HYPOKALEMIA: ICD-10-CM

## 2024-12-11 RX ORDER — POTASSIUM CHLORIDE 1500 MG/1
TABLET, EXTENDED RELEASE ORAL
Qty: 15 TABLET | Refills: 1 | Status: SHIPPED | OUTPATIENT
Start: 2024-12-11

## 2024-12-11 NOTE — TELEPHONE ENCOUNTER
Per Dr Chery pt was to take potassium 3x weekly. Pt says that she has not taken potassium since hip surgery in Feb therefore new  directions per Dr Chery; take 1 tab daily repeat lab 1 week.

## 2024-12-15 DIAGNOSIS — F32.A DEPRESSION, UNSPECIFIED DEPRESSION TYPE: ICD-10-CM

## 2024-12-16 RX ORDER — DULOXETIN HYDROCHLORIDE 60 MG/1
60 CAPSULE, DELAYED RELEASE ORAL EVERY MORNING
Qty: 30 CAPSULE | Refills: 6 | Status: SHIPPED | OUTPATIENT
Start: 2024-12-16

## 2024-12-16 NOTE — TELEPHONE ENCOUNTER
Rx request   Requested Prescriptions     Pending Prescriptions Disp Refills    DULoxetine (CYMBALTA) 60 MG extended release capsule [Pharmacy Med Name: duloxetine 60 mg capsule,delayed release] 30 capsule 6     Sig: TAKE 1 CAPSULE BY MOUTH EVERY MORNING     LOV 7/1/2024  Next Visit Date:  Future Appointments   Date Time Provider Department Center   12/17/2024 12:00 PM [facility]   2/3/2025  1:00 PM Frankie Godoy,  [facility]   4/2/2025  3:00 PM Kb Banda MD [facility] -   9/12/2025 11:30 AM Slavik-Bosworth, Kelly J, APRN - CNP [facility]

## 2024-12-30 ENCOUNTER — HOSPITAL ENCOUNTER (OUTPATIENT)
Dept: WOMENS IMAGING | Age: 74
Discharge: HOME | End: 2025-01-01
Payer: MEDICARE

## 2024-12-30 DIAGNOSIS — Z79.52 CURRENT CHRONIC USE OF SYSTEMIC STEROIDS: ICD-10-CM

## 2024-12-30 DIAGNOSIS — M06.9 RHEUMATOID ARTHRITIS, INVOLVING UNSPECIFIED SITE, UNSPECIFIED WHETHER RHEUMATOID FACTOR PRESENT (HCC): ICD-10-CM

## 2024-12-30 DIAGNOSIS — M81.0 OSTEOPOROSIS WITHOUT CURRENT PATHOLOGICAL FRACTURE, UNSPECIFIED OSTEOPOROSIS TYPE: ICD-10-CM

## 2024-12-30 DIAGNOSIS — M05.9 RHEUMATOID ARTHRITIS WITH RHEUMATOID FACTOR, UNSPECIFIED (HCC): ICD-10-CM

## 2024-12-30 DIAGNOSIS — E55.9 VITAMIN D DEFICIENCY: ICD-10-CM

## 2024-12-30 PROCEDURE — 77080 DXA BONE DENSITY AXIAL: CPT

## 2024-12-30 RX ORDER — HYDROCODONE BITARTRATE AND ACETAMINOPHEN 7.5; 325 MG/1; MG/1
1 TABLET ORAL 3 TIMES DAILY
Qty: 90 TABLET | Refills: 0 | Status: SHIPPED | OUTPATIENT
Start: 2024-12-30 | End: 2025-01-29

## 2024-12-30 NOTE — TELEPHONE ENCOUNTER
requesting medication refill. Please approve or deny this request.    Rx requested:  Requested Prescriptions     Pending Prescriptions Disp Refills    HYDROcodone-acetaminophen (LORCET PLUS) 7.5-325 MG per tablet 90 tablet 0     Sig: Take 1 tablet by mouth 3 times daily for 30 days. Max Daily Amount: 3 tablets         Last Office Visit:   12/3/2024      Next Visit Date:  Future Appointments   Date Time Provider Department Center   12/30/2024 12:00 PM [facility] [place]   2/3/2025  1:00 PM Frankie Godoy, DO [facility]   4/2/2025  3:00 PM Kb Banda MD [facility] -   9/12/2025 11:30 AM Slavik-Bosworth, Kelly J, APRN - CNP [facility]

## 2024-12-31 DIAGNOSIS — M05.9 RHEUMATOID ARTHRITIS WITH RHEUMATOID FACTOR, UNSPECIFIED (HCC): ICD-10-CM

## 2024-12-31 RX ORDER — HYDROXYCHLOROQUINE SULFATE 200 MG/1
TABLET, FILM COATED ORAL
Qty: 30 TABLET | Refills: 5 | Status: SHIPPED | OUTPATIENT
Start: 2024-12-31

## 2025-01-02 DIAGNOSIS — M81.0 AGE-RELATED OSTEOPOROSIS WITHOUT CURRENT PATHOLOGICAL FRACTURE: Primary | ICD-10-CM

## 2025-01-02 LAB
ALBUMIN SERPL-MCNC: 4.3 G/DL (ref 3.5–4.6)
ALP SERPL-CCNC: 86 U/L (ref 40–130)
ALT SERPL-CCNC: <5 U/L (ref 0–33)
ANION GAP SERPL CALCULATED.3IONS-SCNC: 12 MEQ/L (ref 9–15)
AST SERPL-CCNC: 20 U/L (ref 0–35)
BILIRUB SERPL-MCNC: 0.3 MG/DL (ref 0.2–0.7)
BUN SERPL-MCNC: 28 MG/DL (ref 8–23)
CALCIUM SERPL-MCNC: 9.3 MG/DL (ref 8.5–9.9)
CHLORIDE SERPL-SCNC: 98 MEQ/L (ref 95–107)
CO2 SERPL-SCNC: 30 MEQ/L (ref 20–31)
CREAT SERPL-MCNC: 0.83 MG/DL (ref 0.5–0.9)
GLOBULIN SER CALC-MCNC: 2.1 G/DL (ref 2.3–3.5)
GLUCOSE SERPL-MCNC: 91 MG/DL (ref 70–99)
POTASSIUM SERPL-SCNC: 3.4 MEQ/L (ref 3.4–4.9)
PROT SERPL-MCNC: 6.4 G/DL (ref 6.3–8)
SODIUM SERPL-SCNC: 140 MEQ/L (ref 135–144)

## 2025-01-02 RX ORDER — HYDROCORTISONE SODIUM SUCCINATE 100 MG/2ML
100 INJECTION INTRAMUSCULAR; INTRAVENOUS
OUTPATIENT
Start: 2025-01-02

## 2025-01-02 RX ORDER — DIPHENHYDRAMINE HYDROCHLORIDE 50 MG/ML
50 INJECTION INTRAMUSCULAR; INTRAVENOUS
OUTPATIENT
Start: 2025-01-02

## 2025-01-02 RX ORDER — FAMOTIDINE 10 MG/ML
20 INJECTION, SOLUTION INTRAVENOUS
OUTPATIENT
Start: 2025-01-02

## 2025-01-02 RX ORDER — ONDANSETRON 2 MG/ML
8 INJECTION INTRAMUSCULAR; INTRAVENOUS
OUTPATIENT
Start: 2025-01-02

## 2025-01-02 RX ORDER — EPINEPHRINE 1 MG/ML
0.3 INJECTION, SOLUTION, CONCENTRATE INTRAVENOUS PRN
OUTPATIENT
Start: 2025-01-02

## 2025-01-02 RX ORDER — ALBUTEROL SULFATE 90 UG/1
4 INHALANT RESPIRATORY (INHALATION) PRN
OUTPATIENT
Start: 2025-01-02

## 2025-01-02 RX ORDER — SODIUM CHLORIDE 9 MG/ML
INJECTION, SOLUTION INTRAVENOUS CONTINUOUS
OUTPATIENT
Start: 2025-01-02

## 2025-01-02 RX ORDER — ACETAMINOPHEN 325 MG/1
650 TABLET ORAL
OUTPATIENT
Start: 2025-01-02

## 2025-01-28 DIAGNOSIS — M05.9 RHEUMATOID ARTHRITIS WITH RHEUMATOID FACTOR, UNSPECIFIED (HCC): ICD-10-CM

## 2025-01-28 RX ORDER — HYDROCODONE BITARTRATE AND ACETAMINOPHEN 7.5; 325 MG/1; MG/1
1 TABLET ORAL 3 TIMES DAILY
Qty: 90 TABLET | Refills: 0 | Status: SHIPPED | OUTPATIENT
Start: 2025-01-28 | End: 2025-02-27

## 2025-01-28 NOTE — TELEPHONE ENCOUNTER
Patient is  requesting medication refill. Please approve or deny this request.    Rx requested:  Requested Prescriptions     Pending Prescriptions Disp Refills    HYDROcodone-acetaminophen (LORCET PLUS) 7.5-325 MG per tablet 90 tablet 0     Sig: Take 1 tablet by mouth 3 times daily for 30 days. Max Daily Amount: 3 tablets         Last Office Visit:   12/3/2024      Next Visit Date:  Future Appointments   Date Time Provider Department Center   2/3/2025  1:00 PM Frankie Godoy,  [facility]   4/2/2025  3:00 PM Kb Banda MD [facility] -   9/12/2025 11:30 AM Slavik-Bosworth, Kelly J, APRN - CNP [facility]

## 2025-02-03 ENCOUNTER — OFFICE VISIT (OUTPATIENT)
Age: 75
End: 2025-02-03
Payer: MEDICARE

## 2025-02-03 VITALS
HEART RATE: 87 BPM | WEIGHT: 119 LBS | SYSTOLIC BLOOD PRESSURE: 104 MMHG | BODY MASS INDEX: 22.48 KG/M2 | OXYGEN SATURATION: 96 % | DIASTOLIC BLOOD PRESSURE: 68 MMHG

## 2025-02-03 DIAGNOSIS — E87.6 HYPOKALEMIA: ICD-10-CM

## 2025-02-03 DIAGNOSIS — Z51.81 ENCOUNTER FOR MEDICATION MONITORING: ICD-10-CM

## 2025-02-03 DIAGNOSIS — M06.9 RHEUMATOID ARTHRITIS, INVOLVING UNSPECIFIED SITE, UNSPECIFIED WHETHER RHEUMATOID FACTOR PRESENT (HCC): Primary | ICD-10-CM

## 2025-02-03 DIAGNOSIS — M81.0 OSTEOPOROSIS WITHOUT CURRENT PATHOLOGICAL FRACTURE, UNSPECIFIED OSTEOPOROSIS TYPE: ICD-10-CM

## 2025-02-03 LAB
ANION GAP SERPL CALCULATED.3IONS-SCNC: 11 MEQ/L (ref 9–15)
BASOPHILS # BLD: 0 K/UL (ref 0–0.2)
BASOPHILS NFR BLD: 0.7 %
BUN SERPL-MCNC: 23 MG/DL (ref 8–23)
CALCIUM SERPL-MCNC: 9.2 MG/DL (ref 8.5–9.9)
CHLORIDE SERPL-SCNC: 98 MEQ/L (ref 95–107)
CO2 SERPL-SCNC: 29 MEQ/L (ref 20–31)
CREAT SERPL-MCNC: 0.79 MG/DL (ref 0.5–0.9)
EOSINOPHIL # BLD: 0.1 K/UL (ref 0–0.7)
EOSINOPHIL NFR BLD: 1.1 %
ERYTHROCYTE [DISTWIDTH] IN BLOOD BY AUTOMATED COUNT: 12.9 % (ref 11.5–14.5)
GLUCOSE SERPL-MCNC: 86 MG/DL (ref 70–99)
HCT VFR BLD AUTO: 40.7 % (ref 37–47)
HGB BLD-MCNC: 12.8 G/DL (ref 12–16)
LYMPHOCYTES # BLD: 0.7 K/UL (ref 1–4.8)
LYMPHOCYTES NFR BLD: 13.3 %
MCH RBC QN AUTO: 29.2 PG (ref 27–31.3)
MCHC RBC AUTO-ENTMCNC: 31.4 % (ref 33–37)
MCV RBC AUTO: 92.7 FL (ref 79.4–94.8)
MONOCYTES # BLD: 0.7 K/UL (ref 0.2–0.8)
MONOCYTES NFR BLD: 12 %
NEUTROPHILS # BLD: 3.9 K/UL (ref 1.4–6.5)
NEUTS SEG NFR BLD: 70.6 %
PLATELET # BLD AUTO: 294 K/UL (ref 130–400)
POTASSIUM SERPL-SCNC: 3.2 MEQ/L (ref 3.4–4.9)
RBC # BLD AUTO: 4.39 M/UL (ref 4.2–5.4)
SODIUM SERPL-SCNC: 138 MEQ/L (ref 135–144)
WBC # BLD AUTO: 5.6 K/UL (ref 4.8–10.8)

## 2025-02-03 PROCEDURE — 1123F ACP DISCUSS/DSCN MKR DOCD: CPT | Performed by: INTERNAL MEDICINE

## 2025-02-03 PROCEDURE — 1159F MED LIST DOCD IN RCRD: CPT | Performed by: INTERNAL MEDICINE

## 2025-02-03 PROCEDURE — 99214 OFFICE O/P EST MOD 30 MIN: CPT | Performed by: INTERNAL MEDICINE

## 2025-02-03 NOTE — PATIENT INSTRUCTIONS
Please do labs today and I will recheck her potassium as well    I want you to start over-the-counter calcium and vitamin D supplements.  These are found in any of your drugstores and examples include Citracal D and Caltrate D.  You can use the generic equivalents.  Take 1 tablet twice a day with food    I am going to contact the infusion center to get you scheduled for Prolia.  If you have not heard from them by the end of the week please give me a call.    For your joints of your hands, you can try using diclofenac gel which you can purchase over-the-counter at [employer] or any drugstore.  Apply to the affected joints at least 3 times a day for at least 3 days in a row to determine if effective.  If not effective do not purchase again.    Continue hydroxychloroquine daily as you are doing.  Try decreasing your prednisone to 2 mg a day and let me know if you have any problems as you decrease the dose.

## 2025-02-03 NOTE — PROGRESS NOTES
Reason for Referral:       No referring provider defined for this encounter.    Chief Complaint   Patient presents with    Follow-up     Patient here today for follow up            HISTORY OF PRESENT ILLNESS: Ms.Terri JOLLY Marin is a 74 y.o. female for reevaluation of her seronegative rheumatoid arthritis.  Currently on hydroxychloroquine 200 mg daily and tolerating well.  Previously diagnosed with osteoporosis and repeat bone density study last month shows ongoing osteoporosis.  Patient not taking calcium or vitamin D.  Prescription for Prolia sent and apparently the patient has been approved for this but not scheduled yet.  She will be taking these injections every 6 months.  Complains of pain in her fingers which is chronic.  Her right ring finger seems to be more stiff than last visit.  Workup in the interim revealed a negative rheumatoid factor and CCP antibody.  Her sedimentation rate and C-reactive protein were normal.  Her vitamin D level was borderline at 30.         Past Medical,Family, and Social History reviewed.  Patient's PMH/PSH,SH,PSYCH Hx, MEDs, ALLERGIES, and ROS were all reviewed and updated in the appropriate sections.    PAST MEDICAL HISTORY:  Past Medical History:   Diagnosis Date    Arthritis     seen dr carmichael    Chronic back pain     seen dr matos in past    Depression     Hypothyroidism     Osteoarthritis     Osteoporosis     Restless legs syndrome     Tachycardia        Past Surgical History:   Procedure Laterality Date    CHOLECYSTECTOMY      COLONOSCOPY N/A 11/22/2022    COLONOSCOPY DIAGNOSTIC performed by Kalpesh Mariscal MD at [facility]    HYSTERECTOMY (CERVIX STATUS UNKNOWN)      Total age 50    JOINT REPLACEMENT      BILATERAL HIPS    KNEE ARTHROSCOPY Right     OVARY REMOVAL      TONSILLECTOMY      UPPER GASTROINTESTINAL ENDOSCOPY N/A 8/26/2024    ESOPHAGOGASTRODUODENOSCOPY with biopsy performed by Bulmaro Sen MD at [facility]       CURRENT MEDICATIONS:    Current

## 2025-02-04 ENCOUNTER — APPOINTMENT (OUTPATIENT)
Facility: CLINIC | Age: 75
End: 2025-02-04
Payer: MEDICARE

## 2025-02-10 ENCOUNTER — HOSPITAL ENCOUNTER (OUTPATIENT)
Dept: INFUSION THERAPY | Age: 75
Setting detail: INFUSION SERIES
Discharge: HOME | End: 2025-02-10
Payer: MEDICARE

## 2025-02-10 VITALS
HEART RATE: 80 BPM | RESPIRATION RATE: 17 BRPM | DIASTOLIC BLOOD PRESSURE: 72 MMHG | SYSTOLIC BLOOD PRESSURE: 113 MMHG | TEMPERATURE: 96.7 F | OXYGEN SATURATION: 97 %

## 2025-02-10 DIAGNOSIS — M81.0 AGE-RELATED OSTEOPOROSIS WITHOUT CURRENT PATHOLOGICAL FRACTURE: Primary | ICD-10-CM

## 2025-02-10 PROCEDURE — 6360000002 HC RX W HCPCS: Performed by: INTERNAL MEDICINE

## 2025-02-10 PROCEDURE — 96401 CHEMO ANTI-NEOPL SQ/IM: CPT

## 2025-02-10 RX ORDER — HYDROCORTISONE SODIUM SUCCINATE 100 MG/2ML
100 INJECTION INTRAMUSCULAR; INTRAVENOUS
OUTPATIENT
Start: 2025-07-03

## 2025-02-10 RX ORDER — DIPHENHYDRAMINE HYDROCHLORIDE 50 MG/ML
50 INJECTION INTRAMUSCULAR; INTRAVENOUS
OUTPATIENT
Start: 2025-07-03

## 2025-02-10 RX ORDER — ALBUTEROL SULFATE 90 UG/1
4 INHALANT RESPIRATORY (INHALATION) PRN
OUTPATIENT
Start: 2025-07-03

## 2025-02-10 RX ORDER — EPINEPHRINE 1 MG/ML
0.3 INJECTION, SOLUTION, CONCENTRATE INTRAVENOUS PRN
OUTPATIENT
Start: 2025-07-03

## 2025-02-10 RX ORDER — ONDANSETRON 2 MG/ML
8 INJECTION INTRAMUSCULAR; INTRAVENOUS
OUTPATIENT
Start: 2025-07-03

## 2025-02-10 RX ORDER — ACETAMINOPHEN 325 MG/1
650 TABLET ORAL
OUTPATIENT
Start: 2025-07-03

## 2025-02-10 RX ORDER — SODIUM CHLORIDE 9 MG/ML
INJECTION, SOLUTION INTRAVENOUS CONTINUOUS
OUTPATIENT
Start: 2025-07-03

## 2025-02-10 RX ADMIN — DENOSUMAB 60 MG: 60 INJECTION SUBCUTANEOUS at 13:21

## 2025-02-10 NOTE — FLOWSHEET NOTE
Injection completed, pt tolerated well. Patient sitting for observation, family member in room with patient. Call light within reach

## 2025-02-10 NOTE — FLOWSHEET NOTE
Observation completed, pt tolerated well. Patient left unit via wheelchair with family member. All equipment used in the care for this patient has been cleaned.

## 2025-02-27 DIAGNOSIS — M05.9 RHEUMATOID ARTHRITIS WITH RHEUMATOID FACTOR, UNSPECIFIED (HCC): ICD-10-CM

## 2025-02-27 RX ORDER — HYDROCODONE BITARTRATE AND ACETAMINOPHEN 7.5; 325 MG/1; MG/1
1 TABLET ORAL 3 TIMES DAILY
Qty: 90 TABLET | Refills: 0 | Status: SHIPPED | OUTPATIENT
Start: 2025-02-27 | End: 2025-03-29

## 2025-02-27 NOTE — TELEPHONE ENCOUNTER
Patient is requesting medication refill. Please approve or deny this request.    Rx requested:  Requested Prescriptions     Pending Prescriptions Disp Refills    HYDROcodone-acetaminophen (LORCET PLUS) 7.5-325 MG per tablet 90 tablet 0     Sig: Take 1 tablet by mouth 3 times daily for 30 days. Max Daily Amount: 3 tablets         Last Office Visit:   12/3/2024      Next Visit Date:  Future Appointments   Date Time Provider Department Center   4/2/2025  3:00 PM Kb Banda MD [facility] -   5/5/2025  2:00 PM Frankie Godoy, DO [facility]   8/11/2025  1:00 PM [facility]   9/12/2025 11:30 AM Slavik-Bosworth, Kelly J, APRN - CNP [facility]

## 2025-03-17 RX ORDER — CARBIDOPA AND LEVODOPA 25; 100 MG/1; MG/1
TABLET ORAL
Qty: 180 TABLET | Refills: 3 | Status: SHIPPED | OUTPATIENT
Start: 2025-03-17

## 2025-03-17 NOTE — TELEPHONE ENCOUNTER
Pharmacy is  requesting medication refill. Please approve or deny this request.    Rx requested:  Requested Prescriptions     Pending Prescriptions Disp Refills    carbidopa-levodopa (SINEMET)  MG per tablet [Pharmacy Med Name: CARB/LEVO TAB 25-100MG] 180 tablet 3     Sig: TAKE 1/2 TABLET 4 TIMES A  DAY         Last Office Visit:   12/3/2024      Next Visit Date:  Future Appointments   Date Time Provider Department Center   4/2/2025  3:00 PM Kb Banda MD [facility] -   5/5/2025  2:00 PM Frankie Godoy, DO [facility]   8/11/2025  1:00 PM [facility]   9/12/2025 11:30 AM Slavik-Bosworth, Kelly J, APRN - CNP [facility]

## 2025-03-18 RX ORDER — METOPROLOL SUCCINATE 25 MG/1
25 TABLET, EXTENDED RELEASE ORAL DAILY
Qty: 90 TABLET | Refills: 2 | Status: SHIPPED | OUTPATIENT
Start: 2025-03-18

## 2025-03-22 DIAGNOSIS — E03.9 HYPOTHYROIDISM, UNSPECIFIED TYPE: ICD-10-CM

## 2025-03-24 RX ORDER — LEVOTHYROXINE SODIUM 50 UG/1
50 TABLET ORAL DAILY
Qty: 90 TABLET | Refills: 2 | Status: SHIPPED | OUTPATIENT
Start: 2025-03-24

## 2025-03-31 DIAGNOSIS — M05.9 RHEUMATOID ARTHRITIS WITH RHEUMATOID FACTOR, UNSPECIFIED (HCC): ICD-10-CM

## 2025-03-31 DIAGNOSIS — R10.9 CENTRAL ABDOMINAL PAIN: ICD-10-CM

## 2025-03-31 DIAGNOSIS — R07.2 RETROSTERNAL CHEST PAIN: ICD-10-CM

## 2025-03-31 DIAGNOSIS — K21.9 GASTROESOPHAGEAL REFLUX DISEASE, UNSPECIFIED WHETHER ESOPHAGITIS PRESENT: ICD-10-CM

## 2025-03-31 RX ORDER — HYDROCODONE BITARTRATE AND ACETAMINOPHEN 7.5; 325 MG/1; MG/1
1 TABLET ORAL 3 TIMES DAILY
Qty: 90 TABLET | Refills: 0 | Status: SHIPPED | OUTPATIENT
Start: 2025-03-31 | End: 2025-04-30

## 2025-03-31 NOTE — TELEPHONE ENCOUNTER
Requesting medication refill. Please approve or deny this request.    Rx requested:  Requested Prescriptions     Pending Prescriptions Disp Refills    HYDROcodone-acetaminophen (LORCET PLUS) 7.5-325 MG per tablet 90 tablet 0     Sig: Take 1 tablet by mouth 3 times daily for 30 days. Max Daily Amount: 3 tablets         Last Office Visit:   12/3/2024      Next Visit Date:  Future Appointments   Date Time Provider Department Center   4/2/2025  3:00 PM Kb Banda MD [facility] -   5/5/2025  2:00 PM Frankie Godoy, DO [facility]   8/11/2025  1:00 PM [facility] INFUSION CHAIR 2 [facility] [place]   9/12/2025 11:30 AM Slavik-Bosworth, Kelly J, APRN - CNP [facility]               Last refill 2/27/25. Please approve or deny.

## 2025-04-01 RX ORDER — OMEPRAZOLE 40 MG/1
40 CAPSULE, DELAYED RELEASE ORAL DAILY
Qty: 30 CAPSULE | Refills: 3 | Status: SHIPPED | OUTPATIENT
Start: 2025-04-01

## 2025-04-02 ENCOUNTER — OFFICE VISIT (OUTPATIENT)
Age: 75
End: 2025-04-02
Payer: MEDICARE

## 2025-04-02 VITALS
WEIGHT: 117 LBS | DIASTOLIC BLOOD PRESSURE: 70 MMHG | HEART RATE: 76 BPM | BODY MASS INDEX: 22.11 KG/M2 | SYSTOLIC BLOOD PRESSURE: 120 MMHG

## 2025-04-02 DIAGNOSIS — G47.52 REM SLEEP BEHAVIOR DISORDER: ICD-10-CM

## 2025-04-02 DIAGNOSIS — M05.79 RHEUMATOID ARTHRITIS INVOLVING MULTIPLE SITES WITH POSITIVE RHEUMATOID FACTOR (HCC): ICD-10-CM

## 2025-04-02 DIAGNOSIS — R26.0 ATAXIC GAIT: ICD-10-CM

## 2025-04-02 DIAGNOSIS — G20.A1 PARKINSON'S DISEASE WITHOUT DYSKINESIA OR FLUCTUATING MANIFESTATIONS (HCC): Primary | ICD-10-CM

## 2025-04-02 DIAGNOSIS — R29.2 HYPERREFLEXIA: ICD-10-CM

## 2025-04-02 DIAGNOSIS — M41.9 KYPHOSCOLIOSIS AND SCOLIOSIS: ICD-10-CM

## 2025-04-02 DIAGNOSIS — M47.816 SPONDYLOSIS OF LUMBAR REGION WITHOUT MYELOPATHY OR RADICULOPATHY: ICD-10-CM

## 2025-04-02 PROCEDURE — 1123F ACP DISCUSS/DSCN MKR DOCD: CPT | Performed by: PSYCHIATRY & NEUROLOGY

## 2025-04-02 PROCEDURE — 99214 OFFICE O/P EST MOD 30 MIN: CPT | Performed by: PSYCHIATRY & NEUROLOGY

## 2025-04-02 PROCEDURE — 1159F MED LIST DOCD IN RCRD: CPT | Performed by: PSYCHIATRY & NEUROLOGY

## 2025-04-02 NOTE — PROGRESS NOTES
without myelopathy or radiculopathy        3. Kyphoscoliosis and scoliosis        4. Rheumatoid arthritis involving multiple sites with positive rheumatoid factor (HCC)        5. Hyperreflexia        6. REM sleep behavior disorder        Parkinson's disease optimally treated with carbidopa levodopa half a tablet 4 times a day.  Patient has not any side effects of the same.  Patient does have sedation REM sleep disorder with vivid dreams which occasionally not quite as bothersome.  Patient has not required Klonopin as yet.  Next patient's pain is well-controlled Norco that we gave her.  She this is a quality-of-life issue and optimally treated no red flags are raised.*She is having a dry cough but not related to dysphagia.  This may relate to one of the medications.  Patient is followed closely by rheumatology as well.  At this time not recommend intervention will follow her pain medications as well as carbidopa levodopa.    Kb Banda MD, CULLEN  Diplomate, American Board of Psychiatry & Neurology  Board Certified in Vascular Neurology  Board Certified in Neuromuscular Medicine  Certified in Neurorehabilitation         Plan:      No orders of the defined types were placed in this encounter.    No orders of the defined types were placed in this encounter.      Return in about 4 months (around 8/2/2025).      Kb Banda MD

## 2025-04-29 DIAGNOSIS — M05.9 RHEUMATOID ARTHRITIS WITH RHEUMATOID FACTOR, UNSPECIFIED (HCC): ICD-10-CM

## 2025-04-29 NOTE — TELEPHONE ENCOUNTER
Requesting medication refill. Please approve or deny this request.    Rx requested:  Requested Prescriptions     Pending Prescriptions Disp Refills    HYDROcodone-acetaminophen (LORCET PLUS) 7.5-325 MG per tablet 90 tablet 0     Sig: Take 1 tablet by mouth 3 times daily for 30 days. Max Daily Amount: 3 tablets         Last Office Visit:   4/2/2025      Next Visit Date:  Future Appointments   Date Time Provider Department Center   5/5/2025  2:00 PM Frankie Godoy,  [facility]   7/29/2025  2:30 PM Kb Banda MD [facility] -   8/11/2025  1:00 PM [facility] INFUSION CHAIR 2 [facility]   9/12/2025 11:30 AM Slavik-Bosworth, Kelly J, APRN - CNP [facility]               Last refill 3/31/25. Please approve or deny.

## 2025-04-30 RX ORDER — HYDROCODONE BITARTRATE AND ACETAMINOPHEN 7.5; 325 MG/1; MG/1
1 TABLET ORAL 3 TIMES DAILY
Qty: 90 TABLET | Refills: 0 | Status: SHIPPED | OUTPATIENT
Start: 2025-04-30 | End: 2025-05-30

## 2025-05-05 ENCOUNTER — OFFICE VISIT (OUTPATIENT)
Age: 75
End: 2025-05-05
Payer: MEDICARE

## 2025-05-05 ENCOUNTER — RESULTS FOLLOW-UP (OUTPATIENT)
Age: 75
End: 2025-05-05

## 2025-05-05 VITALS
OXYGEN SATURATION: 100 % | BODY MASS INDEX: 21.92 KG/M2 | WEIGHT: 116 LBS | DIASTOLIC BLOOD PRESSURE: 74 MMHG | HEART RATE: 78 BPM | SYSTOLIC BLOOD PRESSURE: 110 MMHG

## 2025-05-05 DIAGNOSIS — M05.9 RHEUMATOID ARTHRITIS WITH RHEUMATOID FACTOR, UNSPECIFIED (HCC): ICD-10-CM

## 2025-05-05 DIAGNOSIS — M81.0 OSTEOPOROSIS WITHOUT CURRENT PATHOLOGICAL FRACTURE, UNSPECIFIED OSTEOPOROSIS TYPE: ICD-10-CM

## 2025-05-05 DIAGNOSIS — M06.9 RHEUMATOID ARTHRITIS, INVOLVING UNSPECIFIED SITE, UNSPECIFIED WHETHER RHEUMATOID FACTOR PRESENT (HCC): ICD-10-CM

## 2025-05-05 DIAGNOSIS — Z51.81 ENCOUNTER FOR MEDICATION MONITORING: ICD-10-CM

## 2025-05-05 DIAGNOSIS — M06.9 RHEUMATOID ARTHRITIS, INVOLVING UNSPECIFIED SITE, UNSPECIFIED WHETHER RHEUMATOID FACTOR PRESENT (HCC): Primary | ICD-10-CM

## 2025-05-05 DIAGNOSIS — Z79.52 CURRENT CHRONIC USE OF SYSTEMIC STEROIDS: ICD-10-CM

## 2025-05-05 LAB
ALBUMIN SERPL-MCNC: 4.2 G/DL (ref 3.5–4.6)
ALP SERPL-CCNC: 44 U/L (ref 40–130)
ALT SERPL-CCNC: 6 U/L (ref 0–33)
ANION GAP SERPL CALCULATED.3IONS-SCNC: 11 MEQ/L (ref 9–15)
AST SERPL-CCNC: 23 U/L (ref 0–35)
BASOPHILS # BLD: 0 K/UL (ref 0–0.2)
BASOPHILS NFR BLD: 0.9 %
BILIRUB SERPL-MCNC: <0.2 MG/DL (ref 0.2–0.7)
BUN SERPL-MCNC: 20 MG/DL (ref 8–23)
CALCIUM SERPL-MCNC: 9.2 MG/DL (ref 8.5–9.9)
CHLORIDE SERPL-SCNC: 102 MEQ/L (ref 95–107)
CO2 SERPL-SCNC: 30 MEQ/L (ref 20–31)
CREAT SERPL-MCNC: 0.86 MG/DL (ref 0.5–0.9)
CRP SERPL HS-MCNC: <3 MG/L (ref 0–5)
EOSINOPHIL # BLD: 0.1 K/UL (ref 0–0.7)
EOSINOPHIL NFR BLD: 2 %
ERYTHROCYTE [DISTWIDTH] IN BLOOD BY AUTOMATED COUNT: 13.2 % (ref 11.5–14.5)
ERYTHROCYTE [SEDIMENTATION RATE] IN BLOOD BY WESTERGREN METHOD: 3 MM (ref 0–30)
GLOBULIN SER CALC-MCNC: 2.2 G/DL (ref 2.3–3.5)
GLUCOSE SERPL-MCNC: 87 MG/DL (ref 70–99)
HCT VFR BLD AUTO: 40.5 % (ref 37–47)
HGB BLD-MCNC: 13 G/DL (ref 12–16)
LYMPHOCYTES # BLD: 0.8 K/UL (ref 1–4.8)
LYMPHOCYTES NFR BLD: 16.6 %
MCH RBC QN AUTO: 30.2 PG (ref 27–31.3)
MCHC RBC AUTO-ENTMCNC: 32.1 % (ref 33–37)
MCV RBC AUTO: 94.2 FL (ref 79.4–94.8)
MONOCYTES # BLD: 0.6 K/UL (ref 0.2–0.8)
MONOCYTES NFR BLD: 12.2 %
NEUTROPHILS # BLD: 3.1 K/UL (ref 1.4–6.5)
NEUTS SEG NFR BLD: 68.1 %
PLATELET # BLD AUTO: 267 K/UL (ref 130–400)
POTASSIUM SERPL-SCNC: 3.2 MEQ/L (ref 3.4–4.9)
PROT SERPL-MCNC: 6.4 G/DL (ref 6.3–8)
RBC # BLD AUTO: 4.3 M/UL (ref 4.2–5.4)
SODIUM SERPL-SCNC: 143 MEQ/L (ref 135–144)
WBC # BLD AUTO: 4.5 K/UL (ref 4.8–10.8)

## 2025-05-05 PROCEDURE — 1123F ACP DISCUSS/DSCN MKR DOCD: CPT | Performed by: INTERNAL MEDICINE

## 2025-05-05 PROCEDURE — 99214 OFFICE O/P EST MOD 30 MIN: CPT | Performed by: INTERNAL MEDICINE

## 2025-05-05 PROCEDURE — 1125F AMNT PAIN NOTED PAIN PRSNT: CPT | Performed by: INTERNAL MEDICINE

## 2025-05-05 PROCEDURE — 1159F MED LIST DOCD IN RCRD: CPT | Performed by: INTERNAL MEDICINE

## 2025-05-05 RX ORDER — HYDROXYCHLOROQUINE SULFATE 200 MG/1
TABLET, FILM COATED ORAL
Qty: 45 TABLET | Refills: 2 | Status: SHIPPED | OUTPATIENT
Start: 2025-05-05

## 2025-05-05 NOTE — PROGRESS NOTES
Reason for Referral:       No referring provider defined for this encounter.    Chief Complaint   Patient presents with    Follow-up     Patient here today for follow up for Rheumatoid arthritis.           HISTORY OF PRESENT ILLNESS: Ms.Terri JOLLY Marin is a 74 y.o. female returns for reevaluation of her rheumatoid arthritis and osteoporosis.  Received her Prolia injection in February and tolerated this well.  Scheduled again for August.  Still having a lot of pain especially in her PIP joints.  Taking hydroxychloroquine 200 mg daily and will see her eye doctor tomorrow.  Her prednisone is down to 2 mg a day and she definitely feels worse since dropping the dose from 3 mg daily.         Past Medical,Family, and Social History reviewed.  Patient's PMH/PSH,SH,PSYCH Hx, MEDs, ALLERGIES, and ROS were all reviewed and updated in the appropriate sections.    PAST MEDICAL HISTORY:  Past Medical History:   Diagnosis Date    Arthritis     seen dr carmichael    Chronic back pain     seen dr matos in past    Depression     Hypothyroidism     Osteoarthritis     Osteoporosis     Restless legs syndrome     Tachycardia        Past Surgical History:   Procedure Laterality Date    CHOLECYSTECTOMY      COLONOSCOPY N/A 11/22/2022    COLONOSCOPY DIAGNOSTIC performed by Kalpesh Mariscal MD at [facility]    HYSTERECTOMY (CERVIX STATUS UNKNOWN)      Total age 50    JOINT REPLACEMENT      BILATERAL HIPS    KNEE ARTHROSCOPY Right     OVARY REMOVAL      TONSILLECTOMY      UPPER GASTROINTESTINAL ENDOSCOPY N/A 8/26/2024    ESOPHAGOGASTRODUODENOSCOPY with biopsy performed by Bulmaro Sen MD at [facility]       CURRENT MEDICATIONS:    Current Outpatient Medications:     hydroxychloroquine (PLAQUENIL) 200 MG tablet, TAKE 1 TABLET BY MOUTH every Tuesday Thursday Saturday and Sunday and 2 tablets every Monday Wednesday and Friday, Disp: 45 tablet, Rfl: 2    HYDROcodone-acetaminophen (LORCET PLUS) 7.5-325 MG per tablet, Take 1 tablet

## 2025-05-05 NOTE — PATIENT INSTRUCTIONS
Labs today and I will contact you with results    Hydroxychloroquine as follows :  1 tablet every day except for Mondays Wednesdays and Fridays when you will take 2 tablets on those days.  New prescription sent    I want an update from you in the next 2 months.  Let me know if your arthritis especially in your hands feels better with the increased dose of hydroxychloroquine.  Also let your ophthalmologist know that I am adjusting your dose slightly.  This will average out closer to 5 mg/kg based on your weight

## 2025-05-22 ENCOUNTER — OFFICE VISIT (OUTPATIENT)
Dept: PRIMARY CARE CLINIC | Age: 75
End: 2025-05-22
Payer: MEDICARE

## 2025-05-22 VITALS
HEIGHT: 61 IN | OXYGEN SATURATION: 100 % | SYSTOLIC BLOOD PRESSURE: 106 MMHG | DIASTOLIC BLOOD PRESSURE: 70 MMHG | WEIGHT: 118 LBS | TEMPERATURE: 98.9 F | HEART RATE: 97 BPM | BODY MASS INDEX: 22.28 KG/M2

## 2025-05-22 DIAGNOSIS — R73.9 HYPERGLYCEMIA: ICD-10-CM

## 2025-05-22 DIAGNOSIS — F33.1 MAJOR DEPRESSIVE DISORDER, RECURRENT, MODERATE (HCC): ICD-10-CM

## 2025-05-22 DIAGNOSIS — E03.9 HYPOTHYROIDISM, UNSPECIFIED TYPE: ICD-10-CM

## 2025-05-22 DIAGNOSIS — R05.1 ACUTE COUGH: Primary | ICD-10-CM

## 2025-05-22 DIAGNOSIS — J00 ACUTE NASOPHARYNGITIS: ICD-10-CM

## 2025-05-22 DIAGNOSIS — E78.49 OTHER HYPERLIPIDEMIA: ICD-10-CM

## 2025-05-22 DIAGNOSIS — E87.6 HYPOKALEMIA: ICD-10-CM

## 2025-05-22 PROCEDURE — 1159F MED LIST DOCD IN RCRD: CPT | Performed by: INTERNAL MEDICINE

## 2025-05-22 PROCEDURE — 1123F ACP DISCUSS/DSCN MKR DOCD: CPT | Performed by: INTERNAL MEDICINE

## 2025-05-22 PROCEDURE — 99214 OFFICE O/P EST MOD 30 MIN: CPT | Performed by: INTERNAL MEDICINE

## 2025-05-22 RX ORDER — CODEINE PHOSPHATE AND GUAIFENESIN 10; 100 MG/5ML; MG/5ML
5 SOLUTION ORAL 4 TIMES DAILY PRN
Qty: 118 ML | Refills: 0 | Status: SHIPPED | OUTPATIENT
Start: 2025-05-22 | End: 2025-05-29

## 2025-05-22 RX ORDER — CEFUROXIME AXETIL 500 MG/1
500 TABLET ORAL 2 TIMES DAILY
Qty: 20 TABLET | Refills: 0 | Status: SHIPPED | OUTPATIENT
Start: 2025-05-22 | End: 2025-06-01

## 2025-05-22 SDOH — ECONOMIC STABILITY: FOOD INSECURITY: WITHIN THE PAST 12 MONTHS, THE FOOD YOU BOUGHT JUST DIDN'T LAST AND YOU DIDN'T HAVE MONEY TO GET MORE.: NEVER TRUE

## 2025-05-22 SDOH — ECONOMIC STABILITY: FOOD INSECURITY: WITHIN THE PAST 12 MONTHS, YOU WORRIED THAT YOUR FOOD WOULD RUN OUT BEFORE YOU GOT MONEY TO BUY MORE.: NEVER TRUE

## 2025-05-22 ASSESSMENT — PATIENT HEALTH QUESTIONNAIRE - PHQ9
7. TROUBLE CONCENTRATING ON THINGS, SUCH AS READING THE NEWSPAPER OR WATCHING TELEVISION: NOT AT ALL
10. IF YOU CHECKED OFF ANY PROBLEMS, HOW DIFFICULT HAVE THESE PROBLEMS MADE IT FOR YOU TO DO YOUR WORK, TAKE CARE OF THINGS AT HOME, OR GET ALONG WITH OTHER PEOPLE: NOT DIFFICULT AT ALL
SUM OF ALL RESPONSES TO PHQ QUESTIONS 1-9: 0
4. FEELING TIRED OR HAVING LITTLE ENERGY: NOT AT ALL
6. FEELING BAD ABOUT YOURSELF - OR THAT YOU ARE A FAILURE OR HAVE LET YOURSELF OR YOUR FAMILY DOWN: NOT AT ALL
2. FEELING DOWN, DEPRESSED OR HOPELESS: NOT AT ALL
8. MOVING OR SPEAKING SO SLOWLY THAT OTHER PEOPLE COULD HAVE NOTICED. OR THE OPPOSITE, BEING SO FIGETY OR RESTLESS THAT YOU HAVE BEEN MOVING AROUND A LOT MORE THAN USUAL: NOT AT ALL
SUM OF ALL RESPONSES TO PHQ QUESTIONS 1-9: 0
3. TROUBLE FALLING OR STAYING ASLEEP: NOT AT ALL
SUM OF ALL RESPONSES TO PHQ QUESTIONS 1-9: 0
9. THOUGHTS THAT YOU WOULD BE BETTER OFF DEAD, OR OF HURTING YOURSELF: NOT AT ALL
5. POOR APPETITE OR OVEREATING: NOT AT ALL
1. LITTLE INTEREST OR PLEASURE IN DOING THINGS: NOT AT ALL
SUM OF ALL RESPONSES TO PHQ QUESTIONS 1-9: 0

## 2025-05-22 ASSESSMENT — LIFESTYLE VARIABLES
HOW OFTEN DO YOU HAVE A DRINK CONTAINING ALCOHOL: NEVER
HOW MANY STANDARD DRINKS CONTAINING ALCOHOL DO YOU HAVE ON A TYPICAL DAY: PATIENT DOES NOT DRINK

## 2025-05-22 NOTE — PROGRESS NOTES
Krystle Marin 74 y.o. female presents today with   Chief Complaint   Patient presents with    Medicare AWV    Cough     mucus       Depression  Visit Type: follow-up  Patient presents with the following symptoms: anhedonia and depressed mood.  Patient is not experiencing: choking sensation, palpitations and suicidal ideas.  Frequency of symptoms: most days   Severity: mild     Cold Symptoms   This is a new problem. The current episode started in the past 7 days. The problem has been waxing and waning. Associated symptoms include coughing and rhinorrhea. Pertinent negatives include no chest pain or rash.       Past Medical History:   Diagnosis Date    Arthritis     seen dr carmichael    Chronic back pain     seen dr matos in past    Depression     Hypothyroidism     Osteoarthritis     Osteoporosis     Restless legs syndrome     Tachycardia      Patient Active Problem List    Diagnosis Date Noted    Rheumatoid arthritis with rheumatoid factor, unspecified 11/15/2022    Rheumatoid arthritis, unspecified 11/15/2022    Major depressive disorder, recurrent, mild 08/09/2022    Major depressive disorder, recurrent, moderate 08/09/2022    Major depressive disorder, recurrent, unspecified 08/09/2022    Drug-induced constipation 08/02/2022    Age-related osteoporosis without current pathological fracture 01/02/2025    Hypokalemia 12/03/2024    Chronic pain syndrome 12/03/2024    Retrosternal chest pain 08/05/2024    GERD (gastroesophageal reflux disease) 08/05/2024    Central abdominal pain 08/05/2024    Post-op pain 11/30/2023    Status post total replacement of left hip 11/30/2023    History of total knee replacement 08/27/2023    Status post total knee replacement, left 08/27/2023    Hip pain, acute 07/28/2023    Osteoarthritis of hip 07/28/2023    Spinal stenosis of lumbar region with neurogenic claudication 04/03/2023    Levoscoliosis of thoracic spine 04/03/2023    Dupuytren's contracture 03/02/2022    Localized, primary

## 2025-05-29 DIAGNOSIS — M05.9 RHEUMATOID ARTHRITIS WITH RHEUMATOID FACTOR, UNSPECIFIED (HCC): ICD-10-CM

## 2025-05-29 RX ORDER — HYDROCODONE BITARTRATE AND ACETAMINOPHEN 7.5; 325 MG/1; MG/1
1 TABLET ORAL 3 TIMES DAILY
Qty: 90 TABLET | Refills: 0 | Status: SHIPPED | OUTPATIENT
Start: 2025-05-29 | End: 2025-06-28

## 2025-05-29 ASSESSMENT — ENCOUNTER SYMPTOMS
COUGH: 1
PHOTOPHOBIA: 0
FACIAL SWELLING: 0
RHINORRHEA: 1
BLOOD IN STOOL: 0
ABDOMINAL DISTENTION: 0
APNEA: 0
CHOKING: 0

## 2025-05-29 NOTE — TELEPHONE ENCOUNTER
Requesting medication refill. Please approve or deny this request.    Rx requested:  Requested Prescriptions     Pending Prescriptions Disp Refills    HYDROcodone-acetaminophen (LORCET PLUS) 7.5-325 MG per tablet 90 tablet 0     Sig: Take 1 tablet by mouth 3 times daily for 30 days. Max Daily Amount: 3 tablets         Last Office Visit:   4/2/2025      Next Visit Date:  Future Appointments   Date Time Provider Department Center   7/29/2025  2:30 PM Kb Banda MD [facility] -   8/5/2025  2:30 PM Frankie Godoy, DO [facility]   8/11/2025  1:00 PM [facility] 2 [facility]   9/12/2025 11:30 AM Slavik-Bosworth, Kelly J, APRN - CNP [facility]               Last refill 4/30/25. Please approve or deny.

## 2025-05-30 DIAGNOSIS — M54.50 LOW BACK PAIN RADIATING TO RIGHT LEG: ICD-10-CM

## 2025-05-30 DIAGNOSIS — M79.604 LOW BACK PAIN RADIATING TO RIGHT LEG: ICD-10-CM

## 2025-05-30 RX ORDER — CYCLOBENZAPRINE HCL 10 MG
TABLET ORAL
Qty: 60 TABLET | Refills: 5 | Status: SHIPPED | OUTPATIENT
Start: 2025-05-30

## 2025-05-30 NOTE — TELEPHONE ENCOUNTER
Comments:     Last Office Visit (last PCP visit):   5/22/2025    Next Visit Date:  Future Appointments   Date Time Provider Department Center   7/29/2025  2:30 PM Kb Banda MD [facility] -   8/5/2025  2:30 PM Frankie Godoy,  [facility]   8/11/2025  1:00 PM [facility]ION [facility] 2 [facility]   9/12/2025 11:30 AM Slavik-Bosworth, Kelly J, APRN - CNP [facility]       **If hasn't been seen in over a year OR hasn't followed up according to last diabetes/ADHD visit, make appointment for patient before sending refill to provider.    Rx requested:  Requested Prescriptions     Pending Prescriptions Disp Refills    cyclobenzaprine (FLEXERIL) 10 MG tablet [Pharmacy Med Name: cyclobenzaprine 10 mg tablet] 60 tablet 5     Sig: TAKE 1 TABLET BY MOUTH TWICE DAILY AS NEEDED for muscle spasms

## 2025-06-18 DIAGNOSIS — Z79.52 CURRENT CHRONIC USE OF SYSTEMIC STEROIDS: ICD-10-CM

## 2025-06-18 DIAGNOSIS — M06.9 RHEUMATOID ARTHRITIS, INVOLVING UNSPECIFIED SITE, UNSPECIFIED WHETHER RHEUMATOID FACTOR PRESENT (HCC): ICD-10-CM

## 2025-06-18 RX ORDER — PREDNISONE 1 MG/1
TABLET ORAL
Qty: 60 TABLET | Refills: 3 | Status: SHIPPED | OUTPATIENT
Start: 2025-06-18

## 2025-06-18 NOTE — TELEPHONE ENCOUNTER
Patient reports taking 2 tables daily    Rx requested:  Requested Prescriptions     Pending Prescriptions Disp Refills    predniSONE (DELTASONE) 1 MG tablet 60 tablet 3     Sig: Take 2 tablets by mouth in the morning with food       Last Office Visit:   5/5/2025    Last Tox screen:      Last Medication contract:      Last refilled on :      Next Visit Date:  Future Appointments   Date Time Provider Department Center   7/29/2025  2:30 PM Kb Banda MD [facility] -   8/5/2025  2:30 PM Frankie Godoy,  [facility]   8/11/2025  1:00 PM [facility]USION CHAIR 2 [facility]   9/12/2025 11:30 AM Slavik-Bosworth, Kelly J, APRN - CNP [facility]

## 2025-06-19 ENCOUNTER — TELEPHONE (OUTPATIENT)
Age: 75
End: 2025-06-19

## 2025-06-20 DIAGNOSIS — R60.0 EDEMA OF LEFT LOWER LEG: ICD-10-CM

## 2025-06-20 RX ORDER — TRIAMTERENE AND HYDROCHLOROTHIAZIDE 37.5; 25 MG/1; MG/1
1 CAPSULE ORAL DAILY
Qty: 30 CAPSULE | Refills: 6 | Status: SHIPPED | OUTPATIENT
Start: 2025-06-20

## 2025-06-27 DIAGNOSIS — M05.9 RHEUMATOID ARTHRITIS WITH RHEUMATOID FACTOR, UNSPECIFIED (HCC): ICD-10-CM

## 2025-06-28 RX ORDER — HYDROCODONE BITARTRATE AND ACETAMINOPHEN 7.5; 325 MG/1; MG/1
1 TABLET ORAL 3 TIMES DAILY
Qty: 90 TABLET | Refills: 0 | Status: SHIPPED | OUTPATIENT
Start: 2025-06-28 | End: 2025-07-28

## 2025-07-07 ENCOUNTER — OFFICE VISIT (OUTPATIENT)
Dept: PRIMARY CARE CLINIC | Age: 75
End: 2025-07-07

## 2025-07-07 VITALS
WEIGHT: 119.6 LBS | HEART RATE: 84 BPM | HEIGHT: 61 IN | BODY MASS INDEX: 22.58 KG/M2 | DIASTOLIC BLOOD PRESSURE: 60 MMHG | TEMPERATURE: 97.3 F | OXYGEN SATURATION: 96 % | RESPIRATION RATE: 17 BRPM | SYSTOLIC BLOOD PRESSURE: 106 MMHG

## 2025-07-07 DIAGNOSIS — Z12.31 VISIT FOR SCREENING MAMMOGRAM: ICD-10-CM

## 2025-07-07 DIAGNOSIS — M10.9 ACUTE GOUT OF LEFT ANKLE, UNSPECIFIED CAUSE: Primary | ICD-10-CM

## 2025-07-07 RX ORDER — PREDNISONE 20 MG/1
20 TABLET ORAL 2 TIMES DAILY
Qty: 12 TABLET | Refills: 0 | Status: SHIPPED | OUTPATIENT
Start: 2025-07-07 | End: 2025-07-13

## 2025-07-07 RX ORDER — METHYLPREDNISOLONE ACETATE 80 MG/ML
60 INJECTION, SUSPENSION INTRA-ARTICULAR; INTRALESIONAL; INTRAMUSCULAR; SOFT TISSUE ONCE
Status: COMPLETED | OUTPATIENT
Start: 2025-07-07 | End: 2025-07-07

## 2025-07-07 RX ADMIN — METHYLPREDNISOLONE ACETATE 60 MG: 80 INJECTION, SUSPENSION INTRA-ARTICULAR; INTRALESIONAL; INTRAMUSCULAR; SOFT TISSUE at 13:48

## 2025-07-07 ASSESSMENT — ENCOUNTER SYMPTOMS: COLOR CHANGE: 1

## 2025-07-07 NOTE — PROGRESS NOTES
Subjective:      Patient ID: Krystle Marin is a 74 y.o. female    Ankle pain and swelling  x 3 days   HPI  Pt presents with 3 days of sudden onset of left ankle swelling and sharp pain rated 7/10. Aggravated with weight-bearing and just the bed sheets. No improvement with ice pack application. No antecedent trauma. Attests to antecedent alcohol and red meat consumption. No hx of gout. Currently on daily Maxzide and metoprolol.    Past Medical History:   Diagnosis Date    Arthritis     seen dr carmichael    Chronic back pain     seen dr matos in past    Depression     Hypothyroidism     Osteoarthritis     Osteoporosis     Restless legs syndrome     Tachycardia      Past Surgical History:   Procedure Laterality Date    CHOLECYSTECTOMY      COLONOSCOPY N/A 11/22/2022    COLONOSCOPY DIAGNOSTIC performed by Kalpesh Mariscal MD at [facility]    HYSTERECTOMY (CERVIX STATUS UNKNOWN)      Total age 50    JOINT REPLACEMENT      BILATERAL HIPS    KNEE ARTHROSCOPY Right     OVARY REMOVAL      TONSILLECTOMY      UPPER GASTROINTESTINAL ENDOSCOPY N/A 8/26/2024    ESOPHAGOGASTRODUODENOSCOPY with biopsy performed by Bulmaro Sen MD at [facility]     Social History     Socioeconomic History    Marital status:      Spouse name: Not on file    Number of children: Not on file    Years of education: Not on file    Highest education level: Not on file   Occupational History    Not on file   Tobacco Use    Smoking status: Never    Smokeless tobacco: Never   Vaping Use    Vaping status: Never Used   Substance and Sexual Activity    Alcohol use: Yes     Alcohol/week: 3.0 standard drinks of alcohol     Types: 3 Glasses of wine per week    Drug use: No    Sexual activity: Yes     Partners: Male   Other Topics Concern    Not on file   Social History Narrative    Not on file     Social Drivers of Health     Financial Resource Strain: Low Risk  (7/1/2024)    Overall Financial Resource Strain (CARDIA)     Difficulty of Paying

## 2025-07-14 ENCOUNTER — HOSPITAL ENCOUNTER (OUTPATIENT)
Dept: ORTHOPEDIC SURGERY | Age: 75
Discharge: HOME | End: 2025-07-16
Payer: MEDICARE

## 2025-07-14 ENCOUNTER — OFFICE VISIT (OUTPATIENT)
Age: 75
End: 2025-07-14
Payer: MEDICARE

## 2025-07-14 VITALS
DIASTOLIC BLOOD PRESSURE: 68 MMHG | BODY MASS INDEX: 21.92 KG/M2 | SYSTOLIC BLOOD PRESSURE: 110 MMHG | OXYGEN SATURATION: 96 % | HEART RATE: 72 BPM | WEIGHT: 116 LBS

## 2025-07-14 DIAGNOSIS — M06.9 RHEUMATOID ARTHRITIS, INVOLVING UNSPECIFIED SITE, UNSPECIFIED WHETHER RHEUMATOID FACTOR PRESENT (HCC): ICD-10-CM

## 2025-07-14 DIAGNOSIS — S93.692A RUPTURE OF PLANTAR FASCIA OF LEFT FOOT, INITIAL ENCOUNTER: ICD-10-CM

## 2025-07-14 DIAGNOSIS — M79.672 LEFT FOOT PAIN: ICD-10-CM

## 2025-07-14 DIAGNOSIS — M79.672 LEFT FOOT PAIN: Primary | ICD-10-CM

## 2025-07-14 LAB
ALBUMIN SERPL-MCNC: 4.5 G/DL (ref 3.5–4.6)
ALP SERPL-CCNC: 76 U/L (ref 40–130)
ALT SERPL-CCNC: <5 U/L (ref 0–33)
ANION GAP SERPL CALCULATED.3IONS-SCNC: 11 MEQ/L (ref 9–15)
AST SERPL-CCNC: 19 U/L (ref 0–35)
BASOPHILS # BLD: 0 K/UL (ref 0–0.2)
BASOPHILS NFR BLD: 0.1 %
BILIRUB SERPL-MCNC: 0.3 MG/DL (ref 0.2–0.7)
BUN SERPL-MCNC: 30 MG/DL (ref 8–23)
CALCIUM SERPL-MCNC: 9.4 MG/DL (ref 8.5–9.9)
CHLORIDE SERPL-SCNC: 97 MEQ/L (ref 95–107)
CO2 SERPL-SCNC: 29 MEQ/L (ref 20–31)
CREAT SERPL-MCNC: 0.71 MG/DL (ref 0.5–0.9)
CRP SERPL HS-MCNC: <3 MG/L (ref 0–5)
EOSINOPHIL # BLD: 0 K/UL (ref 0–0.7)
EOSINOPHIL NFR BLD: 0 %
ERYTHROCYTE [DISTWIDTH] IN BLOOD BY AUTOMATED COUNT: 13.2 % (ref 11.5–14.5)
ERYTHROCYTE [SEDIMENTATION RATE] IN BLOOD BY WESTERGREN METHOD: 1 MM (ref 0–30)
GLOBULIN SER CALC-MCNC: 2.3 G/DL (ref 2.3–3.5)
GLUCOSE SERPL-MCNC: 82 MG/DL (ref 70–99)
HCT VFR BLD AUTO: 40.5 % (ref 37–47)
HGB BLD-MCNC: 13.1 G/DL (ref 12–16)
LYMPHOCYTES # BLD: 0.7 K/UL (ref 1–4.8)
LYMPHOCYTES NFR BLD: 8.5 %
MCH RBC QN AUTO: 29.8 PG (ref 27–31.3)
MCHC RBC AUTO-ENTMCNC: 32.3 % (ref 33–37)
MCV RBC AUTO: 92 FL (ref 79.4–94.8)
MONOCYTES # BLD: 1 K/UL (ref 0.2–0.8)
MONOCYTES NFR BLD: 11.3 %
NEUTROPHILS # BLD: 6.8 K/UL (ref 1.4–6.5)
NEUTS SEG NFR BLD: 78.8 %
PLATELET # BLD AUTO: 341 K/UL (ref 130–400)
POTASSIUM SERPL-SCNC: 3.2 MEQ/L (ref 3.4–4.9)
PROT SERPL-MCNC: 6.8 G/DL (ref 6.3–8)
RBC # BLD AUTO: 4.4 M/UL (ref 4.2–5.4)
SODIUM SERPL-SCNC: 137 MEQ/L (ref 135–144)
URATE SERPL-MCNC: 3.7 MG/DL (ref 2.4–5.7)
WBC # BLD AUTO: 8.7 K/UL (ref 4.8–10.8)

## 2025-07-14 PROCEDURE — 99214 OFFICE O/P EST MOD 30 MIN: CPT | Performed by: INTERNAL MEDICINE

## 2025-07-14 PROCEDURE — 1159F MED LIST DOCD IN RCRD: CPT | Performed by: INTERNAL MEDICINE

## 2025-07-14 PROCEDURE — 73620 X-RAY EXAM OF FOOT: CPT

## 2025-07-14 PROCEDURE — 1125F AMNT PAIN NOTED PAIN PRSNT: CPT | Performed by: INTERNAL MEDICINE

## 2025-07-14 PROCEDURE — 1123F ACP DISCUSS/DSCN MKR DOCD: CPT | Performed by: INTERNAL MEDICINE

## 2025-07-14 NOTE — PROGRESS NOTES
Reason for Referral:       No referring provider defined for this encounter.    Chief Complaint   Patient presents with    Follow-up     Patient here today for follow up for pain in left foot.           HISTORY OF PRESENT ILLNESS: Ms.Terri JOLLY Marin is a 74 y.o. female today with a history of recent left foot pain.  For the past 2 weeks she describes pain in her left foot.  While on a trip to [place] over the Fourth of July weekend she developed sudden swelling and intense pain in her left foot and ankle.  She had difficulty walking on it.  When she got back, she saw Dr. Koch 1 week ago and and was treated for an acute gout flare with prednisone.  The swelling and redness went away but she continues to have pain in the left heel especially when she first steps on her foot.  She did not have an x-ray done and did not have the blood work.  She describes pain where her arch meets her heel and is difficulty walking because of this.    Also since increasing her hydroxychloroquine but decreasing her prednisone, she has noticed more pain in her fingers.       Past Medical,Family, and Social History reviewed.  Patient's PMH/PSH,SH,PSYCH Hx, MEDs, ALLERGIES, and ROS were all reviewed and updated in the appropriate sections.    PAST MEDICAL HISTORY:  Past Medical History:   Diagnosis Date    Arthritis     seen dr carmichael    Chronic back pain     seen dr matos in past    Depression     Hypothyroidism     Osteoarthritis     Osteoporosis     Restless legs syndrome     Tachycardia        Past Surgical History:   Procedure Laterality Date    CHOLECYSTECTOMY      COLONOSCOPY N/A 11/22/2022    COLONOSCOPY DIAGNOSTIC performed by Kalpesh Mariscal MD at [facility]    HYSTERECTOMY (CERVIX STATUS UNKNOWN)      Total age 50    JOINT REPLACEMENT      BILATERAL HIPS    KNEE ARTHROSCOPY Right     OVARY REMOVAL      TONSILLECTOMY      UPPER GASTROINTESTINAL ENDOSCOPY N/A 8/26/2024    ESOPHAGOGASTRODUODENOSCOPY with biopsy performed

## 2025-07-14 NOTE — PATIENT INSTRUCTIONS
Labs today and I will contact you with results    X-rays of left foot I will contact you with results    I may refer you to physical therapy for your left foot and I may also refer you to podiatry once I see all results.    For your stretching exercises try wrapping a towel around the end of your foot and pulling backwards to stretch the bottom of your foot.  You may also roll your heel on a ball such as a golf ball or tennis ball and may try stretching next to the wall.  Try using a frozen cup of ice such as a Lou cup on the heel for about 20 minutes at a time several times a day.    For your prednisone, go up to 4 mg a day for 1 week and then starting next Monday go to 3 mg a day and stay on that for a week and give me an update in about 2 weeks.  I will tell you what dose to be on before your next visit in August

## 2025-07-15 ENCOUNTER — RESULTS FOLLOW-UP (OUTPATIENT)
Age: 75
End: 2025-07-15

## 2025-07-16 ENCOUNTER — TELEPHONE (OUTPATIENT)
Age: 75
End: 2025-07-16

## 2025-07-17 DIAGNOSIS — M72.2 PLANTAR FASCIA SYNDROME: Primary | ICD-10-CM

## 2025-07-24 DIAGNOSIS — F32.A DEPRESSION, UNSPECIFIED DEPRESSION TYPE: ICD-10-CM

## 2025-07-24 RX ORDER — DULOXETIN HYDROCHLORIDE 60 MG/1
60 CAPSULE, DELAYED RELEASE ORAL EVERY MORNING
Qty: 30 CAPSULE | Refills: 6 | Status: SHIPPED | OUTPATIENT
Start: 2025-07-24

## 2025-07-28 ENCOUNTER — TELEPHONE (OUTPATIENT)
Age: 75
End: 2025-07-28

## 2025-07-28 NOTE — TELEPHONE ENCOUNTER
Update-Patient reports that she has been taking  3 tablets of prednisone 1MG and foot is feeling better. Calling today to get in with physical therapy

## 2025-07-28 NOTE — TELEPHONE ENCOUNTER
Patient informed stay on current dose of prednisone as she is doing and begin physical therapy when she can.

## 2025-07-29 ENCOUNTER — OFFICE VISIT (OUTPATIENT)
Age: 75
End: 2025-07-29
Payer: MEDICARE

## 2025-07-29 VITALS
DIASTOLIC BLOOD PRESSURE: 62 MMHG | SYSTOLIC BLOOD PRESSURE: 104 MMHG | HEART RATE: 88 BPM | BODY MASS INDEX: 22.11 KG/M2 | WEIGHT: 117 LBS

## 2025-07-29 DIAGNOSIS — R29.2 HYPERREFLEXIA: ICD-10-CM

## 2025-07-29 DIAGNOSIS — Z79.899 ENCOUNTER FOR LONG-TERM (CURRENT) USE OF MEDICATIONS: ICD-10-CM

## 2025-07-29 DIAGNOSIS — M48.062 SPINAL STENOSIS OF LUMBAR REGION WITH NEUROGENIC CLAUDICATION: ICD-10-CM

## 2025-07-29 DIAGNOSIS — M05.9 RHEUMATOID ARTHRITIS WITH RHEUMATOID FACTOR, UNSPECIFIED (HCC): ICD-10-CM

## 2025-07-29 DIAGNOSIS — G89.4 CHRONIC PAIN SYNDROME: ICD-10-CM

## 2025-07-29 DIAGNOSIS — G20.A1 PARKINSON'S DISEASE WITHOUT DYSKINESIA OR FLUCTUATING MANIFESTATIONS (HCC): Primary | ICD-10-CM

## 2025-07-29 PROCEDURE — 99214 OFFICE O/P EST MOD 30 MIN: CPT | Performed by: PSYCHIATRY & NEUROLOGY

## 2025-07-29 PROCEDURE — 1125F AMNT PAIN NOTED PAIN PRSNT: CPT | Performed by: PSYCHIATRY & NEUROLOGY

## 2025-07-29 PROCEDURE — 1159F MED LIST DOCD IN RCRD: CPT | Performed by: PSYCHIATRY & NEUROLOGY

## 2025-07-29 PROCEDURE — 1123F ACP DISCUSS/DSCN MKR DOCD: CPT | Performed by: PSYCHIATRY & NEUROLOGY

## 2025-07-29 RX ORDER — HYDROCODONE BITARTRATE AND ACETAMINOPHEN 7.5; 325 MG/1; MG/1
1 TABLET ORAL 3 TIMES DAILY
Qty: 90 TABLET | Refills: 0 | Status: SHIPPED | OUTPATIENT
Start: 2025-07-29 | End: 2025-08-28

## 2025-07-29 RX ORDER — HYDROCODONE BITARTRATE AND ACETAMINOPHEN 7.5; 325 MG/1; MG/1
1 TABLET ORAL 3 TIMES DAILY
COMMUNITY

## 2025-07-29 ASSESSMENT — ENCOUNTER SYMPTOMS
COLOR CHANGE: 0
BACK PAIN: 1
CHOKING: 0
NAUSEA: 0
VOMITING: 0
PHOTOPHOBIA: 0
TROUBLE SWALLOWING: 0
SHORTNESS OF BREATH: 0

## 2025-07-29 NOTE — PROGRESS NOTES
Subjective:      Patient ID: Krystle Marin is a 74 y.o. female who presents today for:  Chief Complaint   Patient presents with    Follow-up     Pt states everything is about the same. No questions or concerns at this time.        HPI 74-year-old right-handed female with history of parkinson's disease.  Patient also has significant lumbar spondylosis and kyphoscoliosis.  Patient has rheumatoid arthritis.  Patient has hyperreflexia.  We have her on hydrocodone 3 times a day for considerable pain which is a failed back syndrome.  We have her on carbidopa levodopa half a tablet 4 times a day.  She is not able to tolerate higher doses due to severe nausea.  She denies remained stable and has not any falls.  She denies any hallucinations dyskinesias or recent falls.  Patient does have REM sleep disorders and we had her on Klonopin     Patient's main issues actually only appears to be her arthritis.  From neurological standpoint patient has been stable..  Past Medical History:   Diagnosis Date    Arthritis     seen dr carmichael    Chronic back pain     seen dr matos in past    Depression     Hypothyroidism     Osteoarthritis     Osteoporosis     Restless legs syndrome     Tachycardia      Past Surgical History:   Procedure Laterality Date    CHOLECYSTECTOMY      COLONOSCOPY N/A 11/22/2022    COLONOSCOPY DIAGNOSTIC performed by Kalpesh Mariscal MD at [facility]    HYSTERECTOMY (CERVIX STATUS UNKNOWN)      Total age 50    JOINT REPLACEMENT      BILATERAL HIPS    KNEE ARTHROSCOPY Right     OVARY REMOVAL      TONSILLECTOMY      UPPER GASTROINTESTINAL ENDOSCOPY N/A 8/26/2024    ESOPHAGOGASTRODUODENOSCOPY with biopsy performed by Bulmaro Sen MD at [facility]     Social History     Socioeconomic History    Marital status:      Spouse name: Not on file    Number of children: Not on file    Years of education: Not on file    Highest education level: Not on file   Occupational History    Not on file   Tobacco

## 2025-07-30 ENCOUNTER — TELEPHONE (OUTPATIENT)
Dept: GASTROENTEROLOGY | Age: 75
End: 2025-07-30

## 2025-07-30 DIAGNOSIS — R07.2 RETROSTERNAL CHEST PAIN: ICD-10-CM

## 2025-07-30 DIAGNOSIS — R10.9 CENTRAL ABDOMINAL PAIN: ICD-10-CM

## 2025-07-30 DIAGNOSIS — K21.9 GASTROESOPHAGEAL REFLUX DISEASE, UNSPECIFIED WHETHER ESOPHAGITIS PRESENT: ICD-10-CM

## 2025-07-30 DIAGNOSIS — Z79.899 ENCOUNTER FOR LONG-TERM (CURRENT) USE OF MEDICATIONS: ICD-10-CM

## 2025-07-30 LAB
REASON FOR REJECTION: NORMAL
REJECTED TEST: 7479

## 2025-07-30 RX ORDER — OMEPRAZOLE 40 MG/1
40 CAPSULE, DELAYED RELEASE ORAL DAILY
Qty: 30 CAPSULE | Refills: 3 | Status: SHIPPED | OUTPATIENT
Start: 2025-07-30

## 2025-07-31 ENCOUNTER — TELEPHONE (OUTPATIENT)
Age: 75
End: 2025-07-31

## 2025-07-31 NOTE — TELEPHONE ENCOUNTER
FYI      [facility] called stating the pts opiates serum was rejected due to the tube spilling in the transfer bag, so not enough of the sample. Please advise.

## 2025-08-03 LAB
6MAM SERPL-MCNC: <2 NG/ML
CODEINE SERPL-MCNC: <2 NG/ML
HYDROCODONE SERPL-MCNC: 14 NG/ML
HYDROMORPHONE SERPL-MCNC: <2 NG/ML
MORPHINE SERPL-MCNC: <2 NG/ML
OXYCODONE SERPL-MCNC: <2 NG/ML
OXYMORPHONE SERPL-MCNC: <2 NG/ML

## 2025-08-05 ENCOUNTER — OFFICE VISIT (OUTPATIENT)
Age: 75
End: 2025-08-05
Payer: MEDICARE

## 2025-08-05 VITALS
BODY MASS INDEX: 22.11 KG/M2 | SYSTOLIC BLOOD PRESSURE: 110 MMHG | HEART RATE: 88 BPM | OXYGEN SATURATION: 98 % | WEIGHT: 117 LBS | DIASTOLIC BLOOD PRESSURE: 68 MMHG

## 2025-08-05 DIAGNOSIS — M79.672 LEFT FOOT PAIN: ICD-10-CM

## 2025-08-05 DIAGNOSIS — Z79.52 CURRENT CHRONIC USE OF SYSTEMIC STEROIDS: ICD-10-CM

## 2025-08-05 DIAGNOSIS — M06.9 RHEUMATOID ARTHRITIS, INVOLVING UNSPECIFIED SITE, UNSPECIFIED WHETHER RHEUMATOID FACTOR PRESENT (HCC): Primary | ICD-10-CM

## 2025-08-05 PROCEDURE — 99213 OFFICE O/P EST LOW 20 MIN: CPT | Performed by: INTERNAL MEDICINE

## 2025-08-05 PROCEDURE — 1159F MED LIST DOCD IN RCRD: CPT | Performed by: INTERNAL MEDICINE

## 2025-08-05 PROCEDURE — 1125F AMNT PAIN NOTED PAIN PRSNT: CPT | Performed by: INTERNAL MEDICINE

## 2025-08-05 PROCEDURE — 1123F ACP DISCUSS/DSCN MKR DOCD: CPT | Performed by: INTERNAL MEDICINE

## 2025-08-05 RX ORDER — HYDROXYCHLOROQUINE SULFATE 200 MG/1
TABLET, FILM COATED ORAL
Qty: 135 TABLET | Refills: 1 | Status: SHIPPED | OUTPATIENT
Start: 2025-08-05

## 2025-08-05 RX ORDER — PREDNISONE 1 MG/1
TABLET ORAL
Qty: 270 TABLET | Refills: 1 | Status: SHIPPED | OUTPATIENT
Start: 2025-08-05

## 2025-08-11 ENCOUNTER — HOSPITAL ENCOUNTER (OUTPATIENT)
Dept: INFUSION THERAPY | Age: 75
Setting detail: INFUSION SERIES
Discharge: HOME | End: 2025-08-11
Payer: MEDICARE

## 2025-08-11 VITALS
HEART RATE: 93 BPM | SYSTOLIC BLOOD PRESSURE: 102 MMHG | DIASTOLIC BLOOD PRESSURE: 68 MMHG | TEMPERATURE: 98.2 F | RESPIRATION RATE: 18 BRPM | OXYGEN SATURATION: 95 %

## 2025-08-11 DIAGNOSIS — M81.0 AGE-RELATED OSTEOPOROSIS WITHOUT CURRENT PATHOLOGICAL FRACTURE: Primary | ICD-10-CM

## 2025-08-11 PROCEDURE — 6360000002 HC RX W HCPCS: Performed by: INTERNAL MEDICINE

## 2025-08-11 PROCEDURE — 96401 CHEMO ANTI-NEOPL SQ/IM: CPT

## 2025-08-11 RX ORDER — ACETAMINOPHEN 325 MG/1
650 TABLET ORAL
Status: CANCELLED | OUTPATIENT
Start: 2025-12-28

## 2025-08-11 RX ORDER — DIPHENHYDRAMINE HYDROCHLORIDE 50 MG/ML
50 INJECTION, SOLUTION INTRAMUSCULAR; INTRAVENOUS
Status: CANCELLED | OUTPATIENT
Start: 2025-12-28

## 2025-08-11 RX ORDER — SODIUM CHLORIDE 9 MG/ML
INJECTION, SOLUTION INTRAVENOUS CONTINUOUS
Status: CANCELLED | OUTPATIENT
Start: 2025-12-28

## 2025-08-11 RX ORDER — HYDROCORTISONE SODIUM SUCCINATE 100 MG/2ML
100 INJECTION INTRAMUSCULAR; INTRAVENOUS
Status: CANCELLED | OUTPATIENT
Start: 2025-12-28

## 2025-08-11 RX ORDER — ALBUTEROL SULFATE 90 UG/1
4 INHALANT RESPIRATORY (INHALATION) PRN
Status: CANCELLED | OUTPATIENT
Start: 2025-12-28

## 2025-08-11 RX ORDER — ONDANSETRON 2 MG/ML
8 INJECTION INTRAMUSCULAR; INTRAVENOUS
Status: CANCELLED | OUTPATIENT
Start: 2025-12-28

## 2025-08-11 RX ORDER — EPINEPHRINE 1 MG/ML
0.3 INJECTION, SOLUTION, CONCENTRATE INTRAVENOUS PRN
Status: CANCELLED | OUTPATIENT
Start: 2025-12-28

## 2025-08-11 RX ADMIN — DENOSUMAB 60 MG: 60 INJECTION SUBCUTANEOUS at 13:10

## 2025-08-11 ASSESSMENT — PAIN SCALES - GENERAL: PAINLEVEL_OUTOF10: 0

## 2025-09-04 ENCOUNTER — TELEPHONE (OUTPATIENT)
Dept: PRIMARY CARE CLINIC | Age: 75
End: 2025-09-04

## (undated) DEVICE — TUBING, IRRIGATION, HIGH FLOW, HAND PIECE SET

## (undated) DEVICE — NEEDLE, SPINAL, QUINCKE, 18 G X 3.5 IN, PINK HUB

## (undated) DEVICE — MANIFOLD, 4 PORT NEPTUNE STANDARD

## (undated) DEVICE — TUBING, SUCTION, 1/4" X 10', STRAIGHT: Brand: MEDLINE

## (undated) DEVICE — STRAP, ARM BOARD, 32 X 1.5

## (undated) DEVICE — ENDO CARRY-ON PROCEDURE KIT: Brand: ENDO CARRY-ON PROCEDURE KIT

## (undated) DEVICE — FORCEPS BX L240CM JAW DIA2.4MM WRK CHN 2.8MM ORNG L CAP W/

## (undated) DEVICE — CONMED SCOPE SAVER BITE BLOCK, 20X27 MM: Brand: SCOPE SAVER

## (undated) DEVICE — GOWN, SURGICAL, ROYAL SILK, LG, STERILE

## (undated) DEVICE — Device: Brand: ENDO SMARTCAP

## (undated) DEVICE — SINGLE PORT MANIFOLD: Brand: NEPTUNE 2

## (undated) DEVICE — STRAP, VELCRO, BODY, 4 X 60IN, NS

## (undated) DEVICE — TUBE SET 96 MM 64 MM H2O PERISTALTIC STD AUX CHANNEL

## (undated) DEVICE — TRAY, MINOR, SINGLE BASIN, STERILE

## (undated) DEVICE — SUTURE, MONOCRYL, 4-0, 27 IN, PS-2, UNDYED

## (undated) DEVICE — POSITIONER, CRADLE, HEAD, MEDC, FOAM SLOTTED

## (undated) DEVICE — RETRIEVER, SUTURE

## (undated) DEVICE — SUTURE, MONOCRYL, 3-0, 36 IN, CT-1, UNDYED

## (undated) DEVICE — TOWEL PACK 10-PK

## (undated) DEVICE — COVER, MAYO STAND, W/PAD, 23 IN, DISPOSABLE, PLASTIC, LF, STERILE

## (undated) DEVICE — SUTURE, VICRYL, 1, 36 IN, V-34, VIOLET

## (undated) DEVICE — WOUND SYSTEM, DEBRIDEMENT & CLEANING, O.R DUOPAK

## (undated) DEVICE — GLOVE ORANGE PI 8 1/2   MSG9085

## (undated) DEVICE — DRAPE, SHEET, HD TOP BAR , 27.75 X 16 IN

## (undated) DEVICE — GLOVE ORTHO 8   MSG9480

## (undated) DEVICE — SYRINGE, 50 CC, LUER LOCK

## (undated) DEVICE — BLADE, SAW, RECIPROCATING, SHORT

## (undated) DEVICE — PROTECTOR, NERVE, ULNAR, PINK

## (undated) DEVICE — SOLUTION, INJECTION, USP, SODIUM CHLORIDE 0.9%, .9, NACL, 1000 ML, BAG

## (undated) DEVICE — NEEDLE, SPINAL, 20 G X 3.5 IN, YELLOW HUB

## (undated) DEVICE — HOOD, SURGICAL, FLYTE, T7

## (undated) DEVICE — DRESSING, MEPILEX BORDER, POST-OP AG, 4 X 10 IN

## (undated) DEVICE — PREP, IODOPHOR, W/ALCOHOL, DURAPREP, W/APPLICATOR, 26 CC

## (undated) DEVICE — GLOVE, SURGICAL, PROTEXIS PI , 7.5, PF, LF

## (undated) DEVICE — Device

## (undated) DEVICE — SUTURE, ETHIBOND, P2, V-37, 30 IN, GREEN

## (undated) DEVICE — BRUSH ENDO CLN L90.5IN SHTH DIA1.7MM BRIST DIA5-7MM 2-6MM

## (undated) DEVICE — TUBING IRRIGATION 140/160/180/190 SER GI ENDOSCP SMARTCAP

## (undated) DEVICE — PILLOW, ABDUCTION, LARGE, 25 X 18 X 6.25 IN

## (undated) DEVICE — DRAPE, INCISE, ANTIMICROBIAL, IOBAN 2, LARGE, 17 X 23 IN, DISPOSABLE, STERILE

## (undated) DEVICE — BATH BLANKET STERILE

## (undated) DEVICE — ADHESIVE, SKIN, LIQUIBAND EXCEED